# Patient Record
Sex: MALE | Race: WHITE | Employment: OTHER | URBAN - METROPOLITAN AREA
[De-identification: names, ages, dates, MRNs, and addresses within clinical notes are randomized per-mention and may not be internally consistent; named-entity substitution may affect disease eponyms.]

---

## 2017-05-12 PROBLEM — E78.00 HYPERCHOLESTEROLEMIA: Status: ACTIVE | Noted: 2017-05-12

## 2017-05-12 PROBLEM — K63.5 POLYP OF COLON: Status: ACTIVE | Noted: 2017-05-12

## 2017-05-12 PROBLEM — M19.90 ARTHRITIS: Status: ACTIVE | Noted: 2017-05-12

## 2017-05-15 PROBLEM — E16.2 HYPOGLYCEMIA: Status: ACTIVE | Noted: 2017-05-15

## 2017-05-15 PROBLEM — E78.5 HYPERLIPIDEMIA: Status: ACTIVE | Noted: 2017-05-12

## 2018-06-07 ENCOUNTER — HOSPITAL ENCOUNTER (OUTPATIENT)
Age: 68
Discharge: HOME | End: 2018-06-09
Payer: MEDICARE

## 2018-06-07 DIAGNOSIS — Z00.00 ROUTINE GENERAL MEDICAL EXAMINATION AT A HEALTH CARE FACILITY: ICD-10-CM

## 2018-06-07 DIAGNOSIS — Z11.59 ENCOUNTER FOR HEPATITIS C SCREENING TEST FOR LOW RISK PATIENT: ICD-10-CM

## 2018-06-07 DIAGNOSIS — D64.9 ANEMIA, UNSPECIFIED TYPE: ICD-10-CM

## 2018-06-07 DIAGNOSIS — R53.83 FATIGUE, UNSPECIFIED TYPE: ICD-10-CM

## 2018-06-07 DIAGNOSIS — E16.2 HYPOGLYCEMIA: ICD-10-CM

## 2018-06-07 DIAGNOSIS — N39.0 URINARY TRACT INFECTION WITHOUT HEMATURIA, SITE UNSPECIFIED: ICD-10-CM

## 2018-06-07 DIAGNOSIS — Z12.5 SCREENING PSA (PROSTATE SPECIFIC ANTIGEN): ICD-10-CM

## 2018-06-07 DIAGNOSIS — E78.2 MIXED HYPERLIPIDEMIA: Chronic | ICD-10-CM

## 2018-06-07 DIAGNOSIS — E55.9 VITAMIN D DEFICIENCY: ICD-10-CM

## 2018-06-07 LAB
BASOPHILS ABSOLUTE: 0.03 E9/L (ref 0–0.2)
BASOPHILS RELATIVE PERCENT: 0.7 % (ref 0–2)
EOSINOPHILS ABSOLUTE: 0.18 E9/L (ref 0.05–0.5)
EOSINOPHILS RELATIVE PERCENT: 4.2 % (ref 0–6)
HCT VFR BLD CALC: 37.7 % (ref 37–54)
HEMOGLOBIN: 12.9 G/DL (ref 12.5–16.5)
IMMATURE GRANULOCYTES #: 0.01 E9/L
IMMATURE GRANULOCYTES %: 0.2 % (ref 0–5)
LYMPHOCYTES ABSOLUTE: 1.31 E9/L (ref 1.5–4)
LYMPHOCYTES RELATIVE PERCENT: 30.6 % (ref 20–42)
MCH RBC QN AUTO: 31.5 PG (ref 26–35)
MCHC RBC AUTO-ENTMCNC: 34.2 % (ref 32–34.5)
MCV RBC AUTO: 92.2 FL (ref 80–99.9)
MONOCYTES ABSOLUTE: 0.59 E9/L (ref 0.1–0.95)
MONOCYTES RELATIVE PERCENT: 13.8 % (ref 2–12)
NEUTROPHILS ABSOLUTE: 2.16 E9/L (ref 1.8–7.3)
NEUTROPHILS RELATIVE PERCENT: 50.5 % (ref 43–80)
PDW BLD-RTO: 13.5 FL (ref 11.5–15)
PLATELET # BLD: 195 E9/L (ref 130–450)
PMV BLD AUTO: 8.5 FL (ref 7–12)
RBC # BLD: 4.09 E12/L (ref 3.8–5.8)
WBC # BLD: 4.3 E9/L (ref 4.5–11.5)

## 2018-06-07 PROCEDURE — 83036 HEMOGLOBIN GLYCOSYLATED A1C: CPT

## 2018-06-07 PROCEDURE — 80074 ACUTE HEPATITIS PANEL: CPT

## 2018-06-07 PROCEDURE — 85025 COMPLETE CBC W/AUTO DIFF WBC: CPT

## 2018-06-07 PROCEDURE — G0103 PSA SCREENING: HCPCS

## 2018-06-07 PROCEDURE — 82746 ASSAY OF FOLIC ACID SERUM: CPT

## 2018-06-07 PROCEDURE — 84443 ASSAY THYROID STIM HORMONE: CPT

## 2018-06-07 PROCEDURE — 82607 VITAMIN B-12: CPT

## 2018-06-07 PROCEDURE — 84100 ASSAY OF PHOSPHORUS: CPT

## 2018-06-07 PROCEDURE — 83735 ASSAY OF MAGNESIUM: CPT

## 2018-06-07 PROCEDURE — 85651 RBC SED RATE NONAUTOMATED: CPT

## 2018-06-07 PROCEDURE — 80053 COMPREHEN METABOLIC PANEL: CPT

## 2018-06-07 PROCEDURE — 81001 URINALYSIS AUTO W/SCOPE: CPT

## 2018-06-07 PROCEDURE — 80061 LIPID PANEL: CPT

## 2018-06-07 PROCEDURE — 82306 VITAMIN D 25 HYDROXY: CPT

## 2018-06-08 LAB
ALBUMIN SERPL-MCNC: 4.7 G/DL (ref 3.5–5.2)
ALP BLD-CCNC: 31 U/L (ref 40–129)
ALT SERPL-CCNC: 15 U/L (ref 0–40)
ANION GAP SERPL CALCULATED.3IONS-SCNC: 11 MMOL/L (ref 7–16)
AST SERPL-CCNC: 26 U/L (ref 0–39)
BACTERIA: NORMAL /HPF
BILIRUB SERPL-MCNC: 0.5 MG/DL (ref 0–1.2)
BILIRUBIN URINE: NEGATIVE
BLOOD, URINE: NEGATIVE
BUN BLDV-MCNC: 7 MG/DL (ref 8–23)
CALCIUM SERPL-MCNC: 9.2 MG/DL (ref 8.6–10.2)
CHLORIDE BLD-SCNC: 93 MMOL/L (ref 98–107)
CHOLESTEROL, TOTAL: 130 MG/DL (ref 0–199)
CLARITY: CLEAR
CO2: 27 MMOL/L (ref 22–29)
COLOR: YELLOW
CREAT SERPL-MCNC: 0.8 MG/DL (ref 0.7–1.2)
FOLATE: >20 NG/ML (ref 4.8–24.2)
GFR AFRICAN AMERICAN: >60
GFR NON-AFRICAN AMERICAN: >60 ML/MIN/1.73
GLUCOSE BLD-MCNC: 132 MG/DL (ref 74–109)
GLUCOSE URINE: NEGATIVE MG/DL
HAV IGM SER IA-ACNC: NORMAL
HBA1C MFR BLD: 5.5 % (ref 4.8–5.9)
HDLC SERPL-MCNC: 68 MG/DL
HEPATITIS B CORE IGM ANTIBODY: NORMAL
HEPATITIS B SURFACE ANTIGEN INTERPRETATION: NORMAL
HEPATITIS C ANTIBODY INTERPRETATION: NORMAL
KETONES, URINE: NEGATIVE MG/DL
LDL CHOLESTEROL CALCULATED: 55 MG/DL (ref 0–99)
LEUKOCYTE ESTERASE, URINE: NEGATIVE
MAGNESIUM: 1.9 MG/DL (ref 1.6–2.6)
NITRITE, URINE: NEGATIVE
PH UA: 6 (ref 5–9)
PHOSPHORUS: 3.3 MG/DL (ref 2.5–4.5)
POTASSIUM SERPL-SCNC: 4.7 MMOL/L (ref 3.5–5)
PROSTATE SPECIFIC ANTIGEN: 1.79 NG/ML (ref 0–4)
PROTEIN UA: NEGATIVE MG/DL
RBC UA: NORMAL /HPF (ref 0–2)
SEDIMENTATION RATE, ERYTHROCYTE: 0 MM/HR (ref 0–15)
SODIUM BLD-SCNC: 131 MMOL/L (ref 132–146)
SPECIFIC GRAVITY UA: 1.01 (ref 1–1.03)
TOTAL PROTEIN: 7.2 G/DL (ref 6.4–8.3)
TRIGL SERPL-MCNC: 34 MG/DL (ref 0–149)
TSH SERPL DL<=0.05 MIU/L-ACNC: 2.76 UIU/ML (ref 0.27–4.2)
UROBILINOGEN, URINE: 0.2 E.U./DL
VITAMIN B-12: 728 PG/ML (ref 211–946)
VITAMIN D 25-HYDROXY: 26 NG/ML (ref 30–100)
VLDLC SERPL CALC-MCNC: 7 MG/DL
WBC UA: NORMAL /HPF (ref 0–5)

## 2018-08-17 ENCOUNTER — APPOINTMENT (OUTPATIENT)
Dept: GENERAL RADIOLOGY | Age: 68
DRG: 336 | End: 2018-08-17
Payer: MEDICARE

## 2018-08-17 ENCOUNTER — HOSPITAL ENCOUNTER (INPATIENT)
Age: 68
LOS: 4 days | Discharge: HOME | DRG: 336 | End: 2018-08-21
Attending: EMERGENCY MEDICINE | Admitting: INTERNAL MEDICINE
Payer: MEDICARE

## 2018-08-17 ENCOUNTER — APPOINTMENT (OUTPATIENT)
Dept: CT IMAGING | Age: 68
DRG: 336 | End: 2018-08-17
Payer: MEDICARE

## 2018-08-17 DIAGNOSIS — D72.829 LEUKOCYTOSIS, UNSPECIFIED TYPE: ICD-10-CM

## 2018-08-17 DIAGNOSIS — N17.9 ACUTE RENAL FAILURE, UNSPECIFIED ACUTE RENAL FAILURE TYPE (HCC): Primary | ICD-10-CM

## 2018-08-17 DIAGNOSIS — K56.609 SMALL BOWEL OBSTRUCTION (HCC): ICD-10-CM

## 2018-08-17 DIAGNOSIS — E87.20 LACTIC ACIDOSIS: ICD-10-CM

## 2018-08-17 DIAGNOSIS — R10.9 ABDOMINAL PAIN, UNSPECIFIED ABDOMINAL LOCATION: ICD-10-CM

## 2018-08-17 LAB
ALBUMIN SERPL-MCNC: 4.6 G/DL (ref 3.5–5.2)
ALP BLD-CCNC: 33 U/L (ref 40–129)
ALT SERPL-CCNC: 13 U/L (ref 0–40)
ANION GAP SERPL CALCULATED.3IONS-SCNC: 21 MMOL/L (ref 7–16)
AST SERPL-CCNC: 23 U/L (ref 0–39)
BACTERIA: NORMAL /HPF
BASOPHILS ABSOLUTE: 0 E9/L (ref 0–0.2)
BASOPHILS RELATIVE PERCENT: 0.2 % (ref 0–2)
BILIRUB SERPL-MCNC: 1.1 MG/DL (ref 0–1.2)
BILIRUBIN URINE: ABNORMAL
BLOOD, URINE: NEGATIVE
BUN BLDV-MCNC: 28 MG/DL (ref 8–23)
BURR CELLS: ABNORMAL
CALCIUM SERPL-MCNC: 10.3 MG/DL (ref 8.6–10.2)
CHLORIDE BLD-SCNC: 89 MMOL/L (ref 98–107)
CLARITY: ABNORMAL
CO2: 24 MMOL/L (ref 22–29)
COLOR: YELLOW
CREAT SERPL-MCNC: 2.7 MG/DL (ref 0.7–1.2)
EOSINOPHILS ABSOLUTE: 0 E9/L (ref 0.05–0.5)
EOSINOPHILS RELATIVE PERCENT: 0 % (ref 0–6)
GFR AFRICAN AMERICAN: 28
GFR NON-AFRICAN AMERICAN: 24 ML/MIN/1.73
GLUCOSE BLD-MCNC: 197 MG/DL (ref 74–109)
GLUCOSE URINE: NEGATIVE MG/DL
HCT VFR BLD CALC: 41.4 % (ref 37–54)
HEMOGLOBIN: 14.7 G/DL (ref 12.5–16.5)
INR BLD: 1.1
KETONES, URINE: 15 MG/DL
LACTIC ACID: 3.6 MMOL/L (ref 0.5–2.2)
LEUKOCYTE ESTERASE, URINE: NEGATIVE
LIPASE: 9 U/L (ref 13–60)
LYMPHOCYTES ABSOLUTE: 1.37 E9/L (ref 1.5–4)
LYMPHOCYTES RELATIVE PERCENT: 6.9 % (ref 20–42)
MAGNESIUM: 1.6 MG/DL (ref 1.6–2.6)
MCH RBC QN AUTO: 31.4 PG (ref 26–35)
MCHC RBC AUTO-ENTMCNC: 35.5 % (ref 32–34.5)
MCV RBC AUTO: 88.5 FL (ref 80–99.9)
MONOCYTES ABSOLUTE: 0 E9/L (ref 0.1–0.95)
MONOCYTES RELATIVE PERCENT: 2.6 % (ref 2–12)
NEUTROPHILS ABSOLUTE: 18.14 E9/L (ref 1.8–7.3)
NEUTROPHILS RELATIVE PERCENT: 93.1 % (ref 43–80)
NITRITE, URINE: NEGATIVE
OVALOCYTES: ABNORMAL
PDW BLD-RTO: 13.3 FL (ref 11.5–15)
PH UA: 5.5 (ref 5–9)
PLATELET # BLD: 184 E9/L (ref 130–450)
PMV BLD AUTO: 8.7 FL (ref 7–12)
POIKILOCYTES: ABNORMAL
POTASSIUM SERPL-SCNC: 4.2 MMOL/L (ref 3.5–5)
PROTEIN UA: 30 MG/DL
PROTHROMBIN TIME: 12.8 SEC (ref 9.3–12.4)
RBC # BLD: 4.68 E12/L (ref 3.8–5.8)
RBC UA: NORMAL /HPF (ref 0–2)
SODIUM BLD-SCNC: 134 MMOL/L (ref 132–146)
SPECIFIC GRAVITY UA: 1.02 (ref 1–1.03)
TOTAL PROTEIN: 7.6 G/DL (ref 6.4–8.3)
TROPONIN: <0.01 NG/ML (ref 0–0.03)
UROBILINOGEN, URINE: 0.2 E.U./DL
WBC # BLD: 19.5 E9/L (ref 4.5–11.5)
WBC UA: NORMAL /HPF (ref 0–5)

## 2018-08-17 PROCEDURE — 80053 COMPREHEN METABOLIC PANEL: CPT

## 2018-08-17 PROCEDURE — 81001 URINALYSIS AUTO W/SCOPE: CPT

## 2018-08-17 PROCEDURE — 83735 ASSAY OF MAGNESIUM: CPT

## 2018-08-17 PROCEDURE — 83605 ASSAY OF LACTIC ACID: CPT

## 2018-08-17 PROCEDURE — 96374 THER/PROPH/DIAG INJ IV PUSH: CPT

## 2018-08-17 PROCEDURE — 84484 ASSAY OF TROPONIN QUANT: CPT

## 2018-08-17 PROCEDURE — 2580000003 HC RX 258: Performed by: NURSE PRACTITIONER

## 2018-08-17 PROCEDURE — 6360000002 HC RX W HCPCS: Performed by: NURSE PRACTITIONER

## 2018-08-17 PROCEDURE — 6370000000 HC RX 637 (ALT 250 FOR IP): Performed by: NURSE PRACTITIONER

## 2018-08-17 PROCEDURE — 74176 CT ABD & PELVIS W/O CONTRAST: CPT

## 2018-08-17 PROCEDURE — 71046 X-RAY EXAM CHEST 2 VIEWS: CPT

## 2018-08-17 PROCEDURE — 1200000000 HC SEMI PRIVATE

## 2018-08-17 PROCEDURE — 36415 COLL VENOUS BLD VENIPUNCTURE: CPT

## 2018-08-17 PROCEDURE — 96375 TX/PRO/DX INJ NEW DRUG ADDON: CPT

## 2018-08-17 PROCEDURE — 83690 ASSAY OF LIPASE: CPT

## 2018-08-17 PROCEDURE — 99285 EMERGENCY DEPT VISIT HI MDM: CPT

## 2018-08-17 PROCEDURE — 85025 COMPLETE CBC W/AUTO DIFF WBC: CPT

## 2018-08-17 PROCEDURE — 85610 PROTHROMBIN TIME: CPT

## 2018-08-17 RX ORDER — 0.9 % SODIUM CHLORIDE 0.9 %
500 INTRAVENOUS SOLUTION INTRAVENOUS ONCE
Status: COMPLETED | OUTPATIENT
Start: 2018-08-17 | End: 2018-08-17

## 2018-08-17 RX ORDER — MORPHINE SULFATE 4 MG/ML
4 INJECTION, SOLUTION INTRAMUSCULAR; INTRAVENOUS ONCE
Status: COMPLETED | OUTPATIENT
Start: 2018-08-17 | End: 2018-08-17

## 2018-08-17 RX ORDER — ONDANSETRON 2 MG/ML
4 INJECTION INTRAMUSCULAR; INTRAVENOUS ONCE
Status: COMPLETED | OUTPATIENT
Start: 2018-08-17 | End: 2018-08-17

## 2018-08-17 RX ORDER — 0.9 % SODIUM CHLORIDE 0.9 %
1000 INTRAVENOUS SOLUTION INTRAVENOUS ONCE
Status: COMPLETED | OUTPATIENT
Start: 2018-08-17 | End: 2018-08-17

## 2018-08-17 RX ADMIN — SODIUM CHLORIDE 500 ML: 9 INJECTION, SOLUTION INTRAVENOUS at 19:35

## 2018-08-17 RX ADMIN — LIDOCAINE HYDROCHLORIDE: 20 SOLUTION ORAL; TOPICAL at 21:03

## 2018-08-17 RX ADMIN — MORPHINE SULFATE 4 MG: 4 INJECTION INTRAVENOUS at 19:34

## 2018-08-17 RX ADMIN — SODIUM CHLORIDE 1000 ML: 9 INJECTION, SOLUTION INTRAVENOUS at 20:30

## 2018-08-17 RX ADMIN — ONDANSETRON 4 MG: 2 INJECTION INTRAMUSCULAR; INTRAVENOUS at 19:35

## 2018-08-17 ASSESSMENT — PAIN DESCRIPTION - DESCRIPTORS: DESCRIPTORS: ACHING;DISCOMFORT;DULL

## 2018-08-17 ASSESSMENT — PAIN SCALES - GENERAL: PAINLEVEL_OUTOF10: 8

## 2018-08-17 ASSESSMENT — PAIN DESCRIPTION - PAIN TYPE: TYPE: ACUTE PAIN

## 2018-08-17 ASSESSMENT — PAIN DESCRIPTION - LOCATION: LOCATION: ABDOMEN

## 2018-08-17 NOTE — ED PROVIDER NOTES
EXAM--------------------------------------    Constitutional/General: Alert and oriented x3, well appearing, non toxic in NAD  Head: Normocephalic and atraumatic  Eyes: PERRL, EOMI  Mouth: Oropharynx clear, handling secretions, no trismus  Neck: Supple, full ROM, non tender to palpation in the midline, no stridor, no crepitus, no meningeal signs  Pulmonary: Lungs clear to auscultation bilaterally, no wheezes, rales, or rhonchi. Not in respiratory distress  Cardiovascular:  Tachycardic rate. Regular rhythm. No murmurs, gallops, or rubs. 2+ distal pulses  Chest: no chest wall tenderness  Abdomen: Soft. Diffuse tenderness throughout. Non distended. No rebound, guarding, or rigidity. No pulsatile masses appreciated. Tympanic sounding. Absent bowel sounds  Musculoskeletal: Moves all extremities x 4. Warm and well perfused, no clubbing, cyanosis, or edema. Capillary refill <3 seconds  Skin: warm and dry. No rashes. Neurologic: GCS 15, CN 2-12 grossly intact, no focal deficits, symmetric strength 5/5 in the upper and lower extremities bilaterally  Psych: Normal Affect    -------------------------------------------------- RESULTS -------------------------------------------------  I have personally reviewed all laboratory and imaging results for this patient. Results are listed below.      LABS:  Results for orders placed or performed during the hospital encounter of 08/17/18   Troponin   Result Value Ref Range    Troponin <0.01 0.00 - 0.03 ng/mL   CBC Auto Differential   Result Value Ref Range    WBC 19.5 (H) 4.5 - 11.5 E9/L    RBC 4.68 3.80 - 5.80 E12/L    Hemoglobin 14.7 12.5 - 16.5 g/dL    Hematocrit 41.4 37.0 - 54.0 %    MCV 88.5 80.0 - 99.9 fL    MCH 31.4 26.0 - 35.0 pg    MCHC 35.5 (H) 32.0 - 34.5 %    RDW 13.3 11.5 - 15.0 fL    Platelets 950 028 - 922 E9/L    MPV 8.7 7.0 - 12.0 fL    Neutrophils % 93.1 (H) 43.0 - 80.0 %    Lymphocytes % 6.9 (L) 20.0 - 42.0 %    Monocytes % 2.6 2.0 - 12.0 %    Eosinophils % 0.0 and reevaluate. 2120- call to Dr. Hector Ahuja updated on patient's clinical presentation physical exam and diagnostic results will his service to a general medical bed with diagnosis of small bowel obstruction. 2135- Call to Dr. Blaise Lares, made aware of the consult request    Re-Evaluations:             Re-evaluation. Patients symptoms show no change      Consultations:                 Critical Care: This patient's ED course included: a personal history and physicial examination, re-evaluation prior to disposition, multiple bedside re-evaluations and a personal history and physicial eaxmination    This patient has remained hemodynamically stable and remained unchanged during their ED course. Counseling: The emergency provider has spoken with the patient and spouse/SO and discussed todays results, in addition to providing specific details for the plan of care and counseling regarding the diagnosis and prognosis. Questions are answered at this time and they are agreeable with the plan.       --------------------------------- IMPRESSION AND DISPOSITION ---------------------------------    IMPRESSION  1. Acute renal failure, unspecified acute renal failure type ([address]    2. Lactic acidosis    3. Abdominal pain, unspecified abdominal location    4. Leukocytosis, unspecified type    5. Small bowel obstruction ([address]        DISPOSITION  Disposition: Admit to med/surg floor  Patient condition is stable        NOTE: This report was transcribed using voice recognition software.  Every effort was made to ensure accuracy; however, inadvertent computerized transcription errors may be present         LINDSEY Maria CNP  08/17/18 2125       LINDSEY Maria CNP  08/17/18 2142

## 2018-08-18 LAB
ALBUMIN SERPL-MCNC: 3.7 G/DL (ref 3.5–5.2)
ALP BLD-CCNC: 29 U/L (ref 40–129)
ALT SERPL-CCNC: 10 U/L (ref 0–40)
ANION GAP SERPL CALCULATED.3IONS-SCNC: 14 MMOL/L (ref 7–16)
ANISOCYTOSIS: ABNORMAL
AST SERPL-CCNC: 19 U/L (ref 0–39)
BASOPHILS ABSOLUTE: 0 E9/L (ref 0–0.2)
BASOPHILS RELATIVE PERCENT: 0.2 % (ref 0–2)
BILIRUB SERPL-MCNC: 0.9 MG/DL (ref 0–1.2)
BUN BLDV-MCNC: 36 MG/DL (ref 8–23)
BURR CELLS: ABNORMAL
CALCIUM SERPL-MCNC: 9.6 MG/DL (ref 8.6–10.2)
CHLORIDE BLD-SCNC: 91 MMOL/L (ref 98–107)
CO2: 27 MMOL/L (ref 22–29)
CREAT SERPL-MCNC: 2.1 MG/DL (ref 0.7–1.2)
EOSINOPHILS ABSOLUTE: 0 E9/L (ref 0.05–0.5)
EOSINOPHILS RELATIVE PERCENT: 0 % (ref 0–6)
GFR AFRICAN AMERICAN: 38
GFR NON-AFRICAN AMERICAN: 31 ML/MIN/1.73
GLUCOSE BLD-MCNC: 163 MG/DL (ref 74–109)
HCT VFR BLD CALC: 37.3 % (ref 37–54)
HEMOGLOBIN: 13 G/DL (ref 12.5–16.5)
LACTIC ACID: 1.1 MMOL/L (ref 0.5–2.2)
LYMPHOCYTES ABSOLUTE: 0.88 E9/L (ref 1.5–4)
LYMPHOCYTES RELATIVE PERCENT: 6 % (ref 20–42)
MCH RBC QN AUTO: 31.1 PG (ref 26–35)
MCHC RBC AUTO-ENTMCNC: 34.9 % (ref 32–34.5)
MCV RBC AUTO: 89.2 FL (ref 80–99.9)
METER GLUCOSE: 139 MG/DL (ref 70–110)
METER GLUCOSE: 164 MG/DL (ref 70–110)
MONOCYTES ABSOLUTE: 0.44 E9/L (ref 0.1–0.95)
MONOCYTES RELATIVE PERCENT: 2.6 % (ref 2–12)
NEUTROPHILS ABSOLUTE: 13.43 E9/L (ref 1.8–7.3)
NEUTROPHILS RELATIVE PERCENT: 91.5 % (ref 43–80)
OVALOCYTES: ABNORMAL
PDW BLD-RTO: 13.6 FL (ref 11.5–15)
PLATELET # BLD: 163 E9/L (ref 130–450)
PMV BLD AUTO: 8.4 FL (ref 7–12)
POIKILOCYTES: ABNORMAL
POTASSIUM SERPL-SCNC: 3.9 MMOL/L (ref 3.5–5)
RBC # BLD: 4.18 E12/L (ref 3.8–5.8)
SODIUM BLD-SCNC: 132 MMOL/L (ref 132–146)
TOTAL PROTEIN: 6.3 G/DL (ref 6.4–8.3)
WBC # BLD: 14.6 E9/L (ref 4.5–11.5)

## 2018-08-18 PROCEDURE — 6370000000 HC RX 637 (ALT 250 FOR IP): Performed by: FAMILY MEDICINE

## 2018-08-18 PROCEDURE — 1200000000 HC SEMI PRIVATE

## 2018-08-18 PROCEDURE — 83605 ASSAY OF LACTIC ACID: CPT

## 2018-08-18 PROCEDURE — 36415 COLL VENOUS BLD VENIPUNCTURE: CPT

## 2018-08-18 PROCEDURE — 6370000000 HC RX 637 (ALT 250 FOR IP): Performed by: INTERNAL MEDICINE

## 2018-08-18 PROCEDURE — 2580000003 HC RX 258: Performed by: INTERNAL MEDICINE

## 2018-08-18 PROCEDURE — 99223 1ST HOSP IP/OBS HIGH 75: CPT | Performed by: SURGERY

## 2018-08-18 PROCEDURE — 85025 COMPLETE CBC W/AUTO DIFF WBC: CPT

## 2018-08-18 PROCEDURE — 82962 GLUCOSE BLOOD TEST: CPT

## 2018-08-18 PROCEDURE — 2580000003 HC RX 258: Performed by: FAMILY MEDICINE

## 2018-08-18 PROCEDURE — 6360000002 HC RX W HCPCS: Performed by: FAMILY MEDICINE

## 2018-08-18 PROCEDURE — 80053 COMPREHEN METABOLIC PANEL: CPT

## 2018-08-18 PROCEDURE — C9113 INJ PANTOPRAZOLE SODIUM, VIA: HCPCS | Performed by: FAMILY MEDICINE

## 2018-08-18 RX ORDER — ACETAMINOPHEN 325 MG/1
650 TABLET ORAL EVERY 4 HOURS PRN
Status: DISCONTINUED | OUTPATIENT
Start: 2018-08-18 | End: 2018-08-21 | Stop reason: HOSPADM

## 2018-08-18 RX ORDER — 0.9 % SODIUM CHLORIDE 0.9 %
10 VIAL (ML) INJECTION DAILY
Status: DISCONTINUED | OUTPATIENT
Start: 2018-08-18 | End: 2018-08-21

## 2018-08-18 RX ORDER — ATORVASTATIN CALCIUM 40 MG/1
40 TABLET, FILM COATED ORAL DAILY
Status: DISCONTINUED | OUTPATIENT
Start: 2018-08-18 | End: 2018-08-21 | Stop reason: HOSPADM

## 2018-08-18 RX ORDER — ONDANSETRON 2 MG/ML
4 INJECTION INTRAMUSCULAR; INTRAVENOUS EVERY 6 HOURS PRN
Status: DISCONTINUED | OUTPATIENT
Start: 2018-08-18 | End: 2018-08-21 | Stop reason: HOSPADM

## 2018-08-18 RX ORDER — HEPARIN SODIUM 5000 [USP'U]/ML
5000 INJECTION, SOLUTION INTRAVENOUS; SUBCUTANEOUS EVERY 8 HOURS SCHEDULED
Status: DISCONTINUED | OUTPATIENT
Start: 2018-08-18 | End: 2018-08-21 | Stop reason: HOSPADM

## 2018-08-18 RX ORDER — DEXTROSE MONOHYDRATE 50 MG/ML
100 INJECTION, SOLUTION INTRAVENOUS PRN
Status: DISCONTINUED | OUTPATIENT
Start: 2018-08-18 | End: 2018-08-21 | Stop reason: HOSPADM

## 2018-08-18 RX ORDER — 0.9 % SODIUM CHLORIDE 0.9 %
1000 INTRAVENOUS SOLUTION INTRAVENOUS ONCE
Status: COMPLETED | OUTPATIENT
Start: 2018-08-18 | End: 2018-08-18

## 2018-08-18 RX ORDER — PANTOPRAZOLE SODIUM 40 MG/10ML
40 INJECTION, POWDER, LYOPHILIZED, FOR SOLUTION INTRAVENOUS DAILY
Status: DISCONTINUED | OUTPATIENT
Start: 2018-08-18 | End: 2018-08-21

## 2018-08-18 RX ORDER — DEXTROSE MONOHYDRATE 25 G/50ML
12.5 INJECTION, SOLUTION INTRAVENOUS PRN
Status: DISCONTINUED | OUTPATIENT
Start: 2018-08-18 | End: 2018-08-21 | Stop reason: HOSPADM

## 2018-08-18 RX ORDER — SODIUM CHLORIDE 9 MG/ML
INJECTION, SOLUTION INTRAVENOUS CONTINUOUS
Status: DISCONTINUED | OUTPATIENT
Start: 2018-08-18 | End: 2018-08-19

## 2018-08-18 RX ORDER — SODIUM CHLORIDE 9 MG/ML
INJECTION, SOLUTION INTRAVENOUS CONTINUOUS
Status: DISCONTINUED | OUTPATIENT
Start: 2018-08-18 | End: 2018-08-18

## 2018-08-18 RX ORDER — FENTANYL CITRATE 50 UG/ML
50 INJECTION, SOLUTION INTRAMUSCULAR; INTRAVENOUS
Status: DISCONTINUED | OUTPATIENT
Start: 2018-08-18 | End: 2018-08-21 | Stop reason: HOSPADM

## 2018-08-18 RX ORDER — NICOTINE POLACRILEX 4 MG
15 LOZENGE BUCCAL PRN
Status: DISCONTINUED | OUTPATIENT
Start: 2018-08-18 | End: 2018-08-21 | Stop reason: HOSPADM

## 2018-08-18 RX ADMIN — ATORVASTATIN CALCIUM 40 MG: 40 TABLET, FILM COATED ORAL at 08:46

## 2018-08-18 RX ADMIN — SODIUM CHLORIDE: 9 INJECTION, SOLUTION INTRAVENOUS at 23:44

## 2018-08-18 RX ADMIN — FENTANYL CITRATE 50 MCG: 50 INJECTION INTRAMUSCULAR; INTRAVENOUS at 10:03

## 2018-08-18 RX ADMIN — PANTOPRAZOLE SODIUM 40 MG: 40 INJECTION, POWDER, FOR SOLUTION INTRAVENOUS at 09:33

## 2018-08-18 RX ADMIN — HEPARIN SODIUM 5000 UNITS: 5000 INJECTION, SOLUTION INTRAVENOUS; SUBCUTANEOUS at 13:24

## 2018-08-18 RX ADMIN — FENTANYL CITRATE 50 MCG: 50 INJECTION INTRAMUSCULAR; INTRAVENOUS at 05:25

## 2018-08-18 RX ADMIN — HEPARIN SODIUM 5000 UNITS: 5000 INJECTION, SOLUTION INTRAVENOUS; SUBCUTANEOUS at 20:07

## 2018-08-18 RX ADMIN — FENTANYL CITRATE 50 MCG: 50 INJECTION INTRAMUSCULAR; INTRAVENOUS at 03:28

## 2018-08-18 RX ADMIN — FENTANYL CITRATE 50 MCG: 50 INJECTION INTRAMUSCULAR; INTRAVENOUS at 00:55

## 2018-08-18 RX ADMIN — SODIUM CHLORIDE 1000 ML: 9 INJECTION, SOLUTION INTRAVENOUS at 16:22

## 2018-08-18 RX ADMIN — FENTANYL CITRATE 50 MCG: 50 INJECTION INTRAMUSCULAR; INTRAVENOUS at 08:00

## 2018-08-18 RX ADMIN — FENTANYL CITRATE 50 MCG: 50 INJECTION INTRAMUSCULAR; INTRAVENOUS at 13:43

## 2018-08-18 RX ADMIN — BENZOCAINE, BUTAMBEN, AND TETRACAINE HYDROCHLORIDE 1 SPRAY: .028; .004; .004 AEROSOL, SPRAY TOPICAL at 19:29

## 2018-08-18 RX ADMIN — SODIUM CHLORIDE: 9 INJECTION, SOLUTION INTRAVENOUS at 09:31

## 2018-08-18 RX ADMIN — ONDANSETRON HYDROCHLORIDE 4 MG: 2 INJECTION, SOLUTION INTRAMUSCULAR; INTRAVENOUS at 07:55

## 2018-08-18 RX ADMIN — Medication 10 ML: at 09:35

## 2018-08-18 RX ADMIN — ONDANSETRON HYDROCHLORIDE 4 MG: 2 INJECTION, SOLUTION INTRAMUSCULAR; INTRAVENOUS at 00:56

## 2018-08-18 RX ADMIN — Medication 10 ML: at 10:04

## 2018-08-18 RX ADMIN — SODIUM CHLORIDE: 9 INJECTION, SOLUTION INTRAVENOUS at 01:00

## 2018-08-18 ASSESSMENT — PAIN SCALES - GENERAL
PAINLEVEL_OUTOF10: 4
PAINLEVEL_OUTOF10: 8
PAINLEVEL_OUTOF10: 10
PAINLEVEL_OUTOF10: 8
PAINLEVEL_OUTOF10: 8
PAINLEVEL_OUTOF10: 6
PAINLEVEL_OUTOF10: 8
PAINLEVEL_OUTOF10: 8

## 2018-08-18 ASSESSMENT — PAIN DESCRIPTION - PAIN TYPE
TYPE: ACUTE PAIN
TYPE: ACUTE PAIN

## 2018-08-18 ASSESSMENT — PAIN DESCRIPTION - FREQUENCY
FREQUENCY: CONTINUOUS
FREQUENCY: CONTINUOUS

## 2018-08-18 ASSESSMENT — PAIN DESCRIPTION - DESCRIPTORS
DESCRIPTORS: ACHING;CONSTANT;DISCOMFORT
DESCRIPTORS: ACHING;DISCOMFORT;SORE;TENDER

## 2018-08-18 ASSESSMENT — PAIN DESCRIPTION - LOCATION
LOCATION: ABDOMEN
LOCATION: ABDOMEN

## 2018-08-18 ASSESSMENT — PAIN DESCRIPTION - ONSET
ONSET: ON-GOING
ONSET: ON-GOING

## 2018-08-18 ASSESSMENT — PAIN DESCRIPTION - PROGRESSION: CLINICAL_PROGRESSION: NOT CHANGED

## 2018-08-18 ASSESSMENT — PAIN DESCRIPTION - ORIENTATION: ORIENTATION: MID

## 2018-08-18 NOTE — CONSULTS
trimethobenzamide (TIGAN) injection 200 mg  200 mg Intramuscular Q6H PRN Clemencia Stephanie, DO        acetaminophen (TYLENOL) tablet 650 mg  650 mg Oral Q4H PRN Clemencia Stephanie, DO        glucose (GLUTOSE) 40 % oral gel 15 g  15 g Oral PRN Clemencia Stephanie, DO        dextrose 50 % solution 12.5 g  12.5 g Intravenous PRN Clemencia Stephanie, DO        glucagon (rDNA) injection 1 mg  1 mg Intramuscular PRN Clemencia Stephanie, DO        dextrose 5 % solution  100 mL/hr Intravenous PRN Clemencia Stephanie, DO        fentaNYL (SUBLIMAZE) injection 50 mcg  50 mcg Intravenous Q2H PRN Clemencia Stephanie, DO   50 mcg at 08/18/18 1003       No Known Allergies    The patient has a family history that is negative for severe cardiovascular or respiratory issues, negative for reaction to anesthesia.     Social History     Social History    Marital status:      Spouse name: Hao Barry Number of children: 2    Years of education: 16     Occupational History    -retired      Social History Main Topics    Smoking status: Former Smoker     Quit date: 2/13/2011    Smokeless tobacco: Never Used    Alcohol use 6.0 oz/week     10 Cans of beer per week      Comment: moderate 3-4 per day    Drug use: No    Sexual activity: Yes     Partners: Female     Other Topics Concern    Not on file     Social History Narrative    No narrative on file           Review of Systems  General ROS: negative for - chills, fatigue or fever  ENT ROS: negative for - headaches, hearing change or nasal congestion  Endocrine ROS: negative for - breast changes or galactorrhea, no polyuria  Respiratory ROS: negative for - hemoptysis, orthopnea or pleuritic pain  Cardiovascular ROS: negative for - dyspnea on exertion, edema or loss of consciousness  Gastrointestinal ROS: negative for - blood in stools, heartburn, hematemesis or melena  : no polyuria, no dysuria  Musculoskeletal ROS: negative for - gait

## 2018-08-18 NOTE — H&P
INTERNAL MEDICINE H&P  2018  Name:  Sam Ramirez  :    (70 y.o.)  MRN:   [id number]    Chief Complaint   Patient presents with    Emesis     vomiting abd pain denies any diarrhea         HPI:  The patient is a 70 y.o. male presents with abdominal pain and bloating that began yesterday. He noticed swelling in his belly yesterday afternoon. His last episode of emesis was this morning, his last BM was this morning, he regularly goes every day. He ate a bowl of cereal at 5:30pm, prior to coming to the ED. He admits to burping. He denies diarrhea, fever, chills, cheat pain, dysuria. 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 [facility] 2015 - Extended pylorus preserving whipple procedure with pancreaticojejunostomy, choledochojejunostomy, duodenojejunostomy, placement of nasoenteric feeding tube into the distal jejenum, intraoperative pancreatic ultrasound. He has a hx of duodenitis, esophagitis, colon polyps, c diff, ALFRED.         Past Medical History:    Past Medical History:   Diagnosis Date    Arthritis     GERD (gastroesophageal reflux disease) 2015    Hyperlipidemia     Hypoglycemia     Mixed hyperlipidemia 2015    Nausea & vomiting 2015    Pneumonia 2015       Past Surgical History:   Past Surgical History:   Procedure Laterality Date    COLONOSCOPY      INCISIONAL HERNIA REPAIR N/A 10/13/2016    INGUINAL HERNIA REPAIR Left 2013    laparoscopic left inguinal hernia repair    PANCREAS SURGERY      Whipple procedure    SHOULDER SURGERY      right cyst removed 1965    SKIN BIOPSY      UPPER GASTROINTESTINAL ENDOSCOPY         Social History:  Social History     Social History    Marital status:      Spouse name: Vivian Molina Number of children: 2    Years of education: 16     Occupational History    Building -retired      Social History Main Topics    Smoking status: Former Smoker     Quit date: 2011    Smokeless tobacco: Never Used    Alcohol use 6.0 oz/week     10 Cans of beer per week      Comment: moderate 3-4 per day    Drug use: No    Sexual activity: Yes     Partners: Female     Other Topics Concern    None     Social History Narrative    None       Family History:   Family History   Problem Relation Age of Onset    High Cholesterol Mother     Stroke Mother     Other Father          in , ? pneumonia and prostate cancer.  Diabetes Brother     Mental Illness Brother     Stroke Brother     Cancer Brother        Allergies:    No Known Allergies    Medications Prior to Admission:   Prior to Admission medications    Medication Sig Start Date End Date Taking? Authorizing Provider   temazepam (RESTORIL) 15 MG capsule Take 1 capsule by mouth nightly as needed for Sleep for up to 30 days. . 18  Aung Mishra,    omega-3 acid ethyl esters (LOVAZA) 1 g capsule TAKE 1 CAPSULE BY MOUTH FOUR TIMES DAILY 18   Aung Mishra DO   atorvastatin (LIPITOR) 40 MG tablet TAKE 1 TABLET BY MOUTH EVERY DAY 18   Aung Mishra DO   celecoxib (CELEBREX) 200 MG capsule TAKE 1 CAPSULE BY MOUTH EVERY DAY 18   Aung Mishra DO   pantoprazole (PROTONIX) 40 MG tablet TAKE 1 TABLET BY MOUTH EVERY DAY 18   Aung Mishra, DO   ezetimibe (ZETIA) 10 MG tablet Take 1 tablet by mouth daily 18   Aung Mishra DO   NONFORMULARY Take 1 capsule by mouth daily    Historical Provider, MD   NONFORMULARY Take 1 capsule by mouth daily    Historical Provider, MD   Flaxseed, Linseed, (FLAX SEEDS PO) Take 1 capsule by mouth daily    Historical Provider, MD Yeboah Ballinger EXTR Take 1 capsule by mouth daily    Historical Provider, MD   therapeutic multivitamin-minerals (THERAGRAN-M) tablet Take 1 tablet by mouth daily.     Historical Provider, MD       REVIEW OF SYSTEMS:  General ROS: negative  Hematological and Lymphatic ROS: negative  Respiratory ROS: negative  Cardiovascular ROS: negative  Gastrointestinal ROS: positive for - appetite loss, gas/bloating and nausea/vomiting  Genito-Urinary ROS: negative  Musculoskeletal ROS: negative  ENT ROS: negative  Endocrine ROS: negative  Dermatological ROS: negative    PHYSICAL EXAM:  Vitals:    08/17/18 2119   BP: 127/62   Pulse: 89   Resp: 16   Temp:    SpO2: 98%       General Appearance:  awake, alert, oriented, in no acute distress and appears older than stated age  Skin:  Skin color, texture, turgor normal. No rashes or lesions. Head/face:  NCAT  Eyes:  No gross abnormalities.  and PERRL  Lungs:  RA,no couhg, CTAb, no WRR  Heart:  RRR, no murmur, no peripheral edema, peripheral pulses intact  Abdomen:  Distended, soft, no guarding, non tender, scattered BS, burping  Extremities: no edema, no calf tenderness  Neurologic: CNs grossly intact, no slurred speech, A&Ox3  Musculoskeletal: good ROM, no acute deformities      CBC:   Lab Results   Component Value Date    WBC 19.5 08/17/2018    RBC 4.68 08/17/2018    HGB 14.7 08/17/2018    HCT 41.4 08/17/2018    MCV 88.5 08/17/2018    MCH 31.4 08/17/2018    MCHC 35.5 08/17/2018    RDW 13.3 08/17/2018     08/17/2018    MPV 8.7 08/17/2018     BMP:   Lab Results   Component Value Date     08/17/2018    K 4.2 08/17/2018    CL 89 08/17/2018    CO2 24 08/17/2018    BUN 28 08/17/2018    LABALBU 4.6 08/17/2018    CREATININE 2.7 08/17/2018    CALCIUM 10.3 08/17/2018    GFRAA 28 08/17/2018    LABGLOM 24 08/17/2018    GLUCOSE 197 08/17/2018     Hepatic Function Panel:    Lab Results   Component Value Date    ALKPHOS 33 08/17/2018    AST 23 08/17/2018    ALT 13 08/17/2018    PROT 7.6 08/17/2018    LABALBU 4.6 08/17/2018    BILITOT 1.1 08/17/2018     PT/INR:   Lab Results   Component Value Date    PROTIME 12.8 08/17/2018    INR 1.1 08/17/2018     U/A:   Lab Results   Component Value Date    LEUKOCYTESUR Abdominal pain FINDINGS: PA and lateral views of the chest compared to 1/3/2015. Normal-sized heart. Normal caliber pulmonary vessels. Small multifocal parenchymal opacities posteriorly to left lung base. Multilevel spondylosis. No free peritoneal air. Focal elevation right hemidiaphragm anteriorly. Small left lower lobe airspace disease. Assessment and Plan:  68yo male with abdominal pain possibly 2/2 to internal hernia  HLD  Hx whipple 2015 for intraductal papillary mucinous neoplasm  Hx hypoglycemia, A1c 5.5 6/7/2018  Hx bilateral inguinal hernia, incisional hernia and lap roxana      Admit to med surg  General surgery consulted from ED  Clear liquid diet  AM labs  IVF NS 100c  IV fentanyl for pain  Monitor abdomen/bowel function  Home medications reviewed  DVT/GI prophylaxis  Will discuss with Attending Physician. Tracey Pino DO 10:15 PM, 8/17/2018       I  discussed the patient's management with the resident. I reviewed the resident's note and agree with the documented findings and plan of care.     Shellee Curling, D.O., FACOI  10:30 PM  8/17/19

## 2018-08-18 NOTE — PROGRESS NOTES
Nutrition Assessment    Type and Reason for Visit: Initial, Positive Nutrition Screen    Nutrition Recommendations: Progress diet when medically appropriate. Malnutrition Assessment:  · Malnutrition Status: At risk for malnutrition  · Context: Acute illness or injury  · Findings of the 6 clinical characteristics of malnutrition (Minimum of 2 out of 6 clinical characteristics is required to make the diagnosis of moderate or severe Protein Calorie Malnutrition based on AND/ASPEN Guidelines):  1. Energy Intake-Less than or equal to 75%,  (1-2 days per pt)    2. Weight Loss-No significant weight loss,  (5 months)  3. Fat Loss-No significant subcutaneous fat loss,    4. Muscle Loss-No significant muscle mass loss,    5. Fluid Accumulation-No significant fluid accumulation,    6.   Strength-Not measured    Nutrition Diagnosis:   · Problem: Inadequate oral intake  · Etiology: related to Alteration in GI function (mild SBO noted in physician notes)     Signs and symptoms:  as evidenced by NPO status due to medical condition, Patient report of, Nausea, Vomiting (Pt reported poor appetite, N/V 1-2 days PTA.)    Nutrition Assessment:  · Nutrition-Focused Physical Findings: alert, abdomen distended/soft/tender, emesis, active bowel sounds, edema WDL, -I/O  · Wound Type: None  · Current Nutrition Therapies:  · Oral Diet Orders: NPO   · Oral Diet intake: NPO  · Oral Nutrition Supplement (ONS) Orders: None  · Anthropometric Measures:  · Ht: 5' 6\" (167.6 cm)   · Current Body Wt: 155 lb 9 oz (70.6 kg) (8/17 actual)  · Admission Body Wt: 155 lb 9 oz (70.6 kg) (8/17 actual)  · Usual Body Wt: 166 lb 8 oz (75.5 kg) (3/2018 no method given EMR)  · % Weight Change: 6.6% loss,  5 months  · Ideal Body Wt: 142 lb (64.4 kg), % Ideal Body 109%  · BMI Classification: BMI 25.0 - 29.9 Overweight  · Comparative Standards (Estimated Nutrition Needs):  · Estimated Daily Total Kcal: 3800-6900 (x 1.1-1.2 SF)  · Estimated Daily Protein (g):

## 2018-08-18 NOTE — PROGRESS NOTES
bowel.  Due to lack of IV contrast, suboptimal evaluation of the blood vessels and solid organs. Automated dose control. Severely distended stomach and mid and proximal small bowel. Small bowel dilated up to about 5 cm diameter. Transition point right lower quadrant associated with whirled configuration of the mesentery. Distal small bowel and colon normal caliber. Bile ducts and urinary tract normal caliber. Normal-sized heart. Atherosclerosis including coronary arteries. Multifocal opacities in the lingula and left lower lobe and posterior aspect of the right lower lobe. 3 mm calculus centrally lower pole right kidney. Relatively atrophic pancreas neck and proximal body region of the pancreas not well delineated. There appears to be a low density mass involving this region measuring up to least 47 x 32 mm in diameter. Metallic clips in this region. Within the limits of a noncontrast examination, no focal abnormality other abdominal solid organs. No abdominal or pelvic fluid collection or abnormally enlarged lymph nodes Multilevel spondylosis spondylosis. Biconcave compression deformities at L4 and L5 vertebra. 1. Mid small bowel obstruction probably related to internal hernia right lower quadrant. 2. Probable mass in the region of the pancreatic neck. 3. Multifocal airspace disease. Xr Chest Standard (2 Vw)    Result Date: 8/17/2018  Reading location: 200 INDICATION: Abdominal pain FINDINGS: PA and lateral views of the chest compared to 1/3/2015. Normal-sized heart. Normal caliber pulmonary vessels. Small multifocal parenchymal opacities posteriorly to left lung base. Multilevel spondylosis. No free peritoneal air. Focal elevation right hemidiaphragm anteriorly. Small left lower lobe airspace disease.       Physical Exam:  I/O this shift:  In: -   Out: 900 [Emesis/NG output:900]    Intake/Output Summary (Last 24 hours) at 08/18/18 1648  Last data filed at 08/18/18 1521   Gross per 24 hour   Intake 0 ml   Output             2900 ml   Net            -2900 ml   I/O last 3 completed shifts: In: 0   Out: 2000 [Urine:300; Emesis/NG output:1700]  Patient Vitals for the past 96 hrs (Last 3 readings):   Weight   08/17/18 1901 155 lb 9 oz (70.6 kg)       Vital Signs:   Blood pressure 116/62, pulse 87, temperature 99.4 °F (37.4 °C), temperature source Oral, resp. rate 20, height 5' 6\" (1.676 m), weight 155 lb 9 oz (70.6 kg), SpO2 92 %. General appearance:   Jelena Martinez is a 70 y. o.  male who is alert, responsive, oriented to person, place, and time. Psych:   Behavior is normal. Mood appears normal. Speech is not rapid and/or pressured. HEENT:   PERRLA. EOMI. Sclera clear. Buccal mucosa moist. NG tube left nares. Neck:    Supple. Trachea midline. No thyromegaly. No JVD. No bruits. Heart:    Rhythm regular, rate controlled. No murmurs. Lungs:  Symmetrical. Clear to auscultation bilaterally. No wheezes. No rhonchi. No rales. Abdomen:   Soft. Minimally tender. Softly distended Bowel sounds positive. No organomegaly or masses. Extremities:    Peripheral pulses present. No peripheral edema. No ulcers. Neurologic:    Alert x 3. No focal deficit. Cranial nerves grossly intact. Skin:    No rashes or lesions    Wound Documentation:   Incision 02/19/13 Abdomen  (Active)   Number of days: 2006       Incision 10/18/16 Abdomen (Active)   Number of days: 78[address] [facility] Medical Problems:  Past Medical History:   Diagnosis Date    Arthritis     GERD (gastroesophageal reflux disease) 1/4/2015    Hyperlipidemia     Hypoglycemia     Mixed hyperlipidemia 1/4/2015    Nausea & vomiting 1/4/2015    Pneumonia 1/4/2015     Active Problems:    Abdominal pain  Resolved Problems:    * No resolved hospital problems. *      Assessment:  1. Small bowel obstruction  2. Abdominal pain.    3. Acute kidney injury in the setting of decreased oral intake secondary to small bowel obstruction  4. Leukocytosis secondary to #1   5. Hyperlipidemia  6. Hx whipple 2015 for intraductal papillary mucinous neoplasm  7. Hx hypoglycemia, A1c 5.5 6/7/2018  8. Hx bilateral inguinal hernia, incisional hernia and lap roxana  9. Gastroesophageal reflux disease  10. Vitamin D deficiency      Plan:    Gen. surgery is following the patient and recommendations a been reviewed. Patient did have good relief status post NG tube placement. Depending on patient's stability tomorrow he may require surgical intervention. We'll await further recommendation from general surgery. Encourage the patient to increase activity. We'll permit ice chips sparingly. Patient's medications were reviewed/continued/adjusted. Labs as ordered. Please see orders for further plan of care. More than 50% of my  time was spent counseling and/or coordination of care with the patient and/or family with face to face contact. Time was spent reviewing notes and laboratory data, instructing and counseling the patient  regarding my findings and recommendations and reviewing and answer questions. Jaime Mishra DO, F.A.C.O.I.   On 8/18/2018  4:48 PM

## 2018-08-19 ENCOUNTER — APPOINTMENT (OUTPATIENT)
Dept: GENERAL RADIOLOGY | Age: 68
DRG: 336 | End: 2018-08-19
Payer: MEDICARE

## 2018-08-19 PROBLEM — K56.609 SMALL BOWEL OBSTRUCTION (HCC): Status: ACTIVE | Noted: 2018-08-19

## 2018-08-19 LAB
ALBUMIN SERPL-MCNC: 3.7 G/DL (ref 3.5–5.2)
ALP BLD-CCNC: 32 U/L (ref 40–129)
ALT SERPL-CCNC: 10 U/L (ref 0–40)
ANION GAP SERPL CALCULATED.3IONS-SCNC: 13 MMOL/L (ref 7–16)
AST SERPL-CCNC: 18 U/L (ref 0–39)
BASOPHILS ABSOLUTE: 0.01 E9/L (ref 0–0.2)
BASOPHILS RELATIVE PERCENT: 0.2 % (ref 0–2)
BILIRUB SERPL-MCNC: 0.7 MG/DL (ref 0–1.2)
BUN BLDV-MCNC: 22 MG/DL (ref 8–23)
CALCIUM SERPL-MCNC: 9.2 MG/DL (ref 8.6–10.2)
CHLORIDE BLD-SCNC: 101 MMOL/L (ref 98–107)
CO2: 27 MMOL/L (ref 22–29)
CREAT SERPL-MCNC: 0.9 MG/DL (ref 0.7–1.2)
EOSINOPHILS ABSOLUTE: 0.02 E9/L (ref 0.05–0.5)
EOSINOPHILS RELATIVE PERCENT: 0.3 % (ref 0–6)
GFR AFRICAN AMERICAN: >60
GFR NON-AFRICAN AMERICAN: >60 ML/MIN/1.73
GLUCOSE BLD-MCNC: 124 MG/DL (ref 74–109)
HCT VFR BLD CALC: 35.5 % (ref 37–54)
HEMOGLOBIN: 12 G/DL (ref 12.5–16.5)
IMMATURE GRANULOCYTES #: 0.02 E9/L
IMMATURE GRANULOCYTES %: 0.3 % (ref 0–5)
LYMPHOCYTES ABSOLUTE: 0.98 E9/L (ref 1.5–4)
LYMPHOCYTES RELATIVE PERCENT: 16 % (ref 20–42)
MCH RBC QN AUTO: 30.8 PG (ref 26–35)
MCHC RBC AUTO-ENTMCNC: 33.8 % (ref 32–34.5)
MCV RBC AUTO: 91.3 FL (ref 80–99.9)
MONOCYTES ABSOLUTE: 0.58 E9/L (ref 0.1–0.95)
MONOCYTES RELATIVE PERCENT: 9.4 % (ref 2–12)
NEUTROPHILS ABSOLUTE: 4.53 E9/L (ref 1.8–7.3)
NEUTROPHILS RELATIVE PERCENT: 73.8 % (ref 43–80)
PDW BLD-RTO: 13.5 FL (ref 11.5–15)
PLATELET # BLD: 172 E9/L (ref 130–450)
PMV BLD AUTO: 8.7 FL (ref 7–12)
POTASSIUM SERPL-SCNC: 3.4 MMOL/L (ref 3.5–5)
RBC # BLD: 3.89 E12/L (ref 3.8–5.8)
SODIUM BLD-SCNC: 141 MMOL/L (ref 132–146)
TOTAL PROTEIN: 6.4 G/DL (ref 6.4–8.3)
WBC # BLD: 6.1 E9/L (ref 4.5–11.5)

## 2018-08-19 PROCEDURE — 36415 COLL VENOUS BLD VENIPUNCTURE: CPT

## 2018-08-19 PROCEDURE — 1200000000 HC SEMI PRIVATE

## 2018-08-19 PROCEDURE — 6360000002 HC RX W HCPCS: Performed by: FAMILY MEDICINE

## 2018-08-19 PROCEDURE — 6360000004 HC RX CONTRAST MEDICATION: Performed by: RADIOLOGY

## 2018-08-19 PROCEDURE — 80053 COMPREHEN METABOLIC PANEL: CPT

## 2018-08-19 PROCEDURE — C9113 INJ PANTOPRAZOLE SODIUM, VIA: HCPCS | Performed by: FAMILY MEDICINE

## 2018-08-19 PROCEDURE — 2580000003 HC RX 258: Performed by: FAMILY MEDICINE

## 2018-08-19 PROCEDURE — 2580000003 HC RX 258: Performed by: INTERNAL MEDICINE

## 2018-08-19 PROCEDURE — 99232 SBSQ HOSP IP/OBS MODERATE 35: CPT | Performed by: SURGERY

## 2018-08-19 PROCEDURE — 85025 COMPLETE CBC W/AUTO DIFF WBC: CPT

## 2018-08-19 PROCEDURE — 74250 X-RAY XM SM INT 1CNTRST STD: CPT

## 2018-08-19 PROCEDURE — 2500000003 HC RX 250 WO HCPCS: Performed by: INTERNAL MEDICINE

## 2018-08-19 RX ORDER — DEXTROSE, SODIUM CHLORIDE, AND POTASSIUM CHLORIDE 5; .45; .15 G/100ML; G/100ML; G/100ML
INJECTION INTRAVENOUS CONTINUOUS
Status: DISCONTINUED | OUTPATIENT
Start: 2018-08-19 | End: 2018-08-21

## 2018-08-19 RX ADMIN — ONDANSETRON HYDROCHLORIDE 4 MG: 2 INJECTION, SOLUTION INTRAMUSCULAR; INTRAVENOUS at 22:09

## 2018-08-19 RX ADMIN — Medication 10 ML: at 08:23

## 2018-08-19 RX ADMIN — ONDANSETRON HYDROCHLORIDE 4 MG: 2 INJECTION, SOLUTION INTRAMUSCULAR; INTRAVENOUS at 16:26

## 2018-08-19 RX ADMIN — FENTANYL CITRATE 50 MCG: 50 INJECTION INTRAMUSCULAR; INTRAVENOUS at 16:26

## 2018-08-19 RX ADMIN — POTASSIUM CHLORIDE, DEXTROSE MONOHYDRATE AND SODIUM CHLORIDE: 150; 5; 450 INJECTION, SOLUTION INTRAVENOUS at 17:18

## 2018-08-19 RX ADMIN — SODIUM CHLORIDE: 9 INJECTION, SOLUTION INTRAVENOUS at 09:40

## 2018-08-19 RX ADMIN — PANTOPRAZOLE SODIUM 40 MG: 40 INJECTION, POWDER, FOR SOLUTION INTRAVENOUS at 08:23

## 2018-08-19 RX ADMIN — HEPARIN SODIUM 5000 UNITS: 5000 INJECTION, SOLUTION INTRAVENOUS; SUBCUTANEOUS at 06:15

## 2018-08-19 RX ADMIN — DIATRIZOATE MEGLUMINE AND DIATRIZOATE SODIUM 480 ML: 660; 100 LIQUID ORAL; RECTAL at 12:26

## 2018-08-19 ASSESSMENT — PAIN DESCRIPTION - PAIN TYPE
TYPE: ACUTE PAIN
TYPE: ACUTE PAIN

## 2018-08-19 ASSESSMENT — PAIN SCALES - GENERAL
PAINLEVEL_OUTOF10: 4
PAINLEVEL_OUTOF10: 5
PAINLEVEL_OUTOF10: 4
PAINLEVEL_OUTOF10: 4

## 2018-08-19 ASSESSMENT — PAIN DESCRIPTION - DESCRIPTORS
DESCRIPTORS: DISCOMFORT
DESCRIPTORS: DISCOMFORT

## 2018-08-19 ASSESSMENT — PAIN DESCRIPTION - LOCATION
LOCATION: THROAT
LOCATION: THROAT;ABDOMEN

## 2018-08-19 NOTE — PROGRESS NOTES
Surgery Progress Note            Chief complaint:   Patient Active Problem List   Diagnosis    Hyponatremia    Acute uremia    Hypomagnesemia    GERD (gastroesophageal reflux disease)    Mixed hyperlipidemia    Lumbago    Pneumonia    Nausea & vomiting    Diarrhea    Shoulder impingement    AC (acromioclavicular) arthritis    Arthritis    C. difficile colitis    Polyp of colon    Hyperlipidemia    Hernia, incisional    Pancreas neoplasm    Hypoglycemia    Abdominal pain       S: feeling better with ngt in     O:   Vitals:    08/19/18 0748   BP: 124/64   Pulse: 80   Resp: 18   Temp: 97.9 °F (36.6 °C)   SpO2:        Intake/Output Summary (Last 24 hours) at 08/19/18 0925  Last data filed at 08/19/18 8045   Gross per 24 hour   Intake             2004 ml   Output             5411 ml   Net            -3407 ml           Labs:  Recent Labs      08/17/18   1937  08/18/18   0429  08/19/18   0441   WBC  19.5*  14.6*  6.1   HGB  14.7  13.0  12.0*   HCT  41.4  37.3  35.5*     Lab Results   Component Value Date    CREATININE 0.9 08/19/2018    BUN 22 08/19/2018     08/19/2018    K 3.4 (L) 08/19/2018     08/19/2018    CO2 27 08/19/2018     Recent Labs      08/17/18   1937   LIPASE  9*       Physical exam:   /64   Pulse 80   Temp 97.9 °F (36.6 °C) (Oral)   Resp 18   Ht 5' 6\" (1.676 m)   Wt 155 lb 9 oz (70.6 kg)   SpO2 93%   BMI 25.11 kg/m²   General appearance: NAD  Head: NCAT  Neck: supple, no masses  Lungs: equal chest rise bilateral  Heart: S1S2 present  Abdomen: soft, nontender, minimally distended  Skin; no lesions  Gu: no cva tenderness  Extremities: extremities normal, atraumatic, no cyanosis or edema    A:  sbo    P: will get sbft and if obstruction persists then will need OR    Ligia Lopez MD  8/19/2018

## 2018-08-19 NOTE — PROGRESS NOTES
Internal Medicine Progress Note    Thor Villatoro. Marie Oshea., & [address] Marie Oshea., F.A.C.O.I. Gloria Potter D.O., F.A.C.O.I. Primary Care Physician: Nereyda Burton DO   Admitting Physician:  Bashir Anderson DO  Admission date and time: 8/17/2018  7:11 PM    Room:  [address] [place] [address]  Admitting diagnosis: Abdominal pain [R10.9]    Patient Name: Franci Pink  MRN: [id number]    Date of Service: 8/19/2018     Subjective:    Benji Garcia is a 70 y. o.  male who was seen and examined today,8/19/2018, at the bedside. Returned from [employer] bowel follow through. Had emesis. Feels poorly at present time. Wife present during my examination. I reviewed the patient's past medical, surgical history and medication. I reviewed the  course of events since last visit. Review of System:  Constitutional:   Negative for fever and chills. HEENT:   Negative for ear pain, sore throat, rhinorrhea and sinus pressure. Negative for eye pain, discharge and redness. Psch:   Denies depression or anxiety. Cardiovascular:   Denies any chest pain, irregular heartbeats, or palpitations. Respiratory:   Denies shortness of breath, coughing, sputum production, hemoptysis, or wheezing. Gastrointestinal:   Denies  diarrhea, or constipation. Denies any abdominal pain. See subjective-no issues since NG tube placed. Once ice chips. Positive for nausea and vomiting. Extremities:   Denies any lower extremity swelling or edema. Neurology:    Denies any headache or focal neurological deficits. No weakness or paresthesia. Derm:   Denies any rashes, ulcers, or excoriations. Denies bruising. Genitourinary:    Denies any urgency, frequency, hematuria. Voiding without difficulty.   Musculoskeletal:  Negative for myalgias, back pain and arthralgias      Allergy:  No Known Allergies     Medication:  Scheduled Meds:   atorvastatin  40 mg Oral Daily    heparin (porcine)  5,000 Units Subcutaneous 3 times stomach and mid and proximal small bowel. Small bowel dilated up to about 5 cm diameter. Transition point right lower quadrant associated with whirled configuration of the mesentery. Distal small bowel and colon normal caliber. Bile ducts and urinary tract normal caliber. Normal-sized heart. Atherosclerosis including coronary arteries. Multifocal opacities in the lingula and left lower lobe and posterior aspect of the right lower lobe. 3 mm calculus centrally lower pole right kidney. Relatively atrophic pancreas neck and proximal body region of the pancreas not well delineated. There appears to be a low density mass involving this region measuring up to least 47 x 32 mm in diameter. Metallic clips in this region. Within the limits of a noncontrast examination, no focal abnormality other abdominal solid organs. No abdominal or pelvic fluid collection or abnormally enlarged lymph nodes Multilevel spondylosis spondylosis. Biconcave compression deformities at L4 and L5 vertebra. 1. Mid small bowel obstruction probably related to internal hernia right lower quadrant. 2. Probable mass in the region of the pancreatic neck. 3. Multifocal airspace disease. Xr Chest Standard (2 Vw)    Result Date: 8/17/2018  Reading location: 200 INDICATION: Abdominal pain FINDINGS: PA and lateral views of the chest compared to 1/3/2015. Normal-sized heart. Normal caliber pulmonary vessels. Small multifocal parenchymal opacities posteriorly to left lung base. Multilevel spondylosis. No free peritoneal air. Focal elevation right hemidiaphragm anteriorly. Small left lower lobe airspace disease. Physical Exam:  No intake/output data recorded. Intake/Output Summary (Last 24 hours) at 08/19/18 1503  Last data filed at 08/19/18 1329   Gross per 24 hour   Intake          2652.33 ml   Output             4611 ml   Net         -1958.67 ml   I/O last 3 completed shifts:   In: 2652.3 [P.O.:60; I.V.:2592.3]  Out: 9725 [Urine:1600;

## 2018-08-20 ENCOUNTER — ANESTHESIA EVENT (OUTPATIENT)
Dept: OPERATING ROOM | Age: 68
DRG: 336 | End: 2018-08-20
Payer: MEDICARE

## 2018-08-20 ENCOUNTER — ANESTHESIA (OUTPATIENT)
Dept: OPERATING ROOM | Age: 68
DRG: 336 | End: 2018-08-20
Payer: MEDICARE

## 2018-08-20 VITALS
TEMPERATURE: 99.1 F | SYSTOLIC BLOOD PRESSURE: 208 MMHG | DIASTOLIC BLOOD PRESSURE: 118 MMHG | RESPIRATION RATE: 15 BRPM | OXYGEN SATURATION: 100 %

## 2018-08-20 LAB
ALBUMIN SERPL-MCNC: 4.5 G/DL (ref 3.5–5.2)
ALP BLD-CCNC: 37 U/L (ref 40–129)
ALT SERPL-CCNC: 11 U/L (ref 0–40)
ANION GAP SERPL CALCULATED.3IONS-SCNC: 13 MMOL/L (ref 7–16)
AST SERPL-CCNC: 21 U/L (ref 0–39)
BASOPHILS ABSOLUTE: 0.01 E9/L (ref 0–0.2)
BASOPHILS RELATIVE PERCENT: 0.1 % (ref 0–2)
BILIRUB SERPL-MCNC: 0.6 MG/DL (ref 0–1.2)
BUN BLDV-MCNC: 26 MG/DL (ref 8–23)
CALCIUM SERPL-MCNC: 10.1 MG/DL (ref 8.6–10.2)
CHLORIDE BLD-SCNC: 101 MMOL/L (ref 98–107)
CO2: 31 MMOL/L (ref 22–29)
CREAT SERPL-MCNC: 1 MG/DL (ref 0.7–1.2)
EOSINOPHILS ABSOLUTE: 0.01 E9/L (ref 0.05–0.5)
EOSINOPHILS RELATIVE PERCENT: 0.1 % (ref 0–6)
GFR AFRICAN AMERICAN: >60
GFR NON-AFRICAN AMERICAN: >60 ML/MIN/1.73
GLUCOSE BLD-MCNC: 169 MG/DL (ref 74–109)
HCT VFR BLD CALC: 40.8 % (ref 37–54)
HEMOGLOBIN: 14 G/DL (ref 12.5–16.5)
IMMATURE GRANULOCYTES #: 0.04 E9/L
IMMATURE GRANULOCYTES %: 0.4 % (ref 0–5)
LYMPHOCYTES ABSOLUTE: 0.93 E9/L (ref 1.5–4)
LYMPHOCYTES RELATIVE PERCENT: 10.3 % (ref 20–42)
MCH RBC QN AUTO: 31.4 PG (ref 26–35)
MCHC RBC AUTO-ENTMCNC: 34.3 % (ref 32–34.5)
MCV RBC AUTO: 91.5 FL (ref 80–99.9)
METER GLUCOSE: 127 MG/DL (ref 70–110)
MONOCYTES ABSOLUTE: 0.7 E9/L (ref 0.1–0.95)
MONOCYTES RELATIVE PERCENT: 7.8 % (ref 2–12)
NEUTROPHILS ABSOLUTE: 7.31 E9/L (ref 1.8–7.3)
NEUTROPHILS RELATIVE PERCENT: 81.3 % (ref 43–80)
PDW BLD-RTO: 13.5 FL (ref 11.5–15)
PLATELET # BLD: 220 E9/L (ref 130–450)
PMV BLD AUTO: 8.5 FL (ref 7–12)
POTASSIUM SERPL-SCNC: 3.7 MMOL/L (ref 3.5–5)
RBC # BLD: 4.46 E12/L (ref 3.8–5.8)
SODIUM BLD-SCNC: 145 MMOL/L (ref 132–146)
TOTAL PROTEIN: 7.8 G/DL (ref 6.4–8.3)
WBC # BLD: 9 E9/L (ref 4.5–11.5)

## 2018-08-20 PROCEDURE — 2500000003 HC RX 250 WO HCPCS: Performed by: INTERNAL MEDICINE

## 2018-08-20 PROCEDURE — 2709999900 HC NON-CHARGEABLE SUPPLY: Performed by: SURGERY

## 2018-08-20 PROCEDURE — 2580000003 HC RX 258: Performed by: NURSE ANESTHETIST, CERTIFIED REGISTERED

## 2018-08-20 PROCEDURE — 44005 FREEING OF BOWEL ADHESION: CPT | Performed by: SURGERY

## 2018-08-20 PROCEDURE — 2500000003 HC RX 250 WO HCPCS: Performed by: SURGERY

## 2018-08-20 PROCEDURE — 2500000003 HC RX 250 WO HCPCS: Performed by: NURSE ANESTHETIST, CERTIFIED REGISTERED

## 2018-08-20 PROCEDURE — C9113 INJ PANTOPRAZOLE SODIUM, VIA: HCPCS | Performed by: FAMILY MEDICINE

## 2018-08-20 PROCEDURE — 6360000002 HC RX W HCPCS: Performed by: FAMILY MEDICINE

## 2018-08-20 PROCEDURE — 2780000010 HC IMPLANT OTHER: Performed by: SURGERY

## 2018-08-20 PROCEDURE — 85025 COMPLETE CBC W/AUTO DIFF WBC: CPT

## 2018-08-20 PROCEDURE — 80053 COMPREHEN METABOLIC PANEL: CPT

## 2018-08-20 PROCEDURE — 2580000003 HC RX 258: Performed by: FAMILY MEDICINE

## 2018-08-20 PROCEDURE — 7100000001 HC PACU RECOVERY - ADDTL 15 MIN: Performed by: SURGERY

## 2018-08-20 PROCEDURE — 6360000002 HC RX W HCPCS: Performed by: SURGERY

## 2018-08-20 PROCEDURE — 2720000010 HC SURG SUPPLY STERILE: Performed by: SURGERY

## 2018-08-20 PROCEDURE — 3700000000 HC ANESTHESIA ATTENDED CARE: Performed by: SURGERY

## 2018-08-20 PROCEDURE — 1200000000 HC SEMI PRIVATE

## 2018-08-20 PROCEDURE — 6360000002 HC RX W HCPCS: Performed by: NURSE ANESTHETIST, CERTIFIED REGISTERED

## 2018-08-20 PROCEDURE — 7100000000 HC PACU RECOVERY - FIRST 15 MIN: Performed by: SURGERY

## 2018-08-20 PROCEDURE — 3700000001 HC ADD 15 MINUTES (ANESTHESIA): Performed by: SURGERY

## 2018-08-20 PROCEDURE — 3600000015 HC SURGERY LEVEL 5 ADDTL 15MIN: Performed by: SURGERY

## 2018-08-20 PROCEDURE — 3600000005 HC SURGERY LEVEL 5 BASE: Performed by: SURGERY

## 2018-08-20 PROCEDURE — 0DN84ZZ RELEASE SMALL INTESTINE, PERCUTANEOUS ENDOSCOPIC APPROACH: ICD-10-PCS | Performed by: SURGERY

## 2018-08-20 PROCEDURE — 82962 GLUCOSE BLOOD TEST: CPT

## 2018-08-20 PROCEDURE — 36415 COLL VENOUS BLD VENIPUNCTURE: CPT

## 2018-08-20 DEVICE — RELOAD STPL L75MM OPN H3.8MM CLS 1.5MM WIRE DIA0.2MM REG: Type: IMPLANTABLE DEVICE | Status: FUNCTIONAL

## 2018-08-20 DEVICE — RELOAD STPL L60MM H1.5-3.6MM REG TISS BLU GRIPPING SURF B: Type: IMPLANTABLE DEVICE | Status: FUNCTIONAL

## 2018-08-20 RX ORDER — DEXAMETHASONE SODIUM PHOSPHATE 10 MG/ML
INJECTION, SOLUTION INTRAMUSCULAR; INTRAVENOUS PRN
Status: DISCONTINUED | OUTPATIENT
Start: 2018-08-20 | End: 2018-08-20 | Stop reason: SDUPTHER

## 2018-08-20 RX ORDER — ONDANSETRON 2 MG/ML
4 INJECTION INTRAMUSCULAR; INTRAVENOUS
Status: DISCONTINUED | OUTPATIENT
Start: 2018-08-20 | End: 2018-08-20

## 2018-08-20 RX ORDER — SODIUM CHLORIDE, SODIUM LACTATE, POTASSIUM CHLORIDE, CALCIUM CHLORIDE 600; 310; 30; 20 MG/100ML; MG/100ML; MG/100ML; MG/100ML
INJECTION, SOLUTION INTRAVENOUS CONTINUOUS PRN
Status: DISCONTINUED | OUTPATIENT
Start: 2018-08-20 | End: 2018-08-20 | Stop reason: SDUPTHER

## 2018-08-20 RX ORDER — ONDANSETRON 2 MG/ML
INJECTION INTRAMUSCULAR; INTRAVENOUS PRN
Status: DISCONTINUED | OUTPATIENT
Start: 2018-08-20 | End: 2018-08-20 | Stop reason: SDUPTHER

## 2018-08-20 RX ORDER — PROPOFOL 10 MG/ML
INJECTION, EMULSION INTRAVENOUS PRN
Status: DISCONTINUED | OUTPATIENT
Start: 2018-08-20 | End: 2018-08-20 | Stop reason: SDUPTHER

## 2018-08-20 RX ORDER — FENTANYL CITRATE 50 UG/ML
INJECTION, SOLUTION INTRAMUSCULAR; INTRAVENOUS PRN
Status: DISCONTINUED | OUTPATIENT
Start: 2018-08-20 | End: 2018-08-20 | Stop reason: SDUPTHER

## 2018-08-20 RX ORDER — MORPHINE SULFATE 2 MG/ML
2 INJECTION, SOLUTION INTRAMUSCULAR; INTRAVENOUS EVERY 5 MIN PRN
Status: DISCONTINUED | OUTPATIENT
Start: 2018-08-20 | End: 2018-08-20

## 2018-08-20 RX ORDER — MEPERIDINE HYDROCHLORIDE 25 MG/ML
12.5 INJECTION INTRAMUSCULAR; INTRAVENOUS; SUBCUTANEOUS EVERY 5 MIN PRN
Status: DISCONTINUED | OUTPATIENT
Start: 2018-08-20 | End: 2018-08-20

## 2018-08-20 RX ORDER — PROMETHAZINE HYDROCHLORIDE 25 MG/ML
6.25 INJECTION, SOLUTION INTRAMUSCULAR; INTRAVENOUS
Status: DISCONTINUED | OUTPATIENT
Start: 2018-08-20 | End: 2018-08-20

## 2018-08-20 RX ORDER — ROCURONIUM BROMIDE 10 MG/ML
INJECTION, SOLUTION INTRAVENOUS PRN
Status: DISCONTINUED | OUTPATIENT
Start: 2018-08-20 | End: 2018-08-20 | Stop reason: SDUPTHER

## 2018-08-20 RX ORDER — BUPIVACAINE HYDROCHLORIDE AND EPINEPHRINE 2.5; 5 MG/ML; UG/ML
INJECTION, SOLUTION EPIDURAL; INFILTRATION; INTRACAUDAL; PERINEURAL PRN
Status: DISCONTINUED | OUTPATIENT
Start: 2018-08-20 | End: 2018-08-20 | Stop reason: HOSPADM

## 2018-08-20 RX ORDER — LIDOCAINE HYDROCHLORIDE 20 MG/ML
INJECTION, SOLUTION INFILTRATION; PERINEURAL PRN
Status: DISCONTINUED | OUTPATIENT
Start: 2018-08-20 | End: 2018-08-20 | Stop reason: SDUPTHER

## 2018-08-20 RX ORDER — SUCCINYLCHOLINE CHLORIDE 20 MG/ML
INJECTION INTRAMUSCULAR; INTRAVENOUS PRN
Status: DISCONTINUED | OUTPATIENT
Start: 2018-08-20 | End: 2018-08-20 | Stop reason: SDUPTHER

## 2018-08-20 RX ADMIN — ONDANSETRON HYDROCHLORIDE 4 MG: 2 INJECTION, SOLUTION INTRAMUSCULAR; INTRAVENOUS at 11:32

## 2018-08-20 RX ADMIN — PHENYLEPHRINE HYDROCHLORIDE 300 MCG: 10 INJECTION INTRAMUSCULAR; INTRAVENOUS; SUBCUTANEOUS at 11:41

## 2018-08-20 RX ADMIN — Medication 10 ML: at 09:13

## 2018-08-20 RX ADMIN — FENTANYL CITRATE 50 MCG: 50 INJECTION INTRAMUSCULAR; INTRAVENOUS at 02:42

## 2018-08-20 RX ADMIN — ONDANSETRON HYDROCHLORIDE 4 MG: 2 INJECTION, SOLUTION INTRAMUSCULAR; INTRAVENOUS at 09:13

## 2018-08-20 RX ADMIN — PANTOPRAZOLE SODIUM 40 MG: 40 INJECTION, POWDER, FOR SOLUTION INTRAVENOUS at 09:13

## 2018-08-20 RX ADMIN — Medication 40 MG: at 11:32

## 2018-08-20 RX ADMIN — POTASSIUM CHLORIDE, DEXTROSE MONOHYDRATE AND SODIUM CHLORIDE: 150; 5; 450 INJECTION, SOLUTION INTRAVENOUS at 18:25

## 2018-08-20 RX ADMIN — PROPOFOL 160 MG: 10 INJECTION, EMULSION INTRAVENOUS at 11:32

## 2018-08-20 RX ADMIN — CEFOXITIN SODIUM 1 G: 1 INJECTION, SOLUTION INTRAVENOUS at 11:26

## 2018-08-20 RX ADMIN — FENTANYL CITRATE 50 MCG: 50 INJECTION, SOLUTION INTRAMUSCULAR; INTRAVENOUS at 11:40

## 2018-08-20 RX ADMIN — FENTANYL CITRATE 50 MCG: 50 INJECTION, SOLUTION INTRAMUSCULAR; INTRAVENOUS at 12:00

## 2018-08-20 RX ADMIN — LIDOCAINE HYDROCHLORIDE 40 MG: 20 INJECTION, SOLUTION INFILTRATION; PERINEURAL at 11:32

## 2018-08-20 RX ADMIN — POTASSIUM CHLORIDE, DEXTROSE MONOHYDRATE AND SODIUM CHLORIDE: 150; 5; 450 INJECTION, SOLUTION INTRAVENOUS at 02:43

## 2018-08-20 RX ADMIN — FENTANYL CITRATE 100 MCG: 50 INJECTION, SOLUTION INTRAMUSCULAR; INTRAVENOUS at 11:32

## 2018-08-20 RX ADMIN — PHENYLEPHRINE HYDROCHLORIDE 300 MCG: 10 INJECTION INTRAMUSCULAR; INTRAVENOUS; SUBCUTANEOUS at 11:39

## 2018-08-20 RX ADMIN — FENTANYL CITRATE 50 MCG: 50 INJECTION INTRAMUSCULAR; INTRAVENOUS at 09:13

## 2018-08-20 RX ADMIN — DEXAMETHASONE SODIUM PHOSPHATE 10 MG: 10 INJECTION, SOLUTION INTRAMUSCULAR; INTRAVENOUS at 11:32

## 2018-08-20 RX ADMIN — FENTANYL CITRATE 50 MCG: 50 INJECTION, SOLUTION INTRAMUSCULAR; INTRAVENOUS at 12:10

## 2018-08-20 RX ADMIN — Medication 140 MG: at 11:32

## 2018-08-20 RX ADMIN — SODIUM CHLORIDE, POTASSIUM CHLORIDE, SODIUM LACTATE AND CALCIUM CHLORIDE: 600; 310; 30; 20 INJECTION, SOLUTION INTRAVENOUS at 11:26

## 2018-08-20 RX ADMIN — PHENYLEPHRINE HYDROCHLORIDE 200 MCG: 10 INJECTION INTRAMUSCULAR; INTRAVENOUS; SUBCUTANEOUS at 11:35

## 2018-08-20 RX ADMIN — HEPARIN SODIUM 5000 UNITS: 5000 INJECTION, SOLUTION INTRAVENOUS; SUBCUTANEOUS at 21:28

## 2018-08-20 ASSESSMENT — PAIN DESCRIPTION - DESCRIPTORS: DESCRIPTORS: ACHING;DISCOMFORT

## 2018-08-20 ASSESSMENT — PULMONARY FUNCTION TESTS
PIF_VALUE: 0
PIF_VALUE: 6
PIF_VALUE: 26
PIF_VALUE: 17
PIF_VALUE: 23
PIF_VALUE: 25
PIF_VALUE: 28
PIF_VALUE: 28
PIF_VALUE: 17
PIF_VALUE: 2
PIF_VALUE: 0
PIF_VALUE: 28
PIF_VALUE: 28
PIF_VALUE: 23
PIF_VALUE: 0
PIF_VALUE: 28
PIF_VALUE: 14
PIF_VALUE: 26
PIF_VALUE: 17
PIF_VALUE: 28
PIF_VALUE: 21
PIF_VALUE: 14
PIF_VALUE: 22
PIF_VALUE: 28
PIF_VALUE: 18
PIF_VALUE: 14
PIF_VALUE: 6
PIF_VALUE: 0
PIF_VALUE: 25
PIF_VALUE: 23
PIF_VALUE: 0
PIF_VALUE: 28
PIF_VALUE: 23
PIF_VALUE: 28
PIF_VALUE: 5
PIF_VALUE: 28
PIF_VALUE: 5
PIF_VALUE: 22
PIF_VALUE: 28
PIF_VALUE: 20
PIF_VALUE: 21
PIF_VALUE: 28
PIF_VALUE: 28
PIF_VALUE: 17
PIF_VALUE: 16
PIF_VALUE: 26
PIF_VALUE: 28
PIF_VALUE: 20
PIF_VALUE: 17

## 2018-08-20 ASSESSMENT — PAIN SCALES - GENERAL
PAINLEVEL_OUTOF10: 0
PAINLEVEL_OUTOF10: 7
PAINLEVEL_OUTOF10: 0
PAINLEVEL_OUTOF10: 4

## 2018-08-20 ASSESSMENT — PAIN DESCRIPTION - PROGRESSION: CLINICAL_PROGRESSION: NOT CHANGED

## 2018-08-20 ASSESSMENT — PAIN DESCRIPTION - FREQUENCY: FREQUENCY: CONTINUOUS

## 2018-08-20 ASSESSMENT — PAIN DESCRIPTION - LOCATION: LOCATION: ABDOMEN

## 2018-08-20 ASSESSMENT — LIFESTYLE VARIABLES: SMOKING_STATUS: 0

## 2018-08-20 ASSESSMENT — PAIN DESCRIPTION - ONSET: ONSET: ON-GOING

## 2018-08-20 ASSESSMENT — PAIN DESCRIPTION - PAIN TYPE: TYPE: ACUTE PAIN

## 2018-08-20 NOTE — PROGRESS NOTES
P Quality Flow/Interdisciplinary Rounds Progress Note        Quality Flow Rounds held on August 20, 2018    Disciplines Attending:  Bedside Nurse, ,  and Nursing Unit [address] Mak Nuñez was admitted on 8/17/2018  7:11 PM    Anticipated Discharge Date:  Expected Discharge Date: 08/20/18    Disposition:    Jeferson Score:  Jeferson Scale Score: 19    Readmission Risk              Risk of Unplanned Readmission:        9             Discussed patient goal for the day, patient clinical progression, and barriers to discharge.   The following Goal(s) of the Day/Commitment(s) have been identified:  OR planned for today      [facility]  August 20, 2018

## 2018-08-20 NOTE — ANESTHESIA PRE PROCEDURE
Department of Anesthesiology  Preprocedure Note       Name:  Freedom Drilllarry   Age:  70 y.o.  :  1950                                          MRN:  [id number]         Date:  2018      Surgeon: Austin Diaz):  Jase Parks MD    Procedure: Procedure(s):  DIAGNOSTIC LAPAROSCOPY POSS LAPAROTOMY POSS BOWEL RESECTION    Medications prior to admission:   Prior to Admission medications    Medication Sig Start Date End Date Taking? Authorizing Provider   celecoxib (CELEBREX) 200 MG capsule TAKE 1 CAPSULE BY MOUTH EVERY DAY 18  Yes Jaime Mishra DO   pantoprazole (PROTONIX) 40 MG tablet TAKE 1 TABLET BY MOUTH EVERY DAY 18  Yes Jaime Mishra DO   temazepam (RESTORIL) 15 MG capsule Take 1 capsule by mouth nightly as needed for Sleep for up to 30 days. . 18  Italo Pandael, DO   omega-3 acid ethyl esters (LOVAZA) 1 g capsule TAKE 1 CAPSULE BY MOUTH FOUR TIMES DAILY 18   Italo Mishra, DO   atorvastatin (LIPITOR) 40 MG tablet TAKE 1 TABLET BY MOUTH EVERY DAY 18   Italo Mishra, DO   ezetimibe (ZETIA) 10 MG tablet Take 1 tablet by mouth daily 18   Italo Mishra, DO   NONFORMULARY Take 1 capsule by mouth daily    Historical Provider, MD   NONFORMULARY Take 1 capsule by mouth daily    Historical Provider, MD   Flaxseed, Linseed, (FLAX SEEDS PO) Take 1 capsule by mouth daily    Historical Provider, MD Yeboah Newark EXTR Take 1 capsule by mouth daily    Historical Provider, MD   therapeutic multivitamin-minerals (THERAGRAN-M) tablet Take 1 tablet by mouth daily.     Historical Provider, MD       Current medications:    Current Facility-Administered Medications   Medication Dose Route Frequency Provider Last Rate Last Dose    diatrizoate meglumine-sodium (GASTROGRAFIN) 66-10 % solution 480 mL  480 mL Oral ONCE PRN Rachel Zelaya MD   480 mL at 18 1226    lidocaine viscous (XYLOCAINE) 2 % solution 15 mL  15 mL Mouth/Throat Q3H PRN Jaime Mishra DO        dextrose 5 % and 0.45 % NaCl with KCl 20 mEq infusion   Intravenous Continuous Dontrell Mackeys Danica,  mL/hr at 08/20/18 0243      cefOXitin in dextrose (MEFOXIN) IVPB Duplex 1 g  1 g Intravenous On Call to 3 [facility] MD Yasmeen        atorvastatin (LIPITOR) tablet 40 mg  40 mg Oral Daily [facility]   40 mg at 08/18/18 8751    heparin (porcine) injection 5,000 Units  5,000 Units Subcutaneous 3 times per day [facility]   Stopped at 08/19/18 2111    pantoprazole (PROTONIX) injection 40 mg  40 mg Intravenous Daily [facility]   40 mg at 08/20/18 1545    And    sodium chloride (PF) 0.9 % injection 10 mL  10 mL Intravenous Daily [facility], DO   10 mL at 08/20/18 0913    ondansetron (ZOFRAN) injection 4 mg  4 mg Intravenous Q6H PRN [facility], DO   4 mg at 08/20/18 4623    trimethobenzamide (TIGAN) injection 200 mg  200 mg Intramuscular Q6H PRN [facility], DO        acetaminophen (TYLENOL) tablet 650 mg  650 mg Oral Q4H PRN [facility], DO        glucose (GLUTOSE) 40 % oral gel 15 g  15 g Oral PRN [facility], DO        dextrose 50 % solution 12.5 g  12.5 g Intravenous PRN [facility], DO        glucagon (rDNA) injection 1 mg  1 mg Intramuscular PRN [facility], [facility]        dextrose 5 % solution  100 mL/hr Intravenous PRN [facility], DO        fentaNYL (SUBLIMAZE) injection 50 mcg  50 mcg Intravenous Q2H PRN [facility], DO   50 mcg at 08/20/18 9629       Allergies:  No Known Allergies    Problem List:    Patient Active Problem List   Diagnosis Code    Hyponatremia E87.1    Acute uremia N19    Hypomagnesemia E83.42    GERD (gastroesophageal reflux disease) K21.9    Mixed hyperlipidemia E78.2    Lumbago M54.5    Pneumonia J18.9    Nausea & vomiting R11.2    Diarrhea R19.7    Shoulder impingement M75.40    AC (acromioclavicular) arthritis M19.019    Arthritis M19.90    C. 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    Hypoglycemia E16.2    Abdominal pain R10.9    Small bowel obstruction ([address] K56.609       Past Medical History:        Diagnosis Date    Arthritis     GERD (gastroesophageal reflux disease) 1/4/2015    Hyperlipidemia     Hypoglycemia     Mixed hyperlipidemia 1/4/2015    Nausea & vomiting 1/4/2015    Pneumonia 1/4/2015       Past Surgical History:        Procedure Laterality Date    COLONOSCOPY      INCISIONAL HERNIA REPAIR N/A 10/13/2016    INGUINAL HERNIA REPAIR Left 2/19/2013    laparoscopic left inguinal hernia repair    PANCREAS SURGERY      Whipple procedure    SHOULDER SURGERY      right cyst removed 1965    SKIN BIOPSY      UPPER GASTROINTESTINAL ENDOSCOPY         Social History:    Social History   Substance Use Topics    Smoking status: Former Smoker     Quit date: 2/13/2011    Smokeless tobacco: Never Used    Alcohol use 6.0 oz/week     10 Cans of beer per week      Comment: moderate 3-4 per day                                Counseling given: Not Answered      Vital Signs (Current):   Vitals:    08/19/18 1600 08/19/18 1909 08/19/18 2356 08/20/18 0900   BP:  137/77 (!) 153/87 (!) 157/91   Pulse: 84 81 94 91   Resp:  16 18 16   Temp:  98.7 °F (37.1 °C) 98 °F (36.7 °C) 98 °F (36.7 °C)   TempSrc:  Oral     SpO2: 93% 97% 95% 90%   Weight:       Height:                                                  BP Readings from Last 3 Encounters:   08/20/18 (!) 157/91   07/31/18 124/68   05/23/18 138/72       NPO Status:                                                                                 BMI:   Wt Readings from Last 3 Encounters:   08/17/18 155 lb 9 oz (70.6 kg)   07/31/18 158 lb (71.7 kg)   05/23/18 159 lb (72.1 kg)     Body mass index is 25.11 kg/m².     CBC:   Lab Results   Component Value Date    WBC 9.0 08/20/2018    RBC 4.46 08/20/2018    HGB 14.0 08/20/2018    HCT 40.8 08/20/2018    MCV 91.5 08/20/2018    RDW 13.5 08/20/2018     08/20/2018       CMP:   Lab Results   Component Value Date     08/20/2018    K 3.7 08/20/2018     08/20/2018    CO2 31 08/20/2018    BUN 26 08/20/2018    CREATININE 1.0 08/20/2018    GFRAA >60 08/20/2018    LABGLOM >60 08/20/2018    GLUCOSE 169 08/20/2018    PROT 7.8 08/20/2018    CALCIUM 10.1 08/20/2018    BILITOT 0.6 08/20/2018    ALKPHOS 37 08/20/2018    AST 21 08/20/2018    ALT 11 08/20/2018       POC Tests: No results for input(s): POCGLU, POCNA, POCK, POCCL, POCBUN, POCHEMO, POCHCT in the last 72 hours. Coags:   Lab Results   Component Value Date    PROTIME 12.8 08/17/2018    INR 1.1 08/17/2018       HCG (If Applicable): No results found for: PREGTESTUR, PREGSERUM, HCG, HCGQUANT     ABGs: No results found for: PHART, PO2ART, NNF9KMA, DYF2QOB, BEART, R1BVZHSC     Type & Screen (If Applicable):  No results found for: LABABO, LABRH     CT Scan Abdomen 8/17/2018: Impression   1. Mid small bowel obstruction probably related to internal hernia   right lower quadrant. 2. Probable mass in the region of the pancreatic neck. 3. Multifocal airspace disease.             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    Endo/Other:    (+) : arthritis:., .                 Abdominal:         (-) obese     Vascular:                                      Anesthesia Plan      general     ASA 3       Induction: rapid

## 2018-08-20 NOTE — OP NOTE
sharp  scissors. Once this was completed, there was obvious transition point that  was appreciated and released with those releasing of the adhesions. There  were some additional adhesions with the omentum to the proximal  gastrojejunostomy. The bowel was run from the ligament of Treitz and the  biliary limitus distally all the way down to the cecum. There were no  other appreciable abnormalities. The remainder of the abdomen appeared  normal without any other problems. At this time, pneumoperitoneum and all  trocars were removed. The trocar sites were closed with 4-0 Monocryl and  DermaFlex and the patient was awoken up in stable condition.         Clarisa Dye MD    D: 08/20/2018 12:11:58       T: 08/20/2018 15:43:40     [email or web address]  Job#: [id number]     Doc#: [id number]    CC:

## 2018-08-20 NOTE — PROGRESS NOTES
GENERAL SURGERY  DAILY PROGRESS NOTE  8/20/2018    Subjective:  Floweree sick after SBFT yesterday. Contrast did not reach colon. Had 3200cc of bilious content from NGT. Small bowel movement this morning. Not passing flatus. Objective:  BP (!) 153/87   Pulse 94   Temp 98 °F (36.7 °C)   Resp 18   Ht 5' 6\" (1.676 m)   Wt 155 lb 9 oz (70.6 kg)   SpO2 95%   BMI 25.11 kg/m²     GENERAL:  Laying in bed, awake, alert, cooperative, ill appearing. HEAD: Normocephalic, atraumatic  EYES: No sclera icterus, pupils equal  LUNGS:  No increased work of breathing  CARDIOVASCULAR:  Reg rate  ABDOMEN:  Soft, non-distended, mild diffuse ttp , NGT in place with bilious content. Midline surgical scar well healed. EXTREMITIES: No edema or swelling  SKIN: Warm and dry    Assessment/Plan:  70 y.o. male with SBO 2/2 adhesions from prior surgery   - Continue NPO, NGT  - IVF  - Will tentatively plan for OR this afternoon for diagnostic laparoscopy, and lysis of adhesions.   - medical mgmt per primary team    Electronically signed by Reji Puga MD on 8/20/2018 at 6:31 AM

## 2018-08-20 NOTE — PROGRESS NOTES
HPI:  Ping Nichols was seen and evaluated in the presence of his wife today. He states his abdominal discomfort has improved as NG has been placed back to suction. He has significant output from the NG tube. He did have small bowel movement this morning. Small bowel follow-through yesterday indicated persistent small bowel obstruction and plans are for surgical intervention today. Patient voiced no new complaints since hospital admission. Questions, answers, and tests reviewed. ROS:  Cardiovascular:   Denies any chest pain, irregular heartbeats, or palpitations. Respiratory:   Denies shortness of breath, coughing, sputum production, hemoptysis, or wheezing. Gastrointestinal:   Mild abdominal discomfort but significantly improved when compared to yesterday's examination. Extremities:   Denies any lower extremity swelling or edema. Neurology:    Denies any headache or focal neurological deficits. No weakness or paresthesia. Derm:    Denies any rashes, ulcers, or excoriations. Denies bruising. Genitourinary:    Denies any urgency, frequency, hematuria. Voiding without difficulty. Physical Exam:    Vitals:    08/19/18 2356   BP: (!) 153/87   Pulse: 94   Resp: 18   Temp: 98 °F (36.7 °C)   SpO2: 95%       HEENT:  PERRLA. EOMI. Sclera clear. Buccal mucosa moist.    Neck:  Supple. Trachea midline. No thyromegaly. No JVD. No bruits. Heart:  Rhythm regular, rate controlled. No murmurs. Lungs:  Symmetrical. Clear to auscultation bilaterally. No wheezes. No rhonchi. No rales. Abdomen: Soft. Bowel sounds are hypoactive. No high-pitched bowel sounds are heard. Extremities:  Peripheral pulses present. No peripheral edema. No ulcers. Neurologic:  Alert x 3. No focal deficit. Cranial nerves grossly intact. Skin:  No petechia. No hemorrhage. No wounds.     CBC with Differential:    Lab Results   Component Value Date    WBC 9.0 08/20/2018    RBC 4.46 08/20/2018    HGB 14.0 08/20/2018    HCT 40.8 plan of care.     Adam Walden D.O.  9:02 AM  8/20/2018

## 2018-08-21 VITALS
HEART RATE: 76 BPM | RESPIRATION RATE: 16 BRPM | HEIGHT: 66 IN | OXYGEN SATURATION: 98 % | TEMPERATURE: 98.4 F | DIASTOLIC BLOOD PRESSURE: 89 MMHG | BODY MASS INDEX: 25 KG/M2 | SYSTOLIC BLOOD PRESSURE: 155 MMHG | WEIGHT: 155.56 LBS

## 2018-08-21 LAB
ANION GAP SERPL CALCULATED.3IONS-SCNC: 10 MMOL/L (ref 7–16)
BASOPHILS ABSOLUTE: 0.01 E9/L (ref 0–0.2)
BASOPHILS RELATIVE PERCENT: 0.1 % (ref 0–2)
BUN BLDV-MCNC: 16 MG/DL (ref 8–23)
CALCIUM SERPL-MCNC: 7.9 MG/DL (ref 8.6–10.2)
CHLORIDE BLD-SCNC: 96 MMOL/L (ref 98–107)
CO2: 27 MMOL/L (ref 22–29)
CREAT SERPL-MCNC: 0.7 MG/DL (ref 0.7–1.2)
EKG ATRIAL RATE: 90 BPM
EKG P AXIS: 57 DEGREES
EKG P-R INTERVAL: 138 MS
EKG Q-T INTERVAL: 368 MS
EKG QRS DURATION: 86 MS
EKG QTC CALCULATION (BAZETT): 450 MS
EKG R AXIS: -54 DEGREES
EKG T AXIS: -35 DEGREES
EKG VENTRICULAR RATE: 90 BPM
EOSINOPHILS ABSOLUTE: 0.01 E9/L (ref 0.05–0.5)
EOSINOPHILS RELATIVE PERCENT: 0.1 % (ref 0–6)
GFR AFRICAN AMERICAN: >60
GFR NON-AFRICAN AMERICAN: >60 ML/MIN/1.73
GLUCOSE BLD-MCNC: 144 MG/DL (ref 74–109)
HCT VFR BLD CALC: 33.1 % (ref 37–54)
HEMOGLOBIN: 11.2 G/DL (ref 12.5–16.5)
IMMATURE GRANULOCYTES #: 0.05 E9/L
IMMATURE GRANULOCYTES %: 0.5 % (ref 0–5)
LYMPHOCYTES ABSOLUTE: 1.5 E9/L (ref 1.5–4)
LYMPHOCYTES RELATIVE PERCENT: 16.1 % (ref 20–42)
MCH RBC QN AUTO: 31.1 PG (ref 26–35)
MCHC RBC AUTO-ENTMCNC: 33.8 % (ref 32–34.5)
MCV RBC AUTO: 91.9 FL (ref 80–99.9)
MONOCYTES ABSOLUTE: 0.8 E9/L (ref 0.1–0.95)
MONOCYTES RELATIVE PERCENT: 8.6 % (ref 2–12)
NEUTROPHILS ABSOLUTE: 6.92 E9/L (ref 1.8–7.3)
NEUTROPHILS RELATIVE PERCENT: 74.6 % (ref 43–80)
PDW BLD-RTO: 13 FL (ref 11.5–15)
PLATELET # BLD: 176 E9/L (ref 130–450)
PMV BLD AUTO: 8.6 FL (ref 7–12)
POTASSIUM SERPL-SCNC: 3.3 MMOL/L (ref 3.5–5)
RBC # BLD: 3.6 E12/L (ref 3.8–5.8)
SODIUM BLD-SCNC: 133 MMOL/L (ref 132–146)
WBC # BLD: 9.3 E9/L (ref 4.5–11.5)

## 2018-08-21 PROCEDURE — 6370000000 HC RX 637 (ALT 250 FOR IP): Performed by: INTERNAL MEDICINE

## 2018-08-21 PROCEDURE — 36415 COLL VENOUS BLD VENIPUNCTURE: CPT

## 2018-08-21 PROCEDURE — 80048 BASIC METABOLIC PNL TOTAL CA: CPT

## 2018-08-21 PROCEDURE — 85025 COMPLETE CBC W/AUTO DIFF WBC: CPT

## 2018-08-21 PROCEDURE — 6370000000 HC RX 637 (ALT 250 FOR IP): Performed by: FAMILY MEDICINE

## 2018-08-21 PROCEDURE — 6360000002 HC RX W HCPCS: Performed by: FAMILY MEDICINE

## 2018-08-21 RX ORDER — PANTOPRAZOLE SODIUM 40 MG/1
40 TABLET, DELAYED RELEASE ORAL DAILY
Status: DISCONTINUED | OUTPATIENT
Start: 2018-08-21 | End: 2018-08-21 | Stop reason: HOSPADM

## 2018-08-21 RX ORDER — TEMAZEPAM 15 MG/1
15 CAPSULE ORAL NIGHTLY PRN
Status: DISCONTINUED | OUTPATIENT
Start: 2018-08-21 | End: 2018-08-21 | Stop reason: HOSPADM

## 2018-08-21 RX ORDER — POTASSIUM CHLORIDE 20 MEQ/1
40 TABLET, EXTENDED RELEASE ORAL ONCE
Status: COMPLETED | OUTPATIENT
Start: 2018-08-21 | End: 2018-08-21

## 2018-08-21 RX ADMIN — ATORVASTATIN CALCIUM 40 MG: 40 TABLET, FILM COATED ORAL at 09:24

## 2018-08-21 RX ADMIN — POTASSIUM CHLORIDE 40 MEQ: 1500 TABLET, EXTENDED RELEASE ORAL at 09:29

## 2018-08-21 RX ADMIN — HEPARIN SODIUM 5000 UNITS: 5000 INJECTION, SOLUTION INTRAVENOUS; SUBCUTANEOUS at 06:18

## 2018-08-21 RX ADMIN — PANTOPRAZOLE SODIUM 40 MG: 40 TABLET, DELAYED RELEASE ORAL at 09:24

## 2018-08-21 ASSESSMENT — PAIN DESCRIPTION - ORIENTATION: ORIENTATION: MID

## 2018-08-21 ASSESSMENT — PAIN DESCRIPTION - LOCATION: LOCATION: ABDOMEN

## 2018-08-21 ASSESSMENT — PAIN SCALES - GENERAL: PAINLEVEL_OUTOF10: 1

## 2018-08-21 ASSESSMENT — PAIN DESCRIPTION - PAIN TYPE: TYPE: ACUTE PAIN

## 2018-08-21 ASSESSMENT — PAIN DESCRIPTION - DESCRIPTORS: DESCRIPTORS: DISCOMFORT

## 2018-08-21 NOTE — PROGRESS NOTES
GENERAL SURGERY  DAILY PROGRESS NOTE  8/21/2018    Subjective:  Patient was seen and examined this morning. Pt has no complaints, pain is well controlled, had BM, but no flatus, tolerating his diet and denies fever, nausea vomiting and chest pain. Objective:  /75   Pulse 71   Temp 98.6 °F (37 °C) (Oral)   Resp 16   Ht 5' 6\" (1.676 m)   Wt 155 lb 9 oz (70.6 kg)   SpO2 97%   BMI 25.11 kg/m²     GENERAL:  Awake, alert, ambulating with wife, cooperative, no apparent distress. HEAD: Normocephalic, atraumatic, NG tube in place, not connected to suction. EYES: No sclera icterus, pupils equal  LUNGS:  No increased work of breathing  CARDIOVASCULAR:  RRR, no murmur or gallops  ABDOMEN:  Soft,  Not distended, appropriately tender in LLQ, incisions are cleaned, dried and intact  EXTREMITIES: No edema or swelling, pulse intact. SKIN: Warm and dry, no acute changes  Labs:   Results for Mary Ann Marmolejo (MRN [id number]) as of 8/21/2018 06:29   Ref.  Range 8/21/2018 03:05   WBC Latest Ref Range: 4.5 - 11.5 E9/L 9.3   RBC Latest Ref Range: 3.80 - 5.80 E12/L 3.60 (L)   Hemoglobin Quant Latest Ref Range: 12.5 - 16.5 g/dL 11.2 (L)   Hematocrit Latest Ref Range: 37.0 - 54.0 % 33.1 (L)   MCV Latest Ref Range: 80.0 - 99.9 fL 91.9   MCH Latest Ref Range: 26.0 - 35.0 pg 31.1   MCHC Latest Ref Range: 32.0 - 34.5 % 33.8   MPV Latest Ref Range: 7.0 - 12.0 fL 8.6   Input: 848  Output: 1,105  NG tube: None since surgery  Assessment/Plan:  70 y.o. male with small bowel obstruction S/P laparoscopic lysis of adhesions  - Pt improving , had BM  - Continue pain control  -Continue IVF  -Ambulate as tolerated  - IS 10 / hour   - Will consider D/C NG tube tomorrow if continue improving  - Advanced diet to full liquid  Prophylaxis  -SCD    Rosi Tucker  Medical student [employer] 4    Electronically signed by Chapin Pizano on 8/21/2018 at 6:29 AM

## 2018-08-21 NOTE — PLAN OF CARE
Problem: PAIN  Goal: Communication of presence of pain  Outcome: Met This Shift      Problem: Pain:  Goal: Pain level will decrease  Pain level will decrease   Outcome: Met This Shift      Problem: Falls - Risk of:  Goal: Will remain free from falls  Will remain free from falls   Outcome: Met This Shift

## 2018-08-21 NOTE — PROGRESS NOTES
HPI:  Benji Garcia was seen and evaluated in the presence of his wife today. He underwent surgical intervention yesterday with lysis of adhesions and release of small bowel obstruction. He is feeling dramatically better this morning. He is tolerating his current diet. He did have a bowel movement earlier today as well. Patient voiced no new complaints since hospital admission. Questions, answers, and tests reviewed. ROS:  Cardiovascular:   Denies any chest pain, irregular heartbeats, or palpitations. Respiratory:   Denies shortness of breath, coughing, sputum production, hemoptysis, or wheezing. Gastrointestinal:   Continued improvement in his abdominal discomfort. Extremities:   Denies any lower extremity swelling or edema. Neurology:    Denies any headache or focal neurological deficits. No weakness or paresthesia. Derm:    Denies any rashes, ulcers, or excoriations. Denies bruising. Genitourinary:    Denies any urgency, frequency, hematuria. Voiding without difficulty. Physical Exam:    Vitals:    08/21/18 0322   BP: 138/75   Pulse: 71   Resp: 16   Temp: 98.6 °F (37 °C)   SpO2:        HEENT:  PERRLA. EOMI. Sclera clear. Buccal mucosa moist.    Neck:  Supple. Trachea midline. No thyromegaly. No JVD. No bruits. Heart:  Rhythm regular, rate controlled. No murmurs. Lungs:  Symmetrical. Clear to auscultation bilaterally. No wheezes. No rhonchi. No rales. Abdomen: Soft. Bowel sounds are hypoactive. Extremities:  Peripheral pulses present. No peripheral edema. No ulcers. Neurologic:  Alert x 3. No focal deficit. Cranial nerves grossly intact. Skin:  No petechia. No hemorrhage. No wounds.     CBC with Differential:    Lab Results   Component Value Date    WBC 9.3 08/21/2018    RBC 3.60 08/21/2018    HGB 11.2 08/21/2018    HCT 33.1 08/21/2018     08/21/2018    MCV 91.9 08/21/2018    MCH 31.1 08/21/2018    MCHC 33.8 08/21/2018    RDW 13.0 08/21/2018    LYMPHOPCT 16.1 08/21/2018

## 2018-08-21 NOTE — DISCHARGE SUMMARY
thyromegaly. No JVD. No bruits. Heart:  Rhythm regular, rate controlled. No murmurs. Lungs:  Symmetrical. Clear to auscultation bilaterally. No wheezes. No rhonchi. No rales. Abdomen: Soft. Non-tender. Non-distended. Bowel sounds positive. No organomegaly or masses. No pain on palpation    Extremities:  Peripheral pulses present. No peripheral edema. No ulcers. Neurologic:  Alert x 3. No focal deficit. Cranial nerves grossly intact. Skin:  No petechia. No hemorrhage. No wounds. DISPOSITION:  The patient's condition is good. At this time the patient is without objective evidence of an acute process requiring continuing hospitalization or inpatient management. They are stable for discharge with outpatient follow-up. I have spoken with the patient and discussed the results of the current hospitalization, in addition to providing specific details for the plan of care and counseling regarding the diagnosis and prognosis. The plan has been discussed in detail and they are aware of the specific conditions for emergent return, as well as the importance of follow-up.   Their questions are answered at this time and they are agreeable with the plan for discharge to home    DISCHARGE MEDICATIONS:    [facility] Medication Instructions OSE:[id number]    Printed on:08/21/18 1003   Medication Information                      atorvastatin (LIPITOR) 40 MG tablet  TAKE 1 TABLET BY MOUTH EVERY DAY             celecoxib (CELEBREX) 200 MG capsule  TAKE 1 CAPSULE BY MOUTH EVERY DAY             ezetimibe (ZETIA) 10 MG tablet  Take 1 tablet by mouth daily             Flaxseed, Linseed, (FLAX SEEDS PO)  Take 1 capsule by mouth daily             NONFORMULARY  Take 1 capsule by mouth daily             NONFORMULARY  Take 1 capsule by mouth daily             omega-3 acid ethyl esters (LOVAZA) 1 g capsule  TAKE 1 CAPSULE BY MOUTH FOUR TIMES DAILY             pantoprazole (PROTONIX) 40 MG tablet  TAKE

## 2018-08-22 LAB
EKG ATRIAL RATE: 90 BPM
EKG P AXIS: 64 DEGREES
EKG P-R INTERVAL: 130 MS
EKG Q-T INTERVAL: 390 MS
EKG QRS DURATION: 90 MS
EKG QTC CALCULATION (BAZETT): 477 MS
EKG R AXIS: -40 DEGREES
EKG T AXIS: 50 DEGREES
EKG VENTRICULAR RATE: 90 BPM

## 2018-09-04 ENCOUNTER — APPOINTMENT (OUTPATIENT)
Dept: CT IMAGING | Age: 68
DRG: 327 | End: 2018-09-04
Payer: MEDICARE

## 2018-09-04 ENCOUNTER — HOSPITAL ENCOUNTER (INPATIENT)
Age: 68
LOS: 5 days | Discharge: HOME | DRG: 327 | End: 2018-09-09
Attending: EMERGENCY MEDICINE | Admitting: INTERNAL MEDICINE
Payer: MEDICARE

## 2018-09-04 ENCOUNTER — APPOINTMENT (OUTPATIENT)
Dept: GENERAL RADIOLOGY | Age: 68
DRG: 327 | End: 2018-09-04
Payer: MEDICARE

## 2018-09-04 ENCOUNTER — TELEPHONE (OUTPATIENT)
Dept: SURGERY | Age: 68
End: 2018-09-04

## 2018-09-04 DIAGNOSIS — K92.0 HEMATEMESIS WITH NAUSEA: ICD-10-CM

## 2018-09-04 DIAGNOSIS — K56.609 SBO (SMALL BOWEL OBSTRUCTION) (HCC): Primary | ICD-10-CM

## 2018-09-04 LAB
ANION GAP SERPL CALCULATED.3IONS-SCNC: 13 MMOL/L (ref 7–16)
BASOPHILS ABSOLUTE: 0.02 E9/L (ref 0–0.2)
BASOPHILS RELATIVE PERCENT: 0.2 % (ref 0–2)
BUN BLDV-MCNC: 16 MG/DL (ref 8–23)
CALCIUM SERPL-MCNC: 10.4 MG/DL (ref 8.6–10.2)
CHLORIDE BLD-SCNC: 95 MMOL/L (ref 98–107)
CO2: 28 MMOL/L (ref 22–29)
CREAT SERPL-MCNC: 0.7 MG/DL (ref 0.7–1.2)
EOSINOPHILS ABSOLUTE: 0.04 E9/L (ref 0.05–0.5)
EOSINOPHILS RELATIVE PERCENT: 0.4 % (ref 0–6)
GFR AFRICAN AMERICAN: >60
GFR NON-AFRICAN AMERICAN: >60 ML/MIN/1.73
GLUCOSE BLD-MCNC: 138 MG/DL (ref 74–109)
HCT VFR BLD CALC: 38.4 % (ref 37–54)
HEMOGLOBIN: 13 G/DL (ref 12.5–16.5)
IMMATURE GRANULOCYTES #: 0.05 E9/L
IMMATURE GRANULOCYTES %: 0.5 % (ref 0–5)
LACTIC ACID: 0.9 MMOL/L (ref 0.5–2.2)
LYMPHOCYTES ABSOLUTE: 1.08 E9/L (ref 1.5–4)
LYMPHOCYTES RELATIVE PERCENT: 10.6 % (ref 20–42)
MCH RBC QN AUTO: 30.9 PG (ref 26–35)
MCHC RBC AUTO-ENTMCNC: 33.9 % (ref 32–34.5)
MCV RBC AUTO: 91.2 FL (ref 80–99.9)
MONOCYTES ABSOLUTE: 0.88 E9/L (ref 0.1–0.95)
MONOCYTES RELATIVE PERCENT: 8.7 % (ref 2–12)
NEUTROPHILS ABSOLUTE: 8.09 E9/L (ref 1.8–7.3)
NEUTROPHILS RELATIVE PERCENT: 79.6 % (ref 43–80)
PDW BLD-RTO: 13.3 FL (ref 11.5–15)
PLATELET # BLD: 274 E9/L (ref 130–450)
PMV BLD AUTO: 8.3 FL (ref 7–12)
POTASSIUM REFLEX MAGNESIUM: 4.3 MMOL/L (ref 3.5–5)
RBC # BLD: 4.21 E12/L (ref 3.8–5.8)
SODIUM BLD-SCNC: 136 MMOL/L (ref 132–146)
WBC # BLD: 10.2 E9/L (ref 4.5–11.5)

## 2018-09-04 PROCEDURE — 85025 COMPLETE CBC W/AUTO DIFF WBC: CPT

## 2018-09-04 PROCEDURE — 2580000003 HC RX 258: Performed by: EMERGENCY MEDICINE

## 2018-09-04 PROCEDURE — 74177 CT ABD & PELVIS W/CONTRAST: CPT

## 2018-09-04 PROCEDURE — C9113 INJ PANTOPRAZOLE SODIUM, VIA: HCPCS | Performed by: INTERNAL MEDICINE

## 2018-09-04 PROCEDURE — 6360000004 HC RX CONTRAST MEDICATION: Performed by: RADIOLOGY

## 2018-09-04 PROCEDURE — 6360000002 HC RX W HCPCS: Performed by: INTERNAL MEDICINE

## 2018-09-04 PROCEDURE — 2580000003 HC RX 258: Performed by: INTERNAL MEDICINE

## 2018-09-04 PROCEDURE — 71045 X-RAY EXAM CHEST 1 VIEW: CPT

## 2018-09-04 PROCEDURE — 99285 EMERGENCY DEPT VISIT HI MDM: CPT

## 2018-09-04 PROCEDURE — 80048 BASIC METABOLIC PNL TOTAL CA: CPT

## 2018-09-04 PROCEDURE — 6360000002 HC RX W HCPCS: Performed by: EMERGENCY MEDICINE

## 2018-09-04 PROCEDURE — 1200000000 HC SEMI PRIVATE

## 2018-09-04 PROCEDURE — 36415 COLL VENOUS BLD VENIPUNCTURE: CPT

## 2018-09-04 PROCEDURE — 83605 ASSAY OF LACTIC ACID: CPT

## 2018-09-04 PROCEDURE — 96374 THER/PROPH/DIAG INJ IV PUSH: CPT

## 2018-09-04 RX ORDER — SODIUM CHLORIDE 9 MG/ML
INJECTION, SOLUTION INTRAVENOUS CONTINUOUS
Status: DISCONTINUED | OUTPATIENT
Start: 2018-09-04 | End: 2018-09-04

## 2018-09-04 RX ORDER — 0.9 % SODIUM CHLORIDE 0.9 %
1000 INTRAVENOUS SOLUTION INTRAVENOUS ONCE
Status: COMPLETED | OUTPATIENT
Start: 2018-09-04 | End: 2018-09-04

## 2018-09-04 RX ORDER — LORAZEPAM 2 MG/ML
0.5 INJECTION INTRAMUSCULAR NIGHTLY PRN
Status: DISCONTINUED | OUTPATIENT
Start: 2018-09-04 | End: 2018-09-09 | Stop reason: HOSPADM

## 2018-09-04 RX ORDER — ACETAMINOPHEN 650 MG/1
650 SUPPOSITORY RECTAL EVERY 4 HOURS PRN
Status: DISCONTINUED | OUTPATIENT
Start: 2018-09-04 | End: 2018-09-09 | Stop reason: HOSPADM

## 2018-09-04 RX ORDER — ONDANSETRON 2 MG/ML
4 INJECTION INTRAMUSCULAR; INTRAVENOUS EVERY 6 HOURS PRN
Status: DISCONTINUED | OUTPATIENT
Start: 2018-09-04 | End: 2018-09-09 | Stop reason: HOSPADM

## 2018-09-04 RX ORDER — SODIUM CHLORIDE 0.9 % (FLUSH) 0.9 %
10 SYRINGE (ML) INJECTION EVERY 12 HOURS SCHEDULED
Status: DISCONTINUED | OUTPATIENT
Start: 2018-09-04 | End: 2018-09-09 | Stop reason: HOSPADM

## 2018-09-04 RX ORDER — SODIUM CHLORIDE 0.9 % (FLUSH) 0.9 %
10 SYRINGE (ML) INJECTION PRN
Status: DISCONTINUED | OUTPATIENT
Start: 2018-09-04 | End: 2018-09-04 | Stop reason: SDUPTHER

## 2018-09-04 RX ORDER — SODIUM CHLORIDE 0.9 % (FLUSH) 0.9 %
10 SYRINGE (ML) INJECTION PRN
Status: DISCONTINUED | OUTPATIENT
Start: 2018-09-04 | End: 2018-09-09 | Stop reason: HOSPADM

## 2018-09-04 RX ORDER — LORAZEPAM 0.5 MG/1
0.5 TABLET ORAL NIGHTLY PRN
Status: DISCONTINUED | OUTPATIENT
Start: 2018-09-04 | End: 2018-09-04

## 2018-09-04 RX ORDER — FENTANYL CITRATE 50 UG/ML
25 INJECTION, SOLUTION INTRAMUSCULAR; INTRAVENOUS
Status: DISCONTINUED | OUTPATIENT
Start: 2018-09-04 | End: 2018-09-09 | Stop reason: HOSPADM

## 2018-09-04 RX ORDER — SODIUM CHLORIDE, SODIUM LACTATE, POTASSIUM CHLORIDE, CALCIUM CHLORIDE 600; 310; 30; 20 MG/100ML; MG/100ML; MG/100ML; MG/100ML
INJECTION, SOLUTION INTRAVENOUS CONTINUOUS
Status: DISCONTINUED | OUTPATIENT
Start: 2018-09-04 | End: 2018-09-09 | Stop reason: HOSPADM

## 2018-09-04 RX ORDER — PANTOPRAZOLE SODIUM 40 MG/10ML
40 INJECTION, POWDER, LYOPHILIZED, FOR SOLUTION INTRAVENOUS 2 TIMES DAILY
Status: DISCONTINUED | OUTPATIENT
Start: 2018-09-04 | End: 2018-09-09 | Stop reason: HOSPADM

## 2018-09-04 RX ORDER — ONDANSETRON 2 MG/ML
4 INJECTION INTRAMUSCULAR; INTRAVENOUS ONCE
Status: COMPLETED | OUTPATIENT
Start: 2018-09-04 | End: 2018-09-04

## 2018-09-04 RX ADMIN — IOHEXOL 50 ML: 240 INJECTION, SOLUTION INTRATHECAL; INTRAVASCULAR; INTRAVENOUS; ORAL at 19:55

## 2018-09-04 RX ADMIN — PANTOPRAZOLE SODIUM 40 MG: 40 INJECTION, POWDER, FOR SOLUTION INTRAVENOUS at 23:35

## 2018-09-04 RX ADMIN — IOPAMIDOL 80 ML: 755 INJECTION, SOLUTION INTRAVENOUS at 19:55

## 2018-09-04 RX ADMIN — ONDANSETRON 4 MG: 2 INJECTION INTRAMUSCULAR; INTRAVENOUS at 22:45

## 2018-09-04 RX ADMIN — ONDANSETRON 4 MG: 2 INJECTION INTRAMUSCULAR; INTRAVENOUS at 18:28

## 2018-09-04 RX ADMIN — SODIUM CHLORIDE: 9 INJECTION, SOLUTION INTRAVENOUS at 22:07

## 2018-09-04 RX ADMIN — SODIUM CHLORIDE 1000 ML: 9 INJECTION, SOLUTION INTRAVENOUS at 18:07

## 2018-09-04 RX ADMIN — SODIUM CHLORIDE, POTASSIUM CHLORIDE, SODIUM LACTATE AND CALCIUM CHLORIDE: 600; 310; 30; 20 INJECTION, SOLUTION INTRAVENOUS at 22:40

## 2018-09-04 ASSESSMENT — ENCOUNTER SYMPTOMS
EYE PAIN: 0
SORE THROAT: 0
SHORTNESS OF BREATH: 0
BACK PAIN: 0
COUGH: 0
DIARRHEA: 0
SINUS PRESSURE: 0
ABDOMINAL PAIN: 0
EYE REDNESS: 0
EYE DISCHARGE: 0
NAUSEA: 1
WHEEZING: 0
VOMITING: 1

## 2018-09-04 ASSESSMENT — PAIN SCALES - GENERAL: PAINLEVEL_OUTOF10: 0

## 2018-09-04 NOTE — TELEPHONE ENCOUNTER
Pt's wife has called in and left a VM stating she has taken pt to ED due to excessive vomiting. Pt's wife state she would just like to inform our office and will call to follow up with any questions or concerns, along with the  Result of the ED visit.  Electronically signed by Ambreen Carrington on 9/4/2018 at 2:58 PM

## 2018-09-04 NOTE — ED PROVIDER NOTES
Nursing note and vitals reviewed. Procedures    MDM    Patient presents to the ED for hematemesis. Differential diagnoses included but not limited to SBO. Workup in the ED revealed massive gastric distention. Patient was given NGT for their symptoms with good improvement. Patient requires continued workup and management of their symptoms and will be admitted to the hospital for further evaluation and treatment. ED Course as of Sep 04 2123   Tue Sep 04, 2018   2000 Discussed case with Dr. Campbell Fung. He ask that the patient be admitted under medicine service for hematemesis. [AD]      ED Course User Index  [AD] Jayant Fail, DO       --------------------------------------------- PAST HISTORY ---------------------------------------------  Past Medical History:  has a past medical history of Arthritis; GERD (gastroesophageal reflux disease); Hyperlipidemia; Hypoglycemia; Mixed hyperlipidemia; Nausea & vomiting; and Pneumonia. Past Surgical History:  has a past surgical history that includes Colonoscopy; shoulder surgery; Inguinal hernia repair (Left, 2/19/2013); skin biopsy; Upper gastrointestinal endoscopy; Pancreas surgery; Incisional hernia repair (N/A, 10/13/2016); pr lap,surg,colect,tot,w/proctect,w/ileost (N/A, 8/20/2018); and Colon surgery. Social History:  reports that he quit smoking about 7 years ago. He has never used smokeless tobacco. He reports that he drinks about 6.0 oz of alcohol per week . He reports that he does not use drugs. Family History: family history includes Cancer in his brother; Diabetes in his brother; High Cholesterol in his mother; Mental Illness in his brother; Other in his father; Stroke in his brother and mother. The patients home medications have been reviewed. Allergies: Patient has no known allergies.     -------------------------------------------------- RESULTS -------------------------------------------------    LABS:  Results for orders placed or

## 2018-09-05 ENCOUNTER — APPOINTMENT (OUTPATIENT)
Dept: GENERAL RADIOLOGY | Age: 68
DRG: 327 | End: 2018-09-05
Payer: MEDICARE

## 2018-09-05 ENCOUNTER — ANESTHESIA (OUTPATIENT)
Dept: ENDOSCOPY | Age: 68
DRG: 327 | End: 2018-09-05
Payer: MEDICARE

## 2018-09-05 ENCOUNTER — ANESTHESIA EVENT (OUTPATIENT)
Dept: ENDOSCOPY | Age: 68
DRG: 327 | End: 2018-09-05
Payer: MEDICARE

## 2018-09-05 VITALS
RESPIRATION RATE: 21 BRPM | OXYGEN SATURATION: 96 % | SYSTOLIC BLOOD PRESSURE: 130 MMHG | DIASTOLIC BLOOD PRESSURE: 69 MMHG | TEMPERATURE: 98.6 F

## 2018-09-05 LAB
ALBUMIN SERPL-MCNC: 3.7 G/DL (ref 3.5–5.2)
ALP BLD-CCNC: 31 U/L (ref 40–129)
ALT SERPL-CCNC: 9 U/L (ref 0–40)
ANION GAP SERPL CALCULATED.3IONS-SCNC: 13 MMOL/L (ref 7–16)
AST SERPL-CCNC: 13 U/L (ref 0–39)
BILIRUB SERPL-MCNC: 0.6 MG/DL (ref 0–1.2)
BUN BLDV-MCNC: 13 MG/DL (ref 8–23)
CALCIUM SERPL-MCNC: 9.1 MG/DL (ref 8.6–10.2)
CHLORIDE BLD-SCNC: 102 MMOL/L (ref 98–107)
CHOLESTEROL, TOTAL: 98 MG/DL (ref 0–199)
CO2: 25 MMOL/L (ref 22–29)
CREAT SERPL-MCNC: 0.8 MG/DL (ref 0.7–1.2)
GFR AFRICAN AMERICAN: >60
GFR NON-AFRICAN AMERICAN: >60 ML/MIN/1.73
GLUCOSE BLD-MCNC: 108 MG/DL (ref 74–109)
HBA1C MFR BLD: 5.6 % (ref 4–5.6)
HCT VFR BLD CALC: 32.4 % (ref 37–54)
HDLC SERPL-MCNC: 48 MG/DL
HEMOGLOBIN: 11.2 G/DL (ref 12.5–16.5)
LDL CHOLESTEROL CALCULATED: 40 MG/DL (ref 0–99)
MAGNESIUM: 1.5 MG/DL (ref 1.6–2.6)
MCH RBC QN AUTO: 31.4 PG (ref 26–35)
MCHC RBC AUTO-ENTMCNC: 34.6 % (ref 32–34.5)
MCV RBC AUTO: 90.8 FL (ref 80–99.9)
PDW BLD-RTO: 13.5 FL (ref 11.5–15)
PHOSPHORUS: 3.1 MG/DL (ref 2.5–4.5)
PLATELET # BLD: 246 E9/L (ref 130–450)
PMV BLD AUTO: 8.5 FL (ref 7–12)
POTASSIUM SERPL-SCNC: 3.6 MMOL/L (ref 3.5–5)
RBC # BLD: 3.57 E12/L (ref 3.8–5.8)
SODIUM BLD-SCNC: 140 MMOL/L (ref 132–146)
TOTAL PROTEIN: 6 G/DL (ref 6.4–8.3)
TRIGL SERPL-MCNC: 49 MG/DL (ref 0–149)
TSH SERPL DL<=0.05 MIU/L-ACNC: 2.08 UIU/ML (ref 0.27–4.2)
VLDLC SERPL CALC-MCNC: 10 MG/DL
WBC # BLD: 10.2 E9/L (ref 4.5–11.5)

## 2018-09-05 PROCEDURE — 7100000011 HC PHASE II RECOVERY - ADDTL 15 MIN: Performed by: SURGERY

## 2018-09-05 PROCEDURE — 6370000000 HC RX 637 (ALT 250 FOR IP): Performed by: SURGERY

## 2018-09-05 PROCEDURE — 80053 COMPREHEN METABOLIC PANEL: CPT

## 2018-09-05 PROCEDURE — 71045 X-RAY EXAM CHEST 1 VIEW: CPT

## 2018-09-05 PROCEDURE — 0DB78ZX EXCISION OF STOMACH, PYLORUS, VIA NATURAL OR ARTIFICIAL OPENING ENDOSCOPIC, DIAGNOSTIC: ICD-10-PCS | Performed by: SURGERY

## 2018-09-05 PROCEDURE — 85027 COMPLETE CBC AUTOMATED: CPT

## 2018-09-05 PROCEDURE — 3609012400 HC EGD TRANSORAL BIOPSY SINGLE/MULTIPLE: Performed by: SURGERY

## 2018-09-05 PROCEDURE — 99223 1ST HOSP IP/OBS HIGH 75: CPT | Performed by: SURGERY

## 2018-09-05 PROCEDURE — 2580000003 HC RX 258: Performed by: NURSE ANESTHETIST, CERTIFIED REGISTERED

## 2018-09-05 PROCEDURE — 6360000002 HC RX W HCPCS: Performed by: NURSE ANESTHETIST, CERTIFIED REGISTERED

## 2018-09-05 PROCEDURE — 1200000000 HC SEMI PRIVATE

## 2018-09-05 PROCEDURE — 3700000000 HC ANESTHESIA ATTENDED CARE: Performed by: SURGERY

## 2018-09-05 PROCEDURE — 6370000000 HC RX 637 (ALT 250 FOR IP): Performed by: INTERNAL MEDICINE

## 2018-09-05 PROCEDURE — 2500000003 HC RX 250 WO HCPCS: Performed by: NURSE ANESTHETIST, CERTIFIED REGISTERED

## 2018-09-05 PROCEDURE — 2709999900 HC NON-CHARGEABLE SUPPLY: Performed by: SURGERY

## 2018-09-05 PROCEDURE — 88305 TISSUE EXAM BY PATHOLOGIST: CPT

## 2018-09-05 PROCEDURE — 84443 ASSAY THYROID STIM HORMONE: CPT

## 2018-09-05 PROCEDURE — 2580000003 HC RX 258: Performed by: INTERNAL MEDICINE

## 2018-09-05 PROCEDURE — C9113 INJ PANTOPRAZOLE SODIUM, VIA: HCPCS | Performed by: INTERNAL MEDICINE

## 2018-09-05 PROCEDURE — 80061 LIPID PANEL: CPT

## 2018-09-05 PROCEDURE — 83735 ASSAY OF MAGNESIUM: CPT

## 2018-09-05 PROCEDURE — 84100 ASSAY OF PHOSPHORUS: CPT

## 2018-09-05 PROCEDURE — 6360000002 HC RX W HCPCS: Performed by: INTERNAL MEDICINE

## 2018-09-05 PROCEDURE — 88342 IMHCHEM/IMCYTCHM 1ST ANTB: CPT

## 2018-09-05 PROCEDURE — 43239 EGD BIOPSY SINGLE/MULTIPLE: CPT | Performed by: SURGERY

## 2018-09-05 PROCEDURE — 2580000003 HC RX 258: Performed by: EMERGENCY MEDICINE

## 2018-09-05 PROCEDURE — 83036 HEMOGLOBIN GLYCOSYLATED A1C: CPT

## 2018-09-05 PROCEDURE — 7100000010 HC PHASE II RECOVERY - FIRST 15 MIN: Performed by: SURGERY

## 2018-09-05 PROCEDURE — 36415 COLL VENOUS BLD VENIPUNCTURE: CPT

## 2018-09-05 PROCEDURE — C1773 RET DEV, INSERTABLE: HCPCS | Performed by: SURGERY

## 2018-09-05 RX ORDER — MAGNESIUM SULFATE 1 G/100ML
1 INJECTION INTRAVENOUS ONCE
Status: COMPLETED | OUTPATIENT
Start: 2018-09-05 | End: 2018-09-05

## 2018-09-05 RX ORDER — SODIUM CHLORIDE 9 MG/ML
INJECTION, SOLUTION INTRAVENOUS CONTINUOUS PRN
Status: DISCONTINUED | OUTPATIENT
Start: 2018-09-05 | End: 2018-09-05 | Stop reason: SDUPTHER

## 2018-09-05 RX ORDER — MIDAZOLAM HYDROCHLORIDE 1 MG/ML
INJECTION INTRAMUSCULAR; INTRAVENOUS PRN
Status: DISCONTINUED | OUTPATIENT
Start: 2018-09-05 | End: 2018-09-05 | Stop reason: SDUPTHER

## 2018-09-05 RX ORDER — LIDOCAINE HYDROCHLORIDE 20 MG/ML
INJECTION, SOLUTION INFILTRATION; PERINEURAL PRN
Status: DISCONTINUED | OUTPATIENT
Start: 2018-09-05 | End: 2018-09-05 | Stop reason: SDUPTHER

## 2018-09-05 RX ORDER — PROPOFOL 10 MG/ML
INJECTION, EMULSION INTRAVENOUS PRN
Status: DISCONTINUED | OUTPATIENT
Start: 2018-09-05 | End: 2018-09-05 | Stop reason: SDUPTHER

## 2018-09-05 RX ORDER — FENTANYL CITRATE 50 UG/ML
INJECTION, SOLUTION INTRAMUSCULAR; INTRAVENOUS PRN
Status: DISCONTINUED | OUTPATIENT
Start: 2018-09-05 | End: 2018-09-05 | Stop reason: SDUPTHER

## 2018-09-05 RX ADMIN — ONDANSETRON 4 MG: 2 INJECTION INTRAMUSCULAR; INTRAVENOUS at 13:16

## 2018-09-05 RX ADMIN — BENZOCAINE AND MENTHOL 1 LOZENGE: 15; 3.6 LOZENGE ORAL at 19:34

## 2018-09-05 RX ADMIN — ONDANSETRON 4 MG: 2 INJECTION INTRAMUSCULAR; INTRAVENOUS at 19:34

## 2018-09-05 RX ADMIN — PANTOPRAZOLE SODIUM 40 MG: 40 INJECTION, POWDER, FOR SOLUTION INTRAVENOUS at 21:27

## 2018-09-05 RX ADMIN — SODIUM CHLORIDE, POTASSIUM CHLORIDE, SODIUM LACTATE AND CALCIUM CHLORIDE: 600; 310; 30; 20 INJECTION, SOLUTION INTRAVENOUS at 07:46

## 2018-09-05 RX ADMIN — Medication 20 ML: at 13:16

## 2018-09-05 RX ADMIN — FENTANYL CITRATE 50 MCG: 50 INJECTION, SOLUTION INTRAMUSCULAR; INTRAVENOUS at 09:48

## 2018-09-05 RX ADMIN — FENTANYL CITRATE 25 MCG: 50 INJECTION INTRAMUSCULAR; INTRAVENOUS at 13:18

## 2018-09-05 RX ADMIN — Medication 10 ML: at 09:22

## 2018-09-05 RX ADMIN — BENZOCAINE AND MENTHOL 1 LOZENGE: 15; 3.6 LOZENGE ORAL at 16:41

## 2018-09-05 RX ADMIN — BENZOCAINE AND MENTHOL 1 LOZENGE: 15; 3.6 LOZENGE ORAL at 13:16

## 2018-09-05 RX ADMIN — FENTANYL CITRATE 25 MCG: 50 INJECTION INTRAMUSCULAR; INTRAVENOUS at 07:46

## 2018-09-05 RX ADMIN — FENTANYL CITRATE 25 MCG: 50 INJECTION INTRAMUSCULAR; INTRAVENOUS at 00:31

## 2018-09-05 RX ADMIN — CARBAMIDE PEROXIDE 6.5% 5 DROP: 6.5 LIQUID AURICULAR (OTIC) at 21:35

## 2018-09-05 RX ADMIN — PANTOPRAZOLE SODIUM 40 MG: 40 INJECTION, POWDER, FOR SOLUTION INTRAVENOUS at 09:22

## 2018-09-05 RX ADMIN — MIDAZOLAM 2 MG: 1 INJECTION INTRAMUSCULAR; INTRAVENOUS at 09:37

## 2018-09-05 RX ADMIN — MAGNESIUM SULFATE HEPTAHYDRATE 1 G: 1 INJECTION, SOLUTION INTRAVENOUS at 13:16

## 2018-09-05 RX ADMIN — LIDOCAINE HYDROCHLORIDE 10 MG: 20 INJECTION, SOLUTION INFILTRATION; PERINEURAL at 09:44

## 2018-09-05 RX ADMIN — BENZOCAINE AND MENTHOL 1 LOZENGE: 15; 3.6 LOZENGE ORAL at 21:26

## 2018-09-05 RX ADMIN — PROPOFOL 30 MG: 10 INJECTION, EMULSION INTRAVENOUS at 09:44

## 2018-09-05 RX ADMIN — Medication 10 ML: at 21:27

## 2018-09-05 RX ADMIN — SODIUM CHLORIDE: 9 INJECTION, SOLUTION INTRAVENOUS at 09:35

## 2018-09-05 RX ADMIN — FENTANYL CITRATE 50 MCG: 50 INJECTION, SOLUTION INTRAMUSCULAR; INTRAVENOUS at 09:44

## 2018-09-05 ASSESSMENT — PAIN DESCRIPTION - LOCATION
LOCATION: ABDOMEN
LOCATION: NOSE;THROAT
LOCATION: NOSE

## 2018-09-05 ASSESSMENT — PAIN SCALES - GENERAL
PAINLEVEL_OUTOF10: 2
PAINLEVEL_OUTOF10: 0
PAINLEVEL_OUTOF10: 7
PAINLEVEL_OUTOF10: 1
PAINLEVEL_OUTOF10: 1
PAINLEVEL_OUTOF10: 0
PAINLEVEL_OUTOF10: 7
PAINLEVEL_OUTOF10: 3
PAINLEVEL_OUTOF10: 7

## 2018-09-05 ASSESSMENT — PAIN DESCRIPTION - DESCRIPTORS: DESCRIPTORS: ACHING;DISCOMFORT

## 2018-09-05 ASSESSMENT — PAIN DESCRIPTION - ONSET: ONSET: ON-GOING

## 2018-09-05 ASSESSMENT — PAIN DESCRIPTION - FREQUENCY: FREQUENCY: CONTINUOUS

## 2018-09-05 ASSESSMENT — PAIN DESCRIPTION - PROGRESSION: CLINICAL_PROGRESSION: NOT CHANGED

## 2018-09-05 ASSESSMENT — PAIN DESCRIPTION - PAIN TYPE: TYPE: ACUTE PAIN

## 2018-09-05 NOTE — CONSULTS
GENERAL SURGERY  CONSULT NOTE  9/5/2018    Physician Consulted: Dr. Andrew Ojeda   Reason for Consult: Nausea and vomiting  Referring Physician: Dr. Andrae Cooper    HPI  Roshan Cruz is a 70 y.o. male who presents with nausea, vomiting and abdominal pain. The patient has a history of of recent diagnostic laparoscopy with lysis of adhesions for a small bowel obstruction. The patient's prior surgical history is significant for a Whipple in 2015 for an IPMN. He admits to some persistent reflux symptoms and early satiety since his Whipple. His last bowel movement was yesterday. He admits to still passing flatus. Past Medical History:   Diagnosis Date    Arthritis     GERD (gastroesophageal reflux disease) 1/4/2015    Hyperlipidemia     Hypoglycemia     Mixed hyperlipidemia 1/4/2015    Nausea & vomiting 1/4/2015    Pneumonia 1/4/2015       Past Surgical History:   Procedure Laterality Date    COLON SURGERY      COLONOSCOPY      INCISIONAL HERNIA REPAIR N/A 10/13/2016    INGUINAL HERNIA REPAIR Left 2/19/2013    laparoscopic left inguinal hernia repair    PANCREAS SURGERY      Whipple procedure    [place] LAP,SURG,COLECT,TOT,W/PROCTECT,W/ILEOST N/A 8/20/2018    DIAGNOSTIC LAPAROSCOPY POSS LAPAROTOMY POSS BOWEL RESECTION performed by Mita Deras MD at [address] [place] [address]      right cyst removed 1965    SKIN BIOPSY      UPPER GASTROINTESTINAL ENDOSCOPY         Medications Prior to Admission:    Prior to Admission medications    Medication Sig Start Date End Date Taking? Authorizing Provider   temazepam (RESTORIL) 15 MG capsule Take 1 capsule by mouth nightly as needed for Sleep for up to 30 days. . 8/17/18 9/16/18 Yes Jaime Mishra, DO   omega-3 acid ethyl esters (LOVAZA) 1 g capsule TAKE 1 CAPSULE BY MOUTH FOUR TIMES DAILY 8/7/18  Yes Jaime Mishra DO   atorvastatin (LIPITOR) 40 MG tablet TAKE 1 TABLET BY MOUTH EVERY DAY 8/7/18  Yes Jaime Mishra DO   celecoxib (CELEBREX) 200 MG capsule TAKE 1 CAPSULE BY MOUTH EVERY DAY 18  Yes Jaime Mishra, DO   pantoprazole (PROTONIX) 40 MG tablet TAKE 1 TABLET BY MOUTH EVERY DAY 18  Yes lAex Mishra, DO   ezetimibe (ZETIA) 10 MG tablet Take 1 tablet by mouth daily 18  Yes Jaime Mishra, DO   Flaxseed, Linseed, (FLAX SEEDS PO) Take 1 capsule by mouth daily   Yes Historical Provider, MD Obinna MELGOZA Take 1 capsule by mouth daily   Yes Historical Provider, MD   therapeutic multivitamin-minerals (THERAGRAN-M) tablet Take 1 tablet by mouth daily. Yes Historical Provider, MD       No Known Allergies    Family History   Problem Relation Age of Onset    High Cholesterol Mother     Stroke Mother     Other Father          in , ? pneumonia and prostate cancer.  Diabetes Brother     Mental Illness Brother     Stroke Brother     Cancer Brother        Social History   Substance Use Topics    Smoking status: Former Smoker     Quit date: 2011    Smokeless tobacco: Never Used    Alcohol use 6.0 oz/week     10 Cans of beer per week      Comment: moderate 3-4 per day       Review of Systems   General ROS: negative  Hematological and Lymphatic ROS: negative  Respiratory ROS: negative  Cardiovascular ROS: negative  Gastrointestinal ROS: negative  Genito-Urinary ROS: negative  Musculoskeletal ROS: negative    PHYSICAL EXAM:    Vitals:    18 2330   BP:    Pulse:    Resp:    Temp:    SpO2: 98%       General Appearance:  awake, alert, oriented, in no acute distress  Skin:  Skin color, texture, turgor normal. No rashes or lesions. Head/face:  NCAT  Eyes:  No gross abnormalities.   Lungs:  Symmetric chest rise   Heart:  Regular rate   Abdomen:  Soft, slightly distended     LABS:  CBC  Recent Labs      18   0431   WBC  10.2   HGB  11.2*   HCT  32.4*   PLT  246     BMP  Recent Labs      18   043   NA  140   K  3.6   CL  102   CO2  25   BUN  13   CREATININE  0.8   CALCIUM  9.1     Liver Function  Recent Labs      18 BILITOT  0.6   AST  13   ALT  9   ALKPHOS  31*   PROT  6.0*   LABALBU  3.7     Ct Abdomen Pelvis W Iv Contrast Additional Contrast? Oral    Result Date: 9/4/2018  Reading location: 200 INDICATION: Abdominal pain FINDINGS: Axial CT images through the abdomen and pelvis after intravenous injection 80 mL Isovue-[id number]. Oral contrast with incomplete bowel opacification. Compare with 8/17/2018. Automated dose control. Severely distended stomach with distention of the thoracic segment of the esophagus. Mural thickening/luminal narrowing near the gastric antrum/pylorus. Other bowel normal caliber. Distended urinary bladder. Renal collecting systems and ureters normal caliber. Normal caliber bile ducts. Normal-sized heart. No acute consolidation visualized lung bases. Persistent soft tissue masslike lesion in the region of the neck of the pancreas which could be further assessed with nonemergent MRI. No acute abnormality of the abdominal solid organs. No acute osseous finding. No large abdominal or pelvic fluid collection. Interval severe distention of the stomach with narrowed antropyloric region with mural thickening/edema. Note: Configuration and the right abdomen (mesentery and bowel) remains suspicious for internal hernia. Xr Chest Abdomen Ng Placement    Result Date: 9/5/2018  Location:100 Exam: XR CHEST ABDOMEN NG PLACEMENT Comparison: September 4, 2018 History:  NG tube repositioning Findings: Tip of NG tube in the gastric fundus, side port has been advanced to the gastroesophageal junction. Mild distention of the stomach. Retention of contrast in the transverse colon. No small bowel distention. Tip of the NG tube is in the fundus the stomach. Xr Chest Abdomen Ng Placement    Result Date: 9/4/2018  Reading location: 200 INDICATION: Nasogastric tube placement FINDINGS: Portable AP supine view the abdomen for nasogastric tube placement.  Nasogastric tube medial aspect of the left upper abdomen at the vertex position the gastric fundus. Side-port in the region of the distal thoracic segment esophagus. Visualized bowel normal caliber. Nasogastric tube is not optimally positioned. ASSESSMENT:  70 y.o. male w/ nausea and vomiting likely secondary to gastric outlet obstruction         PLAN:   1. Continue NPO  2. NG decompression  3. Plan for further endoscopic evaluation     Electronically signed by Selma Alves M.D, on 9/5/2018 at 8:04 AM      Surgery Progress Note            Chief complaint:   Patient Active Problem List   Diagnosis    Hyponatremia    Acute uremia    Hypomagnesemia    GERD (gastroesophageal reflux disease)    Mixed hyperlipidemia    Lumbago    Pneumonia    Nausea & vomiting    Diarrhea    Shoulder impingement    AC (acromioclavicular) arthritis    Arthritis    C. difficile colitis    Polyp of colon    Hyperlipidemia    Hernia, incisional    Pancreas neoplasm    Hypoglycemia    Abdominal pain    Small bowel obstruction ([address]    SBO (small bowel obstruction) ([address]       S: as above    O:   Vitals:    09/05/18 0800   BP: 119/60   Pulse: 80   Resp: 16   Temp: 98.7 °F (37.1 °C)   SpO2:        Intake/Output Summary (Last 24 hours) at 09/05/18 0939  Last data filed at 09/05/18 0536   Gross per 24 hour   Intake                0 ml   Output             3125 ml   Net            -3125 ml           Labs:  Recent Labs      09/04/18   1805  09/05/18   0431   WBC  10.2  10.2   HGB  13.0  11.2*   HCT  38.4  32.4*     Lab Results   Component Value Date    CREATININE 0.8 09/05/2018    BUN 13 09/05/2018     09/05/2018    K 3.6 09/05/2018     09/05/2018    CO2 25 09/05/2018     No results for input(s): LIPASE, AMYLASE in the last 72 hours.     Physical exam:   /60   Pulse 80   Temp 98.7 °F (37.1 °C)   Resp 16   Ht 5' 6\" (1.676 m)   Wt 143 lb 11.2 oz (65.2 kg)   SpO2 98%   BMI 23.19 kg/m²   General appearance: NAD  Head: NCAT  Neck: supple, no

## 2018-09-05 NOTE — H&P
[facility]  Internal Medicine  -Resident History & Physical-    PCP:  Ade Hoyos DO  Admitting Physician:  Ade Hoyos DO  Consultants: General surgery  Chief Complaint:    Chief Complaint   Patient presents with    Hematemesis     2 weeks postop bowel obstruction, vomiting black \"stuff\" today       History of Present Illness  Woodrow Overton is a 70 y.o. male who presents to [facility] complaining of vomiting. Woodrow Overton has a past medical history that includes recent SBO related to past whipple procedure secondary to intraductal papillary mucinous neoplasm, hyperlipidemia, GERD, vitamin D deficiency. Yesika Alexander presents to the ER with complaint of nausea and emesis of dark brown material starting today. He states he had multiple episodes of emesis. He denies any bright red blood. He was recently admitted for small bowel obstruction 2 weeks ago where he underwent release of small bowel obstruction on 8/20/2018 with Dr. Jaime Vee. Hemoglobin continues to be 13.0. NG tube was placed with large amount of dark brown drainage. He states his symptoms have improved since NG tube placement. ER Course  Upon presentation to the ER, routine labwork was performed which revealed Glucose of 138, calcium of 10.4. Imaging results are as outlined below in the Imaging section of this note. Upon arrival to the ER, patient was 132/66. The patient received 1L Bolus, zofran in the emergency room and was admitted to med surg under the care of Dr. Beth Sequeira and Philip Vnan. Last Hospital Admission - 8/17/18  1. Small bowel obstruction related to extensive past surgical history with Whipple procedure secondary to intraductal papillary mucinous neoplasm  2. Acute renal failure upon presentation with complete resolution  3. Hyperlipidemia  4. Gastroesophageal reflux disease  5.  Vitamin D deficiency    Last Echocardiogram -   None     ED TRIAGE VITALS  BP: (!) 149/75, Temp: 99.5 °F (37.5 °C), Pulse: 89, Resp: 16, SpO2: 97 %    Vitals:    18 1442 18 1809 18 2155 18 2222   BP: 132/66 (!) 158/91 (!) 154/89 (!) 149/75   Pulse: 102 99 87 89   Resp:    Temp: 98.6 °F (37 °C) 98.5 °F (36.9 °C) 98.8 °F (37.1 °C) 99.5 °F (37.5 °C)   TempSrc:  Oral Oral Oral   SpO2: 96% 96% 96% 97%   Weight: 148 lb (67.1 kg)      Height: 5' 6\" (1.676 m)            Histories  Past Medical History:   Diagnosis Date    Arthritis     GERD (gastroesophageal reflux disease) 2015    Hyperlipidemia     Hypoglycemia     Mixed hyperlipidemia 2015    Nausea & vomiting 2015    Pneumonia 2015     Past Surgical History:   Procedure Laterality Date    COLON SURGERY      COLONOSCOPY      INCISIONAL HERNIA REPAIR N/A 10/13/2016    INGUINAL HERNIA REPAIR Left 2013    laparoscopic left inguinal hernia repair    PANCREAS SURGERY      Whipple procedure    [place] LAP,SURG,COLECT,TOT,W/PROCTECT,W/ILEOST N/A 2018    DIAGNOSTIC LAPAROSCOPY POSS LAPAROTOMY POSS BOWEL RESECTION performed by Carole Marshall MD at [address]      right cyst removed 1965    SKIN BIOPSY      UPPER GASTROINTESTINAL ENDOSCOPY       Family History   Problem Relation Age of Onset    High Cholesterol Mother     Stroke Mother     Other Father          in , ? pneumonia and prostate cancer.  Diabetes Brother     Mental Illness Brother     Stroke Brother     Cancer Brother        Home Medications  Prior to Admission medications    Medication Sig Start Date End Date Taking? Authorizing Provider   temazepam (RESTORIL) 15 MG capsule Take 1 capsule by mouth nightly as needed for Sleep for up to 30 days. . 18 Yes Jaime Mishra, DO   omega-3 acid ethyl esters (LOVAZA) 1 g capsule TAKE 1 CAPSULE BY MOUTH FOUR TIMES DAILY 18  Yes Jaime Mishra DO   atorvastatin (LIPITOR) 40 MG tablet TAKE 1 TABLET BY MOUTH EVERY DAY 18  Yes Jaime Mishra DO   celecoxib (CELEBREX) 200 MG capsule TAKE 1 CAPSULE BY MOUTH EVERY DAY 8/7/18  Yes Jaime MARIANELA Danica, DO   pantoprazole (PROTONIX) 40 MG tablet TAKE 1 TABLET BY MOUTH EVERY DAY 8/7/18  Yes Hali Mishra, DO   ezetimibe (ZETIA) 10 MG tablet Take 1 tablet by mouth daily 2/1/18  Yes Jaime Mishra, DO   Flaxseed, Linseed, (FLAX SEEDS PO) Take 1 capsule by mouth daily   Yes Historical Provider, MD Obinna MELGOZA Take 1 capsule by mouth daily   Yes Historical Provider, MD   therapeutic multivitamin-minerals (THERAGRAN-M) tablet Take 1 tablet by mouth daily. Yes Historical Provider, MD       Allergies  Patient has no known allergies. Social Hx  Social History     Social History    Marital status:      Spouse name: Elisha Cockayne Number of children: 2    Years of education: 16     Occupational History    -retired      Social History Main Topics    Smoking status: Former Smoker     Quit date: 2/13/2011    Smokeless tobacco: Never Used    Alcohol use 6.0 oz/week     10 Cans of beer per week      Comment: moderate 3-4 per day    Drug use: No    Sexual activity: Yes     Partners: Female     Other Topics Concern    Not on file     Social History Narrative    No narrative on file       Review of Systems  All bolded are positive; please see HPI  General:  Fever, chills, diaphoresis, fatigue, malaise, night sweats, weight loss  Psychological:  Anxiety, disorientation, hallucinations. ENT:  Epistaxis, headaches, vertigo, visual changes. Cardiovascular:  Chest pain, irregular heartbeats, palpitations, paroxysmal nocturnal dyspnea. Respiratory:  Shortness of breath, coughing, sputum production, hemoptysis, wheezing, orthopnea.   Gastrointestinal:  Nausea, vomiting, diarrhea, heartburn, constipation, abdominal pain, hematemesis, hematochezia, melena, acholic stools  Genito-Urinary:  Dysuria, urgency, frequency, hematuria  Musculoskeletal:  Joint pain, joint stiffness, joint swelling, muscle pain  Neurology:  Headache, focal neurological deficits, weakness, numbness, paresthesia  Derm:  Rashes, ulcers, excoriations, bruising  Extremities:  Decreased ROM, peripheral edema, mottling    Physical Examination  Vitals:  BP (!) 149/75   Pulse 89   Temp 99.5 °F (37.5 °C) (Oral)   Resp 16   Ht 5' 6\" (1.676 m)   Wt 148 lb (67.1 kg)   SpO2 97%   BMI 23.89 kg/m²   General Appearance:  awake, alert, and oriented to person, place, time, and purpose; appears stated age and cooperative; no apparent distress no labored breathing  HEENT:  NCAT; PERRL; conjunctiva pink, sclera clear +NG tube with dark brown drainage  Neck:  no adenopathy, bruit, JVD, tenderness, masses, or nodules; supple, symmetrical, trachea midline, thyroid not enlarged  Lung:  clear to auscultation bilaterally; no use of accessory muscles; no rhonchi, rales, or crackles  Heart:  regular rate and regular rhythm without murmur, rub, or gallop  Abdomen:  soft, nontender, nondistended; normoactive bowel sounds; no organomegaly  Extremities:  extremities normal, atraumatic, no cyanosis or edema  Musculokeletal:  no joint swelling, no muscle tenderness. ROM normal in all joints of extremities.    Neurologic:  mental status A&Ox3, thought content appropriate; CN II-XII grossly intact; sensation intact, motor strength 5/5 globally; no slurred speech    Laboratory Data  Recent Results (from the past 24 hour(s))   CBC Auto Differential    Collection Time: 09/04/18  6:05 PM   Result Value Ref Range    WBC 10.2 4.5 - 11.5 E9/L    RBC 4.21 3.80 - 5.80 E12/L    Hemoglobin 13.0 12.5 - 16.5 g/dL    Hematocrit 38.4 37.0 - 54.0 %    MCV 91.2 80.0 - 99.9 fL    MCH 30.9 26.0 - 35.0 pg    MCHC 33.9 32.0 - 34.5 %    RDW 13.3 11.5 - 15.0 fL    Platelets 828 997 - 442 E9/L    MPV 8.3 7.0 - 12.0 fL    Neutrophils % 79.6 43.0 - 80.0 %    Immature Granulocytes % 0.5 0.0 - 5.0 %    Lymphocytes % 10.6 (L) 20.0 - 42.0 %    Monocytes % 8.7 2.0 - 12.0 %    Eosinophils % 0.4 0.0 - 6.0 %    Basophils % 0.2 0.0 - 2.0 % Neutrophils # 8.09 (H) 1.80 - 7.30 E9/L    Immature Granulocytes # 0.05 E9/L    Lymphocytes # 1.08 (L) 1.50 - 4.00 E9/L    Monocytes # 0.88 0.10 - 0.95 E9/L    Eosinophils # 0.04 (L) 0.05 - 0.50 E9/L    Basophils # 0.02 0.00 - 0.20 M2/R   Basic Metabolic Panel w/ Reflex to MG    Collection Time: 09/04/18  6:05 PM   Result Value Ref Range    Sodium 136 132 - 146 mmol/L    Potassium reflex Magnesium 4.3 3.5 - 5.0 mmol/L    Chloride 95 (L) 98 - 107 mmol/L    CO2 28 22 - 29 mmol/L    Anion Gap 13 7 - 16 mmol/L    Glucose 138 (H) 74 - 109 mg/dL    BUN 16 8 - 23 mg/dL    CREATININE 0.7 0.7 - 1.2 mg/dL    GFR Non-African American >60 >=60 mL/min/1.73    GFR African American >60     Calcium 10.4 (H) 8.6 - 10.2 mg/dL   Lactic Acid, Plasma    Collection Time: 09/04/18  6:05 PM   Result Value Ref Range    Lactic Acid 0.9 0.5 - 2.2 mmol/L       Imaging  Ct Abdomen Pelvis Wo Contrast    Result Date: 8/17/2018  Reading location: 200 INDICATION: Vomiting FINDINGS: Axial noncontrast images were obtained through the abdomen and pelvis per stone protocol. Due to lack of oral contrast, suboptimal evaluation of the bowel. Due to lack of IV contrast, suboptimal evaluation of the blood vessels and solid organs. Automated dose control. Severely distended stomach and mid and proximal small bowel. Small bowel dilated up to about 5 cm diameter. Transition point right lower quadrant associated with whirled configuration of the mesentery. Distal small bowel and colon normal caliber. Bile ducts and urinary tract normal caliber. Normal-sized heart. Atherosclerosis including coronary arteries. Multifocal opacities in the lingula and left lower lobe and posterior aspect of the right lower lobe. 3 mm calculus centrally lower pole right kidney. Relatively atrophic pancreas neck and proximal body region of the pancreas not well delineated. There appears to be a low density mass involving this region measuring up to least 47 x 32 mm in diameter.

## 2018-09-05 NOTE — PROGRESS NOTES
HPI:  Valentina Wang presented to [facility] emergency department yesterday for the evaluation of intractable abdominal pain with nausea and vomiting. The patient was recently hospitalized and underwent lysis of adhesions secondary to a small bowel obstruction 2 weeks ago. CT scan in the emergency department indicated massive gastric distention questionable gastric outlet obstruction. NG tube is been placed for decompressive purposes. He continues to complain of abdominal discomfort but it has improved when compared to yesterday's examination. His wife was present for the examination and updated accordingly. Patient voiced no new complaints since hospital admission. Questions, answers, and tests reviewed. ROS:  Cardiovascular:   Denies any chest pain, irregular heartbeats, or palpitations. Respiratory:   Denies shortness of breath, coughing, sputum production, hemoptysis, or wheezing. Gastrointestinal:   Abdominal pain persists but is better than on initial presentation. He continues to have intermittent appears of nausea. Extremities:   Denies any lower extremity swelling or edema. Neurology:    Denies any headache or focal neurological deficits. No weakness or paresthesia. Derm:    Denies any rashes, ulcers, or excoriations. Denies bruising. Genitourinary:    Denies any urgency, frequency, hematuria. Voiding without difficulty. Physical Exam:    Vitals:    09/05/18 0800   BP: 119/60   Pulse: 80   Resp: 16   Temp: 98.7 °F (37.1 °C)   SpO2:        HEENT:  PERRLA. EOMI. Sclera clear. Buccal mucosa moist. NG tube is in place. Neck:  Supple. Trachea midline. No thyromegaly. No JVD. No bruits. Heart:  Rhythm regular, rate controlled. Systolic murmur. Lungs:  Symmetrical. Clear to auscultation bilaterally. No wheezes. No rhonchi. No rales. Abdomen: Soft. Mildly tender to palpation diffusely. Bowel sounds are hypoactive. Extremities:  Peripheral pulses present. No peripheral edema.   No ulcers. Neurologic:  Alert x 3. No focal deficit. Cranial nerves grossly intact. Skin:  No petechia. No hemorrhage. No wounds. CBC with Differential:    Lab Results   Component Value Date    WBC 10.2 09/05/2018    RBC 3.57 09/05/2018    HGB 11.2 09/05/2018    HCT 32.4 09/05/2018     09/05/2018    MCV 90.8 09/05/2018    MCH 31.4 09/05/2018    MCHC 34.6 09/05/2018    RDW 13.5 09/05/2018    LYMPHOPCT 10.6 09/04/2018    MONOPCT 8.7 09/04/2018    BASOPCT 0.2 09/04/2018    MONOSABS 0.88 09/04/2018    LYMPHSABS 1.08 09/04/2018    EOSABS 0.04 09/04/2018    BASOSABS 0.02 09/04/2018     BMP:    Lab Results   Component Value Date     09/05/2018    K 3.6 09/05/2018    K 4.3 09/04/2018     09/05/2018    CO2 25 09/05/2018    BUN 13 09/05/2018    LABALBU 3.7 09/05/2018    CREATININE 0.8 09/05/2018    CALCIUM 9.1 09/05/2018    GFRAA >60 09/05/2018    LABGLOM >60 09/05/2018    GLUCOSE 108 09/05/2018       Other Data:      Intake/Output Summary (Last 24 hours) at 09/05/18 0924  Last data filed at 09/05/18 0536   Gross per 24 hour   Intake                0 ml   Output             3125 ml   Net            -3125 ml         Scheduled Medications:   magnesium sulfate  1 g Intravenous Once    sodium chloride flush  10 mL Intravenous 2 times per day    pantoprazole  40 mg Intravenous BID         Infusion Medications:   lactated ringers 100 mL/hr at 09/05/18 0746       Assessment:   1. 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 Hyperlipidemia  3. Gastroesophageal reflux disease  4. Vitamin D deficiency    Plan:   NG tube is been placed for decompression. The patient's abdominal pain has improved mildly. I have discussed the case extensively with the surgical team. Plans are for EGD evaluation later this afternoon.  The patient's wife was updated at the bedside. IV fluid resuscitation, anti-emetics, and pain control is being undertaken. Continue current therapy. See orders for further plan of care.     China Mcdonald D.O.  9:24 AM  9/5/2018

## 2018-09-06 ENCOUNTER — ANESTHESIA EVENT (OUTPATIENT)
Dept: OPERATING ROOM | Age: 68
DRG: 327 | End: 2018-09-06
Payer: MEDICARE

## 2018-09-06 ENCOUNTER — ANESTHESIA (OUTPATIENT)
Dept: OPERATING ROOM | Age: 68
DRG: 327 | End: 2018-09-06
Payer: MEDICARE

## 2018-09-06 VITALS
TEMPERATURE: 64 F | OXYGEN SATURATION: 98 % | DIASTOLIC BLOOD PRESSURE: 77 MMHG | SYSTOLIC BLOOD PRESSURE: 139 MMHG | RESPIRATION RATE: 3 BRPM

## 2018-09-06 LAB
ALBUMIN SERPL-MCNC: 3.5 G/DL (ref 3.5–5.2)
ALP BLD-CCNC: 28 U/L (ref 40–129)
ALT SERPL-CCNC: 8 U/L (ref 0–40)
ANION GAP SERPL CALCULATED.3IONS-SCNC: 15 MMOL/L (ref 7–16)
AST SERPL-CCNC: 14 U/L (ref 0–39)
BILIRUB SERPL-MCNC: 0.5 MG/DL (ref 0–1.2)
BUN BLDV-MCNC: 12 MG/DL (ref 8–23)
CALCIUM SERPL-MCNC: 8.4 MG/DL (ref 8.6–10.2)
CHLORIDE BLD-SCNC: 102 MMOL/L (ref 98–107)
CO2: 22 MMOL/L (ref 22–29)
CREAT SERPL-MCNC: 0.8 MG/DL (ref 0.7–1.2)
GFR AFRICAN AMERICAN: >60
GFR NON-AFRICAN AMERICAN: >60 ML/MIN/1.73
GLUCOSE BLD-MCNC: 89 MG/DL (ref 74–109)
HCT VFR BLD CALC: 30.2 % (ref 37–54)
HEMOGLOBIN: 10 G/DL (ref 12.5–16.5)
MCH RBC QN AUTO: 31.3 PG (ref 26–35)
MCHC RBC AUTO-ENTMCNC: 33.1 % (ref 32–34.5)
MCV RBC AUTO: 94.4 FL (ref 80–99.9)
PDW BLD-RTO: 13.8 FL (ref 11.5–15)
PLATELET # BLD: 215 E9/L (ref 130–450)
PMV BLD AUTO: 8.5 FL (ref 7–12)
POTASSIUM SERPL-SCNC: 3.4 MMOL/L (ref 3.5–5)
RBC # BLD: 3.2 E12/L (ref 3.8–5.8)
SODIUM BLD-SCNC: 139 MMOL/L (ref 132–146)
TOTAL PROTEIN: 5.8 G/DL (ref 6.4–8.3)
WBC # BLD: 7 E9/L (ref 4.5–11.5)

## 2018-09-06 PROCEDURE — 2500000003 HC RX 250 WO HCPCS: Performed by: NURSE ANESTHETIST, CERTIFIED REGISTERED

## 2018-09-06 PROCEDURE — 88304 TISSUE EXAM BY PATHOLOGIST: CPT

## 2018-09-06 PROCEDURE — 2780000010 HC IMPLANT OTHER: Performed by: SURGERY

## 2018-09-06 PROCEDURE — C1760 CLOSURE DEV, VASC: HCPCS | Performed by: SURGERY

## 2018-09-06 PROCEDURE — 2720000010 HC SURG SUPPLY STERILE: Performed by: SURGERY

## 2018-09-06 PROCEDURE — 3700000001 HC ADD 15 MINUTES (ANESTHESIA): Performed by: SURGERY

## 2018-09-06 PROCEDURE — 6370000000 HC RX 637 (ALT 250 FOR IP): Performed by: SURGERY

## 2018-09-06 PROCEDURE — 6360000002 HC RX W HCPCS: Performed by: INTERNAL MEDICINE

## 2018-09-06 PROCEDURE — 6360000002 HC RX W HCPCS: Performed by: ANESTHESIOLOGY

## 2018-09-06 PROCEDURE — 36415 COLL VENOUS BLD VENIPUNCTURE: CPT

## 2018-09-06 PROCEDURE — 80053 COMPREHEN METABOLIC PANEL: CPT

## 2018-09-06 PROCEDURE — 88307 TISSUE EXAM BY PATHOLOGIST: CPT

## 2018-09-06 PROCEDURE — 7100000000 HC PACU RECOVERY - FIRST 15 MIN: Performed by: SURGERY

## 2018-09-06 PROCEDURE — 43999 UNLISTED PROCEDURE STOMACH: CPT | Performed by: SURGERY

## 2018-09-06 PROCEDURE — 2500000003 HC RX 250 WO HCPCS: Performed by: SURGERY

## 2018-09-06 PROCEDURE — 85027 COMPLETE CBC AUTOMATED: CPT

## 2018-09-06 PROCEDURE — 88342 IMHCHEM/IMCYTCHM 1ST ANTB: CPT

## 2018-09-06 PROCEDURE — 1200000000 HC SEMI PRIVATE

## 2018-09-06 PROCEDURE — 2580000003 HC RX 258: Performed by: INTERNAL MEDICINE

## 2018-09-06 PROCEDURE — 2709999900 HC NON-CHARGEABLE SUPPLY: Performed by: SURGERY

## 2018-09-06 PROCEDURE — 2500000003 HC RX 250 WO HCPCS: Performed by: FAMILY MEDICINE

## 2018-09-06 PROCEDURE — 6360000002 HC RX W HCPCS

## 2018-09-06 PROCEDURE — 3600000002 HC SURGERY LEVEL 2 BASE: Performed by: SURGERY

## 2018-09-06 PROCEDURE — 3700000000 HC ANESTHESIA ATTENDED CARE: Performed by: SURGERY

## 2018-09-06 PROCEDURE — 49905 OMENTAL FLAP INTRA-ABDOM: CPT | Performed by: SURGERY

## 2018-09-06 PROCEDURE — C9113 INJ PANTOPRAZOLE SODIUM, VIA: HCPCS | Performed by: INTERNAL MEDICINE

## 2018-09-06 PROCEDURE — 6360000002 HC RX W HCPCS: Performed by: NURSE ANESTHETIST, CERTIFIED REGISTERED

## 2018-09-06 PROCEDURE — 7100000001 HC PACU RECOVERY - ADDTL 15 MIN: Performed by: SURGERY

## 2018-09-06 PROCEDURE — 3600000012 HC SURGERY LEVEL 2 ADDTL 15MIN: Performed by: SURGERY

## 2018-09-06 DEVICE — RELOAD STPL L60MM H1.5-3.6MM REG TISS BLU GRIPPING SURF B: Type: IMPLANTABLE DEVICE | Site: ABDOMEN | Status: FUNCTIONAL

## 2018-09-06 DEVICE — RELOAD STPL L60MM H1-2.6MM MESENTERY THN TISS WHT 6 ROW: Type: IMPLANTABLE DEVICE | Site: ABDOMEN | Status: FUNCTIONAL

## 2018-09-06 RX ORDER — FENTANYL CITRATE 50 UG/ML
50 INJECTION, SOLUTION INTRAMUSCULAR; INTRAVENOUS EVERY 5 MIN PRN
Status: DISCONTINUED | OUTPATIENT
Start: 2018-09-06 | End: 2018-09-06 | Stop reason: HOSPADM

## 2018-09-06 RX ORDER — MIDAZOLAM HYDROCHLORIDE 1 MG/ML
INJECTION INTRAMUSCULAR; INTRAVENOUS PRN
Status: DISCONTINUED | OUTPATIENT
Start: 2018-09-06 | End: 2018-09-06 | Stop reason: SDUPTHER

## 2018-09-06 RX ORDER — ONDANSETRON 2 MG/ML
4 INJECTION INTRAMUSCULAR; INTRAVENOUS
Status: DISCONTINUED | OUTPATIENT
Start: 2018-09-06 | End: 2018-09-06 | Stop reason: HOSPADM

## 2018-09-06 RX ORDER — NEOSTIGMINE METHYLSULFATE 1 MG/ML
INJECTION, SOLUTION INTRAVENOUS PRN
Status: DISCONTINUED | OUTPATIENT
Start: 2018-09-06 | End: 2018-09-06 | Stop reason: SDUPTHER

## 2018-09-06 RX ORDER — FENTANYL CITRATE 50 UG/ML
INJECTION, SOLUTION INTRAMUSCULAR; INTRAVENOUS PRN
Status: DISCONTINUED | OUTPATIENT
Start: 2018-09-06 | End: 2018-09-06 | Stop reason: SDUPTHER

## 2018-09-06 RX ORDER — FENTANYL CITRATE 50 UG/ML
25 INJECTION, SOLUTION INTRAMUSCULAR; INTRAVENOUS EVERY 5 MIN PRN
Status: DISCONTINUED | OUTPATIENT
Start: 2018-09-06 | End: 2018-09-06 | Stop reason: HOSPADM

## 2018-09-06 RX ORDER — BUPIVACAINE HYDROCHLORIDE AND EPINEPHRINE 5; 5 MG/ML; UG/ML
INJECTION, SOLUTION EPIDURAL; INTRACAUDAL; PERINEURAL PRN
Status: DISCONTINUED | OUTPATIENT
Start: 2018-09-06 | End: 2018-09-06 | Stop reason: HOSPADM

## 2018-09-06 RX ORDER — PROPOFOL 10 MG/ML
INJECTION, EMULSION INTRAVENOUS PRN
Status: DISCONTINUED | OUTPATIENT
Start: 2018-09-06 | End: 2018-09-06 | Stop reason: SDUPTHER

## 2018-09-06 RX ORDER — FENTANYL CITRATE 50 UG/ML
INJECTION, SOLUTION INTRAMUSCULAR; INTRAVENOUS
Status: COMPLETED
Start: 2018-09-06 | End: 2018-09-06

## 2018-09-06 RX ORDER — DEXAMETHASONE SODIUM PHOSPHATE 10 MG/ML
INJECTION INTRAMUSCULAR; INTRAVENOUS PRN
Status: DISCONTINUED | OUTPATIENT
Start: 2018-09-06 | End: 2018-09-06 | Stop reason: SDUPTHER

## 2018-09-06 RX ORDER — LIGHT MINERAL OIL, WHITE PETROLATUM 150; 850 MG/G; MG/G
OINTMENT OPHTHALMIC
Status: DISCONTINUED | OUTPATIENT
Start: 2018-09-06 | End: 2018-09-09 | Stop reason: HOSPADM

## 2018-09-06 RX ORDER — GLYCOPYRROLATE 1 MG/5 ML
SYRINGE (ML) INTRAVENOUS PRN
Status: DISCONTINUED | OUTPATIENT
Start: 2018-09-06 | End: 2018-09-06 | Stop reason: SDUPTHER

## 2018-09-06 RX ORDER — ROCURONIUM BROMIDE 10 MG/ML
INJECTION, SOLUTION INTRAVENOUS PRN
Status: DISCONTINUED | OUTPATIENT
Start: 2018-09-06 | End: 2018-09-06 | Stop reason: SDUPTHER

## 2018-09-06 RX ADMIN — BENZOCAINE AND MENTHOL 1 LOZENGE: 15; 3.6 LOZENGE ORAL at 04:58

## 2018-09-06 RX ADMIN — FENTANYL CITRATE 25 MCG: 50 INJECTION INTRAMUSCULAR; INTRAVENOUS at 17:44

## 2018-09-06 RX ADMIN — PANTOPRAZOLE SODIUM 40 MG: 40 INJECTION, POWDER, FOR SOLUTION INTRAVENOUS at 20:48

## 2018-09-06 RX ADMIN — FENTANYL CITRATE 50 MCG: 50 INJECTION, SOLUTION INTRAMUSCULAR; INTRAVENOUS at 15:20

## 2018-09-06 RX ADMIN — FENTANYL CITRATE 25 MCG: 50 INJECTION INTRAMUSCULAR; INTRAVENOUS at 09:41

## 2018-09-06 RX ADMIN — FENTANYL CITRATE 25 MCG: 50 INJECTION INTRAMUSCULAR; INTRAVENOUS at 18:44

## 2018-09-06 RX ADMIN — MIDAZOLAM 2 MG: 1 INJECTION INTRAMUSCULAR; INTRAVENOUS at 15:10

## 2018-09-06 RX ADMIN — PROPOFOL 100 MG: 10 INJECTION, EMULSION INTRAVENOUS at 15:15

## 2018-09-06 RX ADMIN — FENTANYL CITRATE 50 MCG: 50 INJECTION, SOLUTION INTRAMUSCULAR; INTRAVENOUS at 17:15

## 2018-09-06 RX ADMIN — Medication 3 MG: at 16:59

## 2018-09-06 RX ADMIN — ONDANSETRON 4 MG: 2 INJECTION INTRAMUSCULAR; INTRAVENOUS at 18:43

## 2018-09-06 RX ADMIN — DEXAMETHASONE SODIUM PHOSPHATE 10 MG: 10 INJECTION INTRAMUSCULAR; INTRAVENOUS at 15:26

## 2018-09-06 RX ADMIN — BENZOCAINE AND MENTHOL 1 LOZENGE: 15; 3.6 LOZENGE ORAL at 01:49

## 2018-09-06 RX ADMIN — FENTANYL CITRATE 25 MCG: 50 INJECTION INTRAMUSCULAR; INTRAVENOUS at 17:51

## 2018-09-06 RX ADMIN — PANTOPRAZOLE SODIUM 40 MG: 40 INJECTION, POWDER, FOR SOLUTION INTRAVENOUS at 10:11

## 2018-09-06 RX ADMIN — Medication 10 ML: at 10:11

## 2018-09-06 RX ADMIN — BENZOCAINE AND MENTHOL 1 LOZENGE: 15; 3.6 LOZENGE ORAL at 20:14

## 2018-09-06 RX ADMIN — ONDANSETRON 4 MG: 2 INJECTION INTRAMUSCULAR; INTRAVENOUS at 01:50

## 2018-09-06 RX ADMIN — ROCURONIUM BROMIDE 30 MG: 10 INJECTION, SOLUTION INTRAVENOUS at 15:15

## 2018-09-06 RX ADMIN — LIDOCAINE HYDROCHLORIDE 60 MG: 20 INJECTION, SOLUTION INTRAVENOUS at 15:15

## 2018-09-06 RX ADMIN — FENTANYL CITRATE 25 MCG: 50 INJECTION INTRAMUSCULAR; INTRAVENOUS at 04:59

## 2018-09-06 RX ADMIN — SODIUM CHLORIDE, POTASSIUM CHLORIDE, SODIUM LACTATE AND CALCIUM CHLORIDE: 600; 310; 30; 20 INJECTION, SOLUTION INTRAVENOUS at 05:02

## 2018-09-06 RX ADMIN — FENTANYL CITRATE 25 MCG: 50 INJECTION INTRAMUSCULAR; INTRAVENOUS at 20:14

## 2018-09-06 RX ADMIN — Medication 10 ML: at 20:55

## 2018-09-06 RX ADMIN — BENZOCAINE AND MENTHOL 1 LOZENGE: 15; 3.6 LOZENGE ORAL at 09:41

## 2018-09-06 RX ADMIN — SODIUM CHLORIDE, POTASSIUM CHLORIDE, SODIUM LACTATE AND CALCIUM CHLORIDE: 600; 310; 30; 20 INJECTION, SOLUTION INTRAVENOUS at 18:43

## 2018-09-06 RX ADMIN — Medication 0.6 MG: at 16:55

## 2018-09-06 RX ADMIN — CEFAZOLIN SODIUM 2 G: 2 SOLUTION INTRAVENOUS at 15:28

## 2018-09-06 RX ADMIN — FENTANYL CITRATE 25 MCG: 50 INJECTION INTRAMUSCULAR; INTRAVENOUS at 01:52

## 2018-09-06 RX ADMIN — FENTANYL CITRATE 50 MCG: 50 INJECTION, SOLUTION INTRAMUSCULAR; INTRAVENOUS at 15:15

## 2018-09-06 RX ADMIN — FENTANYL CITRATE 50 MCG: 50 INJECTION, SOLUTION INTRAMUSCULAR; INTRAVENOUS at 15:56

## 2018-09-06 RX ADMIN — LIGHT MINERAL OIL, WHITE PETROLATUM: 150; 850 OINTMENT OPHTHALMIC at 20:48

## 2018-09-06 RX ADMIN — FENTANYL CITRATE 50 MCG: 50 INJECTION, SOLUTION INTRAMUSCULAR; INTRAVENOUS at 16:55

## 2018-09-06 RX ADMIN — ONDANSETRON 4 MG: 2 INJECTION INTRAMUSCULAR; INTRAVENOUS at 09:32

## 2018-09-06 RX ADMIN — PROPOFOL 100 MG: 10 INJECTION, EMULSION INTRAVENOUS at 15:56

## 2018-09-06 ASSESSMENT — PULMONARY FUNCTION TESTS
PIF_VALUE: 24
PIF_VALUE: 22
PIF_VALUE: 22
PIF_VALUE: 24
PIF_VALUE: 24
PIF_VALUE: 25
PIF_VALUE: 22
PIF_VALUE: 21
PIF_VALUE: 23
PIF_VALUE: 24
PIF_VALUE: 2
PIF_VALUE: 2
PIF_VALUE: 22
PIF_VALUE: 17
PIF_VALUE: 12
PIF_VALUE: 23
PIF_VALUE: 22
PIF_VALUE: 22
PIF_VALUE: 18
PIF_VALUE: 23
PIF_VALUE: 13
PIF_VALUE: 22
PIF_VALUE: 22
PIF_VALUE: 19
PIF_VALUE: 18
PIF_VALUE: 16
PIF_VALUE: 19
PIF_VALUE: 22
PIF_VALUE: 24
PIF_VALUE: 22
PIF_VALUE: 24
PIF_VALUE: 17
PIF_VALUE: 3
PIF_VALUE: 1
PIF_VALUE: 22
PIF_VALUE: 23
PIF_VALUE: 18
PIF_VALUE: 23
PIF_VALUE: 22
PIF_VALUE: 8
PIF_VALUE: 17
PIF_VALUE: 17
PIF_VALUE: 21
PIF_VALUE: 21
PIF_VALUE: 19
PIF_VALUE: 22
PIF_VALUE: 21
PIF_VALUE: 24
PIF_VALUE: 23
PIF_VALUE: 21
PIF_VALUE: 22
PIF_VALUE: 23
PIF_VALUE: 17
PIF_VALUE: 20
PIF_VALUE: 21
PIF_VALUE: 24
PIF_VALUE: 22
PIF_VALUE: 19
PIF_VALUE: 23
PIF_VALUE: 23
PIF_VALUE: 18
PIF_VALUE: 19
PIF_VALUE: 17
PIF_VALUE: 5
PIF_VALUE: 24
PIF_VALUE: 18
PIF_VALUE: 21
PIF_VALUE: 12
PIF_VALUE: 23
PIF_VALUE: 21
PIF_VALUE: 12
PIF_VALUE: 29
PIF_VALUE: 21
PIF_VALUE: 25
PIF_VALUE: 17
PIF_VALUE: 18
PIF_VALUE: 2
PIF_VALUE: 13
PIF_VALUE: 2
PIF_VALUE: 18
PIF_VALUE: 22
PIF_VALUE: 25
PIF_VALUE: 17
PIF_VALUE: 23
PIF_VALUE: 17
PIF_VALUE: 24
PIF_VALUE: 17
PIF_VALUE: 16
PIF_VALUE: 24
PIF_VALUE: 18
PIF_VALUE: 22
PIF_VALUE: 17
PIF_VALUE: 24
PIF_VALUE: 23
PIF_VALUE: 22
PIF_VALUE: 21
PIF_VALUE: 22
PIF_VALUE: 23
PIF_VALUE: 25
PIF_VALUE: 24
PIF_VALUE: 25
PIF_VALUE: 24
PIF_VALUE: 21
PIF_VALUE: 20
PIF_VALUE: 23
PIF_VALUE: 18
PIF_VALUE: 25
PIF_VALUE: 18
PIF_VALUE: 17
PIF_VALUE: 17
PIF_VALUE: 22
PIF_VALUE: 18
PIF_VALUE: 24
PIF_VALUE: 34
PIF_VALUE: 21
PIF_VALUE: 24
PIF_VALUE: 22
PIF_VALUE: 12
PIF_VALUE: 24
PIF_VALUE: 17
PIF_VALUE: 2
PIF_VALUE: 12
PIF_VALUE: 22
PIF_VALUE: 21
PIF_VALUE: 18

## 2018-09-06 ASSESSMENT — PAIN SCALES - GENERAL
PAINLEVEL_OUTOF10: 5
PAINLEVEL_OUTOF10: 0
PAINLEVEL_OUTOF10: 10
PAINLEVEL_OUTOF10: 5
PAINLEVEL_OUTOF10: 7
PAINLEVEL_OUTOF10: 6
PAINLEVEL_OUTOF10: 5
PAINLEVEL_OUTOF10: 0
PAINLEVEL_OUTOF10: 9
PAINLEVEL_OUTOF10: 1
PAINLEVEL_OUTOF10: 9
PAINLEVEL_OUTOF10: 4

## 2018-09-06 ASSESSMENT — PAIN DESCRIPTION - PROGRESSION: CLINICAL_PROGRESSION: GRADUALLY WORSENING

## 2018-09-06 ASSESSMENT — PAIN DESCRIPTION - ORIENTATION: ORIENTATION: RIGHT;LEFT

## 2018-09-06 ASSESSMENT — PAIN DESCRIPTION - ONSET: ONSET: ON-GOING

## 2018-09-06 ASSESSMENT — PAIN DESCRIPTION - DESCRIPTORS
DESCRIPTORS: ACHING;DISCOMFORT;DULL
DESCRIPTORS: ACHING;TENDER;DISCOMFORT

## 2018-09-06 ASSESSMENT — PAIN DESCRIPTION - FREQUENCY: FREQUENCY: CONTINUOUS

## 2018-09-06 ASSESSMENT — PAIN DESCRIPTION - LOCATION
LOCATION: ABDOMEN
LOCATION: ABDOMEN

## 2018-09-06 ASSESSMENT — LIFESTYLE VARIABLES: SMOKING_STATUS: 0

## 2018-09-06 ASSESSMENT — PAIN DESCRIPTION - PAIN TYPE
TYPE: SURGICAL PAIN
TYPE: SURGICAL PAIN

## 2018-09-06 NOTE — PROGRESS NOTES
diminished. No wheezes. No rhonchi. No rales. Abdomen: Soft. Surgical scars. . Mildly tender to palpation diffusely. Bowel sounds are hypoactive. Extremities:  Peripheral pulses present. No peripheral edema. No ulcers. Neurologic:  Alert x 3. No focal deficit. Cranial nerves grossly intact. Skin:  No petechia. No hemorrhage. No wounds. CBC with Differential:    Lab Results   Component Value Date    WBC 7.0 09/06/2018    RBC 3.20 09/06/2018    HGB 10.0 09/06/2018    HCT 30.2 09/06/2018     09/06/2018    MCV 94.4 09/06/2018    MCH 31.3 09/06/2018    MCHC 33.1 09/06/2018    RDW 13.8 09/06/2018    LYMPHOPCT 10.6 09/04/2018    MONOPCT 8.7 09/04/2018    BASOPCT 0.2 09/04/2018    MONOSABS 0.88 09/04/2018    LYMPHSABS 1.08 09/04/2018    EOSABS 0.04 09/04/2018    BASOSABS 0.02 09/04/2018     BMP:    Lab Results   Component Value Date     09/06/2018    K 3.4 09/06/2018    K 4.3 09/04/2018     09/06/2018    CO2 22 09/06/2018    BUN 12 09/06/2018    LABALBU 3.5 09/06/2018    CREATININE 0.8 09/06/2018    CALCIUM 8.4 09/06/2018    GFRAA >60 09/06/2018    LABGLOM >60 09/06/2018    GLUCOSE 89 09/06/2018       Other Data:      Intake/Output Summary (Last 24 hours) at 09/06/18 1416  Last data filed at 09/06/18 1405   Gross per 24 hour   Intake          2283.33 ml   Output             1650 ml   Net           633.33 ml         Scheduled Medications:   sodium chloride flush  10 mL Intravenous 2 times per day    pantoprazole  40 mg Intravenous BID         Infusion Medications:   lactated ringers 100 mL/hr at 09/06/18 0502       Assessment:   1. 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 Hyperlipidemia  3. Abnormal EGD findings of kinked gastrojenostomy with obstruction   4.  Gastroesophageal reflux disease  5. Vitamin D deficiency    Plan:   NG tube  to be maintained by general surgery. Patient did undergo EGD yesterday which revealed a kinked gastrojejunostomy with obstruction. We're awaiting general surgery's recommendations. Family indicated that Dr. Kylah Pierre plans for the patient undergo surgical intervention. Monitor abdominal pain and adjust medication regimen as needed. Continue IV fluids. Activity as tolerated. Patient's medications were reviewed/continued/adjusted. Labs as ordered. Please see orders for further plan of care. More than 50% of my  time was spent counseling and/or coordination of care with the patient and/or family with face to face contact. Time was spent reviewing notes and laboratory data, instructing and counseling the patient  regarding my findings and recommendations and reviewing and answer questions. Odalys Mishra D.O., F.A.C.O.I.  9/6/2018  2:21 PM

## 2018-09-06 NOTE — ANESTHESIA POSTPROCEDURE EVALUATION
Department of Anesthesiology  Postprocedure Note    Patient: Tyra Meadows  MRN: [id number]  YOB: 1950  Date of evaluation: 9/6/2018  Time:  2:22 PM     Procedure Summary     Date:  09/06/18 Room / Location:  [address] [place] [address] / [facility] [address]    Anesthesia Start:   Anesthesia Stop:      Procedure:  LAPAROSCOPIC REVISION OF JEJUNOSTOMY (N/A ) Diagnosis:  (N/A)    Surgeon:  Ran Ford MD Responsible Provider:      Anesthesia Type:  general ASA Status:  3          Anesthesia Type: No value filed. Jayce Phase I: Jayce Score: 10    Jayce Phase II:      Last vitals: Reviewed and per EMR flowsheets.        Anesthesia Post Evaluation    Patient location during evaluation: PACU  Patient participation: complete - patient participated  Level of consciousness: awake and alert  Airway patency: patent  Nausea & Vomiting: no vomiting  Complications: no  Cardiovascular status: blood pressure returned to baseline  Respiratory status: acceptable  Hydration status: stable

## 2018-09-07 ENCOUNTER — APPOINTMENT (OUTPATIENT)
Dept: CT IMAGING | Age: 68
DRG: 327 | End: 2018-09-07
Payer: MEDICARE

## 2018-09-07 LAB
ALBUMIN SERPL-MCNC: 3.8 G/DL (ref 3.5–5.2)
ALP BLD-CCNC: 33 U/L (ref 40–129)
ALT SERPL-CCNC: 10 U/L (ref 0–40)
ANION GAP SERPL CALCULATED.3IONS-SCNC: 16 MMOL/L (ref 7–16)
AST SERPL-CCNC: 19 U/L (ref 0–39)
BILIRUB SERPL-MCNC: 0.5 MG/DL (ref 0–1.2)
BUN BLDV-MCNC: 13 MG/DL (ref 8–23)
CALCIUM SERPL-MCNC: 8.2 MG/DL (ref 8.6–10.2)
CHLORIDE BLD-SCNC: 101 MMOL/L (ref 98–107)
CK MB: 2.8 NG/ML (ref 0–7.7)
CO2: 22 MMOL/L (ref 22–29)
CREAT SERPL-MCNC: 0.9 MG/DL (ref 0.7–1.2)
EKG ATRIAL RATE: 73 BPM
EKG P AXIS: 73 DEGREES
EKG P-R INTERVAL: 138 MS
EKG Q-T INTERVAL: 436 MS
EKG QRS DURATION: 90 MS
EKG QTC CALCULATION (BAZETT): 480 MS
EKG R AXIS: -8 DEGREES
EKG T AXIS: 8 DEGREES
EKG VENTRICULAR RATE: 73 BPM
GFR AFRICAN AMERICAN: >60
GFR NON-AFRICAN AMERICAN: >60 ML/MIN/1.73
GLUCOSE BLD-MCNC: 111 MG/DL (ref 74–109)
HCT VFR BLD CALC: 35 % (ref 37–54)
HEMOGLOBIN: 11.6 G/DL (ref 12.5–16.5)
MCH RBC QN AUTO: 31.4 PG (ref 26–35)
MCHC RBC AUTO-ENTMCNC: 33.1 % (ref 32–34.5)
MCV RBC AUTO: 94.9 FL (ref 80–99.9)
PDW BLD-RTO: 13.6 FL (ref 11.5–15)
PLATELET # BLD: 264 E9/L (ref 130–450)
PMV BLD AUTO: 8.8 FL (ref 7–12)
POTASSIUM SERPL-SCNC: 3.9 MMOL/L (ref 3.5–5)
RBC # BLD: 3.69 E12/L (ref 3.8–5.8)
SODIUM BLD-SCNC: 139 MMOL/L (ref 132–146)
TOTAL CK: 88 U/L (ref 20–200)
TOTAL PROTEIN: 6.5 G/DL (ref 6.4–8.3)
TROPONIN: <0.01 NG/ML (ref 0–0.03)
WBC # BLD: 12.5 E9/L (ref 4.5–11.5)

## 2018-09-07 PROCEDURE — 1200000000 HC SEMI PRIVATE

## 2018-09-07 PROCEDURE — 74177 CT ABD & PELVIS W/CONTRAST: CPT

## 2018-09-07 PROCEDURE — 0D164ZA BYPASS STOMACH TO JEJUNUM, PERCUTANEOUS ENDOSCOPIC APPROACH: ICD-10-PCS | Performed by: SURGERY

## 2018-09-07 PROCEDURE — 85027 COMPLETE CBC AUTOMATED: CPT

## 2018-09-07 PROCEDURE — 2580000003 HC RX 258: Performed by: INTERNAL MEDICINE

## 2018-09-07 PROCEDURE — 2580000003 HC RX 258: Performed by: EMERGENCY MEDICINE

## 2018-09-07 PROCEDURE — 6360000004 HC RX CONTRAST MEDICATION: Performed by: RADIOLOGY

## 2018-09-07 PROCEDURE — 36415 COLL VENOUS BLD VENIPUNCTURE: CPT

## 2018-09-07 PROCEDURE — C9113 INJ PANTOPRAZOLE SODIUM, VIA: HCPCS | Performed by: INTERNAL MEDICINE

## 2018-09-07 PROCEDURE — 6360000002 HC RX W HCPCS: Performed by: INTERNAL MEDICINE

## 2018-09-07 PROCEDURE — 93005 ELECTROCARDIOGRAM TRACING: CPT | Performed by: INTERNAL MEDICINE

## 2018-09-07 PROCEDURE — 0DU647Z SUPPLEMENT STOMACH WITH AUTOLOGOUS TISSUE SUBSTITUTE, PERCUTANEOUS ENDOSCOPIC APPROACH: ICD-10-PCS | Performed by: SURGERY

## 2018-09-07 PROCEDURE — 84484 ASSAY OF TROPONIN QUANT: CPT

## 2018-09-07 PROCEDURE — 6370000000 HC RX 637 (ALT 250 FOR IP): Performed by: SURGERY

## 2018-09-07 PROCEDURE — 6370000000 HC RX 637 (ALT 250 FOR IP): Performed by: INTERNAL MEDICINE

## 2018-09-07 PROCEDURE — 82550 ASSAY OF CK (CPK): CPT

## 2018-09-07 PROCEDURE — 82553 CREATINE MB FRACTION: CPT

## 2018-09-07 PROCEDURE — 80053 COMPREHEN METABOLIC PANEL: CPT

## 2018-09-07 RX ORDER — WHITE PETROLATUM 450 MG/G
STICK TOPICAL PRN
Status: DISCONTINUED | OUTPATIENT
Start: 2018-09-07 | End: 2018-09-09 | Stop reason: HOSPADM

## 2018-09-07 RX ADMIN — ONDANSETRON 4 MG: 2 INJECTION INTRAMUSCULAR; INTRAVENOUS at 00:21

## 2018-09-07 RX ADMIN — Medication: at 01:22

## 2018-09-07 RX ADMIN — FENTANYL CITRATE 25 MCG: 50 INJECTION INTRAMUSCULAR; INTRAVENOUS at 04:30

## 2018-09-07 RX ADMIN — Medication 10 ML: at 20:33

## 2018-09-07 RX ADMIN — Medication 10 ML: at 14:13

## 2018-09-07 RX ADMIN — FENTANYL CITRATE 25 MCG: 50 INJECTION INTRAMUSCULAR; INTRAVENOUS at 01:21

## 2018-09-07 RX ADMIN — FENTANYL CITRATE 25 MCG: 50 INJECTION INTRAMUSCULAR; INTRAVENOUS at 05:59

## 2018-09-07 RX ADMIN — FENTANYL CITRATE 25 MCG: 50 INJECTION INTRAMUSCULAR; INTRAVENOUS at 14:12

## 2018-09-07 RX ADMIN — Medication 10 ML: at 07:53

## 2018-09-07 RX ADMIN — ONDANSETRON 4 MG: 2 INJECTION INTRAMUSCULAR; INTRAVENOUS at 06:33

## 2018-09-07 RX ADMIN — PANTOPRAZOLE SODIUM 40 MG: 40 INJECTION, POWDER, FOR SOLUTION INTRAVENOUS at 20:33

## 2018-09-07 RX ADMIN — FENTANYL CITRATE 25 MCG: 50 INJECTION INTRAMUSCULAR; INTRAVENOUS at 10:14

## 2018-09-07 RX ADMIN — ONDANSETRON 4 MG: 2 INJECTION INTRAMUSCULAR; INTRAVENOUS at 20:42

## 2018-09-07 RX ADMIN — FENTANYL CITRATE 25 MCG: 50 INJECTION INTRAMUSCULAR; INTRAVENOUS at 07:53

## 2018-09-07 RX ADMIN — ONDANSETRON 4 MG: 2 INJECTION INTRAMUSCULAR; INTRAVENOUS at 14:17

## 2018-09-07 RX ADMIN — FENTANYL CITRATE 25 MCG: 50 INJECTION INTRAMUSCULAR; INTRAVENOUS at 00:21

## 2018-09-07 RX ADMIN — FENTANYL CITRATE 25 MCG: 50 INJECTION INTRAMUSCULAR; INTRAVENOUS at 20:42

## 2018-09-07 RX ADMIN — IOHEXOL 50 ML: 240 INJECTION, SOLUTION INTRATHECAL; INTRAVASCULAR; INTRAVENOUS; ORAL at 15:07

## 2018-09-07 RX ADMIN — BENZOCAINE AND MENTHOL 1 LOZENGE: 15; 3.6 LOZENGE ORAL at 16:26

## 2018-09-07 RX ADMIN — FENTANYL CITRATE 25 MCG: 50 INJECTION INTRAMUSCULAR; INTRAVENOUS at 16:26

## 2018-09-07 RX ADMIN — PANTOPRAZOLE SODIUM 40 MG: 40 INJECTION, POWDER, FOR SOLUTION INTRAVENOUS at 07:52

## 2018-09-07 RX ADMIN — BENZOCAINE AND MENTHOL 1 LOZENGE: 15; 3.6 LOZENGE ORAL at 04:30

## 2018-09-07 ASSESSMENT — PAIN SCALES - GENERAL
PAINLEVEL_OUTOF10: 6
PAINLEVEL_OUTOF10: 6
PAINLEVEL_OUTOF10: 8
PAINLEVEL_OUTOF10: 7
PAINLEVEL_OUTOF10: 6
PAINLEVEL_OUTOF10: 3
PAINLEVEL_OUTOF10: 5
PAINLEVEL_OUTOF10: 4
PAINLEVEL_OUTOF10: 9
PAINLEVEL_OUTOF10: 9
PAINLEVEL_OUTOF10: 5
PAINLEVEL_OUTOF10: 9
PAINLEVEL_OUTOF10: 10
PAINLEVEL_OUTOF10: 9
PAINLEVEL_OUTOF10: 4
PAINLEVEL_OUTOF10: 9

## 2018-09-07 ASSESSMENT — PAIN DESCRIPTION - FREQUENCY
FREQUENCY: CONTINUOUS
FREQUENCY: CONTINUOUS

## 2018-09-07 ASSESSMENT — PAIN DESCRIPTION - PAIN TYPE
TYPE: ACUTE PAIN;SURGICAL PAIN
TYPE: SURGICAL PAIN

## 2018-09-07 ASSESSMENT — PAIN DESCRIPTION - DESCRIPTORS
DESCRIPTORS: ACHING;DISCOMFORT;SORE;TENDER
DESCRIPTORS: CRAMPING;DISCOMFORT;JABBING
DESCRIPTORS: ACHING;DISCOMFORT;SORE;TENDER

## 2018-09-07 ASSESSMENT — PAIN DESCRIPTION - LOCATION
LOCATION: ABDOMEN

## 2018-09-07 ASSESSMENT — PAIN DESCRIPTION - PROGRESSION
CLINICAL_PROGRESSION: NOT CHANGED
CLINICAL_PROGRESSION: NOT CHANGED

## 2018-09-07 ASSESSMENT — PAIN DESCRIPTION - ONSET
ONSET: ON-GOING
ONSET: ON-GOING

## 2018-09-07 NOTE — OP NOTE
[id number]
trocar was placed in the  right lateral abdomen. Once in the abdomen, we could see that there was  the gastrojejunostomy. We opened both the gastric and jejunostomy site  with electrocautery. We then fired a stapler across this and then closed  the common enterogastrostomy. On endoscopic evaluation, though, it  did  not appear to be patent enough. We then did a Gonzalez type stapling of the  limbs of the jejunum that were coming into this as it did appear that it  was narrowed there. I could only find one of the limbs, when I passed  through the gastrojejunostomy. Also, then opened up the stomach and the  jejunum wider and did a very large hand-sewn Heineke-Mikulicz type  strictureplasty of that area. Afterwards, we were able to see through the  gastrojejunostomy very clearly. There were several loops of bowel there  and it was very difficult anatomy at that time, but there was no leaking  from the sewn closure and at this time, mobilization was done of omentum  and placed over our strictureplasty to buttress and patch our repair. Once  this was done, pneumoperitoneum was evacuated. All trocars were removed  and the skin was closed with 4-0 Monocryl.   The patient was then woken up  in stable condition and taken to PACU.        Shasta Méndez MD    D: 09/06/2018 18:47:34       T: 09/07/2018 2:14:49     [email or web address]  Job#: [id number]     Doc#: [id number]    CC:

## 2018-09-07 NOTE — PROGRESS NOTES
doing well Tolerated surgery yesterday and doing satisfactory. NG tube is out. For repeat CT to rule out leak. Ambulating in the banda and doing well    . Patient's medications were reviewed/continued/adjusted. Labs as ordered. Please see orders for further plan of care. Time was spent reviewing notes and laboratory data, instructing and counseling the patient  regarding my findings and recommendations and reviewing and answer questions. Duarte Mishra D.O., EVELINA.O.I.  9/7/2018  1:57 PM

## 2018-09-07 NOTE — PROGRESS NOTES
GENERAL SURGERY  DAILY PROGRESS NOTE  9/7/2018    Subjective:  Complains of abdominal soreness.  States that he has no nausea but is apprehensive about drinking oral contrast as it usually gives him a lot of pain and distention     Objective:  BP (!) 125/58   Pulse 71   Temp 97.9 °F (36.6 °C) (Oral)   Resp 24   Ht 5' 6\" (1.676 m)   Wt 149 lb 12.8 oz (67.9 kg)   SpO2 96%   BMI 24.18 kg/m²     GENERAL:  Laying in bed, awake, alert, cooperative, no apparent distress  LUNGS:  No increased work of breathing  CARDIOVASCULAR:  Regular rate   ABDOMEN:  Soft, non-tender, slightly distended, incisions C/D/I    Assessment/Plan:  70 y.o. male s/p laparoscopic revision of gastrojejunostomy for GOO secondary to stricture     For CT scan w/ PO contrast to better elucidate anatomy today  Continue NPO for now      Electronically signed by Yandy Lopez MD on 9/7/2018 at 9:41 AM      As above, will get CT today to make sure flow is good out of stomach, then d/c ngt and diet

## 2018-09-08 LAB
ALBUMIN SERPL-MCNC: 3.1 G/DL (ref 3.5–5.2)
ALP BLD-CCNC: 31 U/L (ref 40–129)
ALT SERPL-CCNC: 8 U/L (ref 0–40)
ANION GAP SERPL CALCULATED.3IONS-SCNC: 9 MMOL/L (ref 7–16)
AST SERPL-CCNC: 15 U/L (ref 0–39)
BILIRUB SERPL-MCNC: 0.4 MG/DL (ref 0–1.2)
BUN BLDV-MCNC: 7 MG/DL (ref 8–23)
CALCIUM SERPL-MCNC: 8 MG/DL (ref 8.6–10.2)
CHLORIDE BLD-SCNC: 102 MMOL/L (ref 98–107)
CO2: 26 MMOL/L (ref 22–29)
CREAT SERPL-MCNC: 0.7 MG/DL (ref 0.7–1.2)
GFR AFRICAN AMERICAN: >60
GFR NON-AFRICAN AMERICAN: >60 ML/MIN/1.73
GLUCOSE BLD-MCNC: 130 MG/DL (ref 74–109)
HCT VFR BLD CALC: 28.7 % (ref 37–54)
HEMOGLOBIN: 9.6 G/DL (ref 12.5–16.5)
MCH RBC QN AUTO: 31.1 PG (ref 26–35)
MCHC RBC AUTO-ENTMCNC: 33.4 % (ref 32–34.5)
MCV RBC AUTO: 92.9 FL (ref 80–99.9)
PDW BLD-RTO: 13.6 FL (ref 11.5–15)
PLATELET # BLD: 208 E9/L (ref 130–450)
PMV BLD AUTO: 8.8 FL (ref 7–12)
POTASSIUM SERPL-SCNC: 3.7 MMOL/L (ref 3.5–5)
RBC # BLD: 3.09 E12/L (ref 3.8–5.8)
SODIUM BLD-SCNC: 137 MMOL/L (ref 132–146)
TOTAL PROTEIN: 5.3 G/DL (ref 6.4–8.3)
WBC # BLD: 7.1 E9/L (ref 4.5–11.5)

## 2018-09-08 PROCEDURE — 80053 COMPREHEN METABOLIC PANEL: CPT

## 2018-09-08 PROCEDURE — 2580000003 HC RX 258: Performed by: INTERNAL MEDICINE

## 2018-09-08 PROCEDURE — 85027 COMPLETE CBC AUTOMATED: CPT

## 2018-09-08 PROCEDURE — 6360000002 HC RX W HCPCS: Performed by: INTERNAL MEDICINE

## 2018-09-08 PROCEDURE — 99024 POSTOP FOLLOW-UP VISIT: CPT | Performed by: SURGERY

## 2018-09-08 PROCEDURE — 1200000000 HC SEMI PRIVATE

## 2018-09-08 PROCEDURE — 36415 COLL VENOUS BLD VENIPUNCTURE: CPT

## 2018-09-08 PROCEDURE — C9113 INJ PANTOPRAZOLE SODIUM, VIA: HCPCS | Performed by: INTERNAL MEDICINE

## 2018-09-08 RX ADMIN — SODIUM CHLORIDE, POTASSIUM CHLORIDE, SODIUM LACTATE AND CALCIUM CHLORIDE: 600; 310; 30; 20 INJECTION, SOLUTION INTRAVENOUS at 08:29

## 2018-09-08 RX ADMIN — FENTANYL CITRATE 25 MCG: 50 INJECTION INTRAMUSCULAR; INTRAVENOUS at 08:22

## 2018-09-08 RX ADMIN — Medication 10 ML: at 20:34

## 2018-09-08 RX ADMIN — FENTANYL CITRATE 25 MCG: 50 INJECTION INTRAMUSCULAR; INTRAVENOUS at 16:21

## 2018-09-08 RX ADMIN — Medication 10 ML: at 08:23

## 2018-09-08 RX ADMIN — PANTOPRAZOLE SODIUM 40 MG: 40 INJECTION, POWDER, FOR SOLUTION INTRAVENOUS at 20:34

## 2018-09-08 RX ADMIN — PANTOPRAZOLE SODIUM 40 MG: 40 INJECTION, POWDER, FOR SOLUTION INTRAVENOUS at 08:24

## 2018-09-08 RX ADMIN — FENTANYL CITRATE 25 MCG: 50 INJECTION INTRAMUSCULAR; INTRAVENOUS at 14:29

## 2018-09-08 ASSESSMENT — PAIN DESCRIPTION - PAIN TYPE: TYPE: SURGICAL PAIN

## 2018-09-08 ASSESSMENT — PAIN DESCRIPTION - FREQUENCY: FREQUENCY: INTERMITTENT

## 2018-09-08 ASSESSMENT — PAIN SCALES - GENERAL
PAINLEVEL_OUTOF10: 0
PAINLEVEL_OUTOF10: 9
PAINLEVEL_OUTOF10: 3
PAINLEVEL_OUTOF10: 4
PAINLEVEL_OUTOF10: 9
PAINLEVEL_OUTOF10: 6

## 2018-09-08 ASSESSMENT — PAIN DESCRIPTION - LOCATION: LOCATION: ABDOMEN

## 2018-09-08 ASSESSMENT — PAIN DESCRIPTION - ORIENTATION: ORIENTATION: INNER;MID;LOWER

## 2018-09-08 ASSESSMENT — PAIN DESCRIPTION - DESCRIPTORS: DESCRIPTORS: ACHING;DISCOMFORT

## 2018-09-08 ASSESSMENT — PAIN DESCRIPTION - PROGRESSION: CLINICAL_PROGRESSION: GRADUALLY IMPROVING

## 2018-09-08 ASSESSMENT — PAIN DESCRIPTION - ONSET: ONSET: ON-GOING

## 2018-09-08 NOTE — PROGRESS NOTES
Internal Medicine Progress Note      Dontrell King. Rosalba Devine., & [address] [facility] [address] [facility] [address] Rosalba Devine., F.A.C.O.I. Nahed Mclean D.O. , FMARY JANE.CMarcelloO.I. Primary Care Physician: Michelle Mcdowell DO   Admitting Physician:  Michelle Mcdowell DO  Admission date and time: 9/4/2018  5:07 PM    Room:  [address] [place] [address]  Admitting diagnosis: SBO (small bowel obstruction) [facility]) [K56.609]    Patient Name: Jacki Bamberger  MRN: [id number]    Date of Service: 9/8/2018     Subjective:    Luke Vazquez is a 70 y. o.  male who was seen and examined today,9/8/2018, at the bedside. T    Patient states in general he does feel better. Doing well. Tolerating liquid diet. Walked in the banda. Questions, answers, and tests reviewed. ROS:  Cardiovascular:   Denies any chest pain, irregular heartbeats, or palpitations. Respiratory:   Denies shortness of breath, coughing, sputum production, hemoptysis, or wheezing. Gastrointestinal:   Minimal post-surgical pain. No nausea or vomiting. Extremities:   Denies any lower extremity swelling or edema. Neurology:    Denies any headache or focal neurological deficits. No weakness or paresthesia. Derm:    Denies any rashes, ulcers, or excoriations. Denies bruising. Genitourinary:    Denies any urgency, frequency, hematuria. Voiding without difficulty. Physical Exam:    Vitals:    09/08/18 1624   BP: 125/78   Pulse: 78   Resp: 18   Temp: 99.3 °F (37.4 °C)   SpO2:        HEENT:  PERRLA. EOMI. Sclera clear. Buccal mucosa moist. NG tube-Right nares. Impaired vision. Neck:  Supple. Trachea midline. No thyromegaly. No JVD. No bruits. Heart:  Rhythm regular, rate controlled. Systolic murmur. Lungs:  Symmetrical. Clear to auscultation bilaterally but diminished. No wheezes. No rhonchi. No rales. Abdomen: Soft. Surgical scars. . Mildly tender to palpation diffusely. Bowel sounds are present. Extremities:  Peripheral pulses present. No peripheral edema. No ulcers.     Neurologic: veronika and doing well. Discharge planning-tentative discharge home tomorrow. Dr. Marquita Marion note reviewed. .  Patient's medications were reviewed/continued/adjusted. Labs as ordered. Please see orders for further plan of care. Time was spent reviewing notes and laboratory data, instructing and counseling the patient  regarding my findings and recommendations and reviewing and answer questions. Kameron Mishra D.O., TRAYOMarcelloI.  9/8/2018  5:05 PM

## 2018-09-08 NOTE — PROGRESS NOTES
Surgery Progress Note            Chief complaint:   Patient Active Problem List   Diagnosis    Hyponatremia    Acute uremia    Hypomagnesemia    GERD (gastroesophageal reflux disease)    Mixed hyperlipidemia    Lumbago    Pneumonia    Nausea & vomiting    Diarrhea    Shoulder impingement    AC (acromioclavicular) arthritis    Arthritis    C. difficile colitis    Polyp of colon    Hyperlipidemia    Hernia, incisional    Pancreas neoplasm    Hypoglycemia    Abdominal pain    Small bowel obstruction ([address]    SBO (small bowel obstruction) ([facility])       S: doing well tolerating liquids    O:   Vitals:    09/08/18 0429   BP: (!) 154/87   Pulse: 72   Resp: 20   Temp: 97.3 °F (36.3 °C)   SpO2:        Intake/Output Summary (Last 24 hours) at 09/08/18 1017  Last data filed at 09/08/18 0653   Gross per 24 hour   Intake          3763.33 ml   Output                0 ml   Net          3763.33 ml           Labs:  Recent Labs      09/06/18   0438  09/07/18   0842  09/08/18   0448   WBC  7.0  12.5*  7.1   HGB  10.0*  11.6*  9.6*   HCT  30.2*  35.0*  28.7*     Lab Results   Component Value Date    CREATININE 0.7 09/08/2018    BUN 7 (L) 09/08/2018     09/08/2018    K 3.7 09/08/2018     09/08/2018    CO2 26 09/08/2018     No results for input(s): LIPASE, AMYLASE in the last 72 hours. Physical exam:   BP (!) 154/87   Pulse 72   Temp 97.3 °F (36.3 °C) (Oral)   Resp 20   Ht 5' 6\" (1.676 m)   Wt 150 lb 1.6 oz (68.1 kg)   SpO2 95%   BMI 24.23 kg/m²   General appearance: NAD  Head: NCAT  Neck: supple, no masses  Lungs: equal chest rise bilateral  Heart: S1S2 present  Abdomen: soft, minimally tender, nondistended,  Incisions clean  Skin; no lesions  Gu: no cva tenderness  Extremities: extremities normal, atraumatic, no cyanosis or edema    A:  POD # 2 LAP Revision of gastrojejunostomy    P: liquids for one week, ok for d/c by surgery when ok with others.      Chelly Broussard MD  9/8/2018

## 2018-09-09 VITALS
HEIGHT: 66 IN | SYSTOLIC BLOOD PRESSURE: 152 MMHG | OXYGEN SATURATION: 95 % | WEIGHT: 150 LBS | TEMPERATURE: 98.2 F | DIASTOLIC BLOOD PRESSURE: 76 MMHG | RESPIRATION RATE: 18 BRPM | BODY MASS INDEX: 24.11 KG/M2 | HEART RATE: 82 BPM

## 2018-09-09 LAB
ALBUMIN SERPL-MCNC: 3.3 G/DL (ref 3.5–5.2)
ALP BLD-CCNC: 27 U/L (ref 40–129)
ALT SERPL-CCNC: 8 U/L (ref 0–40)
ANION GAP SERPL CALCULATED.3IONS-SCNC: 8 MMOL/L (ref 7–16)
AST SERPL-CCNC: 16 U/L (ref 0–39)
BILIRUB SERPL-MCNC: 0.3 MG/DL (ref 0–1.2)
BUN BLDV-MCNC: 3 MG/DL (ref 8–23)
CALCIUM SERPL-MCNC: 8.2 MG/DL (ref 8.6–10.2)
CHLORIDE BLD-SCNC: 100 MMOL/L (ref 98–107)
CO2: 27 MMOL/L (ref 22–29)
CREAT SERPL-MCNC: 0.6 MG/DL (ref 0.7–1.2)
GFR AFRICAN AMERICAN: >60
GFR NON-AFRICAN AMERICAN: >60 ML/MIN/1.73
GLUCOSE BLD-MCNC: 122 MG/DL (ref 74–109)
HCT VFR BLD CALC: 28.2 % (ref 37–54)
HEMOGLOBIN: 9.6 G/DL (ref 12.5–16.5)
MCH RBC QN AUTO: 31.2 PG (ref 26–35)
MCHC RBC AUTO-ENTMCNC: 34 % (ref 32–34.5)
MCV RBC AUTO: 91.6 FL (ref 80–99.9)
PDW BLD-RTO: 13.5 FL (ref 11.5–15)
PLATELET # BLD: 191 E9/L (ref 130–450)
PMV BLD AUTO: 8.6 FL (ref 7–12)
POTASSIUM SERPL-SCNC: 3.7 MMOL/L (ref 3.5–5)
RBC # BLD: 3.08 E12/L (ref 3.8–5.8)
SODIUM BLD-SCNC: 135 MMOL/L (ref 132–146)
TOTAL PROTEIN: 5.8 G/DL (ref 6.4–8.3)
WBC # BLD: 7.4 E9/L (ref 4.5–11.5)

## 2018-09-09 PROCEDURE — 36415 COLL VENOUS BLD VENIPUNCTURE: CPT

## 2018-09-09 PROCEDURE — 85027 COMPLETE CBC AUTOMATED: CPT

## 2018-09-09 PROCEDURE — 80053 COMPREHEN METABOLIC PANEL: CPT

## 2018-09-09 NOTE — PLAN OF CARE
Problem: Falls - Risk of:  Goal: Will remain free from falls  Will remain free from falls   Outcome: Met This Shift
Problem: Falls - Risk of:  Goal: Will remain free from falls  Will remain free from falls   Outcome: Met This Shift
Problem: Falls - Risk of:  Goal: Will remain free from falls  Will remain free from falls   Outcome: Met This Shift      Problem: Pain:  Goal: Pain level will decrease  Pain level will decrease   Outcome: Met This Shift    Goal: Control of acute pain  Control of acute pain   Outcome: Met This Shift
Problem: Falls - Risk of:  Goal: Will remain free from falls  Will remain free from falls   Outcome: Met This Shift    Goal: Absence of physical injury  Absence of physical injury   Outcome: Met This Shift      Problem: Pain:  Goal: Pain level will decrease  Pain level will decrease   Outcome: Met This Shift    Goal: Control of acute pain  Control of acute pain   Outcome: Met This Shift    Goal: Control of chronic pain  Control of chronic pain   Outcome: Met This Shift
No

## 2018-09-09 NOTE — DISCHARGE SUMMARY
lobe compressive atelectasis, left greater than right. Since the previous study, multiple small collections of subcutaneous and free intraperitoneal air are noted throughout the abdomen and pelvis, consistent with patient history of interval surgery. The stomach is markedly distended and contains a large amount of oral contrast. In addition, there is moderate distention of multiple loops of jejunum just distal to the gastrojejunostomy junction. These small bowel loops measure up to 4.3 cm in diameter. Postoperative changes and anastomotic suture are noted at the gastrojejunostomy junction with a large amount of thickening of the wall of the distal stomach. Soft tissue stranding of the mesenteric fat surrounding the postoperative suture region is also noted. No extraluminal contrast is seen to suggest suture dehiscence. A small amount of nonspecific free fluid is noted within the prerectal region of the pelvis. Other findings are similar to previous examination. 1. Interval postoperative changes consistent with patient history of revision of gastrojejunostomy. 2. Thickening of wall of distal stomach and jejunum adjacent to the postoperative region with inflammatory changes within the surrounding mesenteric fat. Findings are most likely on the basis of edema associated with the recent surgery. Prominent distention of the stomach is again noted. 3. Moderate distention of portion of jejunum just distal to gastrojejunostomy and involving several loops of bowel distal to this region, nonspecific, suggesting postoperative ileus. 4. Other findings as above    Ct Abdomen Pelvis W Iv Contrast Additional Contrast? Oral    Result Date: 9/4/2018  Reading location: 200 INDICATION: Abdominal pain FINDINGS: Axial CT images through the abdomen and pelvis after intravenous injection 80 mL Isovue-370. Oral contrast with incomplete bowel opacification. Compare with 8/17/2018. Automated dose control.  Severely distended stomach with follow-up. I have spoken with the patient and discussed the results of the current hospitalization, in addition to providing specific details for the plan of care and counseling regarding the diagnosis and prognosis. The plan has been discussed in detail and they are aware of the specific conditions for emergent return, as well as the importance of follow-up. Their questions are answered at this time and they are agreeable with the plan for discharge to home    DISCHARGE MEDICATIONS:    Murtaza Parker   Home Medication Instructions [id number]    Printed on:09/09/18 0825   Medication Information                      atorvastatin (LIPITOR) 40 MG tablet  TAKE 1 TABLET BY MOUTH EVERY DAY             ezetimibe (ZETIA) 10 MG tablet  Take 1 tablet by mouth daily             Flaxseed, Linseed, (FLAX SEEDS PO)  Take 1 capsule by mouth daily             omega-3 acid ethyl esters (LOVAZA) 1 g capsule  TAKE 1 CAPSULE BY MOUTH FOUR TIMES DAILY             pantoprazole (PROTONIX) 40 MG tablet  TAKE 1 TABLET BY MOUTH EVERY DAY             Saw Palmetto EXTR  Take 1 capsule by mouth daily             temazepam (RESTORIL) 15 MG capsule  Take 1 capsule by mouth nightly as needed for Sleep for up to 30 days. [facility] therapeutic multivitamin-minerals (THERAGRAN-M) tablet  Take 1 tablet by mouth daily. FOLLOW UP/INSTRUCTIONS:  · This patient is instructed to follow-up with his primary care physician. · Patient is instructed to follow-up with the consults listed above as directed by them. · he is instructed to resume home medications and take new medications as indicated in the list above. · If the patient has a recurrence of symptoms, he is instructed to go to the ED. Preparing for this patient's discharge, including paperwork, orders, instructions, and meeting with patient did require > 30 minutes.     Lazaro Almeida DO     9/9/2018  8:25 AM

## 2018-09-10 ENCOUNTER — TELEPHONE (OUTPATIENT)
Dept: SURGERY | Age: 68
End: 2018-09-10

## 2018-09-12 PROBLEM — K31.1 GASTRIC OUTLET OBSTRUCTION: Status: ACTIVE | Noted: 2018-09-12

## 2018-09-17 ENCOUNTER — OFFICE VISIT (OUTPATIENT)
Dept: SURGERY | Age: 68
End: 2018-09-17

## 2018-09-17 VITALS — SYSTOLIC BLOOD PRESSURE: 118 MMHG | HEART RATE: 98 BPM | DIASTOLIC BLOOD PRESSURE: 62 MMHG | OXYGEN SATURATION: 96 %

## 2018-09-17 DIAGNOSIS — K56.609 SBO (SMALL BOWEL OBSTRUCTION) (HCC): Primary | ICD-10-CM

## 2018-09-17 PROCEDURE — 99024 POSTOP FOLLOW-UP VISIT: CPT | Performed by: SURGERY

## 2018-10-31 PROBLEM — K31.1 GASTRIC OUTLET OBSTRUCTION: Status: RESOLVED | Noted: 2018-09-12 | Resolved: 2018-10-31

## 2018-10-31 PROBLEM — R10.9 ABDOMINAL PAIN: Status: RESOLVED | Noted: 2018-08-17 | Resolved: 2018-10-31

## 2018-10-31 PROBLEM — K56.609 SBO (SMALL BOWEL OBSTRUCTION) (HCC): Status: RESOLVED | Noted: 2018-09-04 | Resolved: 2018-10-31

## 2019-04-17 ENCOUNTER — APPOINTMENT (OUTPATIENT)
Dept: CT IMAGING | Age: 69
End: 2019-04-17
Payer: MEDICARE

## 2019-04-17 ENCOUNTER — HOSPITAL ENCOUNTER (EMERGENCY)
Age: 69
Discharge: ANOTHER HOSPITAL | End: 2019-04-18
Attending: EMERGENCY MEDICINE
Payer: MEDICARE

## 2019-04-17 ENCOUNTER — APPOINTMENT (OUTPATIENT)
Dept: GENERAL RADIOLOGY | Age: 69
End: 2019-04-17
Payer: MEDICARE

## 2019-04-17 VITALS
TEMPERATURE: 97.4 F | DIASTOLIC BLOOD PRESSURE: 87 MMHG | HEIGHT: 67 IN | HEART RATE: 80 BPM | BODY MASS INDEX: 24.01 KG/M2 | RESPIRATION RATE: 20 BRPM | WEIGHT: 153 LBS | OXYGEN SATURATION: 98 % | SYSTOLIC BLOOD PRESSURE: 145 MMHG

## 2019-04-17 DIAGNOSIS — S42.291A OTHER CLOSED DISPLACED FRACTURE OF PROXIMAL END OF RIGHT HUMERUS, INITIAL ENCOUNTER: ICD-10-CM

## 2019-04-17 DIAGNOSIS — E87.1 HYPONATREMIA: ICD-10-CM

## 2019-04-17 DIAGNOSIS — R56.9 SEIZURE (HCC): Primary | ICD-10-CM

## 2019-04-17 LAB
ALBUMIN SERPL-MCNC: 4.1 G/DL (ref 3.5–5.2)
ALP BLD-CCNC: 37 U/L (ref 40–129)
ALT SERPL-CCNC: 19 U/L (ref 0–40)
ANION GAP SERPL CALCULATED.3IONS-SCNC: 9 MMOL/L (ref 7–16)
AST SERPL-CCNC: 52 U/L (ref 0–39)
BILIRUB SERPL-MCNC: 0.3 MG/DL (ref 0–1.2)
BUN BLDV-MCNC: 7 MG/DL (ref 8–23)
CALCIUM SERPL-MCNC: 8.6 MG/DL (ref 8.6–10.2)
CHLORIDE BLD-SCNC: 90 MMOL/L (ref 98–107)
CO2: 26 MMOL/L (ref 22–29)
CREAT SERPL-MCNC: 0.6 MG/DL (ref 0.7–1.2)
GFR AFRICAN AMERICAN: >60
GFR NON-AFRICAN AMERICAN: >60 ML/MIN/1.73
GLUCOSE BLD-MCNC: 134 MG/DL (ref 74–99)
HCT VFR BLD CALC: 31.4 % (ref 37–54)
HEMOGLOBIN: 10.3 G/DL (ref 12.5–16.5)
LACTIC ACID: 1.7 MMOL/L (ref 0.5–2.2)
MCH RBC QN AUTO: 27.5 PG (ref 26–35)
MCHC RBC AUTO-ENTMCNC: 32.8 % (ref 32–34.5)
MCV RBC AUTO: 84 FL (ref 80–99.9)
PDW BLD-RTO: 15.4 FL (ref 11.5–15)
PLATELET # BLD: 158 E9/L (ref 130–450)
PMV BLD AUTO: 9 FL (ref 7–12)
POTASSIUM SERPL-SCNC: 5.7 MMOL/L (ref 3.5–5)
RBC # BLD: 3.74 E12/L (ref 3.8–5.8)
SODIUM BLD-SCNC: 125 MMOL/L (ref 132–146)
TOTAL PROTEIN: 6.6 G/DL (ref 6.4–8.3)
TROPONIN: <0.01 NG/ML (ref 0–0.03)
WBC # BLD: 9.1 E9/L (ref 4.5–11.5)

## 2019-04-17 PROCEDURE — 36415 COLL VENOUS BLD VENIPUNCTURE: CPT

## 2019-04-17 PROCEDURE — G0480 DRUG TEST DEF 1-7 CLASSES: HCPCS

## 2019-04-17 PROCEDURE — 73030 X-RAY EXAM OF SHOULDER: CPT

## 2019-04-17 PROCEDURE — 80053 COMPREHEN METABOLIC PANEL: CPT

## 2019-04-17 PROCEDURE — 73060 X-RAY EXAM OF HUMERUS: CPT

## 2019-04-17 PROCEDURE — 99285 EMERGENCY DEPT VISIT HI MDM: CPT

## 2019-04-17 PROCEDURE — 84484 ASSAY OF TROPONIN QUANT: CPT

## 2019-04-17 PROCEDURE — 70450 CT HEAD/BRAIN W/O DYE: CPT

## 2019-04-17 PROCEDURE — 84146 ASSAY OF PROLACTIN: CPT

## 2019-04-17 PROCEDURE — 24505 CLTX HUMRL SHFT FX W/MNPJ: CPT

## 2019-04-17 PROCEDURE — 2500000003 HC RX 250 WO HCPCS: Performed by: EMERGENCY MEDICINE

## 2019-04-17 PROCEDURE — 85027 COMPLETE CBC AUTOMATED: CPT

## 2019-04-17 PROCEDURE — 93005 ELECTROCARDIOGRAM TRACING: CPT | Performed by: EMERGENCY MEDICINE

## 2019-04-17 PROCEDURE — 2580000003 HC RX 258

## 2019-04-17 PROCEDURE — 83605 ASSAY OF LACTIC ACID: CPT

## 2019-04-17 RX ORDER — SODIUM CHLORIDE 9 MG/ML
INJECTION, SOLUTION INTRAVENOUS
Status: COMPLETED
Start: 2019-04-17 | End: 2019-04-18

## 2019-04-17 RX ORDER — KETAMINE HYDROCHLORIDE 10 MG/ML
1 INJECTION, SOLUTION INTRAMUSCULAR; INTRAVENOUS ONCE
Status: COMPLETED | OUTPATIENT
Start: 2019-04-17 | End: 2019-04-17

## 2019-04-17 RX ADMIN — KETAMINE HYDROCHLORIDE 69.4 MG: 10 INJECTION, SOLUTION INTRAMUSCULAR; INTRAVENOUS at 21:28

## 2019-04-17 RX ADMIN — SODIUM CHLORIDE: 900 INJECTION, SOLUTION INTRAVENOUS at 21:00

## 2019-04-17 ASSESSMENT — PAIN SCALES - GENERAL: PAINLEVEL_OUTOF10: 10

## 2019-04-17 NOTE — ED PROVIDER NOTES
74-year-old male brought in with concern of new onset seizure. He was driving his car today and there was a period of confusion and diminished responsiveness. Patient was driving at the time. He was driving to his mother-in-law's house, he was able to get there. A 30 months after being there and after finishing a couple chores family reports that he had a several minute episode of generalized shaking, he bit his tongue and was then brought in by EMS. He is awake, alert, and oriented ×4 at this time. However, he is complaining of right arm weakness that is new and different for him. No slurred speech, no facial droop, no weakness to the right leg. He has focal tenderness with palpation to the proximal humerus on the right side, no change in  strength. Review of Systems   Neurological: Positive for seizures and weakness. All other systems reviewed and are negative. Physical Exam   Constitutional: He is oriented to person, place, and time. He appears well-developed and well-nourished. No distress. HENT:   Head: Normocephalic and atraumatic. Bite on the right side of the patient's tongue   Eyes: Pupils are equal, round, and reactive to light. Neck: Normal range of motion. Neck supple. No thyromegaly present. Cardiovascular: Normal rate and regular rhythm. Pulmonary/Chest: Effort normal and breath sounds normal. No respiratory distress. He has no wheezes. Abdominal: Soft. He exhibits no distension and no mass. There is no tenderness. There is no rebound and no guarding. Musculoskeletal: He exhibits tenderness. He exhibits no edema. Right shoulder: He exhibits tenderness. Weakness with anterior movement of the arm,  strength is equal on both sides, tenderness to palpation of the proximal humerus and shoulder   Neurological: He is alert and oriented to person, place, and time. No cranial nerve deficit. Skin: Skin is warm and dry. No erythema.    Psychiatric: He has a normal mood and affect. Procedures    [facility]    ED Course as of Apr 23 0112 Wed Apr 17, 2019   2200 Pt alert and oriented, agrees with transfer to [facility].    [SO]   2227 Spoke with Dr. Danelle Tejada who will admit the patient to [facility].    [SO]   [phone] Patient not having any pain as long as he is not moving his arm. Resting currently. Updated patient and his spouse regarding the transfer.    [SO]      ED Course User Index  [SO] Gildardo Myers,        EKG: Sinus rhythm, rate 68, left axis, no ST elevations or depressions, no T-wave abnormalities. Conscious Sedation Procedure Note    Indication: fracture dislocation and procedural pain management    Consent: I have discussed with the patient and/or the patient representative the indication, alternatives, and the possible risks and/or complications of the planned procedure and the anesthesia methods. The patient and/or patient representative appear to understand and agree to proceed. Physician Involvement: The attending physician was present and supervising this procedure. Pre-Sedation Documentation and Exam:  Time: 9422  I have personally completed a history, physical exam & review of systems for this patient (see notes). Vital signs have been reviewed (see flow sheet for vitals). Airway Assessment: normal, dentition not prohibitive, normal neck range of motion    Prior History of Anesthesia Complications: none    ASA Classification: Class 2 - A normal healthy patient with mild systemic disease    Sedation/ Anesthesia Plan: intravenous sedation    Medications Used: ketamine intravenously    Monitoring and Safety: The patient was placed on a cardiac monitor and vital signs, pulse oximetry and level of consciousness were continuously evaluated throughout the procedure. The patient was closely monitored until recovery from the medications was complete and the patient had returned to baseline status.  Respiratory therapy was on standby at all times during the procedure. (The following sections must be completed)  Post-Sedation Vital Signs: Vital signs were reviewed and were stable after the procedure (see flow sheet for vitals)            Post-Sedation Exam:   Time: 1920. Lungs: clear and Cardiovascular: regular rate and rhythm           Complications: none        ED Course as of Apr 23 0112 Wed Apr 17, 2019   2200 Pt alert and oriented, agrees with transfer to [facility].    [SO]   2227 Spoke with Dr. Corey Sotelo who will admit the patient to [facility].    [SO]   [phone] Patient not having any pain as long as he is not moving his arm. Resting currently. Updated patient and his spouse regarding the transfer.    [SO]      ED Course User Index  [SO] Janel Kirkpatrick, DO       --------------------------------------------- PAST HISTORY ---------------------------------------------  Past Medical History:  has a past medical history of Arthritis, GERD (gastroesophageal reflux disease), Hyperlipidemia, Hypoglycemia, Mixed hyperlipidemia, Nausea & vomiting, and Pneumonia. Past Surgical History:  has a past surgical history that includes Colonoscopy; shoulder surgery; Inguinal hernia repair (Left, 2/19/2013); skin biopsy; Upper gastrointestinal endoscopy; Pancreas surgery; Incisional hernia repair (N/A, 10/13/2016); pr lap,surg,colect,tot,w/proctect,w/ileost (N/A, 8/20/2018); Colon surgery; Upper gastrointestinal endoscopy (N/A, 9/5/2018); and pr lap,surg,enterectomy,resect & anast (N/A, 9/6/2018). Social History:  reports that he quit smoking about 8 years ago. He has never used smokeless tobacco. He reports that he drinks about 6.0 oz of alcohol per week. He reports that he does not use drugs. Family History: family history includes Cancer in his brother; Diabetes in his brother; High Cholesterol in his mother; Mental Illness in his brother; Other in his father; Stroke in his brother and mother. The patients home medications have been reviewed.     Allergies: Patient has no known allergies. -------------------------------------------------- RESULTS -------------------------------------------------    Lab  Results for orders placed or performed during the hospital encounter of 04/17/19   CBC   Result Value Ref Range    WBC 9.1 4.5 - 11.5 E9/L    RBC 3.74 (L) 3.80 - 5.80 E12/L    Hemoglobin 10.3 (L) 12.5 - 16.5 g/dL    Hematocrit 31.4 (L) 37.0 - 54.0 %    MCV 84.0 80.0 - 99.9 fL    MCH 27.5 26.0 - 35.0 pg    MCHC 32.8 32.0 - 34.5 %    RDW 15.4 (H) 11.5 - 15.0 fL    Platelets 439 249 - 693 E9/L    MPV 9.0 7.0 - 12.0 fL   Comprehensive Metabolic Panel   Result Value Ref Range    Sodium 125 (L) 132 - 146 mmol/L    Potassium 5.7 (H) 3.5 - 5.0 mmol/L    Chloride 90 (L) 98 - 107 mmol/L    CO2 26 22 - 29 mmol/L    Anion Gap 9 7 - 16 mmol/L    Glucose 134 (H) 74 - 99 mg/dL    BUN 7 (L) 8 - 23 mg/dL    CREATININE 0.6 (L) 0.7 - 1.2 mg/dL    GFR Non-African American >60 >=60 mL/min/1.73    GFR African American >60     Calcium 8.6 8.6 - 10.2 mg/dL    Total Protein 6.6 6.4 - 8.3 g/dL    Alb 4.1 3.5 - 5.2 g/dL    Total Bilirubin 0.3 0.0 - 1.2 mg/dL    Alkaline Phosphatase 37 (L) 40 - 129 U/L    ALT 19 0 - 40 U/L    AST 52 (H) 0 - 39 U/L   Lactic Acid, Plasma   Result Value Ref Range    Lactic Acid 1.7 0.5 - 2.2 mmol/L   Troponin   Result Value Ref Range    Troponin <0.01 0.00 - 0.03 ng/mL   EKG 12 Lead   Result Value Ref Range    Ventricular Rate 68 BPM    Atrial Rate 68 BPM    P-R Interval 146 ms    QRS Duration 86 ms    Q-T Interval 430 ms    QTc Calculation (Bazett) 457 ms    P Axis 69 degrees    R Axis -17 degrees    T Axis 38 degrees       Radiology  XR SHOULDER RIGHT (MIN 2 VIEWS)   Final Result   Redemonstration of a comminuted proximal humerus fracture with   anterior dislocation of the humeral head.       XR SHOULDER RIGHT (MIN 2 VIEWS)   Final Result      Right Shoulder:   Comminuted displaced proximal humerus fracture with anterior   dislocation of the humeral head relative to the glenoid. Right Humerus:   No additional humerus fracture is identified. XR HUMERUS RIGHT (MIN 2 VIEWS)   Final Result      Right Shoulder:   Comminuted displaced proximal humerus fracture with anterior   dislocation of the humeral head relative to the glenoid. Right Humerus:   No additional humerus fracture is identified. CT Head WO Contrast   Final Result   1. No acute intracranial hemorrhage or mass effect. 2. Patchy and confluent areas of hypoattenuation within the cerebral   white matter, which are nonspecific but most compatible with moderate   changes of microangiopathy. 3. Suggestion of a small old lacunar infarcts within bilateral basal   ganglia. Fab Sanchez ------------------------- NURSING NOTES AND VITALS REVIEWED ---------------------------  Date / Time Roomed:  4/17/2019  5:01 PM  ED Bed Assignment:  01/01    The nursing notes within the ED encounter and vital signs as below have been reviewed. Patient Vitals for the past 24 hrs:   BP Temp Temp src Pulse Resp SpO2 Height Weight   04/17/19 2148 (!) 148/80 -- -- 65 15 99 % -- --   04/17/19 2134 (!) 150/74 -- -- 70 20 98 % -- --   04/17/19 2128 (!) 152/78 -- -- 67 14 98 % -- --   04/17/19 2123 139/79 -- -- 65 16 98 % -- --   04/17/19 2118 134/71 -- -- 56 19 97 % -- --   04/17/19 2114 (!) 120/91 -- -- 64 12 99 % -- --   04/17/19 2108 (!) 155/92 -- -- 77 19 100 % -- --   04/17/19 2105 (!) 146/22 -- -- 72 19 100 % -- --   04/17/19 1911 (!) 149/70 -- -- 68 16 99 % -- --   04/17/19 1726 136/75 97.4 °F (36.3 °C) Oral 69 18 95 % 5' 7\" (1.702 m) 153 lb (69.4 kg)       Oxygen Saturation Interpretation: Normal  ------------------------------------------ PROGRESS NOTES ------------------------------------------  Re-evaluation(s):   .  Patients symptoms are improving  Repeat physical examination is improved  Patients symptoms are improving  Repeat physical examination is improved    I have spoken with the patient and spouse and discussed todays results, in addition to providing specific details for the plan of care and counseling regarding the diagnosis and prognosis. Their questions are answered at this time and they are agreeable with the plan. I have discussed the risks and benefits of transfer and they wish to proceed with the transfer. --------------------------------- ADDITIONAL PROVIDER NOTES ---------------------------------    Reason for transfer: Neurology availability. This patient's ED course included: a personal history and physicial examination, re-evaluation prior to disposition, multiple bedside re-evaluations and IV medications    This patient has remained hemodynamically stable and been closely monitored during their ED course. Clinical Impression  1. Seizure ([address]    2. Other closed displaced fracture of proximal end of right humerus, initial encounter          Disposition  Patient's disposition: Transfer to [facility]. Transferred by: EMS. Patient's condition is stable.          Jcarlos Saenz DO  04/23/19 0114

## 2019-04-17 NOTE — ED NOTES
Bed: H3  Expected date:   Expected time:   Means of arrival:   Comments:  Siobhan Benitez RN  04/17/19 7940

## 2019-04-18 ENCOUNTER — HOSPITAL ENCOUNTER (INPATIENT)
Age: 69
LOS: 2 days | Discharge: HOME | DRG: 563 | End: 2019-04-20
Attending: FAMILY MEDICINE | Admitting: FAMILY MEDICINE
Payer: MEDICARE

## 2019-04-18 ENCOUNTER — APPOINTMENT (OUTPATIENT)
Dept: NEUROLOGY | Age: 69
DRG: 563 | End: 2019-04-18
Attending: FAMILY MEDICINE
Payer: MEDICARE

## 2019-04-18 ENCOUNTER — APPOINTMENT (OUTPATIENT)
Dept: CT IMAGING | Age: 69
DRG: 563 | End: 2019-04-18
Attending: FAMILY MEDICINE
Payer: MEDICARE

## 2019-04-18 ENCOUNTER — TELEPHONE (OUTPATIENT)
Dept: ORTHOPEDIC SURGERY | Age: 69
End: 2019-04-18

## 2019-04-18 ENCOUNTER — APPOINTMENT (OUTPATIENT)
Dept: GENERAL RADIOLOGY | Age: 69
DRG: 563 | End: 2019-04-18
Attending: FAMILY MEDICINE
Payer: MEDICARE

## 2019-04-18 ENCOUNTER — HOSPITAL ENCOUNTER (OUTPATIENT)
Age: 69
Discharge: HOME | End: 2019-04-18
Payer: MEDICARE

## 2019-04-18 PROBLEM — S42.309A HUMERUS FRACTURE: Status: ACTIVE | Noted: 2019-04-18

## 2019-04-18 LAB
ABO/RH: NORMAL
ANION GAP SERPL CALCULATED.3IONS-SCNC: 13 MMOL/L (ref 7–16)
ANTIBODY SCREEN: NORMAL
APTT: 27.8 SEC (ref 24.5–35.1)
BACTERIA: ABNORMAL /HPF
BASOPHILS ABSOLUTE: 0.01 E9/L (ref 0–0.2)
BASOPHILS RELATIVE PERCENT: 0.1 % (ref 0–2)
BILIRUBIN URINE: NEGATIVE
BLOOD, URINE: NEGATIVE
BUN BLDV-MCNC: 6 MG/DL (ref 8–23)
CALCIUM SERPL-MCNC: 8.9 MG/DL (ref 8.6–10.2)
CHLORIDE BLD-SCNC: 95 MMOL/L (ref 98–107)
CLARITY: CLEAR
CO2: 24 MMOL/L (ref 22–29)
COLOR: YELLOW
CREAT SERPL-MCNC: 0.7 MG/DL (ref 0.7–1.2)
D DIMER: 2385 NG/ML DDU
EKG ATRIAL RATE: 68 BPM
EKG ATRIAL RATE: 85 BPM
EKG P AXIS: 63 DEGREES
EKG P AXIS: 69 DEGREES
EKG P-R INTERVAL: 140 MS
EKG P-R INTERVAL: 146 MS
EKG Q-T INTERVAL: 392 MS
EKG Q-T INTERVAL: 430 MS
EKG QRS DURATION: 86 MS
EKG QRS DURATION: 88 MS
EKG QTC CALCULATION (BAZETT): 457 MS
EKG QTC CALCULATION (BAZETT): 466 MS
EKG R AXIS: -16 DEGREES
EKG R AXIS: -17 DEGREES
EKG T AXIS: 36 DEGREES
EKG T AXIS: 38 DEGREES
EKG VENTRICULAR RATE: 68 BPM
EKG VENTRICULAR RATE: 85 BPM
EOSINOPHILS ABSOLUTE: 0.01 E9/L (ref 0.05–0.5)
EOSINOPHILS RELATIVE PERCENT: 0.1 % (ref 0–6)
EPITHELIAL CELLS, UA: ABNORMAL /HPF
ETHANOL: <10 MG/DL (ref 0–0.08)
GFR AFRICAN AMERICAN: >60
GFR NON-AFRICAN AMERICAN: >60 ML/MIN/1.73
GLUCOSE BLD-MCNC: 132 MG/DL (ref 74–99)
GLUCOSE URINE: NEGATIVE MG/DL
HCT VFR BLD CALC: 29.3 % (ref 37–54)
HEMOGLOBIN: 9.8 G/DL (ref 12.5–16.5)
IMMATURE GRANULOCYTES #: 0.03 E9/L
IMMATURE GRANULOCYTES %: 0.4 % (ref 0–5)
INR BLD: 1
KETONES, URINE: ABNORMAL MG/DL
LEUKOCYTE ESTERASE, URINE: NEGATIVE
LV EF: 60 %
LVEF MODALITY: NORMAL
LYMPHOCYTES ABSOLUTE: 0.75 E9/L (ref 1.5–4)
LYMPHOCYTES RELATIVE PERCENT: 9.4 % (ref 20–42)
MAGNESIUM: 1.8 MG/DL (ref 1.6–2.6)
MCH RBC QN AUTO: 27.5 PG (ref 26–35)
MCHC RBC AUTO-ENTMCNC: 33.4 % (ref 32–34.5)
MCV RBC AUTO: 82.3 FL (ref 80–99.9)
METER GLUCOSE: 137 MG/DL (ref 74–99)
MONOCYTES ABSOLUTE: 0.47 E9/L (ref 0.1–0.95)
MONOCYTES RELATIVE PERCENT: 5.9 % (ref 2–12)
NEUTROPHILS ABSOLUTE: 6.75 E9/L (ref 1.8–7.3)
NEUTROPHILS RELATIVE PERCENT: 84.1 % (ref 43–80)
NITRITE, URINE: NEGATIVE
PDW BLD-RTO: 15.1 FL (ref 11.5–15)
PH UA: 6.5 (ref 5–9)
PLATELET # BLD: 172 E9/L (ref 130–450)
PMV BLD AUTO: 9.2 FL (ref 7–12)
POTASSIUM SERPL-SCNC: 3.6 MMOL/L (ref 3.5–5)
POTASSIUM SERPL-SCNC: 4 MMOL/L (ref 3.5–5)
PRO-BNP: 249 PG/ML (ref 0–125)
PROCALCITONIN: 0.06 NG/ML (ref 0–0.08)
PROLACTIN: 6.47 NG/ML
PROTEIN UA: NEGATIVE MG/DL
PROTHROMBIN TIME: 11.9 SEC (ref 9.3–12.4)
RBC # BLD: 3.56 E12/L (ref 3.8–5.8)
RBC UA: ABNORMAL /HPF (ref 0–2)
SODIUM BLD-SCNC: 132 MMOL/L (ref 132–146)
SPECIFIC GRAVITY UA: 1.01 (ref 1–1.03)
TOTAL CK: 281 U/L (ref 20–200)
TROPONIN: <0.01 NG/ML (ref 0–0.03)
TROPONIN: <0.01 NG/ML (ref 0–0.03)
UROBILINOGEN, URINE: 0.2 E.U./DL
WBC # BLD: 8 E9/L (ref 4.5–11.5)
WBC UA: ABNORMAL /HPF (ref 0–5)

## 2019-04-18 PROCEDURE — 83880 ASSAY OF NATRIURETIC PEPTIDE: CPT

## 2019-04-18 PROCEDURE — 95816 EEG AWAKE AND DROWSY: CPT | Performed by: PSYCHIATRY & NEUROLOGY

## 2019-04-18 PROCEDURE — 6370000000 HC RX 637 (ALT 250 FOR IP): Performed by: STUDENT IN AN ORGANIZED HEALTH CARE EDUCATION/TRAINING PROGRAM

## 2019-04-18 PROCEDURE — 6370000000 HC RX 637 (ALT 250 FOR IP): Performed by: PSYCHIATRY & NEUROLOGY

## 2019-04-18 PROCEDURE — 86850 RBC ANTIBODY SCREEN: CPT

## 2019-04-18 PROCEDURE — 84484 ASSAY OF TROPONIN QUANT: CPT

## 2019-04-18 PROCEDURE — 93010 ELECTROCARDIOGRAM REPORT: CPT | Performed by: INTERNAL MEDICINE

## 2019-04-18 PROCEDURE — 93306 TTE W/DOPPLER COMPLETE: CPT

## 2019-04-18 PROCEDURE — 6370000000 HC RX 637 (ALT 250 FOR IP): Performed by: FAMILY MEDICINE

## 2019-04-18 PROCEDURE — A0426 ALS 1: HCPCS

## 2019-04-18 PROCEDURE — 2060000000 HC ICU INTERMEDIATE R&B

## 2019-04-18 PROCEDURE — 85025 COMPLETE CBC W/AUTO DIFF WBC: CPT

## 2019-04-18 PROCEDURE — 86901 BLOOD TYPING SEROLOGIC RH(D): CPT

## 2019-04-18 PROCEDURE — 87040 BLOOD CULTURE FOR BACTERIA: CPT

## 2019-04-18 PROCEDURE — 85610 PROTHROMBIN TIME: CPT

## 2019-04-18 PROCEDURE — 82550 ASSAY OF CK (CPK): CPT

## 2019-04-18 PROCEDURE — 99221 1ST HOSP IP/OBS SF/LOW 40: CPT | Performed by: PSYCHIATRY & NEUROLOGY

## 2019-04-18 PROCEDURE — 93005 ELECTROCARDIOGRAM TRACING: CPT | Performed by: FAMILY MEDICINE

## 2019-04-18 PROCEDURE — 84132 ASSAY OF SERUM POTASSIUM: CPT

## 2019-04-18 PROCEDURE — 85378 FIBRIN DEGRADE SEMIQUANT: CPT

## 2019-04-18 PROCEDURE — 81001 URINALYSIS AUTO W/SCOPE: CPT

## 2019-04-18 PROCEDURE — 6360000002 HC RX W HCPCS: Performed by: PSYCHIATRY & NEUROLOGY

## 2019-04-18 PROCEDURE — 85730 THROMBOPLASTIN TIME PARTIAL: CPT

## 2019-04-18 PROCEDURE — 36415 COLL VENOUS BLD VENIPUNCTURE: CPT

## 2019-04-18 PROCEDURE — 82962 GLUCOSE BLOOD TEST: CPT

## 2019-04-18 PROCEDURE — 73200 CT UPPER EXTREMITY W/O DYE: CPT

## 2019-04-18 PROCEDURE — A0425 GROUND MILEAGE: HCPCS

## 2019-04-18 PROCEDURE — 71045 X-RAY EXAM CHEST 1 VIEW: CPT

## 2019-04-18 PROCEDURE — 83735 ASSAY OF MAGNESIUM: CPT

## 2019-04-18 PROCEDURE — 2580000003 HC RX 258: Performed by: FAMILY MEDICINE

## 2019-04-18 PROCEDURE — 86900 BLOOD TYPING SEROLOGIC ABO: CPT

## 2019-04-18 PROCEDURE — 95819 EEG AWAKE AND ASLEEP: CPT

## 2019-04-18 PROCEDURE — 2580000003 HC RX 258

## 2019-04-18 PROCEDURE — 84145 PROCALCITONIN (PCT): CPT

## 2019-04-18 PROCEDURE — 80048 BASIC METABOLIC PNL TOTAL CA: CPT

## 2019-04-18 PROCEDURE — 6360000002 HC RX W HCPCS: Performed by: FAMILY MEDICINE

## 2019-04-18 PROCEDURE — 76376 3D RENDER W/INTRP POSTPROCES: CPT

## 2019-04-18 RX ORDER — MORPHINE SULFATE 4 MG/ML
4 INJECTION, SOLUTION INTRAMUSCULAR; INTRAVENOUS EVERY 4 HOURS PRN
Status: DISCONTINUED | OUTPATIENT
Start: 2019-04-18 | End: 2019-04-20 | Stop reason: HOSPADM

## 2019-04-18 RX ORDER — M-VIT,TX,IRON,MINS/CALC/FOLIC 27MG-0.4MG
1 TABLET ORAL DAILY
Status: DISCONTINUED | OUTPATIENT
Start: 2019-04-18 | End: 2019-04-20 | Stop reason: HOSPADM

## 2019-04-18 RX ORDER — CEFAZOLIN SODIUM 2 G/50ML
2 SOLUTION INTRAVENOUS
Status: ACTIVE | OUTPATIENT
Start: 2019-04-18 | End: 2019-04-18

## 2019-04-18 RX ORDER — LORAZEPAM 2 MG/ML
0.5 INJECTION INTRAMUSCULAR EVERY 4 HOURS PRN
Status: DISCONTINUED | OUTPATIENT
Start: 2019-04-18 | End: 2019-04-20 | Stop reason: HOSPADM

## 2019-04-18 RX ORDER — LEVETIRACETAM 500 MG/1
500 TABLET ORAL 2 TIMES DAILY
Status: DISCONTINUED | OUTPATIENT
Start: 2019-04-18 | End: 2019-04-20 | Stop reason: HOSPADM

## 2019-04-18 RX ORDER — ACETAMINOPHEN 325 MG/1
650 TABLET ORAL EVERY 4 HOURS PRN
Status: DISCONTINUED | OUTPATIENT
Start: 2019-04-18 | End: 2019-04-20 | Stop reason: HOSPADM

## 2019-04-18 RX ORDER — ATORVASTATIN CALCIUM 40 MG/1
40 TABLET, FILM COATED ORAL DAILY
Status: DISCONTINUED | OUTPATIENT
Start: 2019-04-18 | End: 2019-04-20 | Stop reason: HOSPADM

## 2019-04-18 RX ORDER — MORPHINE SULFATE 2 MG/ML
2 INJECTION, SOLUTION INTRAMUSCULAR; INTRAVENOUS EVERY 4 HOURS PRN
Status: DISCONTINUED | OUTPATIENT
Start: 2019-04-18 | End: 2019-04-20 | Stop reason: HOSPADM

## 2019-04-18 RX ORDER — ONDANSETRON 2 MG/ML
4 INJECTION INTRAMUSCULAR; INTRAVENOUS EVERY 6 HOURS PRN
Status: DISCONTINUED | OUTPATIENT
Start: 2019-04-18 | End: 2019-04-20 | Stop reason: HOSPADM

## 2019-04-18 RX ORDER — LEVETIRACETAM 10 MG/ML
1000 INJECTION INTRAVASCULAR ONCE
Status: COMPLETED | OUTPATIENT
Start: 2019-04-18 | End: 2019-04-18

## 2019-04-18 RX ORDER — HYDROCODONE BITARTRATE AND ACETAMINOPHEN 5; 325 MG/1; MG/1
1 TABLET ORAL EVERY 4 HOURS PRN
Status: DISCONTINUED | OUTPATIENT
Start: 2019-04-18 | End: 2019-04-20 | Stop reason: HOSPADM

## 2019-04-18 RX ORDER — HYDROCODONE BITARTRATE AND ACETAMINOPHEN 5; 325 MG/1; MG/1
2 TABLET ORAL EVERY 4 HOURS PRN
Status: DISCONTINUED | OUTPATIENT
Start: 2019-04-18 | End: 2019-04-20 | Stop reason: HOSPADM

## 2019-04-18 RX ORDER — SODIUM CHLORIDE 0.9 % (FLUSH) 0.9 %
SYRINGE (ML) INJECTION
Status: COMPLETED
Start: 2019-04-18 | End: 2019-04-18

## 2019-04-18 RX ORDER — PANTOPRAZOLE SODIUM 40 MG/1
40 TABLET, DELAYED RELEASE ORAL DAILY
Status: DISCONTINUED | OUTPATIENT
Start: 2019-04-18 | End: 2019-04-20 | Stop reason: HOSPADM

## 2019-04-18 RX ADMIN — ONDANSETRON 4 MG: 2 INJECTION INTRAMUSCULAR; INTRAVENOUS at 02:27

## 2019-04-18 RX ADMIN — LEVETIRACETAM 500 MG: 500 TABLET ORAL at 21:44

## 2019-04-18 RX ADMIN — PANTOPRAZOLE SODIUM 40 MG: 40 TABLET, DELAYED RELEASE ORAL at 08:22

## 2019-04-18 RX ADMIN — LORAZEPAM 0.5 MG: 2 INJECTION INTRAMUSCULAR; INTRAVENOUS at 17:24

## 2019-04-18 RX ADMIN — ONDANSETRON 4 MG: 2 INJECTION INTRAMUSCULAR; INTRAVENOUS at 08:28

## 2019-04-18 RX ADMIN — AMPICILLIN SODIUM AND SULBACTAM SODIUM 3 G: 2; 1 INJECTION, POWDER, FOR SOLUTION INTRAMUSCULAR; INTRAVENOUS at 14:39

## 2019-04-18 RX ADMIN — AMPICILLIN SODIUM AND SULBACTAM SODIUM 3 G: 2; 1 INJECTION, POWDER, FOR SOLUTION INTRAMUSCULAR; INTRAVENOUS at 02:36

## 2019-04-18 RX ADMIN — ATORVASTATIN CALCIUM 40 MG: 40 TABLET, FILM COATED ORAL at 08:22

## 2019-04-18 RX ADMIN — AMPICILLIN SODIUM AND SULBACTAM SODIUM 3 G: 2; 1 INJECTION, POWDER, FOR SOLUTION INTRAMUSCULAR; INTRAVENOUS at 08:22

## 2019-04-18 RX ADMIN — LEVETIRACETAM 1000 MG: 10 INJECTION INTRAVENOUS at 17:38

## 2019-04-18 RX ADMIN — Medication 10 ML: at 21:41

## 2019-04-18 RX ADMIN — HYDROCODONE BITARTRATE AND ACETAMINOPHEN 2 TABLET: 5; 325 TABLET ORAL at 09:15

## 2019-04-18 RX ADMIN — AMPICILLIN SODIUM AND SULBACTAM SODIUM 3 G: 2; 1 INJECTION, POWDER, FOR SOLUTION INTRAMUSCULAR; INTRAVENOUS at 21:39

## 2019-04-18 RX ADMIN — MULTIPLE VITAMINS W/ MINERALS TAB 1 TABLET: TAB at 08:22

## 2019-04-18 ASSESSMENT — PAIN - FUNCTIONAL ASSESSMENT: PAIN_FUNCTIONAL_ASSESSMENT: PREVENTS OR INTERFERES WITH MANY ACTIVE NOT PASSIVE ACTIVITIES

## 2019-04-18 ASSESSMENT — PAIN DESCRIPTION - PROGRESSION: CLINICAL_PROGRESSION: NOT CHANGED

## 2019-04-18 ASSESSMENT — PAIN SCALES - GENERAL
PAINLEVEL_OUTOF10: 0
PAINLEVEL_OUTOF10: 0
PAINLEVEL_OUTOF10: 7
PAINLEVEL_OUTOF10: 0
PAINLEVEL_OUTOF10: 0
PAINLEVEL_OUTOF10: 2

## 2019-04-18 ASSESSMENT — PAIN DESCRIPTION - ORIENTATION: ORIENTATION: RIGHT

## 2019-04-18 ASSESSMENT — PAIN DESCRIPTION - PAIN TYPE: TYPE: ACUTE PAIN

## 2019-04-18 ASSESSMENT — PAIN DESCRIPTION - ONSET: ONSET: ON-GOING

## 2019-04-18 ASSESSMENT — PAIN DESCRIPTION - FREQUENCY: FREQUENCY: INTERMITTENT

## 2019-04-18 ASSESSMENT — PAIN DESCRIPTION - LOCATION: LOCATION: SHOULDER

## 2019-04-18 ASSESSMENT — PAIN DESCRIPTION - DESCRIPTORS: DESCRIPTORS: ACHING;CONSTANT;DISCOMFORT

## 2019-04-18 NOTE — CONSULTS
Department of Orthopedic Surgery  Resident Consult Note          CHIEF COMPLAINT:  Right shoulder pain    HISTORY OF PRESENT ILLNESS:                The patient is a 78 y.o. male who presents with right shoulder pain after the patient had a seizure. Family is at bedside and was present for the encounter. They state he did not fall but rather his arm became very spasmodic. He is right hand dominant. Denies any numbness or paresthesias. Denies other orthopedic complaints at this time. Past Medical History:        Diagnosis Date    Arthritis     GERD (gastroesophageal reflux disease) 1/4/2015    Hyperlipidemia     Hypoglycemia     Mixed hyperlipidemia 1/4/2015    Nausea & vomiting 1/4/2015    Pneumonia 1/4/2015     Past Surgical History:        Procedure Laterality Date    COLON SURGERY      COLONOSCOPY      INCISIONAL HERNIA REPAIR N/A 10/13/2016    INGUINAL HERNIA REPAIR Left 2/19/2013    laparoscopic left inguinal hernia repair    PANCREAS SURGERY      Whipple procedure    [place] LAP,SURG,COLECT,TOT,W/PROCTECT,W/ILEOST N/A 8/20/2018    DIAGNOSTIC LAPAROSCOPY POSS LAPAROTOMY POSS BOWEL RESECTION performed by Melvin Rousseau MD at [address] 9/6/2018    LAPAROSCOPIC REVISION OF GASTROJEJUNOSTOMY; INTRAOPERATIVE EGD performed by Melvin Rousseau MD at [address] [place] [address]      right cyst removed [address] [facility]      UPPER GASTROINTESTINAL ENDOSCOPY      UPPER GASTROINTESTINAL ENDOSCOPY N/A 9/5/2018    EGD BIOPSY performed by Melvin Rousseau MD at [facility] ENDOSCOPY     Current Medications:   Current Facility-Administered Medications: ketamine (KETALAR) injection 69.4 mg, 1 mg/kg, Intravenous, Once  Allergies:  Patient has no known allergies. Social History:   TOBACCO:   reports that he quit smoking about 8 years ago. He has never used smokeless tobacco.  ETOH:   reports that he drinks about 6.0 oz of alcohol per week.   DRUGS:   reports that he does 9.0 04/17/2019     PT/INR:    Lab Results   Component Value Date    PROTIME 12.8 08/17/2018    INR 1.1 08/17/2018     Radiology Review:      XR right shoulder/ humerus: comminuted 3 part fracture of the proximal humerus with anterior dislocation of the articular part. IMPRESSION:  · Right proximal humerus fracture dislocation    PLAN:  · After informed consent was obtained. Conscious sedation was performed with a failed attempted at reduction of the articular part into the glenoid. · NWB RUE  · Sling  · Pain control per ED  · Given patient is to be admitted secondary to his new onset seizure and After discussion with attending orthopedic surgeon and the possible intraoperative complications that may arise with the procedures needed to correct this orthopedic injury it was felt that transfer to a facility with broader vascular surgery coverage would better serve the patient. · Transfer   · Discussed with attending.

## 2019-04-18 NOTE — H&P
Hospital Medicine History & Physical      PCP: Ning Hall DO    Date of Admission: 4/18/2019    Date of Service: Pt seen/examined on 04/18/19 and Admitted to Inpatient with expected LOS greater than two midnights due to medical therapy. Chief Complaint:  Possible new-onset seizure    History Of Present Illness:  Records reviewed. This is a  78 y.o. male who presented to [address] [place] [address] with possible new-onset seizure. Patient was brought to the emergency room at [facility] emergency after concern for new onset seizure while he was driving a car. He episode of confusion and decreased responsiveness. ,  Family noted that he had several minute episode of shaking and bit his tongue after he arrived at his destination. ED course: 165/75, heart rate 69, afebrile 95% on room air. Lab work done in the emergency room at [address] [place] [address],[address] shows sodium of 125. He was noted to have weakness in anterior movement of his arm with pain. CT of the head showed old infarcts. X-ray of the right humerus showed displaced proximal humerus fracture.   Was transferred to [address] [place] [address] for neuro evaluation and orthopedic evaluation      Past Medical History:          Diagnosis Date    Arthritis     GERD (gastroesophageal reflux disease) 1/4/2015    Hyperlipidemia     Hypoglycemia     Mixed hyperlipidemia 1/4/2015    Nausea & vomiting 1/4/2015    New onset seizure ([address] 4/17/2019    Pneumonia 1/4/2015       Past Surgical History:          Procedure Laterality Date    COLON SURGERY      COLONOSCOPY      INCISIONAL HERNIA REPAIR N/A 10/13/2016    INGUINAL HERNIA REPAIR Left 2/19/2013    laparoscopic left inguinal hernia repair    PANCREAS SURGERY      Whipple procedure    [place] LAP,SURG,COLECT,TOT,W/PROCTECT,W/ILEOST N/A 8/20/2018    DIAGNOSTIC LAPAROSCOPY POSS LAPAROTOMY POSS BOWEL RESECTION performed by Tillie Habermann, MD at [address] N/A 9/6/2018 pneumonia and prostate cancer.  Diabetes Brother     Mental Illness Brother     Stroke Brother     Cancer Brother        REVIEW OF SYSTEMS:   Pertinent positives as noted in the HPI. All other systems reviewed and negative. PHYSICAL EXAM:    /62   Pulse 86   Temp 98 °F (36.7 °C) (Temporal)   Resp 18   Ht 5' 7\" (1.702 m)   Wt 153 lb 3.2 oz (69.5 kg)   SpO2 94%   BMI 23.99 kg/m²     General appearance:  No apparent distress, appears stated age and cooperative. HEENT:  Normal cephalic, atraumatic without obvious deformity. Pupils equal, round, and reactive to light. Extra ocular muscles intact. Conjunctivae/corneas clear. Neck: Supple, with full range of motion. No jugular venous distention. Trachea midline. Respiratory:  Normal respiratory effort. Clear to auscultation, bilaterally without Rales/Wheezes/Rhonchi. Cardiovascular:  Regular rate and rhythm with normal S1/S2 without murmurs, rubs or gallops. Abdomen: Soft, non-tender, non-distended with normal bowel sounds. Musculoskeletal:  No clubbing, cyanosis or edema bilaterally. Full range of motion without deformity. Skin: Skin color, texture, turgor normal.  No rashes or lesions. Neurologic:  Neurovascularly intact without any focal sensory/motor deficits. Psychiatric:  Alert and oriented, thought content appropriate, normal insight  Capillary Refill: Brisk,< 3 seconds   Peripheral Pulses: +2 palpable, equal bilaterally     Ct Shoulder Right Wo Contrast    Result Date: 2019  Patient MRN:  [id number] : 1950 Age: 78 years Gender: Male Order Date:  2019 10:00 AM EXAM: CT SHOULDER RIGHT WO CONTRAST NUMBER OF IMAGES:  [id number]  INDICATION:  FRACTURE, SHOULDER  COMPARISON: Plain radiographs of the area dated 2018. FINDINGS: Multiple recent fractures of the right shoulder are demonstrated. No underlying osteolytic or osteoblastic lesion is seen in any of these regions.  There is a recent minimally comminuted oblique fracture of the mid acromion. Minimal superior displacement of the distal component is seen. Type II acromion is noted. Recent nondisplaced nondistracted fracture of the posterior glenoid involving its articular surface is noted. There is a recent comminuted fracture involving the femoral head and neck. This extends slightly more inferiorly along its medial area of involvement. Humeral head is displaced medially and anteriorly and is dislocated and impacted upon the anterior glenoid, although most of the head is in a subglenoid location, impacted against the bone including the general area of coracoid fracture. The acromioclavicular joint remains well aligned. The clavicle is intact. Visualized portions of the right lung appear clear. No evidence of rib fracture in the region. Multiple recent fractures with anterior subcoracoid dislocation and rotation of the humeral head. Xr Chest 1 Vw    Result Date: 2019  Patient MRN: [id number] : 1950 Age:  78 years Gender: Male Order Date: 2019 1:15 AM Exam: XR CHEST 1 VIEW Number of Images: 1 view Indication:   pre-operative planning and medical clearance; preop repair of right humeral fracture. pre-operative planning and medical clearance Comparison: 2018 FINDINGS: Lungs emphysematous. Heart and pulmonary vascularity normal. Costophrenic angles sharp. Right humeral fracture noted. Emphysema. Right humeral fracture noted.        Labs:     Recent Labs     19   WBC 9.1 8.0   HGB 10.3* 9.8*   HCT 31.4* 29.3*    172     Recent Labs     19  0625   *  --  132   K 5.7* 3.6 4.0   CL 90*  --  95*   CO2 26  --  24   BUN 7*  --  6*   CREATININE 0.6*  --  0.7   CALCIUM 8.6  --  8.9     Recent Labs     19   AST 52*   ALT 19   BILITOT 0.3   ALKPHOS 37*     Recent Labs     19   INR 1.0     Recent Labs     19  8570 CKTOTAL  --  [facility] <0.01 <0.01 <0.01       Urinalysis:      Lab Results   Component Value Date    NITRU Negative 04/18/2019    WBCUA NONE 04/18/2019    BACTERIA NONE 04/18/2019    RBCUA NONE 04/18/2019    BLOODU Negative 04/18/2019    SPECGRAV 1.010 04/18/2019    GLUCOSEU Negative 04/18/2019         ASSESSMENT:    Active Hospital Problems    Diagnosis Date Noted    Humerus fracture [S42.309A] 04/18/2019     Priority: High    New onset seizure ([address] [R56.9] 04/17/2019     Priority: High    GERD (gastroesophageal reflux disease) [K21.9] 01/04/2015     Priority: Medium    Mixed hyperlipidemia [E78.2] 01/04/2015     Priority: Medium     New-onset seizure    Right proximal humerus fracture dislocation    History of old lacunar strokes noted on CT    Risk surgery:       PLAN:    Neuro  Evaluation  BG strokes, old - per CT  MRI brain  EEG  RCRI Class II risk for right proximal humerus fracture surgery. Tele showing SR, occasional PVC    DVT Prophylaxis: SCDs   Diet: DIET GENERAL;  Code Status: Full Code    PT/OT Eval Status: post op needs     Dispo - tele        Jennifer Escobar, APRN - CNP    Thank you Lola Maldonado DO for the opportunity to be involved in this patient's care.  If you have any questions or concerns please feel free to contact me at [phone]

## 2019-04-18 NOTE — CONSULTS
Department of Orthopedic Surgery  Resident Consult Note          Reason for Consult:  Right shoulder pain      HISTORY OF PRESENT ILLNESS:                 The patient is a 78 y.o. male who presents with right shoulder pain after the patient had a seizure. Family is at bedside and was present for the encounter. They state he did not fall but rather his arm became very spasmodic. He is right hand dominant. Denies any numbness or paresthesias. Denies other orthopedic complaints at this time. Patient is transfer from [facility] for definitve treatment. Shoulder reduction was attempted at [facility] by orthopedics        Past Medical History:        Diagnosis Date    Arthritis     GERD (gastroesophageal reflux disease) 1/4/2015    Hyperlipidemia     Hypoglycemia     Mixed hyperlipidemia 1/4/2015    Nausea & vomiting 1/4/2015    New onset seizure ([address] 4/17/2019    Pneumonia 1/4/2015     Past Surgical History:        Procedure Laterality Date    COLON SURGERY      COLONOSCOPY      INCISIONAL HERNIA REPAIR N/A 10/13/2016    INGUINAL HERNIA REPAIR Left 2/19/2013    laparoscopic left inguinal hernia repair    PANCREAS SURGERY      Whipple procedure    TN LAP,SURG,COLECT,TOT,W/PROCTECT,W/ILEOST N/A 8/20/2018    DIAGNOSTIC LAPAROSCOPY POSS LAPAROTOMY POSS BOWEL RESECTION performed by Corinne Lyon, MD at [address] 9/6/2018    LAPAROSCOPIC REVISION OF GASTROJEJUNOSTOMY; INTRAOPERATIVE EGD performed by Corinne Lyon, MD at [facility]      right cyst removed [address] GASTROINTESTINAL ENDOSCOPY      UPPER GASTROINTESTINAL ENDOSCOPY N/A 9/5/2018    EGD BIOPSY performed by Corinne Lyon, MD at [facility] ENDOSCOPY     Current Medications:   No current facility-administered medications for this encounter. Allergies:  Patient has no known allergies. Social History:   TOBACCO:   reports that he quit smoking about 8 years ago.  He has never used smokeless tobacco.  ETOH:   reports that he drinks about 6.0 oz of alcohol per week. DRUGS:   reports that he does not use drugs. ACTIVITIES OF DAILY LIVING:    OCCUPATION:    Family History:       Problem Relation Age of Onset    High Cholesterol Mother     Stroke Mother     Other Father          in , ? pneumonia and prostate cancer.      Diabetes Brother     Mental Illness Brother     Stroke Brother     Cancer Brother        REVIEW OF SYSTEMS:  CONSTITUTIONAL:  negative for  fevers, chills  EYES:  negative for blurred vision, visual disturbance  HEENT:  negative for  hearing loss, voice change  RESPIRATORY:  negative for  dyspnea, wheezing  CARDIOVASCULAR:  negative for  chest pain, palpitations  GASTROINTESTINAL:  negative for nausea, vomiting  GENITOURINARY:  negative for frequency, urinary incontinence  HEMATOLOGIC/LYMPHATIC:  negative for bleeding and petechiae  MUSCULOSKELETAL:  positive for  Right shoulder pain  NEUROLOGICAL:  negative for headaches, dizziness  BEHAVIOR/PSYCH:  negative for increased agitation and anxiety    PHYSICAL EXAM:    VITALS:  BP (!) 149/70   Pulse 68   Temp 97.4 °F (36.3 °C) (Oral)   Resp 16   Ht 5' 7\" (1.702 m)   Wt 153 lb (69.4 kg)   SpO2 99%   BMI 23.96 kg/m²   CONSTITUTIONAL:  awake, alert, cooperative, no apparent distress, and appears stated age  MUSCULOSKELETAL:  RUE  · Skin intact  · Compartments soft and compressible  · +2 radial pulses  · SILT median/ulnar/axillary/radial nerves  · +AIN/PIN/ulnar nerve function      Secondary Exam:   LUE: skin intact, -TTP, Radial pulses +2, cap refill <2 seconds, +sensation to radial/ulnar/median nerves sensation, +motor to AIN/PIN/ulnar nerves, compartments soft and compressible     RLE: skin intack, -TTP, DP/PT pulses +2, cap refill < 2 seconds, + sensation to sp/dp/pt/s/s nerves intact, demonstrates active plantar and dorsiflexion of ankle, compartments soft and compressible     LLE:skin intack, -TTP, DP/PT pulses +2, cap refill < 2 seconds, + sensation to sp/dp/pt/s/s nerves intact, demonstrates active plantar and dorsiflexion of ankle, compartments soft and compressible        DATA:    CBC:   Lab Results   Component Value Date    WBC 9.1 04/17/2019    RBC 3.74 04/17/2019    HGB 10.3 04/17/2019    HCT 31.4 04/17/2019    MCV 84.0 04/17/2019    MCH 27.5 04/17/2019    MCHC 32.8 04/17/2019    RDW 15.4 04/17/2019     04/17/2019    MPV 9.0 04/17/2019     PT/INR:    Lab Results   Component Value Date    PROTIME 12.8 08/17/2018    INR 1.1 08/17/2018     CRP/ESR:   Lab Results   Component Value Date    SEDRATE 0 06/07/2018     Lactic Acid :   Lab Results   Component Value Date    LACTA 1.7 04/17/2019       Radiology Review:  XR right shoulder/ humerus: comminuted 3 part fracture of the proximal humerus with anterior dislocation of the articular part. IMPRESSION:   · Right proximal humerus fracture dislocation     PLAN:  · NWB RUE  · Sling RUE  · Pain control  · NPO now for possible OR 4/18/19  · Pre op labs and imaging  · Discussed with attending    Orthopedic Attending    Met with patient and his wife. Discussed the complexity and the severity of the fracture dislocation of the right shoulder. Discussed plans to consult Dr. Jessica Carolina for definitive management. Further recommendations will be based after his evaluation. Currently, he is to maintain his sling and strict nonweightbearing to the right upper extremity. He is to continue with medical management is appropriate. Electronically Signed By  Dayan IGLESIAS

## 2019-04-18 NOTE — H&P
[facility] Hospital[facility] Attending Attestation     Neuro  Evaluation  BG strokes, old - per CT  MRI brain  EEG  RCRI Class II risk for right proximal humerus fracture surgery. Tele showing SR, occasional PVC      Attending Physician Statement  I have reviewed the chart, including any radiology or labs, and have seen the patient with the PA/CNP/APRN (s). I personally reviewed and performed key elements of the history and exam.  I agree with the assessment, plan and orders as documented by the PA/CNP/APRN (s). Please refer to the physician extender note for additional information.        Electronically signed by Suman Nix MD on 4/18/2019 at 3:48 PM

## 2019-04-18 NOTE — CARE COORDINATION
Met with pt and wife at bedside to discuss discharge planning / transition of care. Pt relayed he lives with wife, independent of all ADL, denies DME or needs, active , plan is to return home with no needs. Spouse Keyon to provide transportation at discharge via phone [phone]. Discussed possible need for therapy [facility] he would like [facility], declined list.  Pt and wife given SNF list and will make three choices by tomorrow should pt require SNF level of care at discharge.   No therapy ordered currently, level of care will be determined after evals, will continue to follow and assist.

## 2019-04-18 NOTE — PROCEDURES
EEG Report  Ori Santiago is a 78 y.o. male      Appointment Date 4/18/2019   Appointment Time 9:30 am   [place] [facility] [id number]   Type of Study Routine Floor 7401-A     Technical Specifications  Technician Kizzy Salas/Arabella Diaz 92 of consciousness Awake/Sleep   Sleep deprived? No   Hyperventilation tested? No   Photic stim tested? Yes   EEG recording Standard 10-20 electrode placement    Duration of recording 31 mins   EEG complete?  Yes         Clinical History   sz    Medications    Current Facility-Administered Medications:     acetaminophen (TYLENOL) tablet 650 mg, 650 mg, Oral, Q4H PRN, Kelly Schneider DO    HYDROcodone-acetaminophen (NORCO) 5-325 MG per tablet 1 tablet, 1 tablet, Oral, Q4H PRN **OR** HYDROcodone-acetaminophen (NORCO) 5-325 MG per tablet 2 tablet, 2 tablet, Oral, Q4H PRN, Kelly Schneider DO, 2 tablet at 04/18/19 0915    morphine (PF) injection 2 mg, 2 mg, Intravenous, Q4H PRN **OR** morphine sulfate (PF) injection 4 mg, 4 mg, Intravenous, Q4H PRN, Kelly Schneider DO    ceFAZolin (ANCEF) 2 g in dextrose 3 % 50 mL IVPB (duplex), 2 g, Intravenous, On Call to OR, Kelly Schneider DO, Stopped at 04/18/19 0835    ondansetron (ZOFRAN) injection 4 mg, 4 mg, Intravenous, Q6H PRN, Mandi Fuentes MD, 4 mg at 04/18/19 0828    atorvastatin (LIPITOR) tablet 40 mg, 40 mg, Oral, Daily, Mandi Fuentes MD, 40 mg at 04/18/19 6000    pantoprazole (PROTONIX) tablet 40 mg, 40 mg, Oral, Daily, Mandi Fuentes MD, 40 mg at 04/18/19 7401    therapeutic multivitamin-minerals 1 tablet, 1 tablet, Oral, Daily, Mandi Fuentes MD, 1 tablet at 04/18/19 3175    ampicillin-sulbactam (UNASYN) 3 g ivpb minibag, 3 g, Intravenous, Q6H, Mandi Fuentes MD, Stopped at 04/18/19 0917    LORazepam (ATIVAN) injection 0.5 mg, 0.5 mg, Intravenous, Q4H PRN, Mandi Fuentes MD        Physician Interpretation    General EEG Report        Epileptiform Discharge None  Non-Epileptiform Abnormality  PDR is ~ 8 hz      Ictal  None    General Impression  This awake and drowsy EEG is normal. No epileptiform activity or lateralizing signs are seen.

## 2019-04-18 NOTE — CONSULTS
Admission medications    Medication Sig Start Date End Date Taking? Authorizing Provider   temazepam (RESTORIL) 15 MG capsule Take 1 capsule by mouth nightly as needed for Sleep for up to 30 days. 3/25/19 4/24/19 Yes Jaime Mishra, DO   atorvastatin (LIPITOR) 40 MG tablet TAKE 1 TABLET BY MOUTH EVERY DAY 19  Yes Jaime Mishra DO   pantoprazole (PROTONIX) 40 MG tablet TAKE 1 TABLET BY MOUTH EVERY DAY 19  Yes Jaime Mishra DO   omega-3 acid ethyl esters (LOVAZA) 1 g capsule TAKE 1 CAPSULE BY MOUTH FOUR TIMES DAILY 19  Yes Jaime Mishra DO   celecoxib (CELEBREX) 200 MG capsule TAKE 1 CAPSULE BY MOUTH EVERY DAY 19  Yes Marianna Mishra, DO   ezetimibe (ZETIA) 10 MG tablet Take 1 tablet by mouth daily 10/3/18  Yes Jaime Mishra DO   Flaxseed, Linseed, (FLAX SEEDS PO) Take 1 capsule by mouth daily   Yes Historical Provider, MD Obinna Gomez EXTR Take 1 capsule by mouth daily   Yes Historical Provider, MD   therapeutic multivitamin-minerals (THERAGRAN-M) tablet Take 1 tablet by mouth daily. Yes Historical Provider, MD       Allergies:  Patient has no known allergies. Family History   Problem Relation Age of Onset    High Cholesterol Mother     Stroke Mother     Other Father          in , ? pneumonia and prostate cancer.      Diabetes Brother     Mental Illness Brother     Stroke Brother     Cancer Brother        Social History     Tobacco Use    Smoking status: Former Smoker     Last attempt to quit: 2011     Years since quittin.1    Smokeless tobacco: Never Used   Substance Use Topics    Alcohol use: Not Currently     Alcohol/week: 6.0 oz     Types: 10 Cans of beer per week     Comment: moderate 3-4 per day    Drug use: No          ROS:  14 points review of sytem were checked and were neg except as above      EXAMINATION:  BP (!) 104/50   Pulse 97   Temp 98.6 °F (37 °C) (Temporal)   Resp 18   Ht 5' 7\" (1.702 m)   Wt 153 lb 3.2 oz (69.5 kg)   SpO2 95% BMI 23.99 kg/m²     AAOx3, follows commands, no aphasia/dysarthria. PERRL, EOMI, VFF, normal saccades and pursuit, no gaze preference/nystagmus. Intact facial sensation, no asymmetry. Intact hearing bilaterally. Tongue midline. Symmetric palate elevation. Neck muscles and shoulder shrug 5/5. Sensation: intact all over to LT and proprioception, no neglect. Motor: 5/5 all extremities except right shoulder which has dislocation due to sz. Normal bulk and tone, No drift/orbiting. DTRs: +2 biceps/triceps/BR/Knees, +1 ankles, plantars down B/L. Coordination: intact F-N and H-S B/L. No dysmetria. Intact NA.        ASSESSMENT:  1st time sz. One episode of dialepsis. 1 hour later a GTC. No aura. EEG was normal.     PLAN:   Pending brain MRI without and with gadolinium  Due to significant injury shoulder dislocation, I would like to treat this 1st sz with Keppra loading and then 500 mg BID. It needs to be continued at least for 2 years. The need for its continuation can be addressed afterwards. The benefit and side effects of plan and tx were extensively discussed with him and wife. Agreed. Please print the below paragraphs and give to patient on discharge: It was already discussed with the patient/family. It is important to continue to try and achieve seizure control because of the potential for injury and illness due to seizures. In a very small minority of patients with generalized tonic clonic seizures (\"grand mal\"), breathing or heart function can stop during a seizure and result in demise (sudden unexpected death in epilepsy or SUDEP). [place] from seizures prevents this kind of outcome.     Please follow seizure precautions:  Please do not engage in any high risk activities such as, but not limited to, driving, bathing in tubs, swimming alone, climbing ladders, working at heights, near open flames or machinery with moving parts etc.  For patients with epilepsy it is recommended to stay seizure free for 6 months on medication before resume driving.     These will be re-assessed at your next appointment.        =====================================  =====================================          Electronically signed by Rosa M Zapata MD on 4/18/2019 at 10:20 AM

## 2019-04-18 NOTE — PROGRESS NOTES
Dr. Ankur Liz on for vascular surgery. Perfect serve message sent re: new consult order from Dr. Ana Rodrigues per ortho's request. Pt to have a possible Right shoulder open reduction internal fixation vs hemiarthroplasty vs reverse total shoulder, today 4/18/19, with Dr. Ulices Graff.

## 2019-04-18 NOTE — PROGRESS NOTES
Perfect serve message sent to Dr. José Antonio Ayala regarding episodes of confusion patient has experienced twice today. During these episodes, patient becomes nauseated, vomits, and then becomes confused. Patient is then delayed with responses, and does not seem with it. About after a minute of this episode, patient is alert again and answers all questions appropriately. Dr. José Antonio Ayala made aware of these episodes. Will continue to monitor. Dr. Anatoly Liang notified of patient's episodes.

## 2019-04-19 ENCOUNTER — APPOINTMENT (OUTPATIENT)
Dept: MRI IMAGING | Age: 69
DRG: 563 | End: 2019-04-19
Attending: FAMILY MEDICINE
Payer: MEDICARE

## 2019-04-19 LAB
ANION GAP SERPL CALCULATED.3IONS-SCNC: 12 MMOL/L (ref 7–16)
BUN BLDV-MCNC: 12 MG/DL (ref 8–23)
CALCIUM SERPL-MCNC: 8.9 MG/DL (ref 8.6–10.2)
CHLORIDE BLD-SCNC: 96 MMOL/L (ref 98–107)
CO2: 25 MMOL/L (ref 22–29)
CREAT SERPL-MCNC: 0.9 MG/DL (ref 0.7–1.2)
GFR AFRICAN AMERICAN: >60
GFR NON-AFRICAN AMERICAN: >60 ML/MIN/1.73
GLUCOSE BLD-MCNC: 132 MG/DL (ref 74–99)
POTASSIUM SERPL-SCNC: 4.1 MMOL/L (ref 3.5–5)
SODIUM BLD-SCNC: 133 MMOL/L (ref 132–146)

## 2019-04-19 PROCEDURE — A9579 GAD-BASE MR CONTRAST NOS,1ML: HCPCS | Performed by: FAMILY MEDICINE

## 2019-04-19 PROCEDURE — 99233 SBSQ HOSP IP/OBS HIGH 50: CPT | Performed by: CLINICAL NURSE SPECIALIST

## 2019-04-19 PROCEDURE — 2580000003 HC RX 258

## 2019-04-19 PROCEDURE — 70553 MRI BRAIN STEM W/O & W/DYE: CPT

## 2019-04-19 PROCEDURE — 80048 BASIC METABOLIC PNL TOTAL CA: CPT

## 2019-04-19 PROCEDURE — 6370000000 HC RX 637 (ALT 250 FOR IP): Performed by: STUDENT IN AN ORGANIZED HEALTH CARE EDUCATION/TRAINING PROGRAM

## 2019-04-19 PROCEDURE — 2580000003 HC RX 258: Performed by: FAMILY MEDICINE

## 2019-04-19 PROCEDURE — 2060000000 HC ICU INTERMEDIATE R&B

## 2019-04-19 PROCEDURE — 36415 COLL VENOUS BLD VENIPUNCTURE: CPT

## 2019-04-19 PROCEDURE — 6360000004 HC RX CONTRAST MEDICATION: Performed by: FAMILY MEDICINE

## 2019-04-19 PROCEDURE — 6370000000 HC RX 637 (ALT 250 FOR IP): Performed by: FAMILY MEDICINE

## 2019-04-19 PROCEDURE — 6370000000 HC RX 637 (ALT 250 FOR IP): Performed by: PSYCHIATRY & NEUROLOGY

## 2019-04-19 PROCEDURE — 6360000002 HC RX W HCPCS: Performed by: FAMILY MEDICINE

## 2019-04-19 RX ORDER — SODIUM CHLORIDE 0.9 % (FLUSH) 0.9 %
SYRINGE (ML) INJECTION
Status: COMPLETED
Start: 2019-04-19 | End: 2019-04-19

## 2019-04-19 RX ADMIN — LEVETIRACETAM 500 MG: 500 TABLET ORAL at 09:21

## 2019-04-19 RX ADMIN — ATORVASTATIN CALCIUM 40 MG: 40 TABLET, FILM COATED ORAL at 09:21

## 2019-04-19 RX ADMIN — HYDROCODONE BITARTRATE AND ACETAMINOPHEN 2 TABLET: 5; 325 TABLET ORAL at 20:25

## 2019-04-19 RX ADMIN — HYDROCODONE BITARTRATE AND ACETAMINOPHEN 1 TABLET: 5; 325 TABLET ORAL at 14:42

## 2019-04-19 RX ADMIN — AMPICILLIN SODIUM AND SULBACTAM SODIUM 3 G: 2; 1 INJECTION, POWDER, FOR SOLUTION INTRAMUSCULAR; INTRAVENOUS at 03:03

## 2019-04-19 RX ADMIN — GADOTERIDOL 14 ML: 279.3 INJECTION, SOLUTION INTRAVENOUS at 18:56

## 2019-04-19 RX ADMIN — MULTIPLE VITAMINS W/ MINERALS TAB 1 TABLET: TAB at 09:21

## 2019-04-19 RX ADMIN — LORAZEPAM 0.5 MG: 2 INJECTION INTRAMUSCULAR; INTRAVENOUS at 18:40

## 2019-04-19 RX ADMIN — AMPICILLIN SODIUM AND SULBACTAM SODIUM 3 G: 2; 1 INJECTION, POWDER, FOR SOLUTION INTRAMUSCULAR; INTRAVENOUS at 09:21

## 2019-04-19 RX ADMIN — LEVETIRACETAM 500 MG: 500 TABLET ORAL at 20:25

## 2019-04-19 RX ADMIN — Medication 10 ML: at 03:04

## 2019-04-19 RX ADMIN — PANTOPRAZOLE SODIUM 40 MG: 40 TABLET, DELAYED RELEASE ORAL at 09:21

## 2019-04-19 RX ADMIN — AMPICILLIN SODIUM AND SULBACTAM SODIUM 3 G: 2; 1 INJECTION, POWDER, FOR SOLUTION INTRAMUSCULAR; INTRAVENOUS at 20:25

## 2019-04-19 RX ADMIN — AMPICILLIN SODIUM AND SULBACTAM SODIUM 3 G: 2; 1 INJECTION, POWDER, FOR SOLUTION INTRAMUSCULAR; INTRAVENOUS at 14:42

## 2019-04-19 ASSESSMENT — PAIN SCALES - GENERAL
PAINLEVEL_OUTOF10: 6
PAINLEVEL_OUTOF10: 5
PAINLEVEL_OUTOF10: 0
PAINLEVEL_OUTOF10: 7
PAINLEVEL_OUTOF10: 0

## 2019-04-19 ASSESSMENT — PAIN DESCRIPTION - PAIN TYPE: TYPE: ACUTE PAIN

## 2019-04-19 ASSESSMENT — PAIN DESCRIPTION - LOCATION: LOCATION: SHOULDER

## 2019-04-19 ASSESSMENT — PAIN DESCRIPTION - DESCRIPTORS: DESCRIPTORS: ACHING;CONSTANT;DISCOMFORT

## 2019-04-19 ASSESSMENT — PAIN DESCRIPTION - PROGRESSION: CLINICAL_PROGRESSION: GRADUALLY IMPROVING

## 2019-04-19 ASSESSMENT — PAIN DESCRIPTION - ORIENTATION: ORIENTATION: RIGHT

## 2019-04-19 ASSESSMENT — PAIN DESCRIPTION - FREQUENCY: FREQUENCY: INTERMITTENT

## 2019-04-19 ASSESSMENT — PAIN DESCRIPTION - ONSET: ONSET: ON-GOING

## 2019-04-19 NOTE — PROGRESS NOTES
Mike Starks is a 78 y.o. right handed male     Patient was admitted for a suspected GTC seizure    Patient denies any previous similar s/s or previous seizure    His wife notes that they were driving and she noted he was staring straight ahead not talking and going [place];f the speed limit. She attempted to speak to him but he continued to stare, be nonverbal and slow down even further. They arrived at their destination; he exited the car and performed some chores for his MIL. As they sat around the table, he stiffened and had a GTC seizure. In ED, his wife noted a few more of those staring spells where he is not responding but none lasted as long as his original spell. 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 Prior to Visit Medications    Medication Sig Taking? Authorizing Provider   temazepam (RESTORIL) 15 MG capsule Take 1 capsule by mouth nightly as needed for Sleep for up to 30 days.  Yes Jaime Mishra DO   atorvastatin (LIPITOR) 40 MG tablet TAKE 1 TABLET BY MOUTH EVERY DAY Yes Jaime Mishra DO   pantoprazole (PROTONIX) 40 MG tablet TAKE 1 TABLET BY MOUTH EVERY DAY Yes Jaime Mishra DO   omega-3 acid ethyl esters (LOVAZA) 1 g capsule TAKE 1 CAPSULE BY MOUTH FOUR TIMES DAILY Yes Jaime Mishra DO   celecoxib (CELEBREX) 200 MG capsule TAKE 1 CAPSULE BY MOUTH EVERY DAY Yes Jaime Mishra DO   ezetimibe (ZETIA) 10 MG tablet Take 1 tablet by mouth daily Yes Jaime Mishra DO   Flaxseed, Linseed, (FLAX SEEDS PO) Take 1 capsule by mouth daily Yes Historical Provider, MD Obinna MELGOZA Take 1 capsule by mouth daily Yes Historical Provider, MD   therapeutic multivitamin-minerals (THERAGRAN-M) tablet Take 1 tablet by mouth daily.  Yes Historical Provider, MD       Allergies as of 04/17/2019    (No Known Allergies)       Objective:     BP (!) 93/50   Pulse 96   Temp 97.8 °F (36.6 °C) (Temporal)   Resp 18   Ht 5' 7\" (1.702 m)   Wt 153 lb 3.2 oz (69.5 kg)   SpO2 95%   BMI 23.99 kg/m²      General appearance: alert, appears stated age and cooperative  Head: Normocephalic, without obvious abnormality, atraumatic  Neck: no adenopathy, no carotid bruit and limited ROM  Lungs: clear to auscultation bilaterally  Heart: regular rate and rhythm  Extremities: right shoulder in sling   Pulses: 2+ and symmetric  Skin: ecchymosis noted right armpit     Mental Status: Alert, oriented, thought content appropriate    Speech: clear  Language: appropriate    Cranial Nerves:  I: smell    II: visual acuity     II: visual fields Full   II: pupils IMO   III,VII: ptosis None   III,IV,VI: extraocular muscles  EOMI without nystagmus    V: mastication Normal   V: facial light touch sensation  Normal   V,VII: corneal reflex  Present   VII: facial muscle function - upper     VII: facial muscle function - lower Normal   VIII: hearing Normal   IX: soft palate elevation  Normal   IX,X: gag reflex Present   XI: trapezius strength  5/5   XI: sternocleidomastoid strength 5/5   XI: neck extension strength  5/5   XII: tongue strength  Normal     Motor:  5/5 throughout - limited exam right arm   Normal bulk and tone    Sensory:  Normal to LT     Coordination:   Decreased right relative to shoulder sling     DTR:   No reflexes    No Babinski    No Blas's     Laboratory/Radiology:     CBC with Differential:    Lab Results   Component Value Date    WBC 8.0 04/18/2019    RBC 3.56 04/18/2019    HGB 9.8 04/18/2019    HCT 29.3 04/18/2019     04/18/2019    MCV 82.3 04/18/2019    MCH 27.5 04/18/2019    MCHC 33.4 04/18/2019    RDW 15.1 2019    LYMPHOPCT 9.4 2019    MONOPCT 5.9 2019    BASOPCT 0.1 2019    MONOSABS 0.47 2019    LYMPHSABS 0.75 2019    EOSABS 0.01 2019    BASOSABS 0.01 2019     CMP:    Lab Results   Component Value Date     2019    K 4.1 2019    K 4.3 2018    CL 96 2019    CO2 25 2019    BUN 12 2019    CREATININE 0.9 2019    GFRAA >60 2019    LABGLOM >60 2019    GLUCOSE 132 2019    PROT 6.6 2019    LABALBU 4.1 2019    CALCIUM 8.9 2019    BILITOT 0.3 2019    ALKPHOS 37 2019    AST 52 2019    ALT 19 2019       CT Head:   Remote  appreciated    I personally reviewed the patient's lab and imaging studies at this time.     Assessment:     Patient suffered a number of complex partial seizures and one GTC seizure   Etiology remains unclear but MRI is pending    Now maintained on Keppra 500mg BID and doing well    Right humoral head fracture -- sling and followed by ortho     Whipple procedure in  for an intraductal papillary mucinous neoplasm       Plan:     MRI Brain with and without to be obtained    Continue Keppra     Discussed at length with patient and wife     Debra Gu  9:50 AM  2019

## 2019-04-19 NOTE — PROGRESS NOTES
Will try bringing patient down to MRI tomorrow for exam, due to timing of schedule this evening, it did not allow for the time of this  study of the patient to get done.

## 2019-04-19 NOTE — PROGRESS NOTES
Hospitalist Progress Note      Synopsis: Patient admitted on 4/18/2019    Subjective    No complaints as of this morning  Outpatient follow-up for shoulder surgery for Ortho Evra  MRI brain pending her neurology. Currently on Keppra, asymptomatic. Clinically improving. Feeling better. Stable overnight. No other overnight issues reported. Exam:  BP (!) 99/53   Pulse 85   Temp 98.5 °F (36.9 °C) (Temporal)   Resp 18   Ht 5' 7\" (1.702 m)   Wt 153 lb 3.2 oz (69.5 kg)   SpO2 99%   BMI 23.99 kg/m²   General appearance: No apparent distress, appears stated age and cooperative. HEENT: Pupils equal, round, and reactive to light. Conjunctivae/corneas clear. Neck: Supple. No jugular venous distention. Trachea midline. Respiratory:  Normal respiratory effort. Clear to auscultation, bilaterally without Rales/Wheezes/Rhonchi. Cardiovascular: Regular rate and rhythm with normal S1/S2 without murmurs, rubs or gallops. Abdomen: Soft, non-tender, non-distended with normal bowel sounds. Musculoskeletal: No clubbing, cyanosis or edema bilaterally. Brisk capillary refill. 2+ lower extremity pulses (dorsalis pedis).  Right upper extremity in sling  Skin:  No rashes    Neurologic: awake, alert and following commands     Medications:  Reviewed    Infusion Medications   Scheduled Medications    atorvastatin  40 mg Oral Daily    pantoprazole  40 mg Oral Daily    therapeutic multivitamin-minerals  1 tablet Oral Daily    ampicillin-sulbactam  3 g Intravenous Q6H    levETIRAcetam  500 mg Oral BID     PRN Meds: acetaminophen, HYDROcodone 5 mg - acetaminophen **OR** HYDROcodone 5 mg - acetaminophen, morphine **OR** morphine, ondansetron, LORazepam    I/O    Intake/Output Summary (Last 24 hours) at 4/19/2019 1811  Last data filed at 4/19/2019 1439  Gross per 24 hour   Intake 420 ml   Output 675 ml   Net -255 ml       Labs:   Recent Labs     04/17/19  1830 04/18/19  0625   WBC 9.1 8.0   HGB 10.3* 9.8*   HCT 31.4*

## 2019-04-19 NOTE — CONSULTS
Department of Orthopedic Surgery  Resident Consult Note          Reason for Consult:  Right Proximal Humerus Fx/ DL     HISTORY OF PRESENT ILLNESS:                 The patient is a 78 y.o. male who presents with right shoulder pain after the patient had a seizure. Family is at bedside and was present for the encounter. They state he did not fall but rather his arm became very spasmodic on the 4/17. He is right hand dominant. Denies any numbness or paresthesias. Denies other orthopedic complaints at this time. Patient is transfer from [facility] ED. Shoulder reduction was attempted at [facility]. Patient is very Active at home working in his car and yard work. Hx of previous Right shoulder surgery.         Past Medical History:        Diagnosis Date    Arthritis     GERD (gastroesophageal reflux disease) 1/4/2015    Hyperlipidemia     Hypoglycemia     Mixed hyperlipidemia 1/4/2015    Nausea & vomiting 1/4/2015    New onset seizure ([address] 4/17/2019    Pneumonia 1/4/2015     Past Surgical History:        Procedure Laterality Date    COLON SURGERY      COLONOSCOPY      INCISIONAL HERNIA REPAIR N/A 10/13/2016    INGUINAL HERNIA REPAIR Left 2/19/2013    laparoscopic left inguinal hernia repair    PANCREAS SURGERY      Whipple procedure    [place] LAP,SURG,COLECT,TOT,W/PROCTECT,W/ILEOST N/A 8/20/2018    DIAGNOSTIC LAPAROSCOPY POSS LAPAROTOMY POSS BOWEL RESECTION performed by Gene Howard MD at [address] 9/6/2018    LAPAROSCOPIC REVISION OF GASTROJEJUNOSTOMY; INTRAOPERATIVE EGD performed by Gene Howard MD at [address] [place] [address]      right cyst removed [address]      UPPER GASTROINTESTINAL ENDOSCOPY      UPPER GASTROINTESTINAL ENDOSCOPY N/A 9/5/2018    EGD BIOPSY performed by Gene Howard MD at [facility] ENDOSCOPY     Current Medications:   Current Facility-Administered Medications: acetaminophen (TYLENOL) tablet 650 mg, 650 mg, Oral, Q4H agitation and anxiety    PHYSICAL EXAM:    VITALS:  BP (!) 149/70   Pulse 68   Temp 97.4 °F (36.3 °C) (Oral)   Resp 16   Ht 5' 7\" (1.702 m)   Wt 153 lb (69.4 kg)   SpO2 99%   BMI 23.96 kg/m²   CONSTITUTIONAL:  awake, alert, cooperative, no apparent distress, and appears stated age  MUSCULOSKELETAL:  RUE  · Skin intact  · + tenderness to the proximal humerus region  · + swelling  · Sling in place today  · Compartments soft and compressible  · +2 radial pulses  · SILT median/ulnar/axillary/radial nerves  · +AIN/PIN/ulnar nerve function intact  · + firing of the deltoid  · 4/5 strength Biceps      Secondary Exam:   LUE: skin intact, -TTP, Radial pulses +2, cap refill <2 seconds, +sensation to radial/ulnar/median nerves sensation, +motor to AIN/PIN/ulnar nerves, compartments soft and compressible     RLE: skin intack, -TTP, DP/PT pulses +2, cap refill < 2 seconds, + sensation to sp/dp/pt/s/s nerves intact, demonstrates active plantar and dorsiflexion of ankle, compartments soft and compressible     LLE:skin intack, -TTP, DP/PT pulses +2, cap refill < 2 seconds, + sensation to sp/dp/pt/s/s nerves intact, demonstrates active plantar and dorsiflexion of ankle, compartments soft and compressible        DATA:    CBC:   Lab Results   Component Value Date    WBC 8.0 04/18/2019    RBC 3.56 04/18/2019    HGB 9.8 04/18/2019    HCT 29.3 04/18/2019    MCV 82.3 04/18/2019    MCH 27.5 04/18/2019    MCHC 33.4 04/18/2019    RDW 15.1 04/18/2019     04/18/2019    MPV 9.2 04/18/2019     PT/INR:    Lab Results   Component Value Date    PROTIME 11.9 04/18/2019    INR 1.0 04/18/2019     CRP/ESR:   Lab Results   Component Value Date    SEDRATE 0 06/07/2018     Lactic Acid :   Lab Results   Component Value Date    LACTA 1.7 04/17/2019       Radiology Review:  XR right shoulder/ humerus: comminuted 3 part fracture of the proximal humerus with anterior dislocation of the articular part.      CT Right Shoulder -  Showing an communited proximal humerus fracture with greater tuberosity fracture fragment , Dislocation of the humeral head anteriorly.  And a displaced anterior glenoid rim fracture, displaced coracoid base fracture    IMPRESSION:   · Right proximal humerus fracture dislocation  · Right Anterior Glenoid Fracture   · Right Coracoid base fracture      PLAN:  · NWB RUE  · Sling RUE  · Pain control  · Ok to discharge per orthopedic to home with a sling  · Will have patient follow up in office next week to schedule ORIF vs replacement  · DIscussed with Dr. Emiliano Villavicencio

## 2019-04-20 VITALS
DIASTOLIC BLOOD PRESSURE: 60 MMHG | BODY MASS INDEX: 24.04 KG/M2 | HEART RATE: 100 BPM | OXYGEN SATURATION: 93 % | HEIGHT: 67 IN | RESPIRATION RATE: 18 BRPM | WEIGHT: 153.2 LBS | TEMPERATURE: 97.6 F | SYSTOLIC BLOOD PRESSURE: 130 MMHG

## 2019-04-20 PROCEDURE — 99233 SBSQ HOSP IP/OBS HIGH 50: CPT | Performed by: CLINICAL NURSE SPECIALIST

## 2019-04-20 PROCEDURE — 6370000000 HC RX 637 (ALT 250 FOR IP): Performed by: STUDENT IN AN ORGANIZED HEALTH CARE EDUCATION/TRAINING PROGRAM

## 2019-04-20 PROCEDURE — 6370000000 HC RX 637 (ALT 250 FOR IP): Performed by: FAMILY MEDICINE

## 2019-04-20 PROCEDURE — 2580000003 HC RX 258: Performed by: FAMILY MEDICINE

## 2019-04-20 PROCEDURE — 6360000002 HC RX W HCPCS: Performed by: FAMILY MEDICINE

## 2019-04-20 PROCEDURE — 6370000000 HC RX 637 (ALT 250 FOR IP): Performed by: PSYCHIATRY & NEUROLOGY

## 2019-04-20 RX ORDER — LEVETIRACETAM 500 MG/1
500 TABLET ORAL 2 TIMES DAILY
Qty: 60 TABLET | Refills: 3 | Status: SHIPPED | OUTPATIENT
Start: 2019-04-20 | End: 2019-08-12 | Stop reason: SDUPTHER

## 2019-04-20 RX ADMIN — MULTIPLE VITAMINS W/ MINERALS TAB 1 TABLET: TAB at 08:19

## 2019-04-20 RX ADMIN — AMPICILLIN SODIUM AND SULBACTAM SODIUM 3 G: 2; 1 INJECTION, POWDER, FOR SOLUTION INTRAMUSCULAR; INTRAVENOUS at 02:47

## 2019-04-20 RX ADMIN — HYDROCODONE BITARTRATE AND ACETAMINOPHEN 2 TABLET: 5; 325 TABLET ORAL at 04:31

## 2019-04-20 RX ADMIN — ATORVASTATIN CALCIUM 40 MG: 40 TABLET, FILM COATED ORAL at 08:19

## 2019-04-20 RX ADMIN — PANTOPRAZOLE SODIUM 40 MG: 40 TABLET, DELAYED RELEASE ORAL at 08:19

## 2019-04-20 RX ADMIN — AMPICILLIN SODIUM AND SULBACTAM SODIUM 3 G: 2; 1 INJECTION, POWDER, FOR SOLUTION INTRAMUSCULAR; INTRAVENOUS at 08:19

## 2019-04-20 RX ADMIN — HYDROCODONE BITARTRATE AND ACETAMINOPHEN 2 TABLET: 5; 325 TABLET ORAL at 00:28

## 2019-04-20 RX ADMIN — LEVETIRACETAM 500 MG: 500 TABLET ORAL at 08:18

## 2019-04-20 ASSESSMENT — PAIN DESCRIPTION - ONSET: ONSET: ON-GOING

## 2019-04-20 ASSESSMENT — PAIN DESCRIPTION - LOCATION: LOCATION: SHOULDER

## 2019-04-20 ASSESSMENT — PAIN SCALES - GENERAL
PAINLEVEL_OUTOF10: 8
PAINLEVEL_OUTOF10: 0
PAINLEVEL_OUTOF10: 7
PAINLEVEL_OUTOF10: 7

## 2019-04-20 ASSESSMENT — PAIN DESCRIPTION - PAIN TYPE: TYPE: ACUTE PAIN

## 2019-04-20 ASSESSMENT — PAIN DESCRIPTION - FREQUENCY: FREQUENCY: INTERMITTENT

## 2019-04-20 ASSESSMENT — PAIN DESCRIPTION - PROGRESSION: CLINICAL_PROGRESSION: NOT CHANGED

## 2019-04-20 ASSESSMENT — PAIN DESCRIPTION - DESCRIPTORS: DESCRIPTORS: ACHING;CONSTANT;DISCOMFORT

## 2019-04-20 ASSESSMENT — PAIN DESCRIPTION - ORIENTATION: ORIENTATION: RIGHT

## 2019-04-20 NOTE — PROGRESS NOTES
Informed pt wife, that we searched the hospital for alternative sling, we do not carry them.  Will discharge pt with sling that we placed yesterday,

## 2019-04-20 NOTE — PROGRESS NOTES
Luis Valera is a 78 y.o. right handed male     Patient was admitted for a suspected GTC seizure    Patient denies any previous similar s/s or previous seizure    His wife notes that they were driving and she noted he was staring straight ahead not talking and going half the speed limit. She attempted to speak to him but he continued to stare, be nonverbal and slow down even further. They arrived at their destination; he exited the car and performed some chores for his MIL. As they sat around the table, he stiffened and had a GTC seizure. In ED, his wife noted a few more of those staring spells where he is not responding but none lasted as long as his original spell. 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 Prior to Visit Medications    Medication Sig Taking? Authorizing Provider   temazepam (RESTORIL) 15 MG capsule Take 1 capsule by mouth nightly as needed for Sleep for up to 30 days.  Yes Jaime Mishra DO   atorvastatin (LIPITOR) 40 MG tablet TAKE 1 TABLET BY MOUTH EVERY DAY Yes Jaime Mishra DO   pantoprazole (PROTONIX) 40 MG tablet TAKE 1 TABLET BY MOUTH EVERY DAY Yes Jaime Mishra DO   omega-3 acid ethyl esters (LOVAZA) 1 g capsule TAKE 1 CAPSULE BY MOUTH FOUR TIMES DAILY Yes Jaime Mishra DO   celecoxib (CELEBREX) 200 MG capsule TAKE 1 CAPSULE BY MOUTH EVERY DAY Yes Jaime Mishra DO   ezetimibe (ZETIA) 10 MG tablet Take 1 tablet by mouth daily Yes Jaime Mishra DO   Flaxseed, Linseed, (FLAX SEEDS PO) Take 1 capsule by mouth daily Yes Historical Provider, MD Obinna MELGOZA Take 1 capsule by mouth daily Yes Historical Provider, MD   therapeutic multivitamin-minerals (THERAGRAN-M) tablet Take 1 tablet by mouth daily.  Yes Historical Provider, MD       Allergies as of 04/17/2019    (No Known Allergies)       Objective:     /61   Pulse 80   Temp 98.6 °F (37 °C) (Temporal)   Resp 16   Ht 5' 7\" (1.702 m)   Wt 153 lb 3.2 oz (69.5 kg)   SpO2 96%   BMI 23.99 kg/m²      General appearance: alert, appears stated age and cooperative  Head: Normocephalic, without obvious abnormality, atraumatic  Neck: no adenopathy, no carotid bruit and limited ROM  Lungs: clear to auscultation bilaterally  Heart: regular rate and rhythm  Extremities: right shoulder in sling   Pulses: 2+ and symmetric  Skin: ecchymosis noted right armpit     Mental Status: Alert, oriented, thought content appropriate    Speech: clear  Language: appropriate    Cranial Nerves:  I: smell    II: visual acuity     II: visual fields Full   II: pupils MIO   III,VII: ptosis None   III,IV,VI: extraocular muscles  EOMI without nystagmus    V: mastication Normal   V: facial light touch sensation  Normal   V,VII: corneal reflex  Present   VII: facial muscle function - upper     VII: facial muscle function - lower Normal   VIII: hearing Normal   IX: soft palate elevation  Normal   IX,X: gag reflex Present   XI: trapezius strength  5/5   XI: sternocleidomastoid strength 5/5   XI: neck extension strength  5/5   XII: tongue strength  Normal     Motor:  5/5 throughout - limited exam right arm   Normal bulk and tone    Sensory:  Normal to LT     Coordination:   Decreased right relative to shoulder sling     DTR:   No reflexes    No Babinski    No Blas's     Laboratory/Radiology:     CBC with Differential:    Lab Results   Component Value Date    WBC 8.0 04/18/2019    RBC 3.56 04/18/2019    HGB 9.8 04/18/2019    HCT 29.3 04/18/2019     04/18/2019    MCV 82.3 04/18/2019    MCH 27.5 04/18/2019    MCHC 33.4 04/18/2019    RDW 15.1 04/18/2019 LYMPHOPCT 9.4 2019    MONOPCT 5.9 2019    BASOPCT 0.1 2019    MONOSABS 0.47 2019    LYMPHSABS 0.75 2019    EOSABS 0.01 2019    BASOSABS 0.01 2019     CMP:    Lab Results   Component Value Date     2019    K 4.1 2019    K 4.3 2018    CL 96 2019    CO2 25 2019    BUN 12 2019    CREATININE 0.9 2019    GFRAA >60 2019    LABGLOM >60 2019    GLUCOSE 132 2019    PROT 6.6 2019    LABALBU 4.1 2019    CALCIUM 8.9 2019    BILITOT 0.3 2019    ALKPHOS 37 2019    AST 52 2019    ALT 19 2019       CT Head:   Remote  appreciated    MRI unrevealing for an acute event     I personally reviewed the patient's lab and imaging studies at this time.     Assessment:     Patient suffered a number of complex partial seizures and one GTC seizure   Now maintained on Keppra 500mg BID and doing well    Right humoral head fracture -- sling and followed by ortho     Whipple procedure in  for an intraductal papillary mucinous neoplasm     Plan:     Continue Keppra     Discussed at length with patient and wife     No driving -- patient, wife and daughter on phone verbalized an understanding of this  Follow in office    Repeat EEG next month    Repeat MRI in a few months     [place] Render  9:13 AM  2019

## 2019-04-20 NOTE — PLAN OF CARE
Problem: Falls - Risk of:  Goal: Will remain free from falls  Description  Will remain free from falls  Outcome: Met This Shift     Problem: Pain:  Goal: Pain level will decrease  Description  Pain level will decrease  Outcome: Met This Shift

## 2019-04-20 NOTE — DISCHARGE SUMMARY
status epilepticus ([facility] [address]  Resolved Problems:    * No resolved hospital problems. *  Whipple procedure in 2013 for intraductal papillary neoplasm          Discharge Instructions / Follow up:  Neurology follow-up is recommended. Orthopedic follow-up for proximal right humerus fracture  Future Appointments   Date Time Provider Amber Mary   5/13/2019  4:00 PM DO TRISTIN Lezama MARCELLO BEAR           Activity: activity as tolerated    Significant labs:  CBC:   Recent Labs     04/17/19 1830 04/18/19  0625   WBC 9.1 8.0   RBC 3.74* 3.56*   HGB 10.3* 9.8*   HCT 31.4* 29.3*   MCV 84.0 82.3   RDW 15.4* 15.1*    172     BMP:   Recent Labs     04/17/19 1830 04/18/19  0220 04/18/19  0625 04/19/19  0722   *  --  132 133   K 5.7* 3.6 4.0 4.1   CL 90*  --  95* 96*   CO2 26  --  24 25   BUN 7*  --  6* 12   CREATININE 0.6*  --  0.7 0.9   MG  --  1.8  --   --      LFT:  Recent Labs     04/17/19 1830   PROT 6.6   ALKPHOS 37*   ALT 19   AST 52*   BILITOT 0.3     PT/INR:   Recent Labs     04/18/19  0221   INR 1.0   APTT 27.8     BNP: No results for input(s): BNP in the last 72 hours. Hgb A1C:   Lab Results   Component Value Date    LABA1C 5.6 09/05/2018     Folate and B12:   Lab Results   Component Value Date    TQONNPBP04 985 06/07/2018   ,   Lab Results   Component Value Date    FOLATE >20.0 06/07/2018     Thyroid Studies:   Lab Results   Component Value Date    TSH 2.080 09/05/2018       Urinalysis:    Lab Results   Component Value Date    NITRU Negative 04/18/2019    WBCUA NONE 04/18/2019    BACTERIA NONE 04/18/2019    RBCUA NONE 04/18/2019    BLOODU Negative 04/18/2019    SPECGRAV 1.010 04/18/2019    GLUCOSEU Negative 04/18/2019       Imaging:  Xr Shoulder Right (min 2 Views)    Result Date: 4/17/2019  Location: 200 Indication: Post reduction. Comparison: Left shoulder radiograph from 4/7/2019 at 1849 hours Technique: Portable right shoulder radiograph was obtained.  Findings: Redemonstrated is a comminuted proximal humerus fracture with persistent anterior dislocation of the humeral head. The acromioclavicular joint is maintained. Visualized portion of the right lung is grossly clear. Redemonstration of a comminuted proximal humerus fracture with anterior dislocation of the humeral head. Xr Shoulder Right (min 2 Views)    Result Date: 4/17/2019  Reading location:  200 Indication: Seizure, pain. Comparison: None available. Technique: 2 views of the right shoulder and 2 views of the right humerus were obtained. FINDINGS: Right Shoulder: There is a comminuted displaced fracture through the anatomic neck of the proximal humerus with anterior dislocation of the humeral head relative to the glenoid. The acromioclavicular joint is maintained. Visualized portion of the right lung is clear. Right Humerus: No additional humerus fracture is identified. The elbow joint is grossly maintained. Overlying soft tissues are unremarkable. Right Shoulder: Comminuted displaced proximal humerus fracture with anterior dislocation of the humeral head relative to the glenoid. Right Humerus: No additional humerus fracture is identified. Xr Humerus Right (min 2 Views)    Result Date: 4/17/2019  Reading location:  200 Indication: Seizure, pain. Comparison: None available. Technique: 2 views of the right shoulder and 2 views of the right humerus were obtained. FINDINGS: Right Shoulder: There is a comminuted displaced fracture through the anatomic neck of the proximal humerus with anterior dislocation of the humeral head relative to the glenoid. The acromioclavicular joint is maintained. Visualized portion of the right lung is clear. Right Humerus: No additional humerus fracture is identified. The elbow joint is grossly maintained. Overlying soft tissues are unremarkable. Right Shoulder: Comminuted displaced proximal humerus fracture with anterior dislocation of the humeral head relative to the glenoid.  Right Humerus: No additional humerus fracture is identified. Ct Head Wo Contrast    Result Date: 2019  Location: 200 Indication: New seizure, right arm weakness. Comparison: None available. Technique: Multidetector CT imaging was obtained from the skull base to vertex without the administration of intravenous contrast. Coronal and sagittal reformatted images were obtained. Automated dose exposure control was used for this exam. FINDINGS: Prominence of ventricles and sulci is compatible with age-related volume loss. No extra-axial collection. No acute intracranial hemorrhage. No cerebral edema or mass effect. No CT evidence of acute, large territorial infarction. There are patchy and confluent areas of hypoattenuation within the cerebral white matter, which are nonspecific but most compatible with moderate changes of microangiopathy. There is suggestion of small old lacunar infarcts within bilateral basal ganglia. Visualized paranasal sinuses are well aerated. Visualized mastoid air cells are well aerated. The calvarium is intact. 1. No acute intracranial hemorrhage or mass effect. 2. Patchy and confluent areas of hypoattenuation within the cerebral white matter, which are nonspecific but most compatible with moderate changes of microangiopathy. 3. Suggestion of a small old lacunar infarcts within bilateral basal ganglia. .     Ct Shoulder Right Wo Contrast    Result Date: 2019  Patient MRN:  [id number] : 1950 Age: 78 years Gender: Male Order Date:  2019 10:00 AM EXAM: CT SHOULDER RIGHT WO CONTRAST NUMBER OF IMAGES:  [id number]  INDICATION:  FRACTURE, SHOULDER  COMPARISON: Plain radiographs of the area dated 2018. FINDINGS: Multiple recent fractures of the right shoulder are demonstrated. No underlying osteolytic or osteoblastic lesion is seen in any of these regions. There is a recent minimally comminuted oblique fracture of the mid acromion.  Minimal superior displacement of the distal component is or pathologic enhancing lesion. Cortical atrophy and chronic periventricular microangiopathy are present. Discharge Medications:      Medication List      START taking these medications    levETIRAcetam 500 MG tablet  Commonly known as:  KEPPRA  Take 1 tablet by mouth 2 times daily        CONTINUE taking these medications    atorvastatin 40 MG tablet  Commonly known as:  LIPITOR  TAKE 1 TABLET BY MOUTH EVERY DAY     celecoxib 200 MG capsule  Commonly known as:  CELEBREX  TAKE 1 CAPSULE BY MOUTH EVERY DAY     ezetimibe 10 MG tablet  Commonly known as:  ZETIA  Take 1 tablet by mouth daily     FLAX SEEDS PO     omega-3 acid ethyl esters 1 g capsule  Commonly known as:  LOVAZA  TAKE 1 CAPSULE BY MOUTH FOUR TIMES DAILY     pantoprazole 40 MG tablet  Commonly known as:  PROTONIX  TAKE 1 TABLET BY MOUTH EVERY DAY     Saw Palmetto Extr     temazepam 15 MG capsule  Commonly known as:  RESTORIL  Take 1 capsule by mouth nightly as needed for Sleep for up to 30 days. therapeutic multivitamin-minerals tablet           Where to Get Your Medications      You can get these medications from any pharmacy    Bring a paper prescription for each of these medications  · levETIRAcetam 500 MG tablet         Time Spent on discharge is more than 35 minutes in the examination, evaluation, counseling and review of medications and discharge plan.    +++++++++++++++++++++++++++++++++++++++++++++++++  Edna Loza MD, Hospitalist  +++++++++++++++++++++++++++++++++++++++++++++++++  NOTE: This report was transcribed using voice recognition software. Every effort was made to ensure accuracy; however, inadvertent computerized transcription errors may be present.

## 2019-04-22 ENCOUNTER — TELEPHONE (OUTPATIENT)
Dept: ORTHOPEDIC SURGERY | Age: 69
End: 2019-04-22

## 2019-04-22 NOTE — TELEPHONE ENCOUNTER
Left voice message requesting patient to return call to schedule appointment in the office with Dr Sawyer Pate for 4/22/19 or 4/23/19.

## 2019-04-23 ENCOUNTER — OFFICE VISIT (OUTPATIENT)
Dept: ORTHOPEDIC SURGERY | Age: 69
End: 2019-04-23
Payer: MEDICARE

## 2019-04-23 VITALS
WEIGHT: 150 LBS | HEIGHT: 67 IN | BODY MASS INDEX: 23.54 KG/M2 | DIASTOLIC BLOOD PRESSURE: 75 MMHG | HEART RATE: 91 BPM | RESPIRATION RATE: 16 BRPM | SYSTOLIC BLOOD PRESSURE: 125 MMHG

## 2019-04-23 DIAGNOSIS — S42.101A FRACTURE OF UNSPECIFIED PART OF SCAPULA, RIGHT SHOULDER, INITIAL ENCOUNTER FOR CLOSED FRACTURE: Primary | ICD-10-CM

## 2019-04-23 DIAGNOSIS — S42.291A CLOSED 3-PART FRACTURE OF PROXIMAL HUMERUS, RIGHT, INITIAL ENCOUNTER: ICD-10-CM

## 2019-04-23 DIAGNOSIS — S42.91XA TRAUMATIC CLOSED DISPLACED FRACTURE OF RIGHT SHOULDER WITH ANTERIOR DISLOCATION, INITIAL ENCOUNTER: ICD-10-CM

## 2019-04-23 LAB
BLOOD CULTURE, ROUTINE: NORMAL
CULTURE, BLOOD 2: NORMAL

## 2019-04-23 PROCEDURE — 99204 OFFICE O/P NEW MOD 45 MIN: CPT | Performed by: ORTHOPAEDIC SURGERY

## 2019-04-23 PROCEDURE — 1111F DSCHRG MED/CURRENT MED MERGE: CPT | Performed by: ORTHOPAEDIC SURGERY

## 2019-04-23 PROCEDURE — 4040F PNEUMOC VAC/ADMIN/RCVD: CPT | Performed by: ORTHOPAEDIC SURGERY

## 2019-04-23 PROCEDURE — 1036F TOBACCO NON-USER: CPT | Performed by: ORTHOPAEDIC SURGERY

## 2019-04-23 PROCEDURE — G8427 DOCREV CUR MEDS BY ELIG CLIN: HCPCS | Performed by: ORTHOPAEDIC SURGERY

## 2019-04-23 PROCEDURE — 1123F ACP DISCUSS/DSCN MKR DOCD: CPT | Performed by: ORTHOPAEDIC SURGERY

## 2019-04-23 PROCEDURE — 3017F COLORECTAL CA SCREEN DOC REV: CPT | Performed by: ORTHOPAEDIC SURGERY

## 2019-04-23 PROCEDURE — G8420 CALC BMI NORM PARAMETERS: HCPCS | Performed by: ORTHOPAEDIC SURGERY

## 2019-04-23 RX ORDER — OXYCODONE HYDROCHLORIDE AND ACETAMINOPHEN 5; 325 MG/1; MG/1
1 TABLET ORAL EVERY 6 HOURS PRN
Qty: 20 TABLET | Refills: 0 | Status: SHIPPED | OUTPATIENT
Start: 2019-04-23 | End: 2019-05-06 | Stop reason: SDUPTHER

## 2019-04-23 NOTE — PROGRESS NOTES
Department of Orthopedic Surgery  Jermaine Lakhani MD  History and Physical      CHIEF COMPLAINT:  Right shoulder injury    HISTORY OF PRESENT ILLNESS:                 The patient is a 78 y.o. male who presents with right shoulder pain after the patient had a seizure. Fauzia Hobby He did not fall but rather his arm became very spasmodic. He is right hand dominant. Denies any numbness or paresthesias. Denies other orthopedic complaints at this time. He was seen in ED at [facility] and was transferred to [facility] where attempted closed reduction was performed. He was admitted and worked-up for seizure and discharged. He is seen 5 days post-injury. CT scan of right shoulder demonstrates persistent anterior shoulder dislocation with 3-part proximal humerus fracture,    Past Medical History:        Diagnosis Date    Arthritis     GERD (gastroesophageal reflux disease) 1/4/2015    Hyperlipidemia     Hypoglycemia     Mixed hyperlipidemia 1/4/2015    Nausea & vomiting 1/4/2015    New onset seizure ([address] 4/17/2019    Pneumonia 1/4/2015     Past Surgical History:        Procedure Laterality Date    COLON SURGERY      COLONOSCOPY      INCISIONAL HERNIA REPAIR N/A 10/13/2016    INGUINAL HERNIA REPAIR Left 2/19/2013    laparoscopic left inguinal hernia repair    PANCREAS SURGERY      Whipple procedure    [place] LAP,SURG,COLECT,TOT,W/PROCTECT,W/ILEOST N/A 8/20/2018    DIAGNOSTIC LAPAROSCOPY POSS LAPAROTOMY POSS BOWEL RESECTION performed by Mehreen Banda MD at [address] 9/6/2018    LAPAROSCOPIC REVISION OF GASTROJEJUNOSTOMY; INTRAOPERATIVE EGD performed by Mehreen Banda MD at [address]      right cyst removed [address]      UPPER GASTROINTESTINAL ENDOSCOPY      UPPER GASTROINTESTINAL ENDOSCOPY N/A 9/5/2018    EGD BIOPSY performed by Mehreen Banda MD at [address] [facility] ENDOSCOPY     Current Medications:   No current facility-administered medications for this visit. Allergies:  Patient has no known allergies. Social History:   TOBACCO:   reports that he quit smoking about 8 years ago. He has never used smokeless tobacco.  ETOH:   reports that he drank about 6.0 oz of alcohol per week. DRUGS:   reports that he does not use drugs. ACTIVITIES OF DAILY LIVING:    OCCUPATION:    Family History:       Problem Relation Age of Onset    High Cholesterol Mother     Stroke Mother     Other Father          in , ? pneumonia and prostate cancer.  Diabetes Brother     Mental Illness Brother     Stroke Brother     Cancer Brother        REVIEW OF SYSTEMS:  CONSTITUTIONAL:  negative  EYES:  negative  HEENT:  negative  RESPIRATORY:  negative  CARDIOVASCULAR:  negative  GASTROINTESTINAL:  Whipple procedure  GENITOURINARY:  negative  INTEGUMENT/BREAST:  negative  HEMATOLOGIC/LYMPHATIC:  negative  ALLERGIC/IMMUNOLOGIC:  negative  ENDOCRINE:  negative  MUSCULOSKELETAL:  negative  NEUROLOGICAL:  negative  BEHAVIOR/PSYCH:  negative    PHYSICAL EXAM:    VITALS:  /75 (Site: Left Upper Arm, Position: Sitting)   Pulse 91   Resp 16   Ht 5' 7\" (1.702 m)   Wt 150 lb (68 kg)   BMI 23.49 kg/m²   CONSTITUTIONAL:  awake, alert, cooperative, no apparent distress, and appears stated age  EYES:  Lids and lashes normal, pupils equal, round and reactive to light, extra ocular muscles intact, sclera clear, conjunctiva normal  ENT:  Normocephalic, without obvious abnormality, atraumatic, sinuses nontender on palpation, external ears without lesions, oral pharynx with moist mucus membranes, tonsils without erythema or exudates, gums normal and good dentition. NECK:  Supple, symmetrical, trachea midline, no adenopathy, thyroid symmetric, not enlarged and no tenderness, skin normal  LUNGS:  CTA  CARDIOVASCULAR:  RRR  ABDOMEN:  soft  CHEST/BREASTS:  Right echymosis  GENITAL/URINARY:  deferred  NEUROLOGIC:  Awake, alert, oriented to name, place and time.   Cranial nerves II-XII are grossly intact. Motor is 5 out of 5 bilaterally. gait is normal.  MUSCULOSKELETAL:    Right UE: sling intact. [address] with TTP over anterior shoulder. Axillary sensation intact with Deltoid intact shrug. R/U/M nerves intact to light touch distally. Motor 5/5 intrinsics, WF,WE, digital flexion/extension. 2+ radial pulse. BCR all digits    DATA:    CBC:   Lab Results   Component Value Date    WBC 8.0 04/18/2019    RBC 3.56 04/18/2019    HGB 9.8 04/18/2019    HCT 29.3 04/18/2019    MCV 82.3 04/18/2019    MCH 27.5 04/18/2019    MCHC 33.4 04/18/2019    RDW 15.1 04/18/2019     04/18/2019    MPV 9.2 04/18/2019     PT/INR:    Lab Results   Component Value Date    PROTIME 11.9 04/18/2019    INR 1.0 04/18/2019       Radiology Review:  X-rays 4- and CT scan with reconstructions 4- were reviewed and demonstrate right anterior shoulder dislocaiton with 3-proximal humerus fracture. Posterior head impaction present. Glenoid appears preserved    IMPRESSION:  · Right shoulder fracture dislocation at 5 days post-injury  · Coracoid fracture of scapula    PLAN:  Extensive discussion with patient and wife undertaken. Complexity of injury explained. Plan for attempt at ORIF. Patient and wife understand risks of AVN and nonunion. Also explained reverse TSA in detail if fracture more severe than CT/x-ray suggest or if head comminution present. Discussed proximity of brachial plexus and anterior head fragment as well as large coracoid fracture. I explained the risks, benefits, alternatives and complications of surgery with the patient including but not limited to the risks of death, possible damage to nerves, vessels, or tendons, possible infection, possible nonunion, possible malunion, possible hardware failure, possible need for hardware removal, stiffness, as well as the possible need further surgery and unanticipated complications. The patient voiced understanding and all questions were answered.  The patient elected to proceed with surgical intervention.      Yris [place]  4/23/2019

## 2019-04-25 ENCOUNTER — PREP FOR PROCEDURE (OUTPATIENT)
Dept: ORTHOPEDIC SURGERY | Age: 69
End: 2019-04-25

## 2019-04-25 RX ORDER — SODIUM CHLORIDE 0.9 % (FLUSH) 0.9 %
10 SYRINGE (ML) INJECTION EVERY 12 HOURS SCHEDULED
Status: CANCELLED | OUTPATIENT
Start: 2019-04-25

## 2019-04-25 RX ORDER — SODIUM CHLORIDE 0.9 % (FLUSH) 0.9 %
10 SYRINGE (ML) INJECTION PRN
Status: CANCELLED | OUTPATIENT
Start: 2019-04-25

## 2019-04-25 RX ORDER — SODIUM CHLORIDE 9 MG/ML
INJECTION, SOLUTION INTRAVENOUS CONTINUOUS
Status: CANCELLED | OUTPATIENT
Start: 2019-04-25

## 2019-04-25 NOTE — PROGRESS NOTES
[facility] PRE-ADMISSION TESTING INSTRUCTIONS    The Preadmission Testing patient is instructed accordingly using the following criteria (check applicable):    ARRIVAL INSTRUCTIONS:  [x] Parking the day of Surgery is located in the Main Entrance lot. Upon entering the door, make an immediate right to the surgery reception desk    [x] [facility] is available Monday through Friday 6 am to 6 pm    [x] Bring photo ID and insurance card    [] Bring in a copy of Living will or Durable Power of  papers. [x] Please be sure to arrange for responsible adult to provide transportation to and from the hospital    [x] Please arrange for responsible adult to be with you for the 24 hour period post procedure due to having anesthesia      GENERAL INSTRUCTIONS:    [x] Nothing by mouth after midnight, including gum, candy, mints or water    [x] You may brush your teeth, but do not swallow any water    [x] Take medications as instructed with 1-2 oz of water    [x] Stop herbal supplements and vitamins 5 days prior to procedure    [] Follow preop dosing of blood thinners per physician instructions    [] Take 1/2 dose of evening insulin, but no insulin after midnight    [] No oral diabetic medications after midnight    [] If diabetic and have low blood sugar or feel symptomatic, take 1-2oz apple juice only    [] Bring inhalers day of surgery    [] Bring C-PAP/ Bi-Pap day of surgery    [] Bring urine specimen day of surgery    [x] Shower or bath with soap, lather and rinse well, AM of Surgery, no lotion, powders or creams to surgical site    [] Follow bowel prep as instructed per surgeon    [x] No tobacco products within 24 hours of surgery     [x] No alcohol or illegal drug use within 24 hours of surgery.     [x] Jewelry, body piercing's, eyeglasses, contact lenses and dentures are not permitted into surgery (bring cases)      [] Please do not wear any nail polish, make up or hair products on the day of surgery    [x] If not already done, you can expect a call from registration    [x] You can expect a call the business day prior to procedure to notify you if your arrival time changes    [x] If you receive a survey after surgery we would greatly appreciate your comments    [] Parent/guardian of a minor must accompany their child and remain on the premises  the entire time they are under our care     [] Pediatric patients may bring favorite toy, blanket or comfort item with them    [] A caregiver or family member must remain with the patient during their stay if they are mentally handicapped, have dementia, disoriented or unable to use a call light or would be a safety concern if left unattended    [x] Please notify surgeon if you develop any illness between now and time of surgery (cold, cough, sore throat, fever, nausea, vomiting) or any signs of infections  including skin, wounds, and dental.    [x]  The Outpatient Pharmacy is available to fill your prescription here on your day of surgery, ask your preop nurse for details    [] Other instructions  EDUCATIONAL MATERIALS PROVIDED:    [] PAT Preoperative Education Packet/Booklet     [] Medication List    [] Fluoroscopy Information Pamphlet    [] Transfusion bracelet applied with instructions    [] Joint replacement video reviewed    [] Shower with soap, lather and rinse well, and use CHG wipes provided the evening before surgery as instructed

## 2019-04-26 ENCOUNTER — ANESTHESIA (OUTPATIENT)
Dept: OPERATING ROOM | Age: 69
DRG: 483 | End: 2019-04-26
Payer: MEDICARE

## 2019-04-26 ENCOUNTER — HOSPITAL ENCOUNTER (INPATIENT)
Age: 69
LOS: 1 days | Discharge: HOME | DRG: 483 | End: 2019-04-27
Attending: ORTHOPAEDIC SURGERY | Admitting: ORTHOPAEDIC SURGERY
Payer: MEDICARE

## 2019-04-26 ENCOUNTER — APPOINTMENT (OUTPATIENT)
Dept: GENERAL RADIOLOGY | Age: 69
DRG: 483 | End: 2019-04-26
Attending: ORTHOPAEDIC SURGERY
Payer: MEDICARE

## 2019-04-26 ENCOUNTER — ANESTHESIA EVENT (OUTPATIENT)
Dept: OPERATING ROOM | Age: 69
DRG: 483 | End: 2019-04-26
Payer: MEDICARE

## 2019-04-26 ENCOUNTER — HOSPITAL ENCOUNTER (OUTPATIENT)
Dept: GENERAL RADIOLOGY | Age: 69
Discharge: HOME | DRG: 483 | End: 2019-04-28
Attending: ORTHOPAEDIC SURGERY
Payer: MEDICARE

## 2019-04-26 VITALS — TEMPERATURE: 97.2 F | DIASTOLIC BLOOD PRESSURE: 63 MMHG | OXYGEN SATURATION: 100 % | SYSTOLIC BLOOD PRESSURE: 140 MMHG

## 2019-04-26 DIAGNOSIS — R52 PAIN: ICD-10-CM

## 2019-04-26 PROBLEM — S42.293A: Status: ACTIVE | Noted: 2019-04-26

## 2019-04-26 PROBLEM — S42.291A CLOSED 3-PART FRACTURE OF PROXIMAL END OF RIGHT HUMERUS: Status: ACTIVE | Noted: 2019-04-26

## 2019-04-26 LAB
ABO/RH: NORMAL
ANTIBODY SCREEN: NORMAL
METER GLUCOSE: 183 MG/DL (ref 74–99)

## 2019-04-26 PROCEDURE — 7100000001 HC PACU RECOVERY - ADDTL 15 MIN: Performed by: ORTHOPAEDIC SURGERY

## 2019-04-26 PROCEDURE — 3600000005 HC SURGERY LEVEL 5 BASE: Performed by: ORTHOPAEDIC SURGERY

## 2019-04-26 PROCEDURE — 88305 TISSUE EXAM BY PATHOLOGIST: CPT

## 2019-04-26 PROCEDURE — 3700000000 HC ANESTHESIA ATTENDED CARE: Performed by: ORTHOPAEDIC SURGERY

## 2019-04-26 PROCEDURE — 36415 COLL VENOUS BLD VENIPUNCTURE: CPT

## 2019-04-26 PROCEDURE — 6360000002 HC RX W HCPCS: Performed by: PHYSICIAN ASSISTANT

## 2019-04-26 PROCEDURE — 86850 RBC ANTIBODY SCREEN: CPT

## 2019-04-26 PROCEDURE — C1713 ANCHOR/SCREW BN/BN,TIS/BN: HCPCS | Performed by: ORTHOPAEDIC SURGERY

## 2019-04-26 PROCEDURE — 1200000000 HC SEMI PRIVATE

## 2019-04-26 PROCEDURE — 3600000015 HC SURGERY LEVEL 5 ADDTL 15MIN: Performed by: ORTHOPAEDIC SURGERY

## 2019-04-26 PROCEDURE — 6370000000 HC RX 637 (ALT 250 FOR IP): Performed by: INTERNAL MEDICINE

## 2019-04-26 PROCEDURE — 2709999900 HC NON-CHARGEABLE SUPPLY: Performed by: ORTHOPAEDIC SURGERY

## 2019-04-26 PROCEDURE — 6370000000 HC RX 637 (ALT 250 FOR IP): Performed by: PHYSICIAN ASSISTANT

## 2019-04-26 PROCEDURE — 0PBC0ZZ EXCISION OF RIGHT HUMERAL HEAD, OPEN APPROACH: ICD-10-PCS | Performed by: ORTHOPAEDIC SURGERY

## 2019-04-26 PROCEDURE — 6360000002 HC RX W HCPCS: Performed by: ANESTHESIOLOGY

## 2019-04-26 PROCEDURE — 88311 DECALCIFY TISSUE: CPT

## 2019-04-26 PROCEDURE — 2500000003 HC RX 250 WO HCPCS: Performed by: ORTHOPAEDIC SURGERY

## 2019-04-26 PROCEDURE — 2500000003 HC RX 250 WO HCPCS: Performed by: NURSE ANESTHETIST, CERTIFIED REGISTERED

## 2019-04-26 PROCEDURE — 0PU707Z SUPPLEMENT RIGHT GLENOID CAVITY WITH AUTOLOGOUS TISSUE SUBSTITUTE, OPEN APPROACH: ICD-10-PCS | Performed by: ORTHOPAEDIC SURGERY

## 2019-04-26 PROCEDURE — 0PS504Z REPOSITION RIGHT SCAPULA WITH INTERNAL FIXATION DEVICE, OPEN APPROACH: ICD-10-PCS | Performed by: ORTHOPAEDIC SURGERY

## 2019-04-26 PROCEDURE — C1776 JOINT DEVICE (IMPLANTABLE): HCPCS | Performed by: ORTHOPAEDIC SURGERY

## 2019-04-26 PROCEDURE — 73030 X-RAY EXAM OF SHOULDER: CPT

## 2019-04-26 PROCEDURE — 86900 BLOOD TYPING SEROLOGIC ABO: CPT

## 2019-04-26 PROCEDURE — 2580000003 HC RX 258: Performed by: PHYSICIAN ASSISTANT

## 2019-04-26 PROCEDURE — 3209999900 FLUORO FOR SURGICAL PROCEDURES

## 2019-04-26 PROCEDURE — 2720000010 HC SURG SUPPLY STERILE: Performed by: ORTHOPAEDIC SURGERY

## 2019-04-26 PROCEDURE — 2580000003 HC RX 258: Performed by: NURSE ANESTHETIST, CERTIFIED REGISTERED

## 2019-04-26 PROCEDURE — 3700000001 HC ADD 15 MINUTES (ANESTHESIA): Performed by: ORTHOPAEDIC SURGERY

## 2019-04-26 PROCEDURE — L3650 SO 8 ABD RESTRAINT PRE OTS: HCPCS | Performed by: ORTHOPAEDIC SURGERY

## 2019-04-26 PROCEDURE — 0RRJ00Z REPLACEMENT OF RIGHT SHOULDER JOINT WITH REVERSE BALL AND SOCKET SYNTHETIC SUBSTITUTE, OPEN APPROACH: ICD-10-PCS | Performed by: ORTHOPAEDIC SURGERY

## 2019-04-26 PROCEDURE — 86901 BLOOD TYPING SEROLOGIC RH(D): CPT

## 2019-04-26 PROCEDURE — 82962 GLUCOSE BLOOD TEST: CPT

## 2019-04-26 PROCEDURE — 7100000000 HC PACU RECOVERY - FIRST 15 MIN: Performed by: ORTHOPAEDIC SURGERY

## 2019-04-26 PROCEDURE — 99221 1ST HOSP IP/OBS SF/LOW 40: CPT | Performed by: INTERNAL MEDICINE

## 2019-04-26 PROCEDURE — 6360000002 HC RX W HCPCS: Performed by: ORTHOPAEDIC SURGERY

## 2019-04-26 PROCEDURE — 2500000003 HC RX 250 WO HCPCS: Performed by: PHYSICIAN ASSISTANT

## 2019-04-26 PROCEDURE — 2580000003 HC RX 258: Performed by: ORTHOPAEDIC SURGERY

## 2019-04-26 PROCEDURE — 87081 CULTURE SCREEN ONLY: CPT

## 2019-04-26 PROCEDURE — 2700000000 HC OXYGEN THERAPY PER DAY

## 2019-04-26 PROCEDURE — 6360000002 HC RX W HCPCS: Performed by: NURSE ANESTHETIST, CERTIFIED REGISTERED

## 2019-04-26 DEVICE — IMPLANTABLE DEVICE: Type: IMPLANTABLE DEVICE | Site: SHOULDER | Status: FUNCTIONAL

## 2019-04-26 DEVICE — SCREW BNE L15MM DIA4.75MM HD DIA3.5MM CORT FIX ANG: Type: IMPLANTABLE DEVICE | Site: SHOULDER | Status: FUNCTIONAL

## 2019-04-26 DEVICE — BEARING HUM DIA4441MM H+0MM STD ARCOMXL FOR COMPHSVE REV: Type: IMPLANTABLE DEVICE | Site: SHOULDER | Status: FUNCTIONAL

## 2019-04-26 DEVICE — SCREW BNE L20MM DIA4.75MM HD DIA3.5MM CORT FIX ANG: Type: IMPLANTABLE DEVICE | Site: SHOULDER | Status: FUNCTIONAL

## 2019-04-26 DEVICE — SCREW BNE L35MM DIA6.5MM HEX HD DIA3.5MM FOR COMPHSVE CONV: Type: IMPLANTABLE DEVICE | Site: SHOULDER | Status: FUNCTIONAL

## 2019-04-26 DEVICE — TRAY HUM DIA44MM +5MM OFFSET CO CHROM FOR COMPHSVE REV: Type: IMPLANTABLE DEVICE | Site: SHOULDER | Status: FUNCTIONAL

## 2019-04-26 DEVICE — COMP REV AUG MI BSPLT W T: Type: IMPLANTABLE DEVICE | Site: SHOULDER | Status: FUNCTIONAL

## 2019-04-26 DEVICE — ANCHOR SUT NO2 DIA2.9MM MAXBRAID DBL LD SFT W/ TAPR NDL: Type: IMPLANTABLE DEVICE | Site: SHOULDER | Status: FUNCTIONAL

## 2019-04-26 RX ORDER — GLYCOPYRROLATE 1 MG/5 ML
SYRINGE (ML) INTRAVENOUS PRN
Status: DISCONTINUED | OUTPATIENT
Start: 2019-04-26 | End: 2019-04-26 | Stop reason: SDUPTHER

## 2019-04-26 RX ORDER — OXYCODONE HYDROCHLORIDE AND ACETAMINOPHEN 5; 325 MG/1; MG/1
2 TABLET ORAL EVERY 4 HOURS PRN
Status: DISCONTINUED | OUTPATIENT
Start: 2019-04-26 | End: 2019-04-27 | Stop reason: HOSPADM

## 2019-04-26 RX ORDER — OXYCODONE HYDROCHLORIDE AND ACETAMINOPHEN 5; 325 MG/1; MG/1
1 TABLET ORAL EVERY 6 HOURS PRN
Status: DISCONTINUED | OUTPATIENT
Start: 2019-04-26 | End: 2019-04-27 | Stop reason: HOSPADM

## 2019-04-26 RX ORDER — NEOSTIGMINE METHYLSULFATE 1 MG/ML
INJECTION, SOLUTION INTRAVENOUS PRN
Status: DISCONTINUED | OUTPATIENT
Start: 2019-04-26 | End: 2019-04-26 | Stop reason: SDUPTHER

## 2019-04-26 RX ORDER — DOCUSATE SODIUM 100 MG/1
100 CAPSULE, LIQUID FILLED ORAL 2 TIMES DAILY
Status: DISCONTINUED | OUTPATIENT
Start: 2019-04-26 | End: 2019-04-27 | Stop reason: HOSPADM

## 2019-04-26 RX ORDER — OXYCODONE HYDROCHLORIDE AND ACETAMINOPHEN 5; 325 MG/1; MG/1
1 TABLET ORAL EVERY 4 HOURS PRN
Status: DISCONTINUED | OUTPATIENT
Start: 2019-04-26 | End: 2019-04-27 | Stop reason: HOSPADM

## 2019-04-26 RX ORDER — SODIUM CHLORIDE 0.9 % (FLUSH) 0.9 %
10 SYRINGE (ML) INJECTION PRN
Status: DISCONTINUED | OUTPATIENT
Start: 2019-04-26 | End: 2019-04-27 | Stop reason: HOSPADM

## 2019-04-26 RX ORDER — ONDANSETRON 2 MG/ML
INJECTION INTRAMUSCULAR; INTRAVENOUS PRN
Status: DISCONTINUED | OUTPATIENT
Start: 2019-04-26 | End: 2019-04-26 | Stop reason: SDUPTHER

## 2019-04-26 RX ORDER — PHENYLEPHRINE HYDROCHLORIDE 10 MG/ML
INJECTION INTRAVENOUS PRN
Status: DISCONTINUED | OUTPATIENT
Start: 2019-04-26 | End: 2019-04-26 | Stop reason: SDUPTHER

## 2019-04-26 RX ORDER — MIDAZOLAM HYDROCHLORIDE 1 MG/ML
INJECTION INTRAMUSCULAR; INTRAVENOUS PRN
Status: DISCONTINUED | OUTPATIENT
Start: 2019-04-26 | End: 2019-04-26 | Stop reason: SDUPTHER

## 2019-04-26 RX ORDER — VANCOMYCIN HYDROCHLORIDE 500 MG/10ML
INJECTION, POWDER, LYOPHILIZED, FOR SOLUTION INTRAVENOUS PRN
Status: DISCONTINUED | OUTPATIENT
Start: 2019-04-26 | End: 2019-04-26 | Stop reason: ALTCHOICE

## 2019-04-26 RX ORDER — EPHEDRINE SULFATE/0.9% NACL/PF 50 MG/5 ML
SYRINGE (ML) INTRAVENOUS PRN
Status: DISCONTINUED | OUTPATIENT
Start: 2019-04-26 | End: 2019-04-26 | Stop reason: SDUPTHER

## 2019-04-26 RX ORDER — LEVETIRACETAM 500 MG/1
500 TABLET ORAL 2 TIMES DAILY
Status: DISCONTINUED | OUTPATIENT
Start: 2019-04-26 | End: 2019-04-27 | Stop reason: HOSPADM

## 2019-04-26 RX ORDER — OMEGA-3-ACID ETHYL ESTERS 1 G/1
1 CAPSULE, LIQUID FILLED ORAL DAILY
Status: DISCONTINUED | OUTPATIENT
Start: 2019-04-27 | End: 2019-04-27 | Stop reason: HOSPADM

## 2019-04-26 RX ORDER — BUPIVACAINE HYDROCHLORIDE AND EPINEPHRINE 5; 5 MG/ML; UG/ML
INJECTION, SOLUTION EPIDURAL; INTRACAUDAL; PERINEURAL PRN
Status: DISCONTINUED | OUTPATIENT
Start: 2019-04-26 | End: 2019-04-26 | Stop reason: ALTCHOICE

## 2019-04-26 RX ORDER — SODIUM CHLORIDE 0.9 % (FLUSH) 0.9 %
10 SYRINGE (ML) INJECTION PRN
Status: DISCONTINUED | OUTPATIENT
Start: 2019-04-26 | End: 2019-04-26 | Stop reason: HOSPADM

## 2019-04-26 RX ORDER — SODIUM CHLORIDE 0.9 % (FLUSH) 0.9 %
10 SYRINGE (ML) INJECTION EVERY 12 HOURS SCHEDULED
Status: DISCONTINUED | OUTPATIENT
Start: 2019-04-26 | End: 2019-04-26 | Stop reason: HOSPADM

## 2019-04-26 RX ORDER — ROCURONIUM BROMIDE 10 MG/ML
INJECTION, SOLUTION INTRAVENOUS PRN
Status: DISCONTINUED | OUTPATIENT
Start: 2019-04-26 | End: 2019-04-26 | Stop reason: SDUPTHER

## 2019-04-26 RX ORDER — SODIUM CHLORIDE 9 MG/ML
INJECTION, SOLUTION INTRAVENOUS CONTINUOUS
Status: DISCONTINUED | OUTPATIENT
Start: 2019-04-26 | End: 2019-04-26

## 2019-04-26 RX ORDER — SODIUM CHLORIDE 0.9 % (FLUSH) 0.9 %
10 SYRINGE (ML) INJECTION EVERY 12 HOURS SCHEDULED
Status: DISCONTINUED | OUTPATIENT
Start: 2019-04-26 | End: 2019-04-27 | Stop reason: HOSPADM

## 2019-04-26 RX ORDER — TEMAZEPAM 15 MG/1
15 CAPSULE ORAL NIGHTLY PRN
Status: DISCONTINUED | OUTPATIENT
Start: 2019-04-26 | End: 2019-04-26 | Stop reason: CLARIF

## 2019-04-26 RX ORDER — FENTANYL CITRATE 50 UG/ML
INJECTION, SOLUTION INTRAMUSCULAR; INTRAVENOUS PRN
Status: DISCONTINUED | OUTPATIENT
Start: 2019-04-26 | End: 2019-04-26 | Stop reason: SDUPTHER

## 2019-04-26 RX ORDER — PROMETHAZINE HYDROCHLORIDE 25 MG/ML
6.25 INJECTION, SOLUTION INTRAMUSCULAR; INTRAVENOUS
Status: DISCONTINUED | OUTPATIENT
Start: 2019-04-26 | End: 2019-04-26 | Stop reason: HOSPADM

## 2019-04-26 RX ORDER — LORAZEPAM 0.5 MG/1
0.5 TABLET ORAL NIGHTLY PRN
Status: DISCONTINUED | OUTPATIENT
Start: 2019-04-27 | End: 2019-04-27 | Stop reason: HOSPADM

## 2019-04-26 RX ORDER — SODIUM CHLORIDE, SODIUM LACTATE, POTASSIUM CHLORIDE, CALCIUM CHLORIDE 600; 310; 30; 20 MG/100ML; MG/100ML; MG/100ML; MG/100ML
INJECTION, SOLUTION INTRAVENOUS CONTINUOUS PRN
Status: DISCONTINUED | OUTPATIENT
Start: 2019-04-26 | End: 2019-04-26 | Stop reason: SDUPTHER

## 2019-04-26 RX ORDER — SODIUM CHLORIDE 9 MG/ML
INJECTION, SOLUTION INTRAVENOUS CONTINUOUS
Status: DISCONTINUED | OUTPATIENT
Start: 2019-04-26 | End: 2019-04-27 | Stop reason: HOSPADM

## 2019-04-26 RX ORDER — PROPOFOL 10 MG/ML
INJECTION, EMULSION INTRAVENOUS PRN
Status: DISCONTINUED | OUTPATIENT
Start: 2019-04-26 | End: 2019-04-26 | Stop reason: SDUPTHER

## 2019-04-26 RX ORDER — M-VIT,TX,IRON,MINS/CALC/FOLIC 27MG-0.4MG
1 TABLET ORAL DAILY
Status: DISCONTINUED | OUTPATIENT
Start: 2019-04-27 | End: 2019-04-27 | Stop reason: HOSPADM

## 2019-04-26 RX ORDER — LIDOCAINE HYDROCHLORIDE 20 MG/ML
INJECTION, SOLUTION EPIDURAL; INFILTRATION; INTRACAUDAL; PERINEURAL PRN
Status: DISCONTINUED | OUTPATIENT
Start: 2019-04-26 | End: 2019-04-26 | Stop reason: SDUPTHER

## 2019-04-26 RX ORDER — ATORVASTATIN CALCIUM 40 MG/1
40 TABLET, FILM COATED ORAL DAILY
Status: DISCONTINUED | OUTPATIENT
Start: 2019-04-27 | End: 2019-04-27 | Stop reason: HOSPADM

## 2019-04-26 RX ORDER — ONDANSETRON 2 MG/ML
4 INJECTION INTRAMUSCULAR; INTRAVENOUS EVERY 6 HOURS PRN
Status: DISCONTINUED | OUTPATIENT
Start: 2019-04-26 | End: 2019-04-27 | Stop reason: HOSPADM

## 2019-04-26 RX ORDER — PANTOPRAZOLE SODIUM 40 MG/1
40 TABLET, DELAYED RELEASE ORAL DAILY
Status: DISCONTINUED | OUTPATIENT
Start: 2019-04-27 | End: 2019-04-27 | Stop reason: HOSPADM

## 2019-04-26 RX ADMIN — OXYCODONE HYDROCHLORIDE AND ACETAMINOPHEN 2 TABLET: 5; 325 TABLET ORAL at 23:09

## 2019-04-26 RX ADMIN — ONDANSETRON HYDROCHLORIDE 4 MG: 2 INJECTION, SOLUTION INTRAMUSCULAR; INTRAVENOUS at 13:09

## 2019-04-26 RX ADMIN — PHENYLEPHRINE HYDROCHLORIDE 200 MCG: 10 INJECTION INTRAVENOUS at 12:48

## 2019-04-26 RX ADMIN — ROCURONIUM BROMIDE 25 MG: 10 SOLUTION INTRAVENOUS at 12:46

## 2019-04-26 RX ADMIN — Medication 3 MG: at 15:19

## 2019-04-26 RX ADMIN — LEVETIRACETAM 500 MG: 500 TABLET ORAL at 23:09

## 2019-04-26 RX ADMIN — DOCUSATE SODIUM 100 MG: 100 CAPSULE, LIQUID FILLED ORAL at 20:42

## 2019-04-26 RX ADMIN — HYDROMORPHONE HYDROCHLORIDE 0.5 MG: 1 INJECTION, SOLUTION INTRAMUSCULAR; INTRAVENOUS; SUBCUTANEOUS at 16:00

## 2019-04-26 RX ADMIN — PHENYLEPHRINE HYDROCHLORIDE 200 MCG: 10 INJECTION INTRAVENOUS at 12:15

## 2019-04-26 RX ADMIN — FENTANYL CITRATE 100 MCG: 50 INJECTION, SOLUTION INTRAMUSCULAR; INTRAVENOUS at 11:53

## 2019-04-26 RX ADMIN — TRANEXAMIC ACID 1000 MG: 100 INJECTION, SOLUTION INTRAVENOUS at 15:07

## 2019-04-26 RX ADMIN — MIDAZOLAM HYDROCHLORIDE 2 MG: 1 INJECTION, SOLUTION INTRAMUSCULAR; INTRAVENOUS at 11:53

## 2019-04-26 RX ADMIN — TRANEXAMIC ACID 1000 MG: 100 INJECTION, SOLUTION INTRAVENOUS at 10:36

## 2019-04-26 RX ADMIN — HYDROMORPHONE HYDROCHLORIDE 0.5 MG: 1 INJECTION, SOLUTION INTRAMUSCULAR; INTRAVENOUS; SUBCUTANEOUS at 16:06

## 2019-04-26 RX ADMIN — Medication 0.4 MG: at 15:19

## 2019-04-26 RX ADMIN — Medication 20 MG: at 15:17

## 2019-04-26 RX ADMIN — SODIUM CHLORIDE, POTASSIUM CHLORIDE, SODIUM LACTATE AND CALCIUM CHLORIDE: 600; 310; 30; 20 INJECTION, SOLUTION INTRAVENOUS at 11:53

## 2019-04-26 RX ADMIN — Medication 2 G: at 20:36

## 2019-04-26 RX ADMIN — PHENYLEPHRINE HYDROCHLORIDE 100 MCG: 10 INJECTION INTRAVENOUS at 13:26

## 2019-04-26 RX ADMIN — ROCURONIUM BROMIDE 25 MG: 10 SOLUTION INTRAVENOUS at 13:21

## 2019-04-26 RX ADMIN — OXYCODONE HYDROCHLORIDE AND ACETAMINOPHEN 2 TABLET: 5; 325 TABLET ORAL at 18:32

## 2019-04-26 RX ADMIN — Medication 10 MG: at 14:12

## 2019-04-26 RX ADMIN — PROPOFOL 130 MG: 10 INJECTION, EMULSION INTRAVENOUS at 12:03

## 2019-04-26 RX ADMIN — SODIUM CHLORIDE: 9 INJECTION, SOLUTION INTRAVENOUS at 17:12

## 2019-04-26 RX ADMIN — PHENYLEPHRINE HYDROCHLORIDE 200 MCG: 10 INJECTION INTRAVENOUS at 12:28

## 2019-04-26 RX ADMIN — Medication 2 G: at 12:10

## 2019-04-26 RX ADMIN — LIDOCAINE HYDROCHLORIDE 100 MG: 20 INJECTION, SOLUTION EPIDURAL; INFILTRATION; INTRACAUDAL; PERINEURAL at 12:03

## 2019-04-26 RX ADMIN — Medication 10 MG: at 13:43

## 2019-04-26 RX ADMIN — ROCURONIUM BROMIDE 10 MG: 10 SOLUTION INTRAVENOUS at 14:50

## 2019-04-26 RX ADMIN — Medication 10 MG: at 14:37

## 2019-04-26 RX ADMIN — ROCURONIUM BROMIDE 50 MG: 10 SOLUTION INTRAVENOUS at 12:03

## 2019-04-26 RX ADMIN — PHENYLEPHRINE HYDROCHLORIDE 100 MCG: 10 INJECTION INTRAVENOUS at 13:13

## 2019-04-26 ASSESSMENT — PULMONARY FUNCTION TESTS
PIF_VALUE: 23
PIF_VALUE: 26
PIF_VALUE: 22
PIF_VALUE: 23
PIF_VALUE: 22
PIF_VALUE: 22
PIF_VALUE: 23
PIF_VALUE: 23
PIF_VALUE: 22
PIF_VALUE: 23
PIF_VALUE: 23
PIF_VALUE: 1
PIF_VALUE: 21
PIF_VALUE: 20
PIF_VALUE: 21
PIF_VALUE: 23
PIF_VALUE: 22
PIF_VALUE: 22
PIF_VALUE: 23
PIF_VALUE: 22
PIF_VALUE: 22
PIF_VALUE: 11
PIF_VALUE: 23
PIF_VALUE: 3
PIF_VALUE: 21
PIF_VALUE: 13
PIF_VALUE: 22
PIF_VALUE: 23
PIF_VALUE: 21
PIF_VALUE: 6
PIF_VALUE: 23
PIF_VALUE: 21
PIF_VALUE: 22
PIF_VALUE: 22
PIF_VALUE: 23
PIF_VALUE: 21
PIF_VALUE: 0
PIF_VALUE: 14
PIF_VALUE: 23
PIF_VALUE: 0
PIF_VALUE: 0
PIF_VALUE: 23
PIF_VALUE: 22
PIF_VALUE: 23
PIF_VALUE: 22
PIF_VALUE: 3
PIF_VALUE: 22
PIF_VALUE: 21
PIF_VALUE: 22
PIF_VALUE: 21
PIF_VALUE: 22
PIF_VALUE: 21
PIF_VALUE: 0
PIF_VALUE: 1
PIF_VALUE: 23
PIF_VALUE: 24
PIF_VALUE: 23
PIF_VALUE: 23
PIF_VALUE: 14
PIF_VALUE: 22
PIF_VALUE: 21
PIF_VALUE: 23
PIF_VALUE: 22
PIF_VALUE: 23
PIF_VALUE: 23
PIF_VALUE: 22
PIF_VALUE: 21
PIF_VALUE: 3
PIF_VALUE: 22
PIF_VALUE: 13
PIF_VALUE: 22
PIF_VALUE: 22
PIF_VALUE: 23
PIF_VALUE: 23
PIF_VALUE: 22
PIF_VALUE: 21
PIF_VALUE: 23
PIF_VALUE: 14
PIF_VALUE: 22
PIF_VALUE: 23
PIF_VALUE: 23
PIF_VALUE: 21
PIF_VALUE: 23
PIF_VALUE: 22
PIF_VALUE: 23
PIF_VALUE: 1
PIF_VALUE: 23
PIF_VALUE: 22
PIF_VALUE: 22
PIF_VALUE: 23
PIF_VALUE: 21
PIF_VALUE: 22
PIF_VALUE: 22
PIF_VALUE: 23
PIF_VALUE: 21
PIF_VALUE: 23
PIF_VALUE: 23
PIF_VALUE: 13
PIF_VALUE: 23
PIF_VALUE: 14
PIF_VALUE: 14
PIF_VALUE: 23
PIF_VALUE: 21
PIF_VALUE: 22
PIF_VALUE: 23
PIF_VALUE: 22
PIF_VALUE: 23
PIF_VALUE: 24
PIF_VALUE: 15
PIF_VALUE: 21
PIF_VALUE: 21
PIF_VALUE: 22
PIF_VALUE: 21
PIF_VALUE: 15
PIF_VALUE: 22
PIF_VALUE: 23
PIF_VALUE: 0
PIF_VALUE: 23
PIF_VALUE: 22
PIF_VALUE: 14
PIF_VALUE: 22
PIF_VALUE: 21
PIF_VALUE: 15
PIF_VALUE: 0
PIF_VALUE: 22
PIF_VALUE: 22
PIF_VALUE: 26
PIF_VALUE: 3
PIF_VALUE: 23
PIF_VALUE: 15
PIF_VALUE: 13
PIF_VALUE: 21
PIF_VALUE: 22
PIF_VALUE: 13
PIF_VALUE: 2
PIF_VALUE: 22
PIF_VALUE: 2
PIF_VALUE: 23
PIF_VALUE: 26
PIF_VALUE: 23
PIF_VALUE: 22
PIF_VALUE: 1
PIF_VALUE: 15
PIF_VALUE: 22
PIF_VALUE: 21
PIF_VALUE: 13
PIF_VALUE: 22
PIF_VALUE: 21
PIF_VALUE: 23
PIF_VALUE: 22
PIF_VALUE: 21
PIF_VALUE: 22
PIF_VALUE: 22
PIF_VALUE: 26
PIF_VALUE: 22
PIF_VALUE: 21
PIF_VALUE: 22
PIF_VALUE: 13
PIF_VALUE: 12
PIF_VALUE: 23
PIF_VALUE: 22
PIF_VALUE: 15
PIF_VALUE: 24
PIF_VALUE: 22
PIF_VALUE: 22
PIF_VALUE: 23
PIF_VALUE: 1
PIF_VALUE: 22
PIF_VALUE: 14
PIF_VALUE: 23
PIF_VALUE: 23
PIF_VALUE: 22
PIF_VALUE: 23
PIF_VALUE: 22
PIF_VALUE: 23
PIF_VALUE: 23
PIF_VALUE: 14
PIF_VALUE: 23
PIF_VALUE: 2
PIF_VALUE: 21
PIF_VALUE: 22
PIF_VALUE: 23
PIF_VALUE: 23
PIF_VALUE: 14
PIF_VALUE: 22
PIF_VALUE: 14
PIF_VALUE: 21
PIF_VALUE: 23
PIF_VALUE: 22
PIF_VALUE: 22
PIF_VALUE: 23
PIF_VALUE: 22
PIF_VALUE: 21
PIF_VALUE: 23
PIF_VALUE: 4
PIF_VALUE: 23
PIF_VALUE: 22
PIF_VALUE: 12
PIF_VALUE: 22
PIF_VALUE: 23
PIF_VALUE: 22
PIF_VALUE: 22
PIF_VALUE: 21
PIF_VALUE: 21
PIF_VALUE: 1
PIF_VALUE: 22
PIF_VALUE: 23
PIF_VALUE: 21
PIF_VALUE: 22

## 2019-04-26 ASSESSMENT — PAIN - FUNCTIONAL ASSESSMENT
PAIN_FUNCTIONAL_ASSESSMENT: PREVENTS OR INTERFERES SOME ACTIVE ACTIVITIES AND ADLS
PAIN_FUNCTIONAL_ASSESSMENT: 0-10

## 2019-04-26 ASSESSMENT — PAIN DESCRIPTION - ORIENTATION
ORIENTATION: RIGHT

## 2019-04-26 ASSESSMENT — PAIN DESCRIPTION - ONSET
ONSET: GRADUAL

## 2019-04-26 ASSESSMENT — PAIN DESCRIPTION - FREQUENCY
FREQUENCY: CONTINUOUS

## 2019-04-26 ASSESSMENT — PAIN SCALES - GENERAL
PAINLEVEL_OUTOF10: 0
PAINLEVEL_OUTOF10: 8
PAINLEVEL_OUTOF10: 0
PAINLEVEL_OUTOF10: 3
PAINLEVEL_OUTOF10: 7
PAINLEVEL_OUTOF10: 5
PAINLEVEL_OUTOF10: 8
PAINLEVEL_OUTOF10: 4
PAINLEVEL_OUTOF10: 3
PAINLEVEL_OUTOF10: 8

## 2019-04-26 ASSESSMENT — PAIN DESCRIPTION - LOCATION
LOCATION: SHOULDER

## 2019-04-26 ASSESSMENT — PAIN DESCRIPTION - PAIN TYPE
TYPE: SURGICAL PAIN

## 2019-04-26 ASSESSMENT — PAIN DESCRIPTION - DESCRIPTORS
DESCRIPTORS: DISCOMFORT
DESCRIPTORS: ACHING;DISCOMFORT
DESCRIPTORS: ACHING;DISCOMFORT
DESCRIPTORS: DISCOMFORT
DESCRIPTORS: DISCOMFORT
DESCRIPTORS: ACHING;DISCOMFORT
DESCRIPTORS: DISCOMFORT

## 2019-04-26 ASSESSMENT — PAIN DESCRIPTION - PROGRESSION
CLINICAL_PROGRESSION: GRADUALLY WORSENING
CLINICAL_PROGRESSION: GRADUALLY IMPROVING
CLINICAL_PROGRESSION: GRADUALLY WORSENING

## 2019-04-26 NOTE — ANESTHESIA POSTPROCEDURE EVALUATION
Department of Anesthesiology  Postprocedure Note    Patient: Mike Starks  MRN: [id number]  YOB: 1950  Date of evaluation: 4/26/2019  Time:  4:33 PM     Procedure Summary     Date:  04/26/19 Room / Location:  [facility] OR  / [facility] OR    Anesthesia Start:  2988 Anesthesia Stop:  8623    Procedure:  RIGHT REVERSE TOTAL SHOULDER ARTHROPLASTY (Right Shoulder) Diagnosis:  (CLOSED 3 PART FRACTURE OF PROXIMAL HUMERUS RIGHT)    Surgeon:  Garett Moore MD Responsible Provider:  Winston Burger MD    Anesthesia Type:  general ASA Status:  3          Anesthesia Type: general    Jayce Phase I: Jayce Score: 9    Jayce Phase II:      Last vitals: Reviewed and per EMR flowsheets.        Anesthesia Post Evaluation    Patient location during evaluation: PACU  Patient participation: complete - patient participated  Level of consciousness: awake and alert  Pain score: 4  Airway patency: patent  Nausea & Vomiting: no vomiting and no nausea  Complications: no  Cardiovascular status: hemodynamically stable  Respiratory status: spontaneous ventilation  Hydration status: stable

## 2019-04-26 NOTE — BRIEF OP NOTE
IMPL RINGLO+REPL RNG SZ 21 Shoulder/Arm/Wrist/Hand IMPL RINGLO+REPL RNG SZ 21  [employer] [id number] Right 1   SCREW TAMMY PTHRD 5.0X60MM Screw/Plate/Nail/Paulino SCREW TAMMY PTHRD 5.0X60MM  [employer]  Right 1         Drains:   [REMOVED] NG/OG/NJ/NE Tube Nasogastric 16 fr Right nostril (Removed)       Findings: see op note    GILDA Gunter  Date: 4/26/2019  Time: 3:35 PM

## 2019-04-26 NOTE — ANESTHESIA PRE PROCEDURE
Department of Anesthesiology  Preprocedure Note       Name:  Shelley Garcia   Age:  78 y.o.  :  1950                                          MRN:  [id number]         Date:  2019      Surgeon: Rufino Dale):  Maurice Marx MD    Procedure: RIGHT PROXIMAL SHOULDER OPEN REDUCTION INTERNAL FIXATION (BIOMET,BONE GRAFT) (Right )  RIGHT REVERSE TOTAL SHOULDER ARTHROPLASTY (Right )    Medications prior to admission:   Prior to Admission medications    Medication Sig Start Date End Date Taking? Authorizing Provider   temazepam (RESTORIL) 15 MG capsule Take 1 capsule by mouth as needed for Sleep for up to 30 days. 19  Sergo Mishra DO   oxyCODONE-acetaminophen (PERCOCET) 5-325 MG per tablet Take 1 tablet by mouth every 6 hours as needed for Pain for up to 5 days.  19  Maurice Marx MD   levETIRAcetam (KEPPRA) 500 MG tablet Take 1 tablet by mouth 2 times daily 19   Yadi Piña MD   atorvastatin (LIPITOR) 40 MG tablet TAKE 1 TABLET BY MOUTH EVERY DAY 19   Jaime Mishra DO   pantoprazole (PROTONIX) 40 MG tablet TAKE 1 TABLET BY MOUTH EVERY DAY 19   Sergo Mishra DO   omega-3 acid ethyl esters (LOVAZA) 1 g capsule TAKE 1 CAPSULE BY MOUTH FOUR TIMES DAILY 19   Sergo Mishra DO   celecoxib (CELEBREX) 200 MG capsule TAKE 1 CAPSULE BY MOUTH EVERY DAY 19   Sergo Mishra DO   ezetimibe (ZETIA) 10 MG tablet Take 1 tablet by mouth daily 10/3/18   Pedro Mishra DO   Flaxseed, Linseed, (FLAX SEEDS PO) Take 1 capsule by mouth daily Ld 2019    Historical Provider, MD [place] Gomez EXTR Take 1 capsule by mouth daily Ld 2019    Historical Provider, MD   therapeutic multivitamin-minerals [facility]) tablet Take 1 tablet by mouth daily Ld 2019    Historical Provider, MD       Current medications:    Current Facility-Administered Medications   Medication Dose Route Frequency Provider Last Rate Last Dose    tranexamic acid (CYKLOKAPRON) 1,000 mg in dextrose 5 % 100 mL IVPB  1,000 mg Intravenous Once GILDA Khan 400 mL/hr at 04/26/19 1036 1,000 mg at 04/26/19 1036    0.9 % sodium chloride infusion   Intravenous Continuous GILDA Khan        ceFAZolin (ANCEF) 2 g in dextrose 5 % 100 mL IVPB  2 g Intravenous On Call to [address]        sodium chloride flush 0.9 % injection 10 mL  10 mL Intravenous 2 times per day GILDA Khan        sodium chloride flush 0.9 % injection 10 mL  10 mL Intravenous PRN GILDA Khan           Allergies:  No Known Allergies    Problem List:    Patient Active Problem List   Diagnosis Code    GERD (gastroesophageal reflux disease) K21.9    Mixed hyperlipidemia E78.2    Lumbago M54.5    Arthritis M19.90    Polyp of colon K63.5    Hyperlipidemia E78.5    Hernia, incisional K43.2    Hypoglycemia E16.2    Small bowel obstruction ([address] K56.609    New onset seizure ([address] R56.9    Humerus fracture S42.309A    Partial symptomatic epilepsy with complex partial seizures, not intractable, without status epilepticus ([address] G40.209    Closed 3-part fracture of proximal end of humerus, initial encounter S42.293A       Past Medical History:        Diagnosis Date    Arthritis     Dislocated shoulder     right    GERD (gastroesophageal reflux disease) 1/4/2015    Hyperlipidemia     Hypoglycemia     Liver disease     Mixed hyperlipidemia 1/4/2015    New onset seizure ([address] 4/17/2019       Past Surgical History:        Procedure Laterality Date    COLON SURGERY      COLONOSCOPY      INCISIONAL HERNIA REPAIR N/A 10/13/2016    INGUINAL HERNIA REPAIR Left 2/19/2013    laparoscopic left inguinal hernia repair    PANCREAS SURGERY      Whipple procedure    MD LAP,SURG,COLECT,TOT,W/PROCTECT,W/ILEOST N/A 8/20/2018    DIAGNOSTIC LAPAROSCOPY POSS LAPAROTOMY POSS BOWEL RESECTION performed by Kevin Lyons MD at [address] N/A 9/6/2018    LAPAROSCOPIC REVISION OF GASTROJEJUNOSTOMY; INTRAOPERATIVE EGD performed by Katiana Cook MD at [address]      right cyst removed 1965    SKIN BIOPSY      UPPER GASTROINTESTINAL ENDOSCOPY      UPPER GASTROINTESTINAL ENDOSCOPY N/A 2018    EGD BIOPSY performed by Katiana Cook MD at [address] History:    Social History     Tobacco Use    Smoking status: Former Smoker     Last attempt to quit: 2011     Years since quittin.2    Smokeless tobacco: Never Used   Substance Use Topics    Alcohol use: Yes     Alcohol/week: 6.0 oz     Types: 10 Cans of beer per week     Comment: moderate 3-4 per day                                Counseling given: Not Answered      Vital Signs (Current):   Vitals:    19 1153 19 1012   BP:  126/69   Pulse:  82   Resp:  18   Temp:  98.2 °F (36.8 °C)   TempSrc:  Temporal   SpO2:  97%   Weight: 150 lb (68 kg) 148 lb (67.1 kg)   Height: 5' 7\" (1.702 m) 5' 7\" (1.702 m)                                              BP Readings from Last 3 Encounters:   19 126/69   19 125/75   19 130/60       NPO Status: Time of last liquid consumption:                         Time of last solid consumption:                         Date of last liquid consumption: 19                        Date of last solid food consumption: 19    BMI:   Wt Readings from Last 3 Encounters:   19 148 lb (67.1 kg)   19 150 lb (68 kg)   19 153 lb 3.2 oz (69.5 kg)     Body mass index is 23.18 kg/m².     CBC:   Lab Results   Component Value Date    WBC 8.0 2019    RBC 3.56 2019    HGB 9.8 2019    HCT 29.3 2019    MCV 82.3 2019    RDW 15.1 2019     2019       CMP:   Lab Results   Component Value Date     2019    K 4.1 2019    K 4.3 2018    CL 96 2019    CO2 25 2019    BUN 12 2019    CREATININE 0.9 2019    GFRAA >60 2019 LABGLOM >60 04/19/2019    GLUCOSE 132 04/19/2019    PROT 6.6 04/17/2019    CALCIUM 8.9 04/19/2019    BILITOT 0.3 04/17/2019    ALKPHOS 37 04/17/2019    AST 52 04/17/2019    ALT 19 04/17/2019       POC Tests: No results for input(s): POCGLU, POCNA, POCK, POCCL, POCBUN, POCHEMO, POCHCT in the last 72 hours. Coags:   Lab Results   Component Value Date    PROTIME 11.9 04/18/2019    INR 1.0 04/18/2019    APTT 27.8 04/18/2019       HCG (If Applicable): No results found for: PREGTESTUR, PREGSERUM, HCG, HCGQUANT     ABGs: No results found for: PHART, PO2ART, YOK5JTQ, ROB8OBV, BEART, N1GAXPFL     Type & Screen (If Applicable):  No results found for: [facility]    Anesthesia Evaluation  Patient summary reviewed and Nursing notes reviewed no history of anesthetic complications:   Airway: Mallampati: III  TM distance: >3 FB   Neck ROM: full  Mouth opening: > = 3 FB Dental: normal exam         Pulmonary:Negative Pulmonary ROS breath sounds clear to auscultation                             Cardiovascular:  Exercise tolerance: good (>4 METS),           Rhythm: regular  Rate: normal                    Neuro/Psych:   (+) seizures: no interval change,             GI/Hepatic/Renal:   (+) GERD: no interval change,           Endo/Other: Negative Endo/Other ROS                    Abdominal:           Vascular: negative vascular ROS. Anesthesia Plan      general     ASA 3       Induction: intravenous. MIPS: Postoperative opioids intended and Prophylactic antiemetics administered. Anesthetic plan and risks discussed with patient and spouse.       Plan discussed with CRNA and surgical team.                  Myriam Torres MD   4/26/2019

## 2019-04-26 NOTE — H&P
Department of Orthopedic Surgery  History and Physical        CHIEF COMPLAINT:  Right shoulder injury     HISTORY OF PRESENT ILLNESS:                 The patient is a 78 y. o. male who presents with right shoulder pain after the patient had a seizure. Ralcj Luis He did not fall but rather his arm became very spasmodic. He is right hand dominant. Denies any numbness or paresthesias. Denies other orthopedic complaints at this time. He was seen in ED at [facility] and was transferred to [facility] where attempted closed reduction was performed. He was admitted and worked-up for seizure and discharged. He is seen 5 days post-injury.  CT scan of right shoulder demonstrates persistent anterior shoulder dislocation with 3-part proximal humerus fracture,     Past Medical History:    Past Medical History             Diagnosis Date    Arthritis      GERD (gastroesophageal reflux disease) 1/4/2015    Hyperlipidemia      Hypoglycemia      Mixed hyperlipidemia 1/4/2015    Nausea & vomiting 1/4/2015    New onset seizure ([address] 4/17/2019    Pneumonia 1/4/2015         Past Surgical History:    Past Surgical History             Procedure Laterality Date    COLON SURGERY        COLONOSCOPY        INCISIONAL HERNIA REPAIR N/A 10/13/2016    INGUINAL HERNIA REPAIR Left 2/19/2013     laparoscopic left inguinal hernia repair    PANCREAS SURGERY         Whipple procedure    [facility] LAP,SURG,COLECT,TOT,W/PROCTECT,W/ILEOST N/A 8/20/2018     DIAGNOSTIC LAPAROSCOPY POSS LAPAROTOMY POSS BOWEL RESECTION performed by Katiana Cook MD at [address] 9/6/2018     LAPAROSCOPIC REVISION OF GASTROJEJUNOSTOMY; INTRAOPERATIVE EGD performed by Katiana Cook MD at [facility]         right cyst removed [address]        UPPER GASTROINTESTINAL ENDOSCOPY        UPPER GASTROINTESTINAL ENDOSCOPY N/A 9/5/2018     EGD BIOPSY performed by Katiana Cook MD at [address] Medications:   Current Hospital Medications   No current facility-administered medications for this visit. Allergies:  Patient has no known allergies.     Social History:   TOBACCO:   reports that he quit smoking about 8 years ago. He has never used smokeless tobacco.  ETOH:   reports that he drank about 6.0 oz of alcohol per week. DRUGS:   reports that he does not use drugs. ACTIVITIES OF DAILY LIVING:    OCCUPATION:    Family History:   Family History             Problem Relation Age of Onset    High Cholesterol Mother      Stroke Mother      Other Father            in , ? pneumonia and prostate cancer.  Diabetes Brother      Mental Illness Brother      Stroke Brother      Cancer Brother              REVIEW OF SYSTEMS:  CONSTITUTIONAL:  negative  EYES:  negative  HEENT:  negative  RESPIRATORY:  negative  CARDIOVASCULAR:  negative  GASTROINTESTINAL:  Whipple procedure  GENITOURINARY:  negative  INTEGUMENT/BREAST:  negative  HEMATOLOGIC/LYMPHATIC:  negative  ALLERGIC/IMMUNOLOGIC:  negative  ENDOCRINE:  negative  MUSCULOSKELETAL:  negative  NEUROLOGICAL:  negative  BEHAVIOR/PSYCH:  negative     PHYSICAL EXAM:    VITALS:  /75 (Site: Left Upper Arm, Position: Sitting)   Pulse 91   Resp 16   Ht 5' 7\" (1.702 m)   Wt 150 lb (68 kg)   BMI 23.49 kg/m²   CONSTITUTIONAL:  awake, alert, cooperative, no apparent distress, and appears stated age  EYES:  Lids and lashes normal, pupils equal, round and reactive to light, extra ocular muscles intact, sclera clear, conjunctiva normal  ENT:  Normocephalic, without obvious abnormality, atraumatic, sinuses nontender on palpation, external ears without lesions, oral pharynx with moist mucus membranes, tonsils without erythema or exudates, gums normal and good dentition.   NECK:  Supple, symmetrical, trachea midline, no adenopathy, thyroid symmetric, not enlarged and no tenderness, skin normal  LUNGS:  CTA  CARDIOVASCULAR:  RRR  ABDOMEN: for hardware removal, stiffness, as well as the possible need further surgery and unanticipated complications. The patient voiced understanding and all questions were answered. The patient elected to proceed with surgical intervention. History and Physical Update     Patient was seen and examined. Patient's history and physical was reviewed with the patient. There has been no significant interval changes.

## 2019-04-26 NOTE — CONSULTS
[address] [facility] for Medical Management      Reason for Consult:  Medical management    History of Present Illness:  78 y.o. male with a history of seizures, Hyperlipidemia presents following surgery for right shoulder    Informant(s) for H&P: patient    REVIEW OF SYSTEMS:  Complete ROS performed with patient, pertinent positives and negatives are listed in the HPI. PMH:  Past Medical History:   Diagnosis Date    Arthritis     Dislocated shoulder     right    GERD (gastroesophageal reflux disease) 1/4/2015    Hyperlipidemia     Hypoglycemia     Liver disease     Mixed hyperlipidemia 1/4/2015    New onset seizure ([address] 4/17/2019       Surgical History:  Past Surgical History:   Procedure Laterality Date    COLON SURGERY      COLONOSCOPY      INCISIONAL HERNIA REPAIR N/A 10/13/2016    INGUINAL HERNIA REPAIR Left 2/19/2013    laparoscopic left inguinal hernia repair    PANCREAS SURGERY      Whipple procedure    [place] LAP,SURG,COLECT,TOT,W/PROCTECT,W/ILEOST N/A 8/20/2018    DIAGNOSTIC LAPAROSCOPY POSS LAPAROTOMY POSS BOWEL RESECTION performed by Landry Grant MD at [address] 9/6/2018    LAPAROSCOPIC REVISION OF GASTROJEJUNOSTOMY; INTRAOPERATIVE EGD performed by Landry Grant MD at [address]      right cyst removed [address] [facility]      UPPER GASTROINTESTINAL ENDOSCOPY      UPPER GASTROINTESTINAL ENDOSCOPY N/A 9/5/2018    EGD BIOPSY performed by Landry Grant MD at [place] [address]       Medications Prior to Admission:    Prior to Admission medications    Medication Sig Start Date End Date Taking? Authorizing Provider   temazepam (RESTORIL) 15 MG capsule Take 1 capsule by mouth as needed for Sleep for up to 30 days. 4/25/19 5/25/19  Rolf Mishra DO   oxyCODONE-acetaminophen (PERCOCET) 5-325 MG per tablet Take 1 tablet by mouth every 6 hours as needed for Pain for up to 5 days. movements intact, conjunctivae normal  ENT: tympanic membrane, external ear and ear canal normal bilaterally, oropharynx clear and moist with normal mucous membranes  Neck: neck supple and non tender without mass, no thyromegaly or thyroid nodules, no cervical lymphadenopathy   Pulmonary/Chest: clear to auscultation bilaterally- no wheezes, rales or rhonchi, normal air movement, no respiratory distress  Cardiovascular: normal rate, normal S1 and S2, no gallops, intact distal pulses and no carotid bruits  Abdomen: soft, non-tender, non-distended, normal bowel sounds, no masses or organomegaly      LABS:  No results for input(s): NA, K, CL, CO2, BUN, CREATININE, GLUCOSE, CALCIUM in the last 72 hours. No results for input(s): WBC, RBC, HGB, HCT, MCV, MCH, MCHC, RDW, PLT, MPV in the last 72 hours. No results for input(s): POCGLU in the last 72 hours. Radiology:   XR SHOULDER RIGHT (MIN 2 VIEWS)   Final Result      Satisfactory alignment status post right shoulder arthroplasty. EKG:       ASSESSMENT:      Active Problems:    Closed 3-part fracture of proximal end of humerus, initial encounter    Closed 3-part fracture of proximal end of right humerus  Resolved Problems:    * No resolved hospital problems. *      PLAN:    1. Right humorous Fracture and now has undergonr right total reverse Arthroplasty  2. Hyperlipidemia / on Lipitor  3. Seizure / continue Vanessa Basil    Thank you very much for this interesting consult and we will continue to follow along. Feel free to call with any questions at [phone]. Electronically signed by Lopez Maher MD on 4/26/2019 at 6:45 PM    NOTE: This report was transcribed using voice recognition software.  Every effort was made to ensure accuracy; however, inadvertent computerized transcription errors may be present.

## 2019-04-26 NOTE — PROGRESS NOTES
Call placed to Dr Ambreen Conte for medical management    Electronically signed by Greer Simmonds, RN on 4/26/2019 at 5:55 PM

## 2019-04-26 NOTE — PROGRESS NOTES
1537 admitted to pacu post op right shoulder dressing dry and intact  Sling intac and is able to move fingers well / pink / warm   1545 x-rays done post op in pacu of right shoulder area

## 2019-04-27 VITALS
OXYGEN SATURATION: 99 % | HEART RATE: 78 BPM | SYSTOLIC BLOOD PRESSURE: 127 MMHG | WEIGHT: 148 LBS | HEIGHT: 67 IN | DIASTOLIC BLOOD PRESSURE: 62 MMHG | TEMPERATURE: 98.6 F | RESPIRATION RATE: 16 BRPM | BODY MASS INDEX: 23.23 KG/M2

## 2019-04-27 PROBLEM — S42.291A CLOSED 3-PART FRACTURE OF PROXIMAL END OF RIGHT HUMERUS: Status: RESOLVED | Noted: 2019-04-26 | Resolved: 2019-04-27

## 2019-04-27 PROBLEM — S42.293A: Status: RESOLVED | Noted: 2019-04-26 | Resolved: 2019-04-27

## 2019-04-27 LAB — MRSA CULTURE ONLY: NORMAL

## 2019-04-27 PROCEDURE — 6370000000 HC RX 637 (ALT 250 FOR IP): Performed by: INTERNAL MEDICINE

## 2019-04-27 PROCEDURE — G8987 SELF CARE CURRENT STATUS: HCPCS

## 2019-04-27 PROCEDURE — 97166 OT EVAL MOD COMPLEX 45 MIN: CPT

## 2019-04-27 PROCEDURE — G8988 SELF CARE GOAL STATUS: HCPCS

## 2019-04-27 PROCEDURE — 97530 THERAPEUTIC ACTIVITIES: CPT

## 2019-04-27 PROCEDURE — 80177 DRUG SCRN QUAN LEVETIRACETAM: CPT

## 2019-04-27 PROCEDURE — 97116 GAIT TRAINING THERAPY: CPT

## 2019-04-27 PROCEDURE — 99024 POSTOP FOLLOW-UP VISIT: CPT | Performed by: ORTHOPAEDIC SURGERY

## 2019-04-27 PROCEDURE — 6360000002 HC RX W HCPCS: Performed by: PHYSICIAN ASSISTANT

## 2019-04-27 PROCEDURE — 2700000000 HC OXYGEN THERAPY PER DAY

## 2019-04-27 PROCEDURE — 6370000000 HC RX 637 (ALT 250 FOR IP): Performed by: PHYSICIAN ASSISTANT

## 2019-04-27 PROCEDURE — 99233 SBSQ HOSP IP/OBS HIGH 50: CPT | Performed by: INTERNAL MEDICINE

## 2019-04-27 PROCEDURE — 97161 PT EVAL LOW COMPLEX 20 MIN: CPT

## 2019-04-27 RX ADMIN — OXYCODONE HYDROCHLORIDE AND ACETAMINOPHEN 2 TABLET: 5; 325 TABLET ORAL at 03:39

## 2019-04-27 RX ADMIN — LEVETIRACETAM 500 MG: 500 TABLET ORAL at 08:13

## 2019-04-27 RX ADMIN — DOCUSATE SODIUM 100 MG: 100 CAPSULE, LIQUID FILLED ORAL at 08:13

## 2019-04-27 RX ADMIN — Medication 2 G: at 03:39

## 2019-04-27 RX ADMIN — PANTOPRAZOLE SODIUM 40 MG: 40 TABLET, DELAYED RELEASE ORAL at 08:13

## 2019-04-27 RX ADMIN — Medication 1 TABLET: at 08:13

## 2019-04-27 RX ADMIN — OXYCODONE HYDROCHLORIDE AND ACETAMINOPHEN 2 TABLET: 5; 325 TABLET ORAL at 08:14

## 2019-04-27 RX ADMIN — ATORVASTATIN CALCIUM 40 MG: 40 TABLET, FILM COATED ORAL at 08:16

## 2019-04-27 RX ADMIN — OXYCODONE HYDROCHLORIDE AND ACETAMINOPHEN 2 TABLET: 5; 325 TABLET ORAL at 12:16

## 2019-04-27 RX ADMIN — OMEGA-3-ACID ETHYL ESTERS 1 G: 900 CAPSULE, LIQUID FILLED ORAL at 08:13

## 2019-04-27 ASSESSMENT — PAIN DESCRIPTION - DESCRIPTORS: DESCRIPTORS: ACHING;DISCOMFORT

## 2019-04-27 ASSESSMENT — PAIN SCALES - GENERAL
PAINLEVEL_OUTOF10: 7

## 2019-04-27 ASSESSMENT — PAIN DESCRIPTION - PAIN TYPE
TYPE: ACUTE PAIN;SURGICAL PAIN
TYPE: SURGICAL PAIN

## 2019-04-27 ASSESSMENT — PAIN DESCRIPTION - LOCATION
LOCATION: ARM;SHOULDER
LOCATION: SHOULDER

## 2019-04-27 ASSESSMENT — PAIN - FUNCTIONAL ASSESSMENT: PAIN_FUNCTIONAL_ASSESSMENT: PREVENTS OR INTERFERES SOME ACTIVE ACTIVITIES AND ADLS

## 2019-04-27 ASSESSMENT — PAIN DESCRIPTION - ORIENTATION
ORIENTATION: RIGHT
ORIENTATION: RIGHT

## 2019-04-27 NOTE — PROGRESS NOTES
Department of Orthopedic Surgery  Allen Werner MD      Subjective:  Pt has little complaints today.  Pain controlled    Vitals  VITALS:  /62   Pulse 78   Temp 98.6 °F (37 °C)   Resp 16   Ht 5' 7\" (1.702 m)   Wt 148 lb (67.1 kg)   SpO2 99%   BMI 23.18 kg/m²     PHYSICAL EXAM:    Orientation:  alert and oriented to person, place and time    Right Upper Extremity    Dressing/Incision:  dressing in place, clean, dry and intact    Upper Extremity Motor :  Radial, Median, Ulnar, PIN, AIN nerves  intact    Upper Extremity Sensory: intact to light touch    Pulses:   2    Compartments:soft        LABS:  CBC:   Lab Results   Component Value Date    WBC 8.0 04/18/2019    RBC 3.56 04/18/2019    HGB 9.8 04/18/2019    HCT 29.3 04/18/2019    MCV 82.3 04/18/2019    MCH 27.5 04/18/2019    MCHC 33.4 04/18/2019    RDW 15.1 04/18/2019     04/18/2019    MPV 9.2 04/18/2019       ASSESSMENT: POD # 1 right reverse TSA and ORIF scapula coracoid      PLAN:  1:  Non weight bearing  2:  Sling   3:  Patient has painmedication at home  4:  D/C Plan:  Home today  5:  Follow up as scheduled     Electronically signed by Clinton Fuchs MD on 4/27/2019 at 10:45 AM

## 2019-04-27 NOTE — PROGRESS NOTES
[facility] Progress Note    Admitting Date and Time: 4/26/2019  9:30 AM  Admit Dx: Closed 3-part fracture of proximal end of humerus, initial encounter [S42.293A]  Closed 3-part fracture of proximal end of right humerus, initial encounter [S42.291A]    Subjective:  Patient is being followed for Closed 3-part fracture of proximal end of humerus, initial encounter [S42.293A]  Closed 3-part fracture of proximal end of right humerus, initial encounter [S42.291A]   Pt feels ok, no shortness of breath, no chest pain. ROS: denies fever, chills, cp, sob, n/v, HA unless stated above.       sodium chloride flush  10 mL Intravenous 2 times per day    docusate sodium  100 mg Oral BID    ceFAZolin (ANCEF) IVPB  2 g Intravenous Q8H    atorvastatin  40 mg Oral Daily    levETIRAcetam  500 mg Oral BID    pantoprazole  40 mg Oral Daily    omega-3 acid ethyl esters  1 g Oral Daily    therapeutic multivitamin-minerals  1 tablet Oral Daily       sodium chloride flush 10 mL PRN   ondansetron 4 mg Q6H PRN   oxyCODONE-acetaminophen 1 tablet Q4H PRN   Or     oxyCODONE-acetaminophen 2 tablet Q4H PRN   HYDROmorphone 0.25 mg Q4H PRN   Or     HYDROmorphone 0.5 mg Q4H PRN   oxyCODONE-acetaminophen 1 tablet Q6H PRN   LORazepam 0.5 mg Nightly PRN        Objective:    /62   Pulse 78   Temp 98.6 °F (37 °C)   Resp 16   Ht 5' 7\" (1.702 m)   Wt 148 lb (67.1 kg)   SpO2 99%   BMI 23.18 kg/m²     General Appearance: alert and oriented to person, place and time and in no acute distress  Skin: warm and dry  Head: normocephalic and atraumatic  Eyes: pupils equal, round, and reactive to light, extraocular eye movements intact, conjunctival hemorrhage  Neck: neck supple and non tender without mass   Pulmonary/Chest: clear to auscultation bilaterally- no wheezes, rales or rhonchi, normal air movement, no respiratory distress  Cardiovascular: normal rate, normal S1 and S2 and no carotid bruits  Abdomen: soft, non-tender, non-distended, normal bowel sounds, no masses or organomegaly  Extremities: right upper ext in sling. Neurologic: no cranial nerve deficit and speech normal        No results for input(s): NA, K, CL, CO2, BUN, CREATININE, GLUCOSE, CALCIUM in the last 72 hours. No results for input(s): WBC, RBC, HGB, HCT, MCV, MCH, MCHC, RDW, PLT, MPV in the last 72 hours. Assessment:    Active Problems:    Seizure ([address]    Closed 3-part fracture of proximal end of humerus, initial encounter    Closed 3-part fracture of proximal end of right humerus  Resolved Problems:    * No resolved hospital problems. *      Plan:  1. Right humorous fracture s/p right total revere arthroplasty. POD#1, postop care with IS, PT/OT, pain management and DVT prophylaxis,  2. Hx of HLD, continue on lipitor. 3.  Hx of seizure, pateint presented with seizure that caused him the injury, I will obtain keppra levels, continue on keppra. Unfortunately no neurology service available for help in this facility  4. Hx of GERD, continue on pantoprazole. NOTE: This report was transcribed using voice recognition software. Every effort was made to ensure accuracy; however, inadvertent computerized transcription errors may be present.   Electronically signed by Bogdan Rendon MD on 4/27/2019 at 7:57 AM

## 2019-04-27 NOTE — OP NOTE
[address] [address]                                OPERATIVE REPORT    PATIENT NAME: Ghislaine Sofia                     :        1950  MED REC NO:   [id number]                            ROOM:       2524  ACCOUNT NO:   [de-identified]                           ADMIT DATE: 2019  PROVIDER:     Kaylee Young MD    DATE OF PROCEDURE:  2019    PREOPERATIVE DIAGNOSES:  1. Right comminuted proximal humerus fracture dislocation. 2.  Right scapular fracture involving coracoid. POSTOPERATIVE DIAGNOSES:  1. Right greater than three-part intra-articular proximal humerus  fracture dislocation. 2.  Comminuted right glenoid fracture. 3.  Right scapula coracoid fracture. PROCEDURES PERFORMED:  1. Right scapula coracoid open reduction with internal fixation using a  5.0 Heydi cannulated screw and washer. 2.  Right reverse total shoulder arthroplasty using the [employer] fracture  stem press fit, size 14 with a size medium anteriorly augmented  comprehensive base plate with a size 41, +3 glenosphere, and a size 41  standard poly +5.  3.  Bone graft from humeral head to glenoid. 4.  Repair of rotator cuff and bone grafting. 5.  Biceps tenodesis. 6.  Application of 1 gm vancomycin powder at closure. SPECIMEN:  Humeral head was sent for pathology. ANESTHESIA:  General.    ESTIMATED BLOOD LOSS:  Less than 150. ASSISTANT:  Naveed Hamilton, physician assistant certified. She was  present throughout the procedure. Her assistance was used for preop  positioning, intraoperative retraction, closure, and dressing  application. Her assistance expedited the case and decreased the  surgical time. An orthopedic surgery resident was unavailable for case  coverage at the time of surgery. DISPOSITION:  The patient remained stable throughout the procedure.     INDICATIONS:  The patient is a very pleasant 79-year-old gentleman who  sustained a seizure and a ground-level fall with fracture dislocation of  the right shoulder. He was seen at a [facility] and discharged after  the seizures were worked up. He was subsequently referred for  definitive management of his fracture dislocation with persistent  anterior dislocation of the humeral head. CT scan demonstrated  compression and shear fracture of the humeral head as well as suggested  scapular fracture with coracoid and likely glenoid involvement. After  extensive discussion with the patient as well as his wife, they wished  to proceed with surgical intervention in the form of open reduction and  internal fixation with possible reverse total shoulder arthroplasty. The risks, the benefits, alternatives, and complications were fully  outlined and explained including but not limited to the risks of  infection; damage to nerves, vessels, tendons; failure to improve his  symptoms or worsening symptoms; malunion, nonunion; symptomatic  hardware, hardware failure; periprosthetic fractures; dislocations as  well as unforeseen complications were outlined. They understood and  wished to proceed. DESCRIPTION OF PROCEDURE:  The patient was identified in the holding  area. The right shoulder was identified as the surgical site. He was  then seen by Anesthesia, received tranexamic acid, and was taken to the  operating room and underwent general anesthesia per Anesthesia  Department. He was then transferred to a well-padded operating room  table and placed in a well-padded beach-chair position. The right  shoulder and upper extremity were prepared and draped in a standard  sterile fashion. Perioperative antibiotics were provided. Surgical  site pause was undertaken confirming the site and procedure. Anterior  deltopectoral incision was then created over the anterior aspect of the  shoulder. Cephalic vein was identified and retracted.   The  deltopectoral interval was there was comminution present. Given  the anterior bone loss, a medium augment was selected and placed  anteriorly to help supply support anteriorly. The augment was placed as  inferior as possible given the comminution. The reamer was placed. This resulted in significant amount of bone loss; therefore, bone was  harvested from the head, impacted into the vault followed by gently  reaming the vault followed by selection of the medium augmented base  plate which was impacted, and a 35-mm center screw was inserted. This  provided modest compression. This was augmented with superior,  inferior, anterior, and posterior 15-mm locking screws. This provided  enhanced purchase and allowed compression through the center screw. Center screw was seated, and a size 41-mm +3 glenosphere was selected  with inferior offset and impacted with good Cheng taper fit achieved. The shoulder was brought out anteriorly. The biceps was previously  tenotomized. The long head of the biceps was tagged for later  tenodesis. Tag sutures were placed into the remnants of the rotator  cuff superiorly which had bone fragments present with number 5  FiberWire. Sequential reaming was up to size 14. The size 14  comprehensive fracture stem was selected and placed in 30 degrees of  retroversion. Good press fit was achieved with the proximal porous  coating. With this placed, the sutures were placed through the fracture  eyelets and the stem. A previously placed _____ had been placed prior  to stem insertion and used to bring down the superior rotator cuff as  well as subscapularis. Side-to-side sutures were placed as well. Prior  to repair, the trialing was undertaken from standard to +3 to +5 tray. The +5 tray was found to be stable throughout a range of motion. Bone  graft from the humeral head was then placed around the implanted stem. The final 44 +5 tray was selected with a standard poly and impacted.    Bone graft was then placed anteriorly, and the shoulder was reduced and  brought through a range of motion and found to be stable. The rotator  cuff tuberosities were repaired back down around the bone grafted stem. The long head of the biceps was tenodesed to the inferior aspect of the  subscapularis. Excellent reapproximation of the subscapularis  anteriorly as well as good reapproximation of the comminuted greater  tuberosity was achieved around the implant. With the sutures tied, the  shoulder was brought through a range of motion and found to have good  stability and good range of motion. External rotation was estimated at  25 to 30 degrees, internal rotation to the buttock, forward flexion was  approximately 130 degrees, and abduction 100 degrees. Pulsatile lavage  was then undertaken. 500 mg was placed deep into the joint prior to  repair of the tuberosities followed by 500 mg into the soft tissues  followed by deltopectoral closure with 0 Vicryl followed by 2-0 Vicryl,  Monocryl suture, and an impervious dressing as well sling and swath. The patient tolerated the procedure well, was taken to recovery room.         Gordon Geller MD    D: 04/26/2019 15:33:51       T: 04/26/2019 15:39:11     [email or web address]  Job#: [id number]     Doc#: [id number]    CC:

## 2019-04-27 NOTE — PROGRESS NOTES
Signs  Patient Currently in Pain: Yes       Orientation  Orientation  Overall Orientation Status: Within Normal Limits  Social/Functional History  Social/Functional History  Lives With: Spouse  Type of Home: House  Home Layout: Two level  Home Access: Stairs to enter with rails  Entrance Stairs - Number of Steps: 2  Home Equipment: (No AD use PTA)  ADL Assistance: Independent  Homemaking Assistance: Independent  Ambulation Assistance: Independent  Transfer Assistance: Independent  Active : Yes  Mode of Transportation: Car  Occupation: Retired  Additional Comments: Highly indep  Cognition        Objective          AROM RLE (degrees)  RLE General AROM: RT sling/OT assessment ROM, full range RT hand/wrist  AROM LLE (degrees)  LLE AROM : WNL  AROM RUE (degrees)  RUE General AROM: RT sling/OT assessment ROM, full range RT hand/wrist  Strength RLE  Strength RLE: WNL  Strength LLE  Strength LLE: WNL           Transfers  Sit to Stand: Independent  Stand to sit: Independent  Ambulation  Ambulation?: Yes  Ambulation 1  Surface: level tile  Device: No Device  Assistance: Independent  Quality of Gait: No deviations  Distance: 200+'  Stairs/Curb  Stairs? : (x 4 indep)              Plan   Plan  Plan Comment: VOWSC 59/59    G-Code       OutComes Score                                                  AM-PAC Score             Goals          Therapy Time   Individual Concurrent Group Co-treatment   Time In           Time Out           Minutes                   Rachel Calvo PT

## 2019-04-27 NOTE — PROGRESS NOTES
P Quality Flow/Interdisciplinary Rounds Progress Note        Quality Flow Rounds held on April 27, 2019    Disciplines Attending:  Bedside Nurse, ,  and Nursing Unit [address] Mak Nuñez was admitted on 4/26/2019  9:30 AM    Anticipated Discharge Date:  Expected Discharge Date: 04/28/19    Disposition:    Jeferson Score:  Jeferson Scale Score: 20    Readmission Risk              Risk of Unplanned Readmission:        8           Discussed patient goal for the day, patient clinical progression, and barriers to discharge.   The following Goal(s) of the Day/Commitment(s) have been identified:  Pain Control      Bel Fowler  April 27, 2019

## 2019-04-27 NOTE — PROGRESS NOTES
POSS BOWEL RESECTION performed by Thierno Orozco MD at [address] N/A 9/6/2018    LAPAROSCOPIC REVISION OF GASTROJEJUNOSTOMY; INTRAOPERATIVE EGD performed by Thierno Orozco MD at [address] [facility]      right cyst removed [facility]      UPPER GASTROINTESTINAL ENDOSCOPY      UPPER GASTROINTESTINAL ENDOSCOPY N/A 9/5/2018    EGD BIOPSY performed by Thierno Orozco MD at [facility] ENDOSCOPY       Precautions: falls risk; NMB RUE, sling,   Therapy orders per Noah Thomas PA *PROM only forward flexion and abduction not greater than 90*, ER not greater then 20*     Home Setup: Patient lives with his wife in a 2-floor home (1 steps to enter); patient's bedroom and bathroom are on the second floor . Patient has a half bath on first floor. Equipment (AE/DME/AD) Owned: none    Prior Level of Function (PLOF): (per patient)   ADLs: I   IADLs: I   Functional Transfers/Mobility: I  Family/Caregiver Assistance Available: yes, wife    Social History: (per patient)   Driving: yes   Occupation: retired   Hobbies/Interests: unknown    Pain Level: Patient is experiencing pain in forearm and hand from sling. Cognition: Patient isalert and oriented x3. Safety Awareness: Fair- cues to take his time with tasks    Vision/Perception:   Hearing: WFL  Vision: WFL   Glasses: yes       Functional Assessment:   Current Status:  Comments:   Self-Feeding: Set up Pt is R hand dominant-   Grooming: Min    Upper Body Dressing: MAx    Lower Body Dressing: Mod    Bathing:  Mod    Toileting: CGA    Bed Mobility: Supine-to-Sit: I  Sit-to-Supine: DNA    Functional Transfers: Sit-to-Stand: SBA from EOB (with no AD) Assist with IV pole   Functional Mobility: SBA  Assist with IV pole     Sitting Balance: Good (seated at edge of bed)  Standing Balance: Good ( with no AD)  Endurance/Activity Tolerance: Good     UE Assessment:   Strength: ROM:  Comments:   R UE:   DNA/5 grossly AROM distal fingers Limited FMC/dexterity noted during ADL tasks   L UE: 4/5 grossly AROM grossly [facility] FMC/dexterity noted during ADL tasks     Hand Dominance:    [x] Right   [] Left     Sensation: WFL with some tingling in R fingers  Tone: WFL  Edema: none noted in R fingers      [address] - '[address]'  How much help from another person does the patient currently need. .. Total (1) A Lot (2) A Little (3) None (4)   Putting on and taking off regular lower body clothing?  x     Bathing (including washing, rinsing, drying)? x     Toileting, which includes using toilet, bedpan, or urinal?   x    Putting on and taking off regular upper body clothing?  x     Taking care of personal grooming such as brushing teeth?   x    Eating meals?     x    AM-PAC Raw Score: 15    Comments: Upon this OTR's arrival to patient's room, patient supine in bed. Patient sitting in bedside chair with call light in reach and wife present upon conclusion of this evaluation. During the start and prior to conclusion of this session, environmental modifications (including line management, as appropriate) were completed for patient's safety and efficiency of treatment session. Throughout this session, patient education was provided regarding proper hand placement, techniques to maximize safety, energy conservation techniques, and Instruction in light finger AROm and reinforce no WB or AROM of RUE; good understanding indicated.     Current Performance Deficits:  ADL's x   Functional Mobility/Transfers / Bed Mobility    IADL's    Balance    Strength x   ROM x   Activity Tolerance / Endurance    Fine Motor Coordination x   Gross Motor Coordination    Cognition    Safety Awareness    Sensation    Vision / Proprioception      Patient / Family Goal: to return home     Treatment Plan: Skilled OT treatment to be provided for ADL re-training, neuromuscular re-education, functional transfer/mobility training, cognitive re-training, equipment needs, energy conservation techniques, patient and/or family education/training, environmental modifications, compensatory techniques for ADL performance, and any splinting/positioning needs, all as needed. When appropriate, clear delegation orders will be provided for nursing staff. Pt would benefit from continued skilled OT to increase safety and independence with completion of ADL/IADL tasks for functional independence and quality of life.          Short-Term Goals:  Time Frame: 1 week weeks    1. Increase feeding to set up with L hand  2. Increase grooming to set up with L hand  3. Increase UB bath/ dress to Mod A   4. Increase LB bath/ dress to set up  5. Increase toilet transfers including clothing management and personal hygiene to  Set up    Rehab Potential: good     Patient and/or family indicated understanding of this OT plan of care: yes    Time Out: 9:44  Total Session Time: 25 minutes    Evaluation time includes thorough review of current medical information, gathering information on past medical history/social history and prior level of function, completion of standardized testing/informal observation of tasks, assessment of data, and development of POC/Goals        OT Evaluation Complexity Level: Moderate  This level of OT evaluation was determined based upon the of the following: complexity of occupational profile and review of medical/therapy history completed, number of performance deficits demonstrated, and level of clinical decision making required to develop this OT plan of care. Low Complexity Moderate Complexity High Complexity Comments:   Occupational Profile and Medical/Therapy History  x  extended review of patient's medical/therapy history completed. Number of Performance Deficits  x  Patient demonstrates 4 performance deficits. Clinical Decision Making  x  Clinical decision making of moderate analytic complexity needed for development of this OT plan of care.  Patient demonstrates few comorbidities that affect occupational performance. Few modifications needed to facilitate activities during this evaluation.      Tabitha Head, OTR/L 1909

## 2019-04-29 LAB — KEPPRA: 18 UG/ML (ref 12–46)

## 2019-04-29 NOTE — DISCHARGE SUMMARY
Physician Discharge Summary     Patient ID:  Cher Ceballos  [id number]  74 y.o.  1950    Admit date: 4/26/2019    Discharge date and time: 4/27/2019 12:40 PM     Admitting Physician: Dontrell Correa MD     Discharge Physician: Dontrell Correa MD    Admission Diagnoses: Closed 3-part fracture of proximal end of humerus, initial encounter [S42.293A]  Closed 3-part fracture of proximal end of right humerus, initial encounter [S42.291A]    Discharge Diagnoses: s/p right reverse TSA and ORIF of scapula coracoid    Admission Condition: good    Discharged Condition: good    Hospital Course: The patient was admitted after undergoing right reverse TSA and ORIF of scapula coracoid by Dontrell Correa MD on 04/26/2019. The patient was subsequently taken to the PACU and the floor in stable condition. The patient was continued on antibiotics for 24 hours post-operatively as they received a dose of antibiotics pre-operatively as well. The patient was started on physical therapy with instructions to be NWB. On POD 1, the dressing was evaluated, revealing the wound to be benign in appearance without obvious signs of infection including erythema or purulence. On POD 1, after the patient had made appropriate gains in regaining independent function, the patient was discharged to home in stable condition. Treatments: s/p right reverse TSA and ORIF of scapula coracoid    Disposition: The patient was provided instructions regarding appropriate care of the dressing or splint to the operative extremity. The patient is to follow up with Dontrell Correa MD in 1 weeks, and to call the office for an appointment. The patient was given prescription for percocet upon discharge.            Signed:  Miriam Alejandro  4/29/2019  7:31 AM

## 2019-05-03 ENCOUNTER — TELEPHONE (OUTPATIENT)
Dept: ORTHOPEDIC SURGERY | Age: 69
End: 2019-05-03

## 2019-05-03 DIAGNOSIS — S42.101A FRACTURE OF UNSPECIFIED PART OF SCAPULA, RIGHT SHOULDER, INITIAL ENCOUNTER FOR CLOSED FRACTURE: Primary | ICD-10-CM

## 2019-05-06 ENCOUNTER — OFFICE VISIT (OUTPATIENT)
Dept: ORTHOPEDIC SURGERY | Age: 69
End: 2019-05-06

## 2019-05-06 VITALS
TEMPERATURE: 98.4 F | SYSTOLIC BLOOD PRESSURE: 104 MMHG | DIASTOLIC BLOOD PRESSURE: 66 MMHG | HEART RATE: 92 BPM | RESPIRATION RATE: 18 BRPM

## 2019-05-06 DIAGNOSIS — S42.101A FRACTURE OF UNSPECIFIED PART OF SCAPULA, RIGHT SHOULDER, INITIAL ENCOUNTER FOR CLOSED FRACTURE: ICD-10-CM

## 2019-05-06 PROCEDURE — 99024 POSTOP FOLLOW-UP VISIT: CPT | Performed by: ORTHOPAEDIC SURGERY

## 2019-05-06 RX ORDER — OXYCODONE HYDROCHLORIDE AND ACETAMINOPHEN 5; 325 MG/1; MG/1
1 TABLET ORAL EVERY 6 HOURS PRN
Qty: 28 TABLET | Refills: 0 | Status: SHIPPED | OUTPATIENT
Start: 2019-05-06 | End: 2019-05-17 | Stop reason: SDUPTHER

## 2019-05-06 NOTE — PROGRESS NOTES
Be seen back today regards his reverse total shoulder arthroplasty for fracture. I did explain intraoperative findings to both him and his wife. All questions answered. Physical exam: Shoulder incision healing well. No signs of infection. Radial, ulnar, median and axillary nerve sensation intact light touch. 5/5 motor testing in the deltoid, biceps, triceps, wrist flexors and extensors as well as digital intrinsics. 2+ radial pulse neurovascular intact. Shoulder range of motion passively abduction 90°. External rotation to neutral.    X-rays in office today: AP and scapular Y views demonstrate reduced reverse total shoulder arthroplasty with stable fixation of the scapula. Impression office x-rays: Stable reverse total shoulder arthroplasty and scapula coracoid ORIF. Assessment: 10 days postop right reverse total shoulder arthroplasty for fracture with graft augmentation of glenoid and fixation of coracoid fracture    Plan    Patient was educated on passive shoulder range of motion exercises. Patient requested a refill on pain medication was provided. Patient will follow up in 3-4 weeks with new x-rays. Informed consent was obtained for continued opioid treatment. Patient agrees to continue the current treatment and agrees the benefits of opioid treatment ( decreased pain, improved function) continue to outweigh the potential risks ( confusion, change in thinking ability, depression, dry mouth, nausea, constipation, vomiting, impaired coordination/balance, sleepiness, damage liver or kidneys, opioid-induced hyperalgesia, physical dependence, addiction, withdrawal, respiratory depression and even death).

## 2019-05-13 ENCOUNTER — HOSPITAL ENCOUNTER (OUTPATIENT)
Age: 69
Discharge: HOME | End: 2019-05-15
Payer: MEDICARE

## 2019-05-13 DIAGNOSIS — E87.1 HYPONATREMIA: ICD-10-CM

## 2019-05-13 DIAGNOSIS — Z12.5 SCREENING PSA (PROSTATE SPECIFIC ANTIGEN): ICD-10-CM

## 2019-05-13 DIAGNOSIS — G40.209 PARTIAL SYMPTOMATIC EPILEPSY WITH COMPLEX PARTIAL SEIZURES, NOT INTRACTABLE, WITHOUT STATUS EPILEPTICUS (HCC): ICD-10-CM

## 2019-05-13 DIAGNOSIS — M25.511 ACUTE PAIN OF RIGHT SHOULDER: ICD-10-CM

## 2019-05-13 DIAGNOSIS — R79.9 ABNORMAL FINDING OF BLOOD CHEMISTRY: ICD-10-CM

## 2019-05-13 DIAGNOSIS — E78.5 HYPERLIPIDEMIA, UNSPECIFIED HYPERLIPIDEMIA TYPE: ICD-10-CM

## 2019-05-13 DIAGNOSIS — D64.9 ANEMIA, UNSPECIFIED TYPE: ICD-10-CM

## 2019-05-13 DIAGNOSIS — E55.9 VITAMIN D DEFICIENCY: ICD-10-CM

## 2019-05-13 DIAGNOSIS — R53.83 FATIGUE, UNSPECIFIED TYPE: ICD-10-CM

## 2019-05-13 LAB — HBA1C MFR BLD: 5.2 % (ref 4–5.6)

## 2019-05-13 PROCEDURE — 80053 COMPREHEN METABOLIC PANEL: CPT

## 2019-05-13 PROCEDURE — 83735 ASSAY OF MAGNESIUM: CPT

## 2019-05-13 PROCEDURE — 80061 LIPID PANEL: CPT

## 2019-05-13 PROCEDURE — 81015 MICROSCOPIC EXAM OF URINE: CPT

## 2019-05-13 PROCEDURE — 83036 HEMOGLOBIN GLYCOSYLATED A1C: CPT

## 2019-05-13 PROCEDURE — 82607 VITAMIN B-12: CPT

## 2019-05-13 PROCEDURE — 85651 RBC SED RATE NONAUTOMATED: CPT

## 2019-05-13 PROCEDURE — 82306 VITAMIN D 25 HYDROXY: CPT

## 2019-05-13 PROCEDURE — G0103 PSA SCREENING: HCPCS

## 2019-05-13 PROCEDURE — 84443 ASSAY THYROID STIM HORMONE: CPT

## 2019-05-13 PROCEDURE — 82746 ASSAY OF FOLIC ACID SERUM: CPT

## 2019-05-13 PROCEDURE — 80177 DRUG SCRN QUAN LEVETIRACETAM: CPT

## 2019-05-14 LAB
ALBUMIN SERPL-MCNC: 3.8 G/DL (ref 3.5–5.2)
ALP BLD-CCNC: 79 U/L (ref 40–129)
ALT SERPL-CCNC: 21 U/L (ref 0–40)
ANION GAP SERPL CALCULATED.3IONS-SCNC: 11 MMOL/L (ref 7–16)
AST SERPL-CCNC: 30 U/L (ref 0–39)
BACTERIA: ABNORMAL /HPF
BILIRUB SERPL-MCNC: 0.3 MG/DL (ref 0–1.2)
BUN BLDV-MCNC: 12 MG/DL (ref 8–23)
CALCIUM SERPL-MCNC: 9.6 MG/DL (ref 8.6–10.2)
CASTS: ABNORMAL /LPF
CHLORIDE BLD-SCNC: 96 MMOL/L (ref 98–107)
CHOLESTEROL, TOTAL: 90 MG/DL (ref 0–199)
CO2: 25 MMOL/L (ref 22–29)
CREAT SERPL-MCNC: 1 MG/DL (ref 0.7–1.2)
CRYSTALS, UA: ABNORMAL
FOLATE: >20 NG/ML (ref 4.8–24.2)
GFR AFRICAN AMERICAN: >60
GFR NON-AFRICAN AMERICAN: >60 ML/MIN/1.73
GLUCOSE BLD-MCNC: 75 MG/DL (ref 74–99)
HDLC SERPL-MCNC: 43 MG/DL
LDL CHOLESTEROL CALCULATED: 39 MG/DL (ref 0–99)
MAGNESIUM: 1.8 MG/DL (ref 1.6–2.6)
POTASSIUM SERPL-SCNC: 4.5 MMOL/L (ref 3.5–5)
PROSTATE SPECIFIC ANTIGEN: 1.15 NG/ML (ref 0–4)
RBC UA: ABNORMAL /HPF (ref 0–2)
SEDIMENTATION RATE, ERYTHROCYTE: 5 MM/HR (ref 0–15)
SODIUM BLD-SCNC: 132 MMOL/L (ref 132–146)
TOTAL PROTEIN: 6.6 G/DL (ref 6.4–8.3)
TRIGL SERPL-MCNC: 39 MG/DL (ref 0–149)
TSH SERPL DL<=0.05 MIU/L-ACNC: 2.94 UIU/ML (ref 0.27–4.2)
VITAMIN B-12: 1110 PG/ML (ref 211–946)
VITAMIN D 25-HYDROXY: 30 NG/ML (ref 30–100)
VLDLC SERPL CALC-MCNC: 8 MG/DL
WBC UA: ABNORMAL /HPF (ref 0–5)

## 2019-05-15 LAB — KEPPRA: 23 UG/ML (ref 12–46)

## 2019-05-17 DIAGNOSIS — S42.101A FRACTURE OF UNSPECIFIED PART OF SCAPULA, RIGHT SHOULDER, INITIAL ENCOUNTER FOR CLOSED FRACTURE: ICD-10-CM

## 2019-05-19 RX ORDER — OXYCODONE HYDROCHLORIDE AND ACETAMINOPHEN 5; 325 MG/1; MG/1
1 TABLET ORAL EVERY 6 HOURS PRN
Qty: 28 TABLET | Refills: 0 | Status: SHIPPED | OUTPATIENT
Start: 2019-05-19 | End: 2019-05-26

## 2019-05-20 ENCOUNTER — TELEPHONE (OUTPATIENT)
Dept: NEUROLOGY | Age: 69
End: 2019-05-20

## 2019-05-20 NOTE — TELEPHONE ENCOUNTER
Patient Jason Hernandez called in stating that he saw Gordon Mendoza while in the hospital 4/18-4/20 for seizures. He was given Keppra 500 mg 1 tab bid upon discharge. He states that since he's started taking it, he gets tired and fatigued about 5:30-6:30 every day. He is wanting to know if there is a different way to take it. He does have a hospital follow up with Gordon Mendoza on 7/30/2019. Brody Miller did not prescribe the Keppra and will be out of the office until 5/28/2019. Please advise.

## 2019-05-21 NOTE — TELEPHONE ENCOUNTER
This medication really makes her drowsy---at 500 mg twice daily. He may take the first dose upon awakening and the second dose at night before retiring. His fatigue may be related to sleep deprivation as well.   Thank you

## 2019-05-22 NOTE — TELEPHONE ENCOUNTER
MA spoke with patient regarding dosage of Keppra, he states that he is taking AM & PM, but now seems to have \"worked\" it's way through. Patient will call back if any issues return.

## 2019-06-10 ENCOUNTER — OFFICE VISIT (OUTPATIENT)
Dept: ORTHOPEDIC SURGERY | Age: 69
End: 2019-06-10

## 2019-06-10 VITALS
BODY MASS INDEX: 22.6 KG/M2 | WEIGHT: 143.96 LBS | SYSTOLIC BLOOD PRESSURE: 123 MMHG | HEART RATE: 76 BPM | RESPIRATION RATE: 18 BRPM | HEIGHT: 67 IN | DIASTOLIC BLOOD PRESSURE: 73 MMHG | TEMPERATURE: 97.6 F

## 2019-06-10 DIAGNOSIS — S42.101A FRACTURE OF UNSPECIFIED PART OF SCAPULA, RIGHT SHOULDER, INITIAL ENCOUNTER FOR CLOSED FRACTURE: Primary | ICD-10-CM

## 2019-06-10 PROCEDURE — 99024 POSTOP FOLLOW-UP VISIT: CPT | Performed by: ORTHOPAEDIC SURGERY

## 2019-08-12 ENCOUNTER — OFFICE VISIT (OUTPATIENT)
Dept: ORTHOPEDIC SURGERY | Age: 69
End: 2019-08-12
Payer: MEDICARE

## 2019-08-12 VITALS — SYSTOLIC BLOOD PRESSURE: 134 MMHG | HEART RATE: 86 BPM | DIASTOLIC BLOOD PRESSURE: 76 MMHG | RESPIRATION RATE: 20 BRPM

## 2019-08-12 DIAGNOSIS — S42.291A CLOSED 3-PART FRACTURE OF PROXIMAL HUMERUS, RIGHT, INITIAL ENCOUNTER: Primary | ICD-10-CM

## 2019-08-12 PROCEDURE — 3017F COLORECTAL CA SCREEN DOC REV: CPT | Performed by: ORTHOPAEDIC SURGERY

## 2019-08-12 PROCEDURE — 99213 OFFICE O/P EST LOW 20 MIN: CPT | Performed by: ORTHOPAEDIC SURGERY

## 2019-08-12 PROCEDURE — G8420 CALC BMI NORM PARAMETERS: HCPCS | Performed by: ORTHOPAEDIC SURGERY

## 2019-08-12 PROCEDURE — 1123F ACP DISCUSS/DSCN MKR DOCD: CPT | Performed by: ORTHOPAEDIC SURGERY

## 2019-08-12 PROCEDURE — G8427 DOCREV CUR MEDS BY ELIG CLIN: HCPCS | Performed by: ORTHOPAEDIC SURGERY

## 2019-08-12 PROCEDURE — 1036F TOBACCO NON-USER: CPT | Performed by: ORTHOPAEDIC SURGERY

## 2019-08-12 PROCEDURE — 4040F PNEUMOC VAC/ADMIN/RCVD: CPT | Performed by: ORTHOPAEDIC SURGERY

## 2019-08-12 NOTE — PROGRESS NOTES
fracture with graft augmentation of glenoid and fixation of coracoid fracture    Plan    Discussed xray and findings with the patient. Offered to send the patient to formal therapy, he wound like to continue home exercises. Home exercise program provided to the patient. Patient to call if would like formal therapy. I have seen and evaluated the patient and agree with the above assessment and plan on today's visit. I have performed the key components of the history and physical examination with significant findings of doing well status post reverse total shoulder arthroplasty for fracture. I concur with the findings and plan as documented.     Jose Becerra MD  8/12/2019

## 2019-08-12 NOTE — PATIENT INSTRUCTIONS
body for comfort. This will help keep your arm at your side. 3. Hold one end of the elastic band with the hand of the painful arm. 4. Start with your forearm across your belly. Slowly rotate the forearm out away from your body. Keep your elbow and upper arm tucked against the towel roll or the side of your body until you begin to feel tightness in your shoulder. Slowly move your arm back to where you started. 5. Repeat 8 to 12 times. Internal rotator strengthening exercise    1. Start by tying a piece of elastic exercise material to a doorknob. You can use surgical tubing or Thera-Band. 2. Stand or sit with your shoulder relaxed and your elbow bent 90 degrees. Your upper arm should rest comfortably against your side. Squeeze a rolled towel between your elbow and your body for comfort. This will help keep your arm at your side. 3. Hold one end of the elastic band in the hand of the painful arm. 4. Slowly rotate your forearm toward your body until it touches your belly. Slowly move it back to where you started. 5. Keep your elbow and upper arm firmly tucked against the towel roll or at your side. 6. Repeat 8 to 12 times. Pendulum swing    1. Hold on to a table or the back of a chair with your good arm. Then bend forward a little and let your sore arm hang straight down. This exercise does not use the arm muscles. Rather, use your legs and your hips to create movement that makes your arm swing freely. 2. Use the movement from your hips and legs to guide the slightly swinging arm back and forth like a pendulum (or elephant trunk). Then guide it in circles that start small (about the size of a dinner plate). Make the circles a bit larger each day, as your pain allows. 3. Do this exercise for 5 minutes, 5 to 7 times each day. 4. As you have less pain, try bending over a little farther to do this exercise. This will increase the amount of movement at your shoulder.     Follow-up care is a key part of your

## 2019-08-22 ENCOUNTER — HOSPITAL ENCOUNTER (OUTPATIENT)
Age: 69
Discharge: HOME | End: 2019-08-24
Payer: MEDICARE

## 2019-08-22 DIAGNOSIS — R56.9 SEIZURE (HCC): ICD-10-CM

## 2019-08-22 LAB
BASOPHILS ABSOLUTE: 0.02 E9/L (ref 0–0.2)
BASOPHILS RELATIVE PERCENT: 0.5 % (ref 0–2)
EOSINOPHILS ABSOLUTE: 0.06 E9/L (ref 0.05–0.5)
EOSINOPHILS RELATIVE PERCENT: 1.4 % (ref 0–6)
HCT VFR BLD CALC: 33.9 % (ref 37–54)
HEMOGLOBIN: 10.4 G/DL (ref 12.5–16.5)
IMMATURE GRANULOCYTES #: 0 E9/L
IMMATURE GRANULOCYTES %: 0 % (ref 0–5)
LYMPHOCYTES ABSOLUTE: 1.15 E9/L (ref 1.5–4)
LYMPHOCYTES RELATIVE PERCENT: 26.1 % (ref 20–42)
MCH RBC QN AUTO: 25.8 PG (ref 26–35)
MCHC RBC AUTO-ENTMCNC: 30.7 % (ref 32–34.5)
MCV RBC AUTO: 84.1 FL (ref 80–99.9)
MONOCYTES ABSOLUTE: 0.59 E9/L (ref 0.1–0.95)
MONOCYTES RELATIVE PERCENT: 13.4 % (ref 2–12)
NEUTROPHILS ABSOLUTE: 2.58 E9/L (ref 1.8–7.3)
NEUTROPHILS RELATIVE PERCENT: 58.6 % (ref 43–80)
PDW BLD-RTO: 15.6 FL (ref 11.5–15)
PLATELET # BLD: 196 E9/L (ref 130–450)
PMV BLD AUTO: 8.7 FL (ref 7–12)
RBC # BLD: 4.03 E12/L (ref 3.8–5.8)
WBC # BLD: 4.4 E9/L (ref 4.5–11.5)

## 2019-08-22 PROCEDURE — 80177 DRUG SCRN QUAN LEVETIRACETAM: CPT

## 2019-08-22 PROCEDURE — 80053 COMPREHEN METABOLIC PANEL: CPT

## 2019-08-22 PROCEDURE — 85025 COMPLETE CBC W/AUTO DIFF WBC: CPT

## 2019-08-23 LAB
ALBUMIN SERPL-MCNC: 4.5 G/DL (ref 3.5–5.2)
ALP BLD-CCNC: 65 U/L (ref 40–129)
ALT SERPL-CCNC: 17 U/L (ref 0–40)
ANION GAP SERPL CALCULATED.3IONS-SCNC: 13 MMOL/L (ref 7–16)
AST SERPL-CCNC: 33 U/L (ref 0–39)
BILIRUB SERPL-MCNC: 0.4 MG/DL (ref 0–1.2)
BUN BLDV-MCNC: 7 MG/DL (ref 8–23)
CALCIUM SERPL-MCNC: 9.1 MG/DL (ref 8.6–10.2)
CHLORIDE BLD-SCNC: 96 MMOL/L (ref 98–107)
CO2: 24 MMOL/L (ref 22–29)
CREAT SERPL-MCNC: 0.8 MG/DL (ref 0.7–1.2)
GFR AFRICAN AMERICAN: >60
GFR NON-AFRICAN AMERICAN: >60 ML/MIN/1.73
GLUCOSE BLD-MCNC: 123 MG/DL (ref 74–99)
POTASSIUM SERPL-SCNC: 4.5 MMOL/L (ref 3.5–5)
SODIUM BLD-SCNC: 133 MMOL/L (ref 132–146)
TOTAL PROTEIN: 6.8 G/DL (ref 6.4–8.3)

## 2019-08-24 LAB — KEPPRA: 18 UG/ML (ref 12–46)

## 2019-08-29 ENCOUNTER — OFFICE VISIT (OUTPATIENT)
Dept: NEUROLOGY | Age: 69
End: 2019-08-29
Payer: MEDICARE

## 2019-08-29 VITALS
HEART RATE: 83 BPM | DIASTOLIC BLOOD PRESSURE: 71 MMHG | BODY MASS INDEX: 23.2 KG/M2 | WEIGHT: 147.8 LBS | HEIGHT: 67 IN | OXYGEN SATURATION: 100 % | RESPIRATION RATE: 18 BRPM | SYSTOLIC BLOOD PRESSURE: 126 MMHG

## 2019-08-29 DIAGNOSIS — R56.9 SEIZURE (HCC): Primary | ICD-10-CM

## 2019-08-29 PROCEDURE — G8420 CALC BMI NORM PARAMETERS: HCPCS | Performed by: CLINICAL NURSE SPECIALIST

## 2019-08-29 PROCEDURE — 99215 OFFICE O/P EST HI 40 MIN: CPT | Performed by: CLINICAL NURSE SPECIALIST

## 2019-08-29 PROCEDURE — 1123F ACP DISCUSS/DSCN MKR DOCD: CPT | Performed by: CLINICAL NURSE SPECIALIST

## 2019-08-29 PROCEDURE — G8427 DOCREV CUR MEDS BY ELIG CLIN: HCPCS | Performed by: CLINICAL NURSE SPECIALIST

## 2019-08-29 PROCEDURE — 4040F PNEUMOC VAC/ADMIN/RCVD: CPT | Performed by: CLINICAL NURSE SPECIALIST

## 2019-08-29 PROCEDURE — 1036F TOBACCO NON-USER: CPT | Performed by: CLINICAL NURSE SPECIALIST

## 2019-08-29 PROCEDURE — 3017F COLORECTAL CA SCREEN DOC REV: CPT | Performed by: CLINICAL NURSE SPECIALIST

## 2019-08-29 NOTE — PROGRESS NOTES
Da Mayorga is a 78 y.o. right handed male     Patient was admitted in April 2019 for a GTC seizure    Patient denies any previous similar s/s or previous seizure    His wife notes that they were driving and she noted he was staring straight ahead not talking and going half the speed limit. She attempted to speak to him but he continued to stare, be nonverbal and slow down even further. They arrived at their destination; he exited the car and performed some chores for his MIL. As they sat around the table, he stiffened and had a GTC seizure. In ED, his wife noted a few more of those staring spells where he is not responding but none lasted as long as his original spell. 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 Prior to Visit Medications    Medication Sig Taking?  Authorizing Provider   pantoprazole (PROTONIX) 40 MG tablet TAKE 1 TABLET BY MOUTH EVERY DAY Yes Jaime Mishra, DO   atorvastatin (LIPITOR) 40 MG tablet TAKE 1 TABLET BY MOUTH EVERY DAY Yes Jaime Mishra DO   levETIRAcetam (KEPPRA) 500 MG tablet Take 1 tablet by mouth 2 times daily Yes Jaime Mishra DO   sildenafil (VIAGRA) 100 MG tablet Take 1 tablet by mouth daily as needed for Erectile Dysfunction Yes Jaime Mishra DO   ezetimibe (ZETIA) 10 MG tablet Take 1 tablet by mouth daily Yes Chely Del Castillo decreased to tuning fork in ankles      Coordination:   FN, FFM and NA symmetrical     DTR:   No reflexes    No Babinski    No Blas's     Laboratory/Radiology:     CBC with Differential:    Lab Results   Component Value Date    WBC 4.4 2019    RBC 4.03 2019    HGB 10.4 2019    HCT 33.9 2019     2019    MCV 84.1 2019    MCH 25.8 2019    MCHC 30.7 2019    RDW 15.6 2019    LYMPHOPCT 26.1 2019    MONOPCT 13.4 2019    BASOPCT 0.5 2019    MONOSABS 0.59 2019    LYMPHSABS 1.15 2019    EOSABS 0.06 2019    BASOSABS 0.02 2019     CMP:    Lab Results   Component Value Date     2019    K 4.5 2019    K 4.3 2018    CL 96 2019    CO2 24 2019    BUN 7 2019    CREATININE 0.8 2019    GFRAA >60 2019    LABGLOM >60 2019    GLUCOSE 123 2019    PROT 6.8 2019    LABALBU 4.5 2019    CALCIUM 9.1 2019    BILITOT 0.4 2019    ALKPHOS 65 2019    AST 33 2019    ALT 17 2019     CT Head:   Remote  appreciated    MRI unrevealing for an acute event     EEG 2019   normal     I personally reviewed the patient's lab and imaging studies at this time.     Assessment:     Patient suffered a number of complex partial seizures and one GTC seizure   Now maintained on Keppra 500mg BID and doing well   Possibly r/t hyponatremia in April     Right humoral head fracture -- sling and followed by ortho     Whipple procedure in  for an intraductal papillary mucinous neoplasm     Plan:     Continue Keppra until at least 2021 per Dr Ivy Cargo     Discussed at length with patient and wife     No driving at this time (2019) -- patient and daughter on phone verbalized an understanding of this    I spent 45 minutes with the patient, with 50% or more counseling them on their dx     Rosalia Numbers  4:24 PM  2019

## 2020-01-28 ENCOUNTER — TELEPHONE (OUTPATIENT)
Dept: NEUROLOGY | Age: 70
End: 2020-01-28

## 2020-01-28 NOTE — TELEPHONE ENCOUNTER
LM to call to set up appointment for April/May in [place] w/Eugenio Miller December, [address]  Electronically signed by Eva Stephens on 1/28/20 at 2:22 PM

## 2020-04-24 ENCOUNTER — VIRTUAL VISIT (OUTPATIENT)
Dept: NEUROLOGY | Age: 70
End: 2020-04-24
Payer: MEDICARE

## 2020-04-24 PROCEDURE — 99443 PR PHYS/QHP TELEPHONE EVALUATION 21-30 MIN: CPT | Performed by: CLINICAL NURSE SPECIALIST

## 2020-04-24 NOTE — PROGRESS NOTES
Kelly Deluca is a 69 y.o. right handed male     evaluated via telephone on 4/24/2020. Consent:  He and/or health care decision maker is aware that that he may receive a bill for this telephone service, depending on his insurance coverage, and has provided verbal consent to proceed: Yes    I affirm this is a Patient Initiated Episode with an Established Patient who has not had a related appointment within my department in the past 7 days or scheduled within the next 24 hours. Total Time: minutes: 21-30 minutes    Consent:  The patient and/or health care decision maker is aware that that he may receive a bill for this telephone service, depending on his insurance coverage, and has provided verbal consent to proceed: Yes    Patient advised regarding steps to help prevent the spread of COVID-19   SOURCE - [email or web address]-melgar.[email or web address] html     1-Stay home except to get medical care  2-Clean your hands often for atleast 20 secnds, avoid touching: Avoid touching your eyes, nose, and mouth with unwashed hands. 3-Seek medical attention: Seek prompt medical attention if your illness is worsening (e.g., difficulty breathing). Call you doctor first.  3-Wear a facemask if you are sick   4-Cover your coughs and sneezes    Note: not billable if this call serves to triage the patient into an appointment for the relevant concern    Patient was admitted in April 2019 for a GTC seizure    Patient denies any previous similar s/s or previous seizure    His wife notes that they were driving and she noted he was staring straight ahead not talking and going half the speed limit. She attempted to speak to him but he continued to stare, be nonverbal and slow down even further. They arrived at their destination; he exited the car and performed some chores for his MIL. As they sat around the table, he stiffened and had a GTC seizure.   In ED, his wife noted a few more of those staring spells where he is not responding but none lasted as long as his original spell. MRI and EEG in the hospital were unrevealing   Keppra was started by Dr Manasa Narayan for 2 years (April 2021)    No prior similar complaints    He suffered a humoral head fracture and underwent surgery this spring as well and recovered well   Past history of hypoglycemia but no spells reported in almost a year   Denies ETOH use  Denies benzo use  His Na was 125 on admission and is the suspected culprit of his seizure   He was drinking a lot of Diet soda at the time   Eats well and denies any recent skipping of meals  No issues sleeping    No prior known stroke history     He does have a history of intraductal papillary mucinous neoplasm for which he underwent a Whipple procedure in 2015. Prior to Visit Medications    Medication Sig Taking?  Authorizing Provider   ezetimibe (ZETIA) 10 MG tablet Take 1 tablet by mouth daily Yes Jaime Mishra DO   pantoprazole (PROTONIX) 40 MG tablet TAKE 1 TABLET BY MOUTH EVERY DAY Yes Jaime Mishra DO   atorvastatin (LIPITOR) 40 MG tablet TAKE 1 TABLET BY MOUTH EVERY DAY Yes Jaime Mishra DO   celecoxib (CELEBREX) 200 MG capsule Take 1 capsule by mouth daily Yes Jaime Mishra DO   levETIRAcetam (KEPPRA) 500 MG tablet Take 1 tablet by mouth 2 times daily Yes Jaime Mishra DO   sildenafil (VIAGRA) 100 MG tablet Take 1 tablet by mouth daily as needed for Erectile Dysfunction Yes Jaime Mishra DO   omega-3 acid ethyl esters (LOVAZA) 1 g capsule TAKE 1 CAPSULE BY MOUTH FOUR TIMES DAILY Yes Jaime Mishra DO   Flaxseed, Linseed, (FLAX SEEDS PO) Take 1 capsule by mouth daily Ld 4/24/2019 Yes Historical Provider, MD Obinna MELGOZA Take 1 capsule by mouth daily Ld 4/24/2019 Yes Historical Provider, MD   therapeutic multivitamin-minerals (THERAGRAN-M) tablet Take 1 tablet by mouth daily Ld 4/24/2019 Yes Historical Provider, MD     Allergies as of 04/24/2020    (No Known Allergies)     Objective:

## 2020-06-11 ENCOUNTER — HOSPITAL ENCOUNTER (OUTPATIENT)
Age: 70
Discharge: HOME | End: 2020-06-13
Payer: MEDICARE

## 2020-06-11 LAB
ALBUMIN SERPL-MCNC: 5.4 G/DL (ref 3.5–5.2)
ALP BLD-CCNC: 59 U/L (ref 40–129)
ALT SERPL-CCNC: 17 U/L (ref 0–40)
ANION GAP SERPL CALCULATED.3IONS-SCNC: 16 MMOL/L (ref 7–16)
AST SERPL-CCNC: 38 U/L (ref 0–39)
BACTERIA: ABNORMAL /HPF
BASOPHILS ABSOLUTE: 0.03 E9/L (ref 0–0.2)
BASOPHILS RELATIVE PERCENT: 0.6 % (ref 0–2)
BILIRUB SERPL-MCNC: 0.5 MG/DL (ref 0–1.2)
BUN BLDV-MCNC: 7 MG/DL (ref 8–23)
CALCIUM SERPL-MCNC: 9.7 MG/DL (ref 8.6–10.2)
CHLORIDE BLD-SCNC: 94 MMOL/L (ref 98–107)
CHOLESTEROL, TOTAL: 127 MG/DL (ref 0–199)
CO2: 24 MMOL/L (ref 22–29)
CREAT SERPL-MCNC: 0.8 MG/DL (ref 0.7–1.2)
EOSINOPHILS ABSOLUTE: 0.14 E9/L (ref 0.05–0.5)
EOSINOPHILS RELATIVE PERCENT: 2.9 % (ref 0–6)
GFR AFRICAN AMERICAN: >60
GFR NON-AFRICAN AMERICAN: >60 ML/MIN/1.73
GLUCOSE BLD-MCNC: 97 MG/DL (ref 74–99)
HBA1C MFR BLD: 5.4 % (ref 4–5.6)
HCT VFR BLD CALC: 36.7 % (ref 37–54)
HDLC SERPL-MCNC: 78 MG/DL
HEMOGLOBIN: 11.4 G/DL (ref 12.5–16.5)
IMMATURE GRANULOCYTES #: 0.01 E9/L
IMMATURE GRANULOCYTES %: 0.2 % (ref 0–5)
LDL CHOLESTEROL CALCULATED: 43 MG/DL (ref 0–99)
LYMPHOCYTES ABSOLUTE: 0.93 E9/L (ref 1.5–4)
LYMPHOCYTES RELATIVE PERCENT: 19.2 % (ref 20–42)
MAGNESIUM: 1.9 MG/DL (ref 1.6–2.6)
MCH RBC QN AUTO: 26.4 PG (ref 26–35)
MCHC RBC AUTO-ENTMCNC: 31.1 % (ref 32–34.5)
MCV RBC AUTO: 85 FL (ref 80–99.9)
MONOCYTES ABSOLUTE: 0.52 E9/L (ref 0.1–0.95)
MONOCYTES RELATIVE PERCENT: 10.7 % (ref 2–12)
NEUTROPHILS ABSOLUTE: 3.21 E9/L (ref 1.8–7.3)
NEUTROPHILS RELATIVE PERCENT: 66.4 % (ref 43–80)
PDW BLD-RTO: 15.9 FL (ref 11.5–15)
PHOSPHORUS: 3.3 MG/DL (ref 2.5–4.5)
PLATELET # BLD: 195 E9/L (ref 130–450)
PMV BLD AUTO: 9 FL (ref 7–12)
POTASSIUM SERPL-SCNC: 4.4 MMOL/L (ref 3.5–5)
PROSTATE SPECIFIC ANTIGEN: 1.12 NG/ML (ref 0–4)
RBC # BLD: 4.32 E12/L (ref 3.8–5.8)
RBC UA: ABNORMAL /HPF (ref 0–2)
SODIUM BLD-SCNC: 134 MMOL/L (ref 132–146)
TOTAL PROTEIN: 8.1 G/DL (ref 6.4–8.3)
TRIGL SERPL-MCNC: 28 MG/DL (ref 0–149)
TSH SERPL DL<=0.05 MIU/L-ACNC: 2.9 UIU/ML (ref 0.27–4.2)
VITAMIN D 25-HYDROXY: 46 NG/ML (ref 30–100)
VLDLC SERPL CALC-MCNC: 6 MG/DL
WBC # BLD: 4.8 E9/L (ref 4.5–11.5)
WBC UA: ABNORMAL /HPF (ref 0–5)

## 2020-06-11 PROCEDURE — 84443 ASSAY THYROID STIM HORMONE: CPT

## 2020-06-11 PROCEDURE — G0103 PSA SCREENING: HCPCS

## 2020-06-11 PROCEDURE — 84100 ASSAY OF PHOSPHORUS: CPT

## 2020-06-11 PROCEDURE — 81015 MICROSCOPIC EXAM OF URINE: CPT

## 2020-06-11 PROCEDURE — 85025 COMPLETE CBC W/AUTO DIFF WBC: CPT

## 2020-06-11 PROCEDURE — 80177 DRUG SCRN QUAN LEVETIRACETAM: CPT

## 2020-06-11 PROCEDURE — 85651 RBC SED RATE NONAUTOMATED: CPT

## 2020-06-11 PROCEDURE — 82306 VITAMIN D 25 HYDROXY: CPT

## 2020-06-11 PROCEDURE — 80053 COMPREHEN METABOLIC PANEL: CPT

## 2020-06-11 PROCEDURE — 83036 HEMOGLOBIN GLYCOSYLATED A1C: CPT

## 2020-06-11 PROCEDURE — 83735 ASSAY OF MAGNESIUM: CPT

## 2020-06-11 PROCEDURE — 80061 LIPID PANEL: CPT

## 2020-06-12 LAB — SEDIMENTATION RATE, ERYTHROCYTE: 0 MM/HR (ref 0–15)

## 2020-06-13 LAB — KEPPRA: 19 UG/ML (ref 12–46)

## 2020-11-19 ENCOUNTER — TELEPHONE (OUTPATIENT)
Dept: NEUROLOGY | Age: 70
End: 2020-11-19

## 2020-11-19 NOTE — TELEPHONE ENCOUNTER
LM to set up Feb. 2021 jc/Eugenio Gamez in [place]  Electronically signed by Alberto Kirkland on 11/19/20 at 2:32 PM EST

## 2020-12-10 DIAGNOSIS — E78.2 MIXED HYPERLIPIDEMIA: Chronic | ICD-10-CM

## 2020-12-10 DIAGNOSIS — D64.9 ANEMIA, UNSPECIFIED TYPE: ICD-10-CM

## 2020-12-10 LAB
ALBUMIN SERPL-MCNC: 4.6 G/DL (ref 3.5–5.2)
ALP BLD-CCNC: 47 U/L (ref 40–129)
ALT SERPL-CCNC: 19 U/L (ref 0–40)
ANION GAP SERPL CALCULATED.3IONS-SCNC: 11 MMOL/L (ref 7–16)
AST SERPL-CCNC: 38 U/L (ref 0–39)
BASOPHILS ABSOLUTE: 0.02 E9/L (ref 0–0.2)
BASOPHILS RELATIVE PERCENT: 0.3 % (ref 0–2)
BILIRUB SERPL-MCNC: 0.4 MG/DL (ref 0–1.2)
BUN BLDV-MCNC: 9 MG/DL (ref 8–23)
CALCIUM SERPL-MCNC: 9.4 MG/DL (ref 8.6–10.2)
CHLORIDE BLD-SCNC: 94 MMOL/L (ref 98–107)
CO2: 25 MMOL/L (ref 22–29)
CREAT SERPL-MCNC: 0.8 MG/DL (ref 0.7–1.2)
EOSINOPHILS ABSOLUTE: 0.14 E9/L (ref 0.05–0.5)
EOSINOPHILS RELATIVE PERCENT: 1.9 % (ref 0–6)
GFR AFRICAN AMERICAN: >60
GFR NON-AFRICAN AMERICAN: >60 ML/MIN/1.73
GLUCOSE BLD-MCNC: 143 MG/DL (ref 74–99)
HCT VFR BLD CALC: 34.1 % (ref 37–54)
HEMOGLOBIN: 11.2 G/DL (ref 12.5–16.5)
IMMATURE GRANULOCYTES #: 0.02 E9/L
IMMATURE GRANULOCYTES %: 0.3 % (ref 0–5)
LYMPHOCYTES ABSOLUTE: 1.04 E9/L (ref 1.5–4)
LYMPHOCYTES RELATIVE PERCENT: 14 % (ref 20–42)
MCH RBC QN AUTO: 27.3 PG (ref 26–35)
MCHC RBC AUTO-ENTMCNC: 32.8 % (ref 32–34.5)
MCV RBC AUTO: 83 FL (ref 80–99.9)
MONOCYTES ABSOLUTE: 0.76 E9/L (ref 0.1–0.95)
MONOCYTES RELATIVE PERCENT: 10.2 % (ref 2–12)
NEUTROPHILS ABSOLUTE: 5.45 E9/L (ref 1.8–7.3)
NEUTROPHILS RELATIVE PERCENT: 73.3 % (ref 43–80)
PDW BLD-RTO: 15.6 FL (ref 11.5–15)
PLATELET # BLD: 205 E9/L (ref 130–450)
PMV BLD AUTO: 9.5 FL (ref 7–12)
POTASSIUM SERPL-SCNC: 4.4 MMOL/L (ref 3.5–5)
RBC # BLD: 4.11 E12/L (ref 3.8–5.8)
SODIUM BLD-SCNC: 130 MMOL/L (ref 132–146)
TOTAL PROTEIN: 7.1 G/DL (ref 6.4–8.3)
WBC # BLD: 7.4 E9/L (ref 4.5–11.5)

## 2021-02-03 ENCOUNTER — OFFICE VISIT (OUTPATIENT)
Dept: SURGERY | Age: 71
End: 2021-02-03
Payer: MEDICARE

## 2021-02-03 VITALS
TEMPERATURE: 97.4 F | BODY MASS INDEX: 24.04 KG/M2 | HEART RATE: 96 BPM | OXYGEN SATURATION: 95 % | SYSTOLIC BLOOD PRESSURE: 130 MMHG | WEIGHT: 149.6 LBS | HEIGHT: 66 IN | DIASTOLIC BLOOD PRESSURE: 76 MMHG

## 2021-02-03 DIAGNOSIS — R22.0 SWELLING, MASS, OR LUMP ON FACE: Primary | ICD-10-CM

## 2021-02-03 PROCEDURE — G8420 CALC BMI NORM PARAMETERS: HCPCS | Performed by: PLASTIC SURGERY

## 2021-02-03 PROCEDURE — G8482 FLU IMMUNIZE ORDER/ADMIN: HCPCS | Performed by: PLASTIC SURGERY

## 2021-02-03 PROCEDURE — 3017F COLORECTAL CA SCREEN DOC REV: CPT | Performed by: PLASTIC SURGERY

## 2021-02-03 PROCEDURE — 1123F ACP DISCUSS/DSCN MKR DOCD: CPT | Performed by: PLASTIC SURGERY

## 2021-02-03 PROCEDURE — 99204 OFFICE O/P NEW MOD 45 MIN: CPT | Performed by: PLASTIC SURGERY

## 2021-02-03 PROCEDURE — 4040F PNEUMOC VAC/ADMIN/RCVD: CPT | Performed by: PLASTIC SURGERY

## 2021-02-03 PROCEDURE — G8427 DOCREV CUR MEDS BY ELIG CLIN: HCPCS | Performed by: PLASTIC SURGERY

## 2021-02-03 PROCEDURE — 1036F TOBACCO NON-USER: CPT | Performed by: PLASTIC SURGERY

## 2021-02-03 NOTE — PROGRESS NOTES
Department of Plastic Surgery - Adult  Attending Consult Note      CHIEF COMPLAINT:   Mass of face    History Obtained From:  patient    HISTORY OF PRESENT ILLNESS:                The patient is a 66 y.o. male who presents with bilateral face mass. The patient states that they first noticed the mass many years ago. It has slowly grown in size since they first noticed the mass. The mass has not had a history of discharge. The pt has not had the mass biopsied previously. The patient states the mass is mildly painful at times if he gets irritated. The patient states the underlying masses are worse on the left compared to the right. And a more prominent after sleeping. The patient also feels that the masses are weighing down the lower eyelids causing discomfort. The patient also states he has visual field disturbance and has difficulty seeing books and other reading material and shifting in his vehicle secondary to the mass-effect.     Past Medical History:    Past Medical History:   Diagnosis Date    Arthritis     Dislocated shoulder     right    GERD (gastroesophageal reflux disease) 1/4/2015    Hyperlipidemia     Hypoglycemia     Hyponatremia     Liver disease     Mixed hyperlipidemia 1/4/2015    New onset seizure ([address] 4/17/2019     Past Surgical History:    Past Surgical History:   Procedure Laterality Date    COLON SURGERY      COLONOSCOPY      INCISIONAL HERNIA REPAIR N/A 10/13/2016    INGUINAL HERNIA REPAIR Left 2/19/2013    laparoscopic left inguinal hernia repair    PANCREAS SURGERY      Whipple procedure    NH LAP,SURG,COLECT,TOT,W/PROCTECT,W/ILEOST N/A 8/20/2018    DIAGNOSTIC LAPAROSCOPY POSS LAPAROTOMY POSS BOWEL RESECTION performed by Jovi Calderón MD at [address] 9/6/2018    LAPAROSCOPIC REVISION OF GASTROJEJUNOSTOMY; INTRAOPERATIVE EGD performed by Jovi Calderón MD at [address] [facility] Right 4/26/2019    RIGHT REVERSE TOTAL SHOULDER ARTHROPLASTY performed by Brunilda Carmona MD at [address]      right cyst removed 1965    SKIN BIOPSY      UPPER GASTROINTESTINAL ENDOSCOPY      UPPER GASTROINTESTINAL ENDOSCOPY N/A 9/5/2018    EGD BIOPSY performed by Kamari Casas MD at [facility] ENDOSCOPY     Current Medications:      Current Outpatient Medications   Medication Sig Dispense Refill    atorvastatin (LIPITOR) 40 MG tablet TAKE 1 TABLET BY MOUTH EVERY DAY 90 tablet 2    celecoxib (CELEBREX) 200 MG capsule Take 1 capsule by mouth daily 90 capsule 3    levETIRAcetam (KEPPRA) 500 MG tablet Take 1 tablet by mouth 2 times daily 180 tablet 3    omega-3 acid ethyl esters (LOVAZA) 1 g capsule TAKE 1 CAPSULE BY MOUTH FOUR TIMES DAILY 360 capsule 1    ezetimibe (ZETIA) 10 MG tablet Take 1 tablet by mouth daily 90 tablet 3    pantoprazole (PROTONIX) 40 MG tablet TAKE 1 TABLET BY MOUTH EVERY DAY 90 tablet 3    sildenafil (VIAGRA) 100 MG tablet Take 1 tablet by mouth daily as needed for Erectile Dysfunction 90 tablet 1    Flaxseed, Linseed, (FLAX SEEDS PO) Take 1 capsule by mouth daily Ld 4/24/2019      Saw Palmetto EXTR Take 1 capsule by mouth daily Ld 4/24/2019      therapeutic multivitamin-minerals (THERAGRAN-M) tablet Take 1 tablet by mouth daily Ld 4/24/2019       No current facility-administered medications for this visit. Allergies:  Patient has no known allergies.     Social History:   Social History     Socioeconomic History    Marital status:      Spouse name: Nadine Maurer Number of children: 2    Years of education: 16    Highest education level: Not on file   Occupational History    Occupation: -retired   Social Needs    Financial resource strain: Not hard at all   [place] insecurity     Worry: Never true     Inability: Never true    Transportation needs     Medical: No     Non-medical: No   Tobacco Use    Smoking status: Former Smoker     Quit date: 2/13/2011 Years since quittin.9    Smokeless tobacco: Never Used   Substance and Sexual Activity    Alcohol use: Yes     Alcohol/week: 2.0 standard drinks     Types: 2 Cans of beer per week     Frequency: 2-3 times a week     Drinks per session: 1 or 2     Binge frequency: Never    Drug use: No    Sexual activity: Yes     Partners: Female   Lifestyle    Physical activity     Days per week: 5 days     Minutes per session: 80 min    Stress: Only a little   Relationships    Social connections     Talks on phone: Three times a week     Gets together: Once a week     Attends Orthodox service: 1 to 4 times per year     Active member of club or organization: No     Attends meetings of clubs or organizations: Never     Relationship status:     Intimate partner violence     Fear of current or ex partner: No     Emotionally abused: No     Physically abused: No     Forced sexual activity: No   Other Topics Concern    Not on file   Social History Narrative    Not on file     Family History:   Family History   Problem Relation Age of Onset    High Cholesterol Mother     Stroke Mother     Other Father          in , ? pneumonia and prostate cancer.  Diabetes Brother     Mental Illness Brother     Stroke Brother     Cancer Brother        REVIEW OF SYSTEMS:    CONSTITUTIONAL:  negative for  fevers, chills, sweats and fatigue  EYES: negative for dipolpia or acute vision loss. RESPIRATORY:  negative for  dry cough, cough with sputum, dyspnea, wheezing and chest pain  HENT:negative for pain, headache, difficulty swallowing or nose bleeds. CARDIOVASCULAR:  negative for  chest pain, dyspnea, palpitations, syncope  GASTROINTESTINAL:  negative for nausea, vomiting, change in bowel habits, diarrhea, constipation and abdominal pain  EXTREMITIES: negative for edema  MUSCULOSKELETAL: negative for muscle weakness  SKIN: positive for lesionnegative for itching or rashes.   HEME: negative for easy brusing or bleeding  BEHAVIOR/PSYCH:  negative for poor appetite, increased appetite, decreased sleep and poor concentration  PSYCH: Alert and oriented to person place and time    I have reviewed the chief complaint and history of present illness including (ROS and PFSH) and vital documentation by my staff and I agree with their documentation and have added when applicable. PHYSICAL EXAM:            PHYSICAL EXAM:    VITALS:  /76 (Site: Right Upper Arm, Position: Sitting, Cuff Size: Medium Adult)   Pulse 96   Temp 97.4 °F (36.3 °C) (Temporal)   Ht 5' 6\" (1.676 m)   Wt 149 lb 9.6 oz (67.9 kg)   SpO2 95%   BMI 24.15 kg/m²   CONSTITUTIONAL:  awake, alert, cooperative, no apparent distress, and appears stated age  EYES: PERRLA, EOMI, no signs of occular infection  LUNGS:  No increased work of breathing, good air exchange, clear to auscultation bilaterally, no crackles or wheezing  CARDIOVASCULAR:  Normal apical impulse, regular rate and rhythm,   EXTREMITIES: no signs of clubbing or cyanosis. MUSCULOSKELETAL: negative for flaccid muscle tone or spastic movements. NEURO: Cranial nerves II-XII grossly intact. No signs of agitated mood. SKIN: subcutaneous mass of bilateral face-30 mm x 10 mm x 5 mm,  raised, no signs of bleeding,drainage or infection. Non tender to palpation. Mobile, subcutaneous , non-pulsitile. There is slight asymmetry with the left side being more pronounced than the right. There is more of a translucent to the left side compared to the right. There are wrinkling of both these masses.           DATA:    Labs: CBC:   Lab Results   Component Value Date    WBC 7.4 12/10/2020    RBC 4.11 12/10/2020    HGB 11.2 12/10/2020    HCT 34.1 12/10/2020    MCV 83.0 12/10/2020    MCH 27.3 12/10/2020    MCHC 32.8 12/10/2020    RDW 15.6 12/10/2020     12/10/2020    MPV 9.5 12/10/2020     BMP:    Lab Results   Component Value Date     12/10/2020    K 4.4 12/10/2020    K 4.3 09/04/2018    CL 94 12/10/2020    CO2 25 12/10/2020    BUN 9 12/10/2020    LABALBU 4.6 12/10/2020    CREATININE 0.8 12/10/2020    CALCIUM 9.4 12/10/2020    GFRAA >60 12/10/2020    LABGLOM >60 12/10/2020    GLUCOSE 143 12/10/2020       Radiology Review:  No radiology needed at this time    IMPRESSION/RECOMMENDATIONS:      Diagnosis  -) Bilateral midface festoons, symptomatic with irritation pain and visual field disturbance      -The patient was counseled on the need for surgical intervention if he would like to improve his visual field complaints and the irritation due to the fluctuation and fluid in this area. -The risks, benefits and options were discussed with the pt. The risks included but not limited to pain, bleeding, infection, heavy scarring, damage to surrounding structures, fluid collections, asymmetry, and need for further procedures. All of His questions were answered to their satisfaction and He agrees to proceed with the procedure. I did discuss with the patient that based on his medical history that elective surgery like this would be at slightly higher risk. He will discuss this with his primary care physician. Furthermore, he will obtain an attestation of his visual field disturbances to further support his history. Surgical Plan: Direct excision of festoons bilaterally    I did discuss that there is a slight increased risk of recurrence of festoons. The patient states that he will consider the pros and cons and will return to me following his eye doctor and primary care doctor visit. Photos obtained. Chaperone present for entire visit. This document is generated, in part, by voice recognition software and thus  syntax and grammatical errors are possible.     aJnet Farmer  2:46 PM  2/9/2021

## 2021-02-09 ENCOUNTER — VIRTUAL VISIT (OUTPATIENT)
Dept: NEUROLOGY | Age: 71
End: 2021-02-09
Payer: MEDICARE

## 2021-02-09 DIAGNOSIS — R56.9 SEIZURE (HCC): Primary | ICD-10-CM

## 2021-02-09 PROCEDURE — 99443 PR PHYS/QHP TELEPHONE EVALUATION 21-30 MIN: CPT | Performed by: CLINICAL NURSE SPECIALIST

## 2021-02-09 NOTE — PROGRESS NOTES
Sina Sherman is a 66 y.o. right handed male     evaluated via telephone on 2/9/2021. Consent:  He and/or health care decision maker is aware that that he may receive a bill for this telephone service, depending on his insurance coverage, and has provided verbal consent to proceed: Yes    I affirm this is a Patient Initiated Episode with an Established Patient who has not had a related appointment within my department in the past 7 days or scheduled within the next 24 hours. Total Time: minutes: 21-30 minutes    Consent:  The patient and/or health care decision maker is aware that that he may receive a bill for this telephone service, depending on his insurance coverage, and has provided verbal consent to proceed: Yes    Patient advised regarding steps to help prevent the spread of COVID-19   SOURCE - https://leanne-talia.info/. html     1-Stay home except to get medical care  2-Clean your hands often for atleast 20 secnds, avoid touching: Avoid touching your eyes, nose, and mouth with unwashed hands. 3-Seek medical attention: Seek prompt medical attention if your illness is worsening (e.g., difficulty breathing). Call you doctor first.  3-Wear a facemask if you are sick   4-Cover your coughs and sneezes    Note: not billable if this call serves to triage the patient into an appointment for the relevant concern    Patient was admitted in April 2019 for a GTC seizure    Patient denies any previous similar s/s or previous seizure    His wife notes that they were driving and she noted he was staring straight ahead not talking and going half the speed limit. She attempted to speak to him but he continued to stare, be nonverbal and slow down even further. They arrived at their destination; he exited the car and performed some chores for his MIL. As they sat around the table, he stiffened and had a GTC seizure. In ED, his wife noted a few more of those staring spells where he is not responding but none lasted as long as his original spell. 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 Prior to Visit Medications    Medication Sig Taking?  Authorizing Provider   atorvastatin (LIPITOR) 40 MG tablet TAKE 1 TABLET BY MOUTH EVERY DAY  Jaime Mishra,    celecoxib (CELEBREX) 200 MG capsule Take 1 capsule by mouth daily  Jaime Mishra DO   levETIRAcetam (KEPPRA) 500 MG tablet Take 1 tablet by mouth 2 times daily  Jaime Mishra DO   omega-3 acid ethyl esters (LOVAZA) 1 g capsule TAKE 1 CAPSULE BY MOUTH FOUR TIMES DAILY  Jaime Mishra DO   ezetimibe (ZETIA) 10 MG tablet Take 1 tablet by mouth daily  Jaime Mishra DO   pantoprazole (PROTONIX) 40 MG tablet TAKE 1 TABLET BY MOUTH EVERY DAY  Jaime Mishra DO   sildenafil (VIAGRA) 100 MG tablet Take 1 tablet by mouth daily as needed for Erectile Dysfunction  Jaime Mishra DO   Flaxseed, Linseed, (FLAX SEEDS PO) Take 1 capsule by mouth daily Ld 4/24/2019  Historical Provider, MD Obinna MELGOZA Take 1 capsule by mouth daily Ld 4/24/2019  Historical Provider, MD   therapeutic multivitamin-minerals (THERAGRAN-M) tablet Take 1 tablet by mouth daily Ld 4/24/2019  Historical Provider, MD     Allergies as of 02/09/2021  (No Known Allergies)     Objective:     Mental Status: Alert, oriented, thought content appropriate    Speech: clear  Language: appropriate    Breathing seems unlabored     Laboratory/Radiology:     CBC with Differential:    Lab Results   Component Value Date    WBC 7.4 12/10/2020    RBC 4.11 12/10/2020    HGB 11.2 12/10/2020    HCT 34.1 12/10/2020     12/10/2020    MCV 83.0 12/10/2020    MCH 27.3 12/10/2020    MCHC 32.8 12/10/2020    RDW 15.6 12/10/2020    LYMPHOPCT 14.0 12/10/2020    MONOPCT 10.2 12/10/2020    BASOPCT 0.3 12/10/2020    MONOSABS 0.76 12/10/2020    LYMPHSABS 1.04 12/10/2020    EOSABS 0.14 12/10/2020    BASOSABS 0.02 12/10/2020     CMP:    Lab Results   Component Value Date     12/10/2020    K 4.4 12/10/2020    K 4.3 2018    CL 94 12/10/2020    CO2 25 12/10/2020    BUN 9 12/10/2020    CREATININE 0.8 12/10/2020    GFRAA >60 12/10/2020    LABGLOM >60 12/10/2020    GLUCOSE 143 12/10/2020    PROT 7.1 12/10/2020    LABALBU 4.6 12/10/2020    CALCIUM 9.4 12/10/2020    BILITOT 0.4 12/10/2020    ALKPHOS 47 12/10/2020    AST 38 12/10/2020    ALT 19 12/10/2020     CT Head:   Remote  appreciated    MRI unrevealing for an acute event     EEG 2019   normal     I personally reviewed the patient's lab and imaging studies at this time.     Assessment:     Patient suffered a number of complex partial seizures and one GTC seizure   Now maintained on Keppra 500mg BID and doing well   Possibly r/t hyponatremia in 2019    Right humoral head fracture -- sling and followed by ortho     Whipple procedure in 2015 for an intraductal papillary mucinous neoplasm     Plan:     Begin weaning Keppra  Schedule provided    Call with issues and follow serially     Discussed at length with patient     Dilcia Giraldo  7:39 AM  2021

## 2021-02-09 NOTE — PROGRESS NOTES
Vicky Castillo was read the following message We want to confirm that, for purposes of billing, this is a virtual visit with your provider for which we will submit a claim for reimbursement with your insurance company. You will be responsible for any copays, coinsurance amounts or other amounts not covered by your insurance company. If you do not accept this, unfortunately we will not be able to schedule or proceed with a virtual visit with the provider. Do you accept? Syed Simmons responded Yes .

## 2021-04-13 DIAGNOSIS — E55.9 VITAMIN D DEFICIENCY: ICD-10-CM

## 2021-04-13 DIAGNOSIS — D64.9 ANEMIA, UNSPECIFIED TYPE: ICD-10-CM

## 2021-04-13 DIAGNOSIS — R53.83 FATIGUE, UNSPECIFIED TYPE: ICD-10-CM

## 2021-04-13 DIAGNOSIS — Z12.5 SCREENING PSA (PROSTATE SPECIFIC ANTIGEN): ICD-10-CM

## 2021-04-13 DIAGNOSIS — E16.2 HYPOGLYCEMIA: ICD-10-CM

## 2021-04-13 DIAGNOSIS — M19.90 ARTHRITIS: ICD-10-CM

## 2021-04-13 DIAGNOSIS — E78.2 MIXED HYPERLIPIDEMIA: ICD-10-CM

## 2021-04-13 PROBLEM — R56.9 SEIZURE (HCC): Status: RESOLVED | Noted: 2019-04-17 | Resolved: 2021-04-13

## 2021-04-13 LAB
ALBUMIN SERPL-MCNC: 4.9 G/DL (ref 3.5–5.2)
ALP BLD-CCNC: 51 U/L (ref 40–129)
ALT SERPL-CCNC: 19 U/L (ref 0–40)
ANION GAP SERPL CALCULATED.3IONS-SCNC: 11 MMOL/L (ref 7–16)
AST SERPL-CCNC: 40 U/L (ref 0–39)
BASOPHILS ABSOLUTE: 0.03 E9/L (ref 0–0.2)
BASOPHILS RELATIVE PERCENT: 0.7 % (ref 0–2)
BILIRUB SERPL-MCNC: 0.5 MG/DL (ref 0–1.2)
BUN BLDV-MCNC: 8 MG/DL (ref 8–23)
CALCIUM SERPL-MCNC: 9.8 MG/DL (ref 8.6–10.2)
CHLORIDE BLD-SCNC: 96 MMOL/L (ref 98–107)
CHOLESTEROL, TOTAL: 141 MG/DL (ref 0–199)
CO2: 26 MMOL/L (ref 22–29)
CREAT SERPL-MCNC: 1 MG/DL (ref 0.7–1.2)
EOSINOPHILS ABSOLUTE: 0.07 E9/L (ref 0.05–0.5)
EOSINOPHILS RELATIVE PERCENT: 1.6 % (ref 0–6)
GFR AFRICAN AMERICAN: >60
GFR NON-AFRICAN AMERICAN: >60 ML/MIN/1.73
GLUCOSE BLD-MCNC: 91 MG/DL (ref 74–99)
HBA1C MFR BLD: 5.2 % (ref 4–5.6)
HCT VFR BLD CALC: 36.1 % (ref 37–54)
HDLC SERPL-MCNC: 81 MG/DL
HEMOGLOBIN: 11.5 G/DL (ref 12.5–16.5)
IMMATURE GRANULOCYTES #: 0.01 E9/L
IMMATURE GRANULOCYTES %: 0.2 % (ref 0–5)
IRON SATURATION: 18 % (ref 20–55)
IRON: 64 MCG/DL (ref 59–158)
LDL CHOLESTEROL CALCULATED: 54 MG/DL (ref 0–99)
LYMPHOCYTES ABSOLUTE: 1.06 E9/L (ref 1.5–4)
LYMPHOCYTES RELATIVE PERCENT: 24.9 % (ref 20–42)
MAGNESIUM: 1.9 MG/DL (ref 1.6–2.6)
MCH RBC QN AUTO: 26.3 PG (ref 26–35)
MCHC RBC AUTO-ENTMCNC: 31.9 % (ref 32–34.5)
MCV RBC AUTO: 82.4 FL (ref 80–99.9)
MONOCYTES ABSOLUTE: 0.51 E9/L (ref 0.1–0.95)
MONOCYTES RELATIVE PERCENT: 12 % (ref 2–12)
NEUTROPHILS ABSOLUTE: 2.57 E9/L (ref 1.8–7.3)
NEUTROPHILS RELATIVE PERCENT: 60.6 % (ref 43–80)
PDW BLD-RTO: 16.9 FL (ref 11.5–15)
PHOSPHORUS: 3.6 MG/DL (ref 2.5–4.5)
PLATELET # BLD: 189 E9/L (ref 130–450)
PMV BLD AUTO: 9.1 FL (ref 7–12)
POTASSIUM SERPL-SCNC: 5 MMOL/L (ref 3.5–5)
PROSTATE SPECIFIC ANTIGEN: 1.38 NG/ML (ref 0–4)
RBC # BLD: 4.38 E12/L (ref 3.8–5.8)
SODIUM BLD-SCNC: 133 MMOL/L (ref 132–146)
T4 FREE: 1.5 NG/DL (ref 0.93–1.7)
TOTAL IRON BINDING CAPACITY: 355 MCG/DL (ref 250–450)
TOTAL PROTEIN: 7.3 G/DL (ref 6.4–8.3)
TRIGL SERPL-MCNC: 28 MG/DL (ref 0–149)
TSH SERPL DL<=0.05 MIU/L-ACNC: 3.13 UIU/ML (ref 0.27–4.2)
VITAMIN D 25-HYDROXY: 44 NG/ML (ref 30–100)
VLDLC SERPL CALC-MCNC: 6 MG/DL
WBC # BLD: 4.3 E9/L (ref 4.5–11.5)

## 2021-04-29 ENCOUNTER — OFFICE VISIT (OUTPATIENT)
Dept: PHYSICAL MEDICINE AND REHAB | Age: 71
End: 2021-04-29
Payer: MEDICARE

## 2021-04-29 VITALS
WEIGHT: 146 LBS | HEART RATE: 83 BPM | SYSTOLIC BLOOD PRESSURE: 151 MMHG | DIASTOLIC BLOOD PRESSURE: 86 MMHG | BODY MASS INDEX: 22.91 KG/M2 | HEIGHT: 67 IN

## 2021-04-29 DIAGNOSIS — M21.70 LEG LENGTH DISCREPANCY: ICD-10-CM

## 2021-04-29 DIAGNOSIS — M47.816 LUMBAR SPONDYLOSIS: ICD-10-CM

## 2021-04-29 DIAGNOSIS — G89.29 CHRONIC LEFT-SIDED LOW BACK PAIN WITH LEFT-SIDED SCIATICA: Primary | ICD-10-CM

## 2021-04-29 DIAGNOSIS — M54.17 RADICULOPATHY, LUMBOSACRAL REGION: ICD-10-CM

## 2021-04-29 DIAGNOSIS — M54.42 CHRONIC LEFT-SIDED LOW BACK PAIN WITH LEFT-SIDED SCIATICA: Primary | ICD-10-CM

## 2021-04-29 PROCEDURE — 3017F COLORECTAL CA SCREEN DOC REV: CPT | Performed by: PHYSICAL MEDICINE & REHABILITATION

## 2021-04-29 PROCEDURE — 4040F PNEUMOC VAC/ADMIN/RCVD: CPT | Performed by: PHYSICAL MEDICINE & REHABILITATION

## 2021-04-29 PROCEDURE — 99204 OFFICE O/P NEW MOD 45 MIN: CPT | Performed by: PHYSICAL MEDICINE & REHABILITATION

## 2021-04-29 PROCEDURE — 1036F TOBACCO NON-USER: CPT | Performed by: PHYSICAL MEDICINE & REHABILITATION

## 2021-04-29 PROCEDURE — G8420 CALC BMI NORM PARAMETERS: HCPCS | Performed by: PHYSICAL MEDICINE & REHABILITATION

## 2021-04-29 PROCEDURE — G8427 DOCREV CUR MEDS BY ELIG CLIN: HCPCS | Performed by: PHYSICAL MEDICINE & REHABILITATION

## 2021-04-29 PROCEDURE — 1123F ACP DISCUSS/DSCN MKR DOCD: CPT | Performed by: PHYSICAL MEDICINE & REHABILITATION

## 2021-04-29 RX ORDER — METHYLPREDNISOLONE 4 MG/1
TABLET ORAL
Qty: 1 KIT | Refills: 0 | Status: SHIPPED
Start: 2021-04-29 | End: 2021-05-26

## 2021-04-29 NOTE — PROGRESS NOTES
DAY 90 tablet 2    sildenafil (VIAGRA) 100 MG tablet Take 1 tablet by mouth daily as needed for Erectile Dysfunction 90 tablet 1    Flaxseed, Linseed, (FLAX SEEDS PO) Take 1 capsule by mouth daily Ld 2019      Saw Palmetto EXTR Take 1 capsule by mouth daily Ld 2019      therapeutic multivitamin-minerals (THERAGRAN-M) tablet Take 1 tablet by mouth daily Ld 2019       No current facility-administered medications for this visit. Past Medical History:   Diagnosis Date    Arthritis     Dislocated shoulder     right    GERD (gastroesophageal reflux disease) 2015    Hyperlipidemia     Hypoglycemia     Hyponatremia     Liver disease     Mixed hyperlipidemia 2015    New onset seizure ([address] 2019       Past Surgical History:   Procedure Laterality Date    COLON SURGERY      COLONOSCOPY      INCISIONAL HERNIA REPAIR N/A 10/13/2016    INGUINAL HERNIA REPAIR Left 2013    laparoscopic left inguinal hernia repair    PANCREAS SURGERY      Whipple procedure    CA LAP,SURG,COLECT,TOT,W/PROCTECT,W/ILEOST N/A 2018    DIAGNOSTIC LAPAROSCOPY POSS LAPAROTOMY POSS BOWEL RESECTION performed by Latisha Saldana MD at [address] 2018    LAPAROSCOPIC REVISION OF GASTROJEJUNOSTOMY; INTRAOPERATIVE EGD performed by Latisha Saldana MD at [facility] Right 2019    RIGHT REVERSE TOTAL SHOULDER ARTHROPLASTY performed by Bill Thompson MD at [address] [place] [address]      right cyst removed [address] UPPER GASTROINTESTINAL ENDOSCOPY      UPPER GASTROINTESTINAL ENDOSCOPY N/A 2018    EGD BIOPSY performed by Latisha Saldana MD at [facility] ENDOSCOPY       Family History   Problem Relation Age of Onset    High Cholesterol Mother     Stroke Mother     Other Father          in , ? pneumonia and prostate cancer.      Diabetes Brother     Mental Illness Brother     Stroke Brother  Cancer Brother        Social History     Tobacco Use    Smoking status: Former Smoker     Quit date: 2/13/2011     Years since quitting: 10.2    Smokeless tobacco: Never Used   Substance Use Topics    Alcohol use: Yes     Alcohol/week: 2.0 standard drinks     Types: 2 Cans of beer per week     Frequency: 2-3 times a week     Drinks per session: 1 or 2     Binge frequency: Never    Drug use: No          Functional Status: The patient is able to ambulate and perform activities of daily living without the use of an assistive device. ROS: For more complete ROS answered by the patient, please see . Constitutional: Denies fevers, chills, night sweats, unintentional weight loss     Skin: Denies rash or skin changes     Eyes: Denies vision changes    Ears/Nose/Throat: Denies nasal congestion or sore throat     Respiratory: Denies SOB or cough     Cardiovascular: Denies CP, palpitations, edema      Gastrointestinal: Denies abdominal pain,  N/V, constipation, or diarrhea    Genitourinary: Denies urinary symptoms    Neurologic: See HPI.     MSK: See HPI. Psychiatric: Denies sleep disturbance, anxiety, depression    Hematologic/Lymphatic/Immunologic: Denies bruising       Physical Exam:   Blood pressure (!) 151/86, pulse 83, height 5' 7\" (1.702 m), weight 146 lb (66.2 kg). General: well developed and well nourished in no acute distress. Body habitus is thin  HEENT: No rhinorrhea, sneezing, yawning, or lacrimation. No scleral icterus or conjunctival injection. Resp: symmetrical chest expansion, unlabored breathing, respirations unlabored. CV: Heart rate is regular. Peripheral pulses are palpable  Lymph: No visible regional lymphadenopathy. Skin: No rashes or ecchymosis. Normal turgor. Psych: Mood is calm. Affect is normal.   Ext: No edema noted     MSK:   Back/Hip Exam:   Inspection: Pelvis was asymmetric. Lumbar lordotic curvature was decreased. There was no scoliosis.   No ecchymoses or erythema. Palpation: Palpatory exam revealed tenderness along left lumbosacral paraspinals, no ttp midline spine, ttp left SI joint sulcus, ttp left piriformis, ttp left greater trochanters. There was no paraspinal spasms. There were no trigger points. ROM: ROM decreased  Special/provocative testing:   Supine SLR negative . There was leg length discrepancy. Neurological Exam:  Strength:   R  L  Hip Flex  5  5  Knee Ext  5  5  Ankle dorsi  5  4  EHL   5 4  Ankle Plantar  5  5    Sensory:  Intact for light touch in all lower extremity dermatomes. Reflexes:   R  L  Patellar  (0) (0)  Ankle Jerk  (0) (0)      Gait is antalgic         Imaging: (personally reviewed by me 04/29/21)  X-ray L spine  X-ray left hip       Impression:   Efrain Davila is a 66 y.o. male     1. Chronic left-sided low back pain with left-sided sciatica    2. Radiculopathy, lumbosacral region    3. Lumbar spondylosis    4. Leg length discrepancy        Plan:   · Refer to PT   · Imaging reviewed. · Obtain lumbar MRI   · Refer to orthotics for heel lift. · Medrol dose miguel. Stop celebrex while taking.  The patient was educated about the diagnosis, prognosis, indications, risks and benefits of treatment. An opportunity to ask questions was given to the patient and questions were answered. The patient agreed to proceed with the recommended treatment as described above.  Follow up after MRI/PT      Thank you for the consultation and for allowing me to participate in the care of this patient.         Sincerely,     Cong Cox DO, [facility]   Board Certified Physical Medicine and Rehabilitation

## 2021-05-05 ENCOUNTER — TELEPHONE (OUTPATIENT)
Dept: PHYSICAL MEDICINE AND REHAB | Age: 71
End: 2021-05-05

## 2021-05-05 RX ORDER — TIZANIDINE 2 MG/1
2 TABLET ORAL EVERY 8 HOURS PRN
Qty: 90 TABLET | Refills: 0 | Status: SHIPPED
Start: 2021-05-05 | End: 2021-05-06 | Stop reason: SINTOL

## 2021-05-05 NOTE — TELEPHONE ENCOUNTER
Can restart celebrex. I sent in zanaflex 2mg TID PRN. Can take zanaflex, celebrex and tylenol together as needed and if needed. Make sure he started PT.

## 2021-05-05 NOTE — TELEPHONE ENCOUNTER
Patient called and stated that he just finished his medrol dose miguel and he wanted to know if there is something else you can give him for pain, he stated his back is still hurting. Please advise.

## 2021-05-06 ENCOUNTER — APPOINTMENT (OUTPATIENT)
Dept: GENERAL RADIOLOGY | Age: 71
End: 2021-05-06
Payer: MEDICARE

## 2021-05-06 ENCOUNTER — HOSPITAL ENCOUNTER (EMERGENCY)
Age: 71
Discharge: HOME | End: 2021-05-06
Attending: EMERGENCY MEDICINE
Payer: MEDICARE

## 2021-05-06 VITALS
SYSTOLIC BLOOD PRESSURE: 132 MMHG | TEMPERATURE: 97.6 F | BODY MASS INDEX: 21.97 KG/M2 | RESPIRATION RATE: 18 BRPM | WEIGHT: 140 LBS | DIASTOLIC BLOOD PRESSURE: 82 MMHG | HEIGHT: 67 IN | HEART RATE: 71 BPM | OXYGEN SATURATION: 100 %

## 2021-05-06 DIAGNOSIS — T88.7XXA MEDICATION SIDE EFFECT: ICD-10-CM

## 2021-05-06 DIAGNOSIS — R42 DIZZINESS: Primary | ICD-10-CM

## 2021-05-06 LAB
ALBUMIN SERPL-MCNC: 3.2 G/DL (ref 3.5–5.2)
ALP BLD-CCNC: 40 U/L (ref 40–129)
ALT SERPL-CCNC: 15 U/L (ref 0–40)
ANION GAP SERPL CALCULATED.3IONS-SCNC: 9 MMOL/L (ref 7–16)
AST SERPL-CCNC: 21 U/L (ref 0–39)
BASOPHILS ABSOLUTE: 0.02 E9/L (ref 0–0.2)
BASOPHILS RELATIVE PERCENT: 0.3 % (ref 0–2)
BILIRUB SERPL-MCNC: 0.3 MG/DL (ref 0–1.2)
BUN BLDV-MCNC: 12 MG/DL (ref 6–23)
CALCIUM SERPL-MCNC: 7.3 MG/DL (ref 8.6–10.2)
CHLORIDE BLD-SCNC: 103 MMOL/L (ref 98–107)
CO2: 20 MMOL/L (ref 22–29)
CREAT SERPL-MCNC: 1 MG/DL (ref 0.7–1.2)
EOSINOPHILS ABSOLUTE: 0.11 E9/L (ref 0.05–0.5)
EOSINOPHILS RELATIVE PERCENT: 1.4 % (ref 0–6)
GFR AFRICAN AMERICAN: >60
GFR NON-AFRICAN AMERICAN: >60 ML/MIN/1.73
GLUCOSE BLD-MCNC: 91 MG/DL (ref 74–99)
HCT VFR BLD CALC: 33.8 % (ref 37–54)
HEMOGLOBIN: 11 G/DL (ref 12.5–16.5)
IMMATURE GRANULOCYTES #: 0.02 E9/L
IMMATURE GRANULOCYTES %: 0.3 % (ref 0–5)
LACTIC ACID: 0.8 MMOL/L (ref 0.5–2.2)
LYMPHOCYTES ABSOLUTE: 0.86 E9/L (ref 1.5–4)
LYMPHOCYTES RELATIVE PERCENT: 11.3 % (ref 20–42)
MCH RBC QN AUTO: 27.2 PG (ref 26–35)
MCHC RBC AUTO-ENTMCNC: 32.5 % (ref 32–34.5)
MCV RBC AUTO: 83.5 FL (ref 80–99.9)
MONOCYTES ABSOLUTE: 0.83 E9/L (ref 0.1–0.95)
MONOCYTES RELATIVE PERCENT: 10.9 % (ref 2–12)
NEUTROPHILS ABSOLUTE: 5.75 E9/L (ref 1.8–7.3)
NEUTROPHILS RELATIVE PERCENT: 75.8 % (ref 43–80)
PDW BLD-RTO: 17.3 FL (ref 11.5–15)
PLATELET # BLD: 181 E9/L (ref 130–450)
PMV BLD AUTO: 8.9 FL (ref 7–12)
POTASSIUM SERPL-SCNC: 3.5 MMOL/L (ref 3.5–5)
RBC # BLD: 4.05 E12/L (ref 3.8–5.8)
SODIUM BLD-SCNC: 132 MMOL/L (ref 132–146)
TOTAL PROTEIN: 5.1 G/DL (ref 6.4–8.3)
TROPONIN: <0.01 NG/ML (ref 0–0.03)
WBC # BLD: 7.6 E9/L (ref 4.5–11.5)

## 2021-05-06 PROCEDURE — 85025 COMPLETE CBC W/AUTO DIFF WBC: CPT

## 2021-05-06 PROCEDURE — 93005 ELECTROCARDIOGRAM TRACING: CPT | Performed by: EMERGENCY MEDICINE

## 2021-05-06 PROCEDURE — 84484 ASSAY OF TROPONIN QUANT: CPT

## 2021-05-06 PROCEDURE — 99284 EMERGENCY DEPT VISIT MOD MDM: CPT

## 2021-05-06 PROCEDURE — 80053 COMPREHEN METABOLIC PANEL: CPT

## 2021-05-06 PROCEDURE — 2580000003 HC RX 258: Performed by: EMERGENCY MEDICINE

## 2021-05-06 PROCEDURE — 83605 ASSAY OF LACTIC ACID: CPT

## 2021-05-06 PROCEDURE — 71045 X-RAY EXAM CHEST 1 VIEW: CPT

## 2021-05-06 RX ORDER — 0.9 % SODIUM CHLORIDE 0.9 %
1000 INTRAVENOUS SOLUTION INTRAVENOUS ONCE
Status: COMPLETED | OUTPATIENT
Start: 2021-05-06 | End: 2021-05-06

## 2021-05-06 RX ADMIN — SODIUM CHLORIDE 1000 ML: 9 INJECTION, SOLUTION INTRAVENOUS at 21:22

## 2021-05-06 NOTE — TELEPHONE ENCOUNTER
Called and spoke with the patient wife Shelby Leos and informed her that the physician sent a script to his pharmacy for zanaflex 2 mg tid prn and he can also use his celebrex and tylenol. Patient wife is aware and voiced understanding.

## 2021-05-07 LAB
EKG ATRIAL RATE: 76 BPM
EKG P AXIS: 68 DEGREES
EKG P-R INTERVAL: 136 MS
EKG Q-T INTERVAL: 404 MS
EKG QRS DURATION: 82 MS
EKG QTC CALCULATION (BAZETT): 454 MS
EKG R AXIS: -59 DEGREES
EKG T AXIS: 39 DEGREES
EKG VENTRICULAR RATE: 76 BPM

## 2021-05-07 PROCEDURE — 93010 ELECTROCARDIOGRAM REPORT: CPT | Performed by: INTERNAL MEDICINE

## 2021-05-07 NOTE — ED PROVIDER NOTES
HPI:  5/6/21, Time: 9:08 PM EDT         Raman Ayoub is a 66 y.o. male presenting to the ED for dizziness and generalized weakness, beginning 30 minutes after taking Tizanidine this afternoon. This is a new pill that was prescribed to him by his doctor for back pain. He states he's feeling better now. The complaint has been persistent, moderate in severity, and worsened by nothing. He denies falling down. Patient denies fever/chills, sore throat, cough, congestion, chest pain, shortness of breath, edema, headache, visual disturbances, focal paresthesias, focal weakness, abdominal pain, nausea, vomiting, diarrhea, constipation, dysuria, hematuria, trauma, neck or back pain, rash or other complaints. ROS:   A complete review of systems was performed and all pertinent positives and negatives are stated within HPI, all other systems reviewed and are negative.      --------------------------------------------- PAST HISTORY ---------------------------------------------  Past Medical History:  has a past medical history of Arthritis, Dislocated shoulder, GERD (gastroesophageal reflux disease), Hyperlipidemia, Hypoglycemia, Hyponatremia, Liver disease, Mixed hyperlipidemia, and New onset seizure ([facility] [address] Past Surgical History:  has a past surgical history that includes Colonoscopy; shoulder surgery; Inguinal hernia repair (Left, 2/19/2013); skin biopsy; Upper gastrointestinal endoscopy; Pancreas surgery; Incisional hernia repair (N/A, 10/13/2016); pr lap,surg,colect,tot,w/proctect,w/ileost (N/A, 8/20/2018); Colon surgery; Upper gastrointestinal endoscopy (N/A, 9/5/2018); pr lap,surg,enterectomy,resect & anast (N/A, 9/6/2018); and REVISION OF TOTAL SHOULDER (Right, 4/26/2019). Social History:  reports that he quit smoking about 10 years ago. He has never used smokeless tobacco. He reports current alcohol use of about 2.0 standard drinks of alcohol per week. He reports that he does not use drugs.     Family History: family history includes Cancer in his brother; Diabetes in his brother; High Cholesterol in his mother; Mental Illness in his brother; Other in his father; Stroke in his brother and mother. The patients home medications have been reviewed. Allergies: Patient has no known allergies. ----------------------------------------PHYSICAL EXAM--------------------------------------  Constitutional:  Well developed, well nourished, no acute distress, non-toxic appearance   Eyes:  PERRL, conjunctiva normal, EOMI, no nystagmus  HENT:  Atraumatic, external ears normal, nose normal, oropharynx moist, no pharyngeal exudates. Neck- normal range of motion, no nuchal rigidity   Respiratory:  No respiratory distress, normal breath sounds, no rales, no wheezing   Cardiovascular:  Normal rate, normal rhythm, no murmurs, no gallops, no rubs. Radial and DP pulses 2+ bilaterally. GI:  Soft, nondistended, normal bowel sounds, nontender, no organomegaly, no mass, no rebound, no guarding   :  No costovertebral angle tenderness   Musculoskeletal:  No edema, no tenderness, no deformities. Back- no tenderness  Integument:  Well hydrated, no visible rash. Adequate perfusion. Lymphatic:  No cervical lymphadenopathy noted   Neurologic:  Alert & oriented x 3, CN 2-12 normal,  no focal deficits noted. DTRs 2+ bilateral patellar. Normal gait. Psychiatric:  Speech and behavior appropriate       -------------------------------------------------- RESULTS -------------------------------------------------  I have personally reviewed all laboratory and imaging results for this patient. Results are listed below.      LABS:  Results for orders placed or performed during the hospital encounter of 05/06/21   CBC auto differential   Result Value Ref Range    WBC 7.6 4.5 - 11.5 E9/L    RBC 4.05 3.80 - 5.80 E12/L    Hemoglobin 11.0 (L) 12.5 - 16.5 g/dL    Hematocrit 33.8 (L) 37.0 - 54.0 %    MCV 83.5 80.0 - 99.9 fL    MCH 27.2 26.0 - 35.0 pg    MCHC 32.5 32.0 - 34.5 %    RDW 17.3 (H) 11.5 - 15.0 fL    Platelets 317 994 - 955 E9/L    MPV 8.9 7.0 - 12.0 fL    Neutrophils % 75.8 43.0 - 80.0 %    Immature Granulocytes % 0.3 0.0 - 5.0 %    Lymphocytes % 11.3 (L) 20.0 - 42.0 %    Monocytes % 10.9 2.0 - 12.0 %    Eosinophils % 1.4 0.0 - 6.0 %    Basophils % 0.3 0.0 - 2.0 %    Neutrophils Absolute 5.75 1.80 - 7.30 E9/L    Immature Granulocytes # 0.02 E9/L    Lymphocytes Absolute 0.86 (L) 1.50 - 4.00 E9/L    Monocytes Absolute 0.83 0.10 - 0.95 E9/L    Eosinophils Absolute 0.11 0.05 - 0.50 E9/L    Basophils Absolute 0.02 0.00 - 0.20 E9/L   Comprehensive Metabolic Panel   Result Value Ref Range    Sodium 132 132 - 146 mmol/L    Potassium 3.5 3.5 - 5.0 mmol/L    Chloride 103 98 - 107 mmol/L    CO2 20 (L) 22 - 29 mmol/L    Anion Gap 9 7 - 16 mmol/L    Glucose 91 74 - 99 mg/dL    BUN 12 6 - 23 mg/dL    CREATININE 1.0 0.7 - 1.2 mg/dL    GFR Non-African American >60 >=60 mL/min/1.73    GFR African American >60     Calcium 7.3 (L) 8.6 - 10.2 mg/dL    Total Protein 5.1 (L) 6.4 - 8.3 g/dL    Albumin 3.2 (L) 3.5 - 5.2 g/dL    Total Bilirubin 0.3 0.0 - 1.2 mg/dL    Alkaline Phosphatase 40 40 - 129 U/L    ALT 15 0 - 40 U/L    AST 21 0 - 39 U/L   Lactic Acid, Plasma   Result Value Ref Range    Lactic Acid 0.8 0.5 - 2.2 mmol/L   Troponin   Result Value Ref Range    Troponin <0.01 0.00 - 0.03 ng/mL       RADIOLOGY:  Interpreted by Radiologist.  XR CHEST PORTABLE   Final Result   No acute process.                EKG Interpretation  Time: 8:38 PM EDT  Rhythm: normal sinus   Rate: 76  Axis: normal  Conduction: left anterior fasciclar block  ST Segments: no acute change  T Waves: no acute change  Clinical Impression: non-specific EKG  Comparison to prior EKG: stable as compared to patient's most recent EKG      ------------------------- NURSING NOTES AND VITALS REVIEWED ---------------------------  The nursing notes within the ED encounter and vital signs as below have been are agreeable with the plan.    --------------------------- IMPRESSION AND DISPOSITION ---------------------------------    IMPRESSION  1. Dizziness    2.  Medication side effect        DISPOSITION  Disposition: Discharge to home  Patient condition is stable             Monae Guerra DO  05/06/21 [id number]

## 2021-05-07 NOTE — ED NOTES
LAYING  BP 97/58  HR 75    SITTING  BP 99/62  HR 80    STANDING  BP 95/54  HR 84       Alayna Tsai RN  05/06/21 2052

## 2021-05-11 ENCOUNTER — TELEPHONE (OUTPATIENT)
Dept: PHYSICAL MEDICINE AND REHAB | Age: 71
End: 2021-05-11

## 2021-05-12 ENCOUNTER — TELEPHONE (OUTPATIENT)
Dept: PHYSICAL MEDICINE AND REHAB | Age: 71
End: 2021-05-12

## 2021-05-12 NOTE — TELEPHONE ENCOUNTER
I spoke with his wife and told her that we would not recommend taking anything else if he had this happen with the zanaflex. She verbalized understanding.

## 2021-05-12 NOTE — TELEPHONE ENCOUNTER
Patient called in and states the tizanidine made him dizzy and he states he fell on floor (syncope) after taking this. Would like to know if a different med would help him and  be prescribed ?   Please advised

## 2021-05-12 NOTE — TELEPHONE ENCOUNTER
Attempted to reach the patient. Left voicemail and callback number and instructed patient to call the office.

## 2021-05-24 ENCOUNTER — TELEPHONE (OUTPATIENT)
Dept: PHYSICAL MEDICINE AND REHAB | Age: 71
End: 2021-05-24

## 2021-05-24 NOTE — TELEPHONE ENCOUNTER
Spoke with Gladis in PT and she said that they have been busy scheduling Post op patients and that she would be giving the patient a call today.

## 2021-05-24 NOTE — TELEPHONE ENCOUNTER
----- Message from Corbin Garcias DO sent at 5/24/2021  8:43 AM EDT -----  Please call patient to schedule appointment to review MRI. Also, can someone follow up on PT scheduling please? Referral was placed 4/29 and I see he is not scheduled.  Thank you

## 2021-05-25 ENCOUNTER — OFFICE VISIT (OUTPATIENT)
Dept: PHYSICAL MEDICINE AND REHAB | Age: 71
End: 2021-05-25
Payer: MEDICARE

## 2021-05-25 ENCOUNTER — TELEPHONE (OUTPATIENT)
Dept: PHYSICAL MEDICINE AND REHAB | Age: 71
End: 2021-05-25

## 2021-05-25 VITALS
HEART RATE: 99 BPM | HEIGHT: 63 IN | SYSTOLIC BLOOD PRESSURE: 134 MMHG | BODY MASS INDEX: 25.16 KG/M2 | WEIGHT: 142 LBS | DIASTOLIC BLOOD PRESSURE: 75 MMHG

## 2021-05-25 DIAGNOSIS — M54.17 LUMBOSACRAL RADICULITIS: Primary | ICD-10-CM

## 2021-05-25 DIAGNOSIS — M54.42 CHRONIC LEFT-SIDED LOW BACK PAIN WITH LEFT-SIDED SCIATICA: ICD-10-CM

## 2021-05-25 DIAGNOSIS — M54.17 RADICULOPATHY, LUMBOSACRAL REGION: ICD-10-CM

## 2021-05-25 DIAGNOSIS — M21.70 LEG LENGTH DISCREPANCY: ICD-10-CM

## 2021-05-25 DIAGNOSIS — G89.29 CHRONIC LEFT-SIDED LOW BACK PAIN WITH LEFT-SIDED SCIATICA: ICD-10-CM

## 2021-05-25 DIAGNOSIS — M47.816 LUMBAR SPONDYLOSIS: ICD-10-CM

## 2021-05-25 PROCEDURE — G8417 CALC BMI ABV UP PARAM F/U: HCPCS | Performed by: PHYSICAL MEDICINE & REHABILITATION

## 2021-05-25 PROCEDURE — 1123F ACP DISCUSS/DSCN MKR DOCD: CPT | Performed by: PHYSICAL MEDICINE & REHABILITATION

## 2021-05-25 PROCEDURE — 4040F PNEUMOC VAC/ADMIN/RCVD: CPT | Performed by: PHYSICAL MEDICINE & REHABILITATION

## 2021-05-25 PROCEDURE — G8427 DOCREV CUR MEDS BY ELIG CLIN: HCPCS | Performed by: PHYSICAL MEDICINE & REHABILITATION

## 2021-05-25 PROCEDURE — 3017F COLORECTAL CA SCREEN DOC REV: CPT | Performed by: PHYSICAL MEDICINE & REHABILITATION

## 2021-05-25 PROCEDURE — 1036F TOBACCO NON-USER: CPT | Performed by: PHYSICAL MEDICINE & REHABILITATION

## 2021-05-25 PROCEDURE — 99213 OFFICE O/P EST LOW 20 MIN: CPT | Performed by: PHYSICAL MEDICINE & REHABILITATION

## 2021-05-25 NOTE — PROGRESS NOTES
Social History     Tobacco Use    Smoking status: Former Smoker     Quit date: 2/13/2011     Years since quitting: 10.2    Smokeless tobacco: Never Used   Vaping Use    Vaping Use: Never used   Substance Use Topics    Alcohol use: Yes     Alcohol/week: 2.0 standard drinks     Types: 2 Cans of beer per week    Drug use: No          Functional Status: The patient is able to ambulate and perform activities of daily living without the use of an assistive device. ROS:   Constitutional: Denies fevers, chills, night sweats, unintentional weight loss     Skin: Denies rash or skin changes     Eyes: Denies vision changes    Ears/Nose/Throat: Denies nasal congestion or sore throat     Respiratory: Denies SOB or cough     Cardiovascular: Denies CP, palpitations, edema      Gastrointestinal: Denies abdominal pain,  N/V, constipation, or diarrhea    Genitourinary: Denies urinary symptoms    Neurologic: See HPI.     MSK: See HPI. Psychiatric: Denies sleep disturbance, anxiety, depression    Hematologic/Lymphatic/Immunologic: Denies bruising       Physical Exam:   Blood pressure 134/75, pulse 99, height 5' 3\" (1.6 m), weight 142 lb (64.4 kg). General: well developed and well nourished in no acute distress. Body habitus is thin  HEENT: No rhinorrhea, sneezing, yawning, or lacrimation. No scleral icterus or conjunctival injection. Resp: symmetrical chest expansion, unlabored breathing, respirations unlabored. CV: Heart rate is regular. Peripheral pulses are palpable  Lymph: No visible regional lymphadenopathy. Skin: No rashes or ecchymosis. Normal turgor. Psych: Mood is calm. Affect is normal.   Ext: No edema noted     MSK:   Back/Hip Exam:   Inspection: Pelvis was asymmetric. Lumbar lordotic curvature was decreased. There was no scoliosis. No ecchymoses or erythema.    Palpation: Palpatory exam revealed tenderness along left lumbosacral paraspinals, no ttp midline spine, ttp left SI joint sulcus, ttp

## 2021-05-25 NOTE — H&P
Emmy Almaguer, [address] [facility]  [address] [facility] [facility] [place]  Phone: [phone]  Fax: [phone]    PCP: Jenni Blake DO  Date of visit: 5/25/21    Chief Complaint   Patient presents with    Back Pain     follow up and results MRI      Interval:   Patient presents today for MRI follow up. MRI reveals severe DDD, stenosis, both central and NF, L4-5 and L5-S1. Facet arthropathy. He reports continued left sided low back, buttock and posterior leg pain. He was unable to tolerate zanaflex due to dizziness. The pain is rated Pain Score:   5, is described as cramping, and is located in the left low back with radiation to the left hamstring. The pain is better with sitting, laying down. The pain is worse with walking. There is no associated numbness/tingling. There is no weakness. There is no bowel/bladder changes. The prior workup has included: Xray L spine, X-ray hip     The prior treatment has included:  PT: none. He states he does home exercises. Chiropractic: yes with no relief. Modalities: none    OTC Tylenol: none    NSAIDS: celebrex with no relief   Medrol dose miguel   Opioids: none    Membrane stabilizers: none    Muscle relaxers: couldn't tolerate zanaflex   Previous injections: none .    Previous surgery at this site: none    No Known Allergies    Current Outpatient Medications   Medication Sig Dispense Refill    methylPREDNISolone (MEDROL DOSEPACK) 4 MG tablet Take by mouth as directed 1 kit 0    ezetimibe (ZETIA) 10 MG tablet Take 1 tablet by mouth daily 90 tablet 3    pantoprazole (PROTONIX) 40 MG tablet TAKE 1 TABLET BY MOUTH EVERY DAY 90 tablet 3    celecoxib (CELEBREX) 200 MG capsule TAKE 1 CAPSULE BY MOUTH DAILY 90 capsule 3    omega-3 acid ethyl esters (LOVAZA) 1 g capsule TAKE 1 CAPSULE BY MOUTH FOUR TIMES DAILY 360 capsule 1    atorvastatin (LIPITOR) 40 MG tablet TAKE 1 TABLET BY MOUTH EVERY DAY 90 tablet 2    sildenafil (VIAGRA) 100 MG tablet Take 1 tablet by mouth daily as needed for Erectile Dysfunction 90 tablet 1    Flaxseed, Linseed, (FLAX SEEDS PO) Take 1 capsule by mouth daily Ld 2019      Saw Palmetto EXTR Take 1 capsule by mouth daily Ld 2019      therapeutic multivitamin-minerals (THERAGRAN-M) tablet Take 1 tablet by mouth daily Ld 2019       No current facility-administered medications for this visit. Past Medical History:   Diagnosis Date    Arthritis     Dislocated shoulder     right    GERD (gastroesophageal reflux disease) 2015    Hyperlipidemia     Hypoglycemia     Hyponatremia     Liver disease     Mixed hyperlipidemia 2015    New onset seizure ([address] 2019       Past Surgical History:   Procedure Laterality Date    COLON SURGERY      COLONOSCOPY      INCISIONAL HERNIA REPAIR N/A 10/13/2016    INGUINAL HERNIA REPAIR Left 2013    laparoscopic left inguinal hernia repair    PANCREAS SURGERY      Whipple procedure    OH LAP,SURG,COLECT,TOT,W/PROCTECT,W/ILEOST N/A 2018    DIAGNOSTIC LAPAROSCOPY POSS LAPAROTOMY POSS BOWEL RESECTION performed by Radha An MD at [address] 2018    LAPAROSCOPIC REVISION OF GASTROJEJUNOSTOMY; INTRAOPERATIVE EGD performed by Radha An MD at [address] [facility] Right 2019    RIGHT REVERSE TOTAL SHOULDER ARTHROPLASTY performed by Jason Nava MD at [address]      right cyst removed [address] UPPER GASTROINTESTINAL ENDOSCOPY      UPPER GASTROINTESTINAL ENDOSCOPY N/A 2018    EGD BIOPSY performed by Radha An MD at [address] [place] [address] ENDOSCOPY       Family History   Problem Relation Age of Onset    High Cholesterol Mother     Stroke Mother     Other Father          in , ? pneumonia and prostate cancer.      Diabetes Brother     Mental Illness Brother     Stroke Brother     Cancer Brother Social History     Tobacco Use    Smoking status: Former Smoker     Quit date: 2/13/2011     Years since quitting: 10.2    Smokeless tobacco: Never Used   Vaping Use    Vaping Use: Never used   Substance Use Topics    Alcohol use: Yes     Alcohol/week: 2.0 standard drinks     Types: 2 Cans of beer per week    Drug use: No          Functional Status: The patient is able to ambulate and perform activities of daily living without the use of an assistive device. ROS:   Constitutional: Denies fevers, chills, night sweats, unintentional weight loss     Skin: Denies rash or skin changes     Eyes: Denies vision changes    Ears/Nose/Throat: Denies nasal congestion or sore throat     Respiratory: Denies SOB or cough     Cardiovascular: Denies CP, palpitations, edema      Gastrointestinal: Denies abdominal pain,  N/V, constipation, or diarrhea    Genitourinary: Denies urinary symptoms    Neurologic: See HPI.     MSK: See HPI. Psychiatric: Denies sleep disturbance, anxiety, depression    Hematologic/Lymphatic/Immunologic: Denies bruising       Physical Exam:   Blood pressure 134/75, pulse 99, height 5' 3\" (1.6 m), weight 142 lb (64.4 kg). General: well developed and well nourished in no acute distress. Body habitus is thin  HEENT: No rhinorrhea, sneezing, yawning, or lacrimation. No scleral icterus or conjunctival injection. Resp: symmetrical chest expansion, unlabored breathing, respirations unlabored. CV: Heart rate is regular. Peripheral pulses are palpable  Lymph: No visible regional lymphadenopathy. Skin: No rashes or ecchymosis. Normal turgor. Psych: Mood is calm. Affect is normal.   Ext: No edema noted     MSK:   Back/Hip Exam:   Inspection: Pelvis was asymmetric. Lumbar lordotic curvature was decreased. There was no scoliosis. No ecchymoses or erythema.    Palpation: Palpatory exam revealed tenderness along left lumbosacral paraspinals, no ttp midline spine, ttp left SI joint sulcus, ttp

## 2021-05-25 NOTE — TELEPHONE ENCOUNTER
Called patient to see if he wanted to come in any earlier today. Left message and callback number and instructed patient to call back.

## 2021-05-26 RX ORDER — ASPIRIN,CAFFEINE 35.5; 5 MG/1; MG/1
TABLET ORAL
COMMUNITY
End: 2022-08-16

## 2021-06-03 ENCOUNTER — HOSPITAL ENCOUNTER (OUTPATIENT)
Dept: OPERATING ROOM | Age: 71
Setting detail: OUTPATIENT SURGERY
Discharge: HOME | End: 2021-06-03
Attending: PHYSICAL MEDICINE & REHABILITATION
Payer: MEDICARE

## 2021-06-03 ENCOUNTER — HOSPITAL ENCOUNTER (OUTPATIENT)
Age: 71
Setting detail: OUTPATIENT SURGERY
Discharge: HOME | End: 2021-06-03
Attending: PHYSICAL MEDICINE & REHABILITATION | Admitting: PHYSICAL MEDICINE & REHABILITATION
Payer: MEDICARE

## 2021-06-03 VITALS
HEIGHT: 63 IN | BODY MASS INDEX: 24.8 KG/M2 | OXYGEN SATURATION: 98 % | HEART RATE: 79 BPM | WEIGHT: 140 LBS | DIASTOLIC BLOOD PRESSURE: 74 MMHG | RESPIRATION RATE: 14 BRPM | SYSTOLIC BLOOD PRESSURE: 133 MMHG

## 2021-06-03 DIAGNOSIS — M51.36 LUMBAR DEGENERATIVE DISC DISEASE: ICD-10-CM

## 2021-06-03 PROBLEM — M54.16 LUMBAR RADICULITIS: Status: ACTIVE | Noted: 2021-06-03

## 2021-06-03 PROCEDURE — 7100000010 HC PHASE II RECOVERY - FIRST 15 MIN: Performed by: PHYSICAL MEDICINE & REHABILITATION

## 2021-06-03 PROCEDURE — 2500000003 HC RX 250 WO HCPCS: Performed by: PHYSICAL MEDICINE & REHABILITATION

## 2021-06-03 PROCEDURE — 64483 NJX AA&/STRD TFRM EPI L/S 1: CPT | Performed by: PHYSICAL MEDICINE & REHABILITATION

## 2021-06-03 PROCEDURE — 3600000005 HC SURGERY LEVEL 5 BASE: Performed by: PHYSICAL MEDICINE & REHABILITATION

## 2021-06-03 PROCEDURE — 6360000002 HC RX W HCPCS: Performed by: PHYSICAL MEDICINE & REHABILITATION

## 2021-06-03 PROCEDURE — 2580000003 HC RX 258: Performed by: PHYSICAL MEDICINE & REHABILITATION

## 2021-06-03 PROCEDURE — 7100000011 HC PHASE II RECOVERY - ADDTL 15 MIN: Performed by: PHYSICAL MEDICINE & REHABILITATION

## 2021-06-03 PROCEDURE — 6360000004 HC RX CONTRAST MEDICATION: Performed by: PHYSICAL MEDICINE & REHABILITATION

## 2021-06-03 PROCEDURE — 3209999900 FLUORO FOR SURGICAL PROCEDURES

## 2021-06-03 PROCEDURE — 2709999900 HC NON-CHARGEABLE SUPPLY: Performed by: PHYSICAL MEDICINE & REHABILITATION

## 2021-06-03 RX ORDER — LIDOCAINE HYDROCHLORIDE 10 MG/ML
INJECTION, SOLUTION EPIDURAL; INFILTRATION; INTRACAUDAL; PERINEURAL PRN
Status: DISCONTINUED | OUTPATIENT
Start: 2021-06-03 | End: 2021-06-03 | Stop reason: ALTCHOICE

## 2021-06-03 ASSESSMENT — PAIN SCALES - GENERAL
PAINLEVEL_OUTOF10: 0
PAINLEVEL_OUTOF10: 0

## 2021-06-03 ASSESSMENT — PAIN - FUNCTIONAL ASSESSMENT: PAIN_FUNCTIONAL_ASSESSMENT: 0-10

## 2021-06-03 NOTE — H&P
Ángel Marita, [address] [facility]  [address] [facility] [place]  Phone: [phone]  Fax: [phone]     PCP: Lisa Uribe DO  Date of visit: 5/25/21          Chief Complaint   Patient presents with    Back Pain       follow up and results MRI       Interval:   Patient presents today for MRI follow up. MRI reveals severe DDD, stenosis, both central and NF, L4-5 and L5-S1. Facet arthropathy. He reports continued left sided low back, buttock and posterior leg pain. He was unable to tolerate zanaflex due to dizziness. The pain is rated Pain Score:   5, is described as cramping, and is located in the left low back with radiation to the left hamstring. The pain is better with sitting, laying down. The pain is worse with walking. There is no associated numbness/tingling. There is no weakness. There is no bowel/bladder changes.      The prior workup has included: Xray L spine, X-ray hip      The prior treatment has included:  PT: none. He states he does home exercises. Chiropractic: yes with no relief. Modalities: none    OTC Tylenol: none    NSAIDS: celebrex with no relief   Medrol dose miguel   Opioids: none    Membrane stabilizers: none    Muscle relaxers: couldn't tolerate zanaflex   Previous injections: none .    Previous surgery at this site: none     No Known Allergies            Current Outpatient Medications   Medication Sig Dispense Refill    methylPREDNISolone (MEDROL DOSEPACK) 4 MG tablet Take by mouth as directed 1 kit 0    ezetimibe (ZETIA) 10 MG tablet Take 1 tablet by mouth daily 90 tablet 3    pantoprazole (PROTONIX) 40 MG tablet TAKE 1 TABLET BY MOUTH EVERY DAY 90 tablet 3    celecoxib (CELEBREX) 200 MG capsule TAKE 1 CAPSULE BY MOUTH DAILY 90 capsule 3    omega-3 acid ethyl esters (LOVAZA) 1 g capsule TAKE 1 CAPSULE BY MOUTH FOUR TIMES DAILY 360 capsule 1    atorvastatin (LIPITOR) 40 MG tablet TAKE 1 TABLET BY MOUTH Diabetes Brother      Mental Illness Brother      Stroke Brother      Cancer Brother           Social History            Tobacco Use    Smoking status: Former Smoker       Quit date: 2/13/2011       Years since quitting: 10.2    Smokeless tobacco: Never Used   Vaping Use    Vaping Use: Never used   Substance Use Topics    Alcohol use: Yes       Alcohol/week: 2.0 standard drinks       Types: 2 Cans of beer per week    Drug use: No            Functional Status: The patient is able to ambulate and perform activities of daily living without the use of an assistive device. ROS:   Constitutional: Denies fevers, chills, night sweats, unintentional weight loss     Skin: Denies rash or skin changes     Eyes: Denies vision changes    Ears/Nose/Throat: Denies nasal congestion or sore throat     Respiratory: Denies SOB or cough     Cardiovascular: Denies CP, palpitations, edema      Gastrointestinal: Denies abdominal pain,  N/V, constipation, or diarrhea    Genitourinary: Denies urinary symptoms    Neurologic: See HPI.     MSK: See HPI. Psychiatric: Denies sleep disturbance, anxiety, depression    Hematologic/Lymphatic/Immunologic: Denies bruising         Physical Exam:   Blood pressure 134/75, pulse 99, height 5' 3\" (1.6 m), weight 142 lb (64.4 kg). General: well developed and well nourished in no acute distress. Body habitus is thin  HEENT: No rhinorrhea, sneezing, yawning, or lacrimation. No scleral icterus or conjunctival injection. Resp: symmetrical chest expansion, unlabored breathing, respirations unlabored. CV: Heart rate is regular. Peripheral pulses are palpable  Lymph: No visible regional lymphadenopathy. Skin: No rashes or ecchymosis. Normal turgor. Psych: Mood is calm. Affect is normal.   Ext: No edema noted      MSK:   Back/Hip Exam:   Inspection: Pelvis was asymmetric. Lumbar lordotic curvature was decreased. There was no scoliosis. No ecchymoses or erythema.    Palpation: Palpatory

## 2021-06-03 NOTE — OP NOTE
LUMBOSACRAL EPIDURAL STEROID INJECTION, TRANSFORAMINAL APPROACH       WITH FLUOROSCOPIC GUIDANCE    Patient: Andrea Richter                         MRN#: [id number]  : 1950   Date of procedure: 6/3/2021      Physician Performing Procedure:  Cheryle Ends, DO    Clinical Scenario: As per electronic documentation. Diagnosis: lumbosacral radiculitis     Injectate: A total of 3cc, consisting of 1 cc of Dexamethasone 10mg/ml,  with the remainder of normal saline    Levels Treated:  Left S1 neural foramen    Approach:   Transforaminal    Improvement after today's procedure: As per nursing record. Comments:  None     Pre-procedural evaluation: The patient was examined today just prior to performing the procedure listed above. The patient's heart rate was normal, lungs were clear to auscultation bilaterally. Procedure: The patient was prepped and draped in a sterile fashion in the prone position after informed consent was signed and all the patient's questions were answered including the risks, benefits, alternative treatment options, and prognosis. The risks include - but are not limited to - infection, allergic reaction, increased pain, lack of therapeutic benefit, steroid reaction, nerve damage, paralysis, stroke, epidural hematoma, syncope, headache, respiratory or cardiac arrest, and scar formation. The C-arm was positioned so that an oblique view of the neural foramen as noted above was visualized. The soft tissues overlying this structure were infiltrated with 2-3 cc. of 1% Lidocaine without Epinephrine. A 22 gauge 3.5 inch spinal needle was inserted toward the target using a trajectory view along the fluoroscope beam.  Under AP and lateral visualization, the needle was advanced so it did not puncture dura. Biplanar projections were used to confirm position. Aspiration was confirmed to be negative for CSF and/or blood.   A 1-2 cc. volume of Isovue contrast was injected at this level.  The contrast was observed to flow under the pedicle. Radiographs were obtained for documentation purposes. After attaining flow of contrast documented above, the above injectate was administered into the transforaminal epidural space. The patient tolerated the procedure well and was discharged after an appropriate period of observation. If there are any complications, the patient was instructed to call us. The patient is to follow-up with the requesting physician after the injection, preferably within three weeks.

## 2021-06-14 DIAGNOSIS — M54.17 LUMBOSACRAL RADICULITIS: ICD-10-CM

## 2021-06-29 DIAGNOSIS — Z12.5 SCREENING PSA (PROSTATE SPECIFIC ANTIGEN): ICD-10-CM

## 2021-06-29 DIAGNOSIS — R53.83 FATIGUE, UNSPECIFIED TYPE: ICD-10-CM

## 2021-06-29 DIAGNOSIS — D64.9 ANEMIA, UNSPECIFIED TYPE: ICD-10-CM

## 2021-06-29 LAB
ALBUMIN SERPL-MCNC: 4.7 G/DL (ref 3.5–5.2)
ALP BLD-CCNC: 45 U/L (ref 40–129)
ALT SERPL-CCNC: 17 U/L (ref 0–40)
ANION GAP SERPL CALCULATED.3IONS-SCNC: 12 MMOL/L (ref 7–16)
AST SERPL-CCNC: 34 U/L (ref 0–39)
BASOPHILS ABSOLUTE: 0.02 E9/L (ref 0–0.2)
BASOPHILS RELATIVE PERCENT: 0.4 % (ref 0–2)
BILIRUB SERPL-MCNC: 0.4 MG/DL (ref 0–1.2)
BUN BLDV-MCNC: 7 MG/DL (ref 6–23)
CALCIUM SERPL-MCNC: 9.3 MG/DL (ref 8.6–10.2)
CHLORIDE BLD-SCNC: 95 MMOL/L (ref 98–107)
CO2: 23 MMOL/L (ref 22–29)
CREAT SERPL-MCNC: 0.8 MG/DL (ref 0.7–1.2)
EOSINOPHILS ABSOLUTE: 0.12 E9/L (ref 0.05–0.5)
EOSINOPHILS RELATIVE PERCENT: 2.5 % (ref 0–6)
GFR AFRICAN AMERICAN: >60
GFR NON-AFRICAN AMERICAN: >60 ML/MIN/1.73
GLUCOSE BLD-MCNC: 129 MG/DL (ref 74–99)
HCT VFR BLD CALC: 34.9 % (ref 37–54)
HEMOGLOBIN: 11.3 G/DL (ref 12.5–16.5)
IMMATURE GRANULOCYTES #: 0.01 E9/L
IMMATURE GRANULOCYTES %: 0.2 % (ref 0–5)
IRON SATURATION: 12 % (ref 20–55)
IRON: 41 MCG/DL (ref 59–158)
LYMPHOCYTES ABSOLUTE: 1.08 E9/L (ref 1.5–4)
LYMPHOCYTES RELATIVE PERCENT: 22.2 % (ref 20–42)
MCH RBC QN AUTO: 27.4 PG (ref 26–35)
MCHC RBC AUTO-ENTMCNC: 32.4 % (ref 32–34.5)
MCV RBC AUTO: 84.7 FL (ref 80–99.9)
MONOCYTES ABSOLUTE: 0.65 E9/L (ref 0.1–0.95)
MONOCYTES RELATIVE PERCENT: 13.4 % (ref 2–12)
NEUTROPHILS ABSOLUTE: 2.98 E9/L (ref 1.8–7.3)
NEUTROPHILS RELATIVE PERCENT: 61.3 % (ref 43–80)
PDW BLD-RTO: 16.8 FL (ref 11.5–15)
PLATELET # BLD: 189 E9/L (ref 130–450)
PMV BLD AUTO: 8.8 FL (ref 7–12)
POTASSIUM SERPL-SCNC: 4.6 MMOL/L (ref 3.5–5)
PROSTATE SPECIFIC ANTIGEN: 1.95 NG/ML (ref 0–4)
RBC # BLD: 4.12 E12/L (ref 3.8–5.8)
SODIUM BLD-SCNC: 130 MMOL/L (ref 132–146)
TOTAL IRON BINDING CAPACITY: 341 MCG/DL (ref 250–450)
TOTAL PROTEIN: 7.2 G/DL (ref 6.4–8.3)
WBC # BLD: 4.9 E9/L (ref 4.5–11.5)

## 2021-07-06 ENCOUNTER — OFFICE VISIT (OUTPATIENT)
Dept: PHYSICAL MEDICINE AND REHAB | Age: 71
End: 2021-07-06
Payer: MEDICARE

## 2021-07-06 VITALS
SYSTOLIC BLOOD PRESSURE: 136 MMHG | BODY MASS INDEX: 25.52 KG/M2 | HEART RATE: 85 BPM | DIASTOLIC BLOOD PRESSURE: 74 MMHG | HEIGHT: 63 IN | WEIGHT: 144 LBS

## 2021-07-06 DIAGNOSIS — G89.29 CHRONIC LEFT-SIDED LOW BACK PAIN WITH LEFT-SIDED SCIATICA: ICD-10-CM

## 2021-07-06 DIAGNOSIS — M54.17 LUMBOSACRAL RADICULITIS: Primary | ICD-10-CM

## 2021-07-06 DIAGNOSIS — M47.816 LUMBAR SPONDYLOSIS: ICD-10-CM

## 2021-07-06 DIAGNOSIS — M54.42 CHRONIC LEFT-SIDED LOW BACK PAIN WITH LEFT-SIDED SCIATICA: ICD-10-CM

## 2021-07-06 PROCEDURE — 1036F TOBACCO NON-USER: CPT | Performed by: PHYSICAL MEDICINE & REHABILITATION

## 2021-07-06 PROCEDURE — 3017F COLORECTAL CA SCREEN DOC REV: CPT | Performed by: PHYSICAL MEDICINE & REHABILITATION

## 2021-07-06 PROCEDURE — 99213 OFFICE O/P EST LOW 20 MIN: CPT | Performed by: PHYSICAL MEDICINE & REHABILITATION

## 2021-07-06 PROCEDURE — G8427 DOCREV CUR MEDS BY ELIG CLIN: HCPCS | Performed by: PHYSICAL MEDICINE & REHABILITATION

## 2021-07-06 PROCEDURE — 4040F PNEUMOC VAC/ADMIN/RCVD: CPT | Performed by: PHYSICAL MEDICINE & REHABILITATION

## 2021-07-06 PROCEDURE — G8417 CALC BMI ABV UP PARAM F/U: HCPCS | Performed by: PHYSICAL MEDICINE & REHABILITATION

## 2021-07-06 PROCEDURE — 1123F ACP DISCUSS/DSCN MKR DOCD: CPT | Performed by: PHYSICAL MEDICINE & REHABILITATION

## 2021-07-06 NOTE — H&P
Helen Payment, [address] [facility]  [address] [facility] [place]  Phone: [phone]  Fax: [phone]    PCP: Abdi Michaud DO  Date of visit: 7/6/21    Chief Complaint   Patient presents with    Back Pain     follow up post [facility] SERVICES     Interval:   Patient presents today for injection follow up. He underwent left S1 TFESI and reports relief for a few days. His pain is back. The pain is rated Pain Score:   5, is described as cramping, and is located in the left low back with radiation to the left hamstring. The pain is better with sitting, laying down. The pain is worse with walking. There is no associated numbness/tingling. There is no weakness. There is no bowel/bladder changes. The prior workup has included: Xray L spine, X-ray hip     The prior treatment has included:  PT: none. He states he does home exercises. Chiropractic: yes with no relief.    Modalities: none    OTC Tylenol: none    NSAIDS: celebrex with no relief   Medrol dose miguel   Opioids: none    Membrane stabilizers: none    Muscle relaxers: couldn't tolerate zanaflex   Previous injections: left S1 TFESI   Previous surgery at this site: none    No Known Allergies    Current Outpatient Medications   Medication Sig Dispense Refill    Aspirin-Caffeine (JOHN BACK & BODY) 500-32.5 MG TABS Take by mouth As directed on bottle      ezetimibe (ZETIA) 10 MG tablet Take 1 tablet by mouth daily 90 tablet 3    pantoprazole (PROTONIX) 40 MG tablet TAKE 1 TABLET BY MOUTH EVERY DAY 90 tablet 3    celecoxib (CELEBREX) 200 MG capsule TAKE 1 CAPSULE BY MOUTH DAILY 90 capsule 3    omega-3 acid ethyl esters (LOVAZA) 1 g capsule TAKE 1 CAPSULE BY MOUTH FOUR TIMES DAILY 360 capsule 1    atorvastatin (LIPITOR) 40 MG tablet TAKE 1 TABLET BY MOUTH EVERY DAY 90 tablet 2    sildenafil (VIAGRA) 100 MG tablet Take 1 tablet by mouth daily as needed for Erectile Dysfunction 90 tablet 1    Flaxseed, Brother     Stroke Brother     Cancer Brother        Social History     Tobacco Use    Smoking status: Former Smoker     Quit date: 2/13/2011     Years since quitting: 10.4    Smokeless tobacco: Never Used   Vaping Use    Vaping Use: Never used   Substance Use Topics    Alcohol use: Yes     Alcohol/week: 2.0 standard drinks     Types: 2 Cans of beer per week    Drug use: No          Functional Status: The patient is able to ambulate and perform activities of daily living without the use of an assistive device. ROS:   Constitutional: Denies fevers, chills, night sweats, unintentional weight loss     Skin: Denies rash or skin changes     Eyes: Denies vision changes    Ears/Nose/Throat: Denies nasal congestion or sore throat     Respiratory: Denies SOB or cough     Cardiovascular: Denies CP, palpitations, edema      Gastrointestinal: Denies abdominal pain,  N/V, constipation, or diarrhea    Genitourinary: Denies urinary symptoms    Neurologic: See HPI.     MSK: See HPI. Psychiatric: Denies sleep disturbance, anxiety, depression    Hematologic/Lymphatic/Immunologic: Denies bruising       Physical Exam:   Blood pressure 136/74, pulse 85, height 5' 3\" (1.6 m), weight 144 lb (65.3 kg). General: well developed and well nourished in no acute distress. Body habitus is thin  HEENT: No rhinorrhea, sneezing, yawning, or lacrimation. No scleral icterus or conjunctival injection. Resp: symmetrical chest expansion, unlabored breathing, respirations unlabored. CV: Heart rate is regular. Peripheral pulses are palpable  Lymph: No visible regional lymphadenopathy. Skin: No rashes or ecchymosis. Normal turgor. Psych: Mood is calm. Affect is normal.   Ext: No edema noted     MSK:   Back/Hip Exam:   Inspection: Pelvis was asymmetric. Lumbar lordotic curvature was decreased. There was no scoliosis. No ecchymoses or erythema.    Palpation: Palpatory exam revealed tenderness along left lumbosacral paraspinals, no ttp midline spine, ttp left SI joint sulcus, ttp left piriformis, ttp left greater trochanters. There was no paraspinal spasms. There were no trigger points. ROM: ROM decreased  Special/provocative testing:   Supine SLR negative . There was leg length discrepancy. Neurological Exam:  Strength:   R  L  Hip Flex  5  5  Knee Ext  5  5  Ankle dorsi  5  4  EHL   5 4  Ankle Plantar  5  5    Sensory:  Intact for light touch in all lower extremity dermatomes. Reflexes:   R  L  Patellar  (0) (0)  Ankle Jerk  (0) (0)      Gait is antalgic         Imaging: (personally reviewed by me 07/06/21)  X-ray L spine  X-ray left hip   MRI L Spine     Impression:   Maren Lesch is a 66 y.o. male     1. Lumbosacral radiculitis    2. Chronic left-sided low back pain with left-sided sciatica    3. Lumbar spondylosis        Plan:   · Recommend left L5-S1 TFESI. Procedure risks, benefits and alternatives were discussed. Patient would like to proceed. · Still leery about medications. Discussed lyrica. He wants to try injection first.      The patient was educated about the diagnosis, prognosis, indications, risks and benefits of treatment. An opportunity to ask questions was given to the patient and questions were answered. The patient agreed to proceed with the recommended treatment as described above.       Follow up after injection       Candelario Camarillo DO, [facility]   Board Certified Physical Medicine and Rehabilitation

## 2021-07-06 NOTE — PROGRESS NOTES
Johnathonjhony Shaquille, [address] [facility]  [address] [facility] [place]  Phone: [phone]  Fax: [phone]    PCP: Felton Segovia DO  Date of visit: 7/6/21    Chief Complaint   Patient presents with    Back Pain     follow up post [facility] SERVICES     Interval:   Patient presents today for injection follow up. He underwent left S1 TFESI and reports relief for a few days. His pain is back. The pain is rated Pain Score:   5, is described as cramping, and is located in the left low back with radiation to the left hamstring. The pain is better with sitting, laying down. The pain is worse with walking. There is no associated numbness/tingling. There is no weakness. There is no bowel/bladder changes. The prior workup has included: Xray L spine, X-ray hip     The prior treatment has included:  PT: none. He states he does home exercises. Chiropractic: yes with no relief.    Modalities: none    OTC Tylenol: none    NSAIDS: celebrex with no relief   Medrol dose miguel   Opioids: none    Membrane stabilizers: none    Muscle relaxers: couldn't tolerate zanaflex   Previous injections: left S1 TFESI   Previous surgery at this site: none    No Known Allergies    Current Outpatient Medications   Medication Sig Dispense Refill    Aspirin-Caffeine (JOHN BACK & BODY) 500-32.5 MG TABS Take by mouth As directed on bottle      ezetimibe (ZETIA) 10 MG tablet Take 1 tablet by mouth daily 90 tablet 3    pantoprazole (PROTONIX) 40 MG tablet TAKE 1 TABLET BY MOUTH EVERY DAY 90 tablet 3    celecoxib (CELEBREX) 200 MG capsule TAKE 1 CAPSULE BY MOUTH DAILY 90 capsule 3    omega-3 acid ethyl esters (LOVAZA) 1 g capsule TAKE 1 CAPSULE BY MOUTH FOUR TIMES DAILY 360 capsule 1    atorvastatin (LIPITOR) 40 MG tablet TAKE 1 TABLET BY MOUTH EVERY DAY 90 tablet 2    sildenafil (VIAGRA) 100 MG tablet Take 1 tablet by mouth daily as needed for Erectile Dysfunction 90 tablet 1    Flaxseed, Brother     Stroke Brother     Cancer Brother        Social History     Tobacco Use    Smoking status: Former Smoker     Quit date: 2/13/2011     Years since quitting: 10.4    Smokeless tobacco: Never Used   Vaping Use    Vaping Use: Never used   Substance Use Topics    Alcohol use: Yes     Alcohol/week: 2.0 standard drinks     Types: 2 Cans of beer per week    Drug use: No          Functional Status: The patient is able to ambulate and perform activities of daily living without the use of an assistive device. ROS:   Constitutional: Denies fevers, chills, night sweats, unintentional weight loss     Skin: Denies rash or skin changes     Eyes: Denies vision changes    Ears/Nose/Throat: Denies nasal congestion or sore throat     Respiratory: Denies SOB or cough     Cardiovascular: Denies CP, palpitations, edema      Gastrointestinal: Denies abdominal pain,  N/V, constipation, or diarrhea    Genitourinary: Denies urinary symptoms    Neurologic: See HPI.     MSK: See HPI. Psychiatric: Denies sleep disturbance, anxiety, depression    Hematologic/Lymphatic/Immunologic: Denies bruising       Physical Exam:   Blood pressure 136/74, pulse 85, height 5' 3\" (1.6 m), weight 144 lb (65.3 kg). General: well developed and well nourished in no acute distress. Body habitus is thin  HEENT: No rhinorrhea, sneezing, yawning, or lacrimation. No scleral icterus or conjunctival injection. Resp: symmetrical chest expansion, unlabored breathing, respirations unlabored. CV: Heart rate is regular. Peripheral pulses are palpable  Lymph: No visible regional lymphadenopathy. Skin: No rashes or ecchymosis. Normal turgor. Psych: Mood is calm. Affect is normal.   Ext: No edema noted     MSK:   Back/Hip Exam:   Inspection: Pelvis was asymmetric. Lumbar lordotic curvature was decreased. There was no scoliosis. No ecchymoses or erythema.    Palpation: Palpatory exam revealed tenderness along left lumbosacral paraspinals, no ttp midline spine, ttp left SI joint sulcus, ttp left piriformis, ttp left greater trochanters. There was no paraspinal spasms. There were no trigger points. ROM: ROM decreased  Special/provocative testing:   Supine SLR negative . There was leg length discrepancy. Neurological Exam:  Strength:   R  L  Hip Flex  5  5  Knee Ext  5  5  Ankle dorsi  5  4  EHL   5 4  Ankle Plantar  5  5    Sensory:  Intact for light touch in all lower extremity dermatomes. Reflexes:   R  L  Patellar  (0) (0)  Ankle Jerk  (0) (0)      Gait is antalgic         Imaging: (personally reviewed by me 07/06/21)  X-ray L spine  X-ray left hip   MRI L Spine     Impression:   Tao Quintana is a 66 y.o. male     1. Lumbosacral radiculitis    2. Chronic left-sided low back pain with left-sided sciatica    3. Lumbar spondylosis        Plan:   · Recommend left L5-S1 TFESI. Procedure risks, benefits and alternatives were discussed. Patient would like to proceed. · Still leery about medications. Discussed lyrica. He wants to try injection first.      The patient was educated about the diagnosis, prognosis, indications, risks and benefits of treatment. An opportunity to ask questions was given to the patient and questions were answered. The patient agreed to proceed with the recommended treatment as described above.       Follow up after injection       DO Beatris, [facility]   Board Certified Physical Medicine and Rehabilitation

## 2021-07-08 ENCOUNTER — HOSPITAL ENCOUNTER (OUTPATIENT)
Age: 71
Setting detail: OUTPATIENT SURGERY
Discharge: HOME | End: 2021-07-08
Attending: PHYSICAL MEDICINE & REHABILITATION | Admitting: PHYSICAL MEDICINE & REHABILITATION
Payer: MEDICARE

## 2021-07-08 VITALS
RESPIRATION RATE: 22 BRPM | BODY MASS INDEX: 24.8 KG/M2 | TEMPERATURE: 97.9 F | DIASTOLIC BLOOD PRESSURE: 82 MMHG | WEIGHT: 140 LBS | OXYGEN SATURATION: 99 % | HEART RATE: 72 BPM | SYSTOLIC BLOOD PRESSURE: 145 MMHG | HEIGHT: 63 IN

## 2021-07-08 DIAGNOSIS — M79.2 NEUROPATHIC PAIN: Primary | ICD-10-CM

## 2021-07-08 DIAGNOSIS — M51.9 LUMBAR DISC DISEASE: ICD-10-CM

## 2021-07-08 PROCEDURE — 6360000004 HC RX CONTRAST MEDICATION: Performed by: PHYSICAL MEDICINE & REHABILITATION

## 2021-07-08 PROCEDURE — 2580000003 HC RX 258: Performed by: PHYSICAL MEDICINE & REHABILITATION

## 2021-07-08 PROCEDURE — 7100000011 HC PHASE II RECOVERY - ADDTL 15 MIN: Performed by: PHYSICAL MEDICINE & REHABILITATION

## 2021-07-08 PROCEDURE — 3600000005 HC SURGERY LEVEL 5 BASE: Performed by: PHYSICAL MEDICINE & REHABILITATION

## 2021-07-08 PROCEDURE — 7100000010 HC PHASE II RECOVERY - FIRST 15 MIN: Performed by: PHYSICAL MEDICINE & REHABILITATION

## 2021-07-08 PROCEDURE — 64483 NJX AA&/STRD TFRM EPI L/S 1: CPT | Performed by: PHYSICAL MEDICINE & REHABILITATION

## 2021-07-08 PROCEDURE — 6360000002 HC RX W HCPCS: Performed by: PHYSICAL MEDICINE & REHABILITATION

## 2021-07-08 PROCEDURE — 2709999900 HC NON-CHARGEABLE SUPPLY: Performed by: PHYSICAL MEDICINE & REHABILITATION

## 2021-07-08 PROCEDURE — 2500000003 HC RX 250 WO HCPCS: Performed by: PHYSICAL MEDICINE & REHABILITATION

## 2021-07-08 RX ORDER — PREGABALIN 50 MG/1
50 CAPSULE ORAL 2 TIMES DAILY
Qty: 60 CAPSULE | Refills: 1 | Status: SHIPPED | OUTPATIENT
Start: 2021-07-08 | End: 2021-07-20 | Stop reason: SINTOL

## 2021-07-08 RX ORDER — LIDOCAINE HYDROCHLORIDE 10 MG/ML
INJECTION, SOLUTION EPIDURAL; INFILTRATION; INTRACAUDAL; PERINEURAL PRN
Status: DISCONTINUED | OUTPATIENT
Start: 2021-07-08 | End: 2021-07-08 | Stop reason: ALTCHOICE

## 2021-07-08 ASSESSMENT — PAIN SCALES - GENERAL
PAINLEVEL_OUTOF10: 0
PAINLEVEL_OUTOF10: 0

## 2021-07-08 ASSESSMENT — PAIN - FUNCTIONAL ASSESSMENT: PAIN_FUNCTIONAL_ASSESSMENT: 0-10

## 2021-07-08 ASSESSMENT — PAIN DESCRIPTION - DESCRIPTORS: DESCRIPTORS: ACHING

## 2021-07-08 NOTE — H&P
Gilda Corbin, [address] [facility] Physical Medicine and Rehabilitation  [address] [facility] [address]  Phone: [phone]  Fax: [phone]     PCP: Felton Segovia DO  Date of visit: 7/6/21          Chief Complaint   Patient presents with    Back Pain       follow up post ZIYAD      Interval:   Patient presents today for injection follow up. He underwent left S1 TFESI and reports relief for a few days. His pain is back. The pain is rated Pain Score:   5, is described as cramping, and is located in the left low back with radiation to the left hamstring. The pain is better with sitting, laying down. The pain is worse with walking. There is no associated numbness/tingling. There is no weakness. There is no bowel/bladder changes.      The prior workup has included: Xray L spine, X-ray hip      The prior treatment has included:  PT: none. He states he does home exercises. Chiropractic: yes with no relief.    Modalities: none    OTC Tylenol: none    NSAIDS: celebrex with no relief   Medrol dose miguel   Opioids: none    Membrane stabilizers: none    Muscle relaxers: couldn't tolerate zanaflex   Previous injections: left S1 TFESI   Previous surgery at this site: none     No Known Allergies            Current Outpatient Medications   Medication Sig Dispense Refill    Aspirin-Caffeine (JOHN BACK & BODY) 500-32.5 MG TABS Take by mouth As directed on bottle        ezetimibe (ZETIA) 10 MG tablet Take 1 tablet by mouth daily 90 tablet 3    pantoprazole (PROTONIX) 40 MG tablet TAKE 1 TABLET BY MOUTH EVERY DAY 90 tablet 3    celecoxib (CELEBREX) 200 MG capsule TAKE 1 CAPSULE BY MOUTH DAILY 90 capsule 3    omega-3 acid ethyl esters (LOVAZA) 1 g capsule TAKE 1 CAPSULE BY MOUTH FOUR TIMES DAILY 360 capsule 1    atorvastatin (LIPITOR) 40 MG tablet TAKE 1 TABLET BY MOUTH EVERY DAY 90 tablet 2    sildenafil (VIAGRA) 100 MG tablet Take 1 tablet by mouth daily as needed for Erectile Dysfunction 90 tablet 1    Flaxseed, Linseed, (FLAX SEEDS PO) Take 1 capsule by mouth daily Ld 2019        Saw [place] EXTR Take 1 capsule by mouth daily Ld 2019        therapeutic multivitamin-minerals (THERAGRAN-M) tablet Take 1 tablet by mouth daily Ld 2019          No current facility-administered medications for this visit.              Past Medical History:   Diagnosis Date    Arthritis      Dislocated shoulder       right    GERD (gastroesophageal reflux disease) 2015    Hyperlipidemia      Hypoglycemia      Hyponatremia      Liver disease      Mixed hyperlipidemia 2015    New onset seizure ([address] 2019               Past Surgical History:   Procedure Laterality Date    COLON SURGERY        COLONOSCOPY        INCISIONAL HERNIA REPAIR N/A 10/13/2016    INGUINAL HERNIA REPAIR Left 2013     laparoscopic left inguinal hernia repair    NERVE BLOCK Left 6/3/2021     LEFT S1 TRANSFORAMINAL EPIDURAL STEROID INJECTION(REQUESTS LAST APPOINMENT) performed by Gilda Corbin DO at [facility] procedure    SD LAP,SURG,COLECT,TOT,W/PROCTECT,W/ILEOST N/A 2018     DIAGNOSTIC LAPAROSCOPY POSS LAPAROTOMY POSS BOWEL RESECTION performed by Wilner Geller MD at [address] N/A 2018     LAPAROSCOPIC REVISION OF GASTROJEJUNOSTOMY; INTRAOPERATIVE EGD performed by Wilner Geller MD at [address] [place] [address] Right 2019     RIGHT REVERSE TOTAL SHOULDER ARTHROPLASTY performed by Hiwot Wolfe MD at [address] [place] [address]         right cyst removed [address]        UPPER GASTROINTESTINAL ENDOSCOPY        UPPER GASTROINTESTINAL ENDOSCOPY N/A 2018     EGD BIOPSY performed by Wilner Geller MD at [place] [address]        Family History   Problem Relation Age of Onset    High Cholesterol Mother      Stroke Mother      Other Father            in 1978, ? pneumonia and prostate cancer.  Diabetes Brother      Mental Illness Brother      Stroke Brother      Cancer Brother           Social History      Tobacco Use    Smoking status: Former Smoker       Quit date: 2/13/2011       Years since quitting: 10.4    Smokeless tobacco: Never Used   Vaping Use    Vaping Use: Never used   Substance Use Topics    Alcohol use: Yes       Alcohol/week: 2.0 standard drinks       Types: 2 Cans of beer per week    Drug use: No            Functional Status: The patient is able to ambulate and perform activities of daily living without the use of an assistive device. ROS:   Constitutional: Denies fevers, chills, night sweats, unintentional weight loss     Skin: Denies rash or skin changes     Eyes: Denies vision changes    Ears/Nose/Throat: Denies nasal congestion or sore throat     Respiratory: Denies SOB or cough     Cardiovascular: Denies CP, palpitations, edema      Gastrointestinal: Denies abdominal pain,  N/V, constipation, or diarrhea    Genitourinary: Denies urinary symptoms    Neurologic: See HPI.     MSK: See HPI. Psychiatric: Denies sleep disturbance, anxiety, depression    Hematologic/Lymphatic/Immunologic: Denies bruising         Physical Exam:   Blood pressure 136/74, pulse 85, height 5' 3\" (1.6 m), weight 144 lb (65.3 kg). General: well developed and well nourished in no acute distress. Body habitus is thin  HEENT: No rhinorrhea, sneezing, yawning, or lacrimation. No scleral icterus or conjunctival injection. Resp: symmetrical chest expansion, unlabored breathing, respirations unlabored. CV: Heart rate is regular. Peripheral pulses are palpable  Lymph: No visible regional lymphadenopathy. Skin: No rashes or ecchymosis. Normal turgor. Psych: Mood is calm. Affect is normal.   Ext: No edema noted      MSK:   Back/Hip Exam:   Inspection: Pelvis was asymmetric. Lumbar lordotic curvature was decreased. There was no scoliosis.   No ecchymoses or erythema. Palpation: Palpatory exam revealed tenderness along left lumbosacral paraspinals, no ttp midline spine, ttp left SI joint sulcus, ttp left piriformis, ttp left greater trochanters. There was no paraspinal spasms. There were no trigger points. ROM: ROM decreased  Special/provocative testing:   Supine SLR negative . There was leg length discrepancy.     Neurological Exam:  Strength:                     R          L  Hip Flex                       5          5  Knee Ext                     5          5  Ankle dorsi                  5          4  EHL                             5          4  Ankle Plantar               5          5     Sensory:  Intact for light touch in all lower extremity dermatomes.      Reflexes:                     R          L  Patellar                        (0)        (0)  Ankle Jerk                   (0)        (0)        Gait is antalgic            Imaging: (personally reviewed by me 07/06/21)  X-ray L spine  X-ray left hip   MRI L Spine      Impression:   Barrett Bear is a 66 y.o. male      1. Lumbosacral radiculitis    2. Chronic left-sided low back pain with left-sided sciatica    3. Lumbar spondylosis          Plan:   · Recommend left L5-S1 TFESI. Procedure risks, benefits and alternatives were discussed. Patient would like to proceed. · Still leery about medications. Discussed lyrica. He wants to try injection first.      · The patient was educated about the diagnosis, prognosis, indications, risks and benefits of treatment. An opportunity to ask questions was given to the patient and questions were answered.   The patient agreed to proceed with the recommended treatment as described above.      · Follow up after injection         Brigid DO Ayanna, [facility]   Board Certified Physical Medicine and Rehabilitation

## 2021-07-08 NOTE — OP NOTE
LUMBOSACRAL EPIDURAL STEROID INJECTION, TRANSFORAMINAL APPROACH       WITH FLUOROSCOPIC GUIDANCE    Patient: Raisa Mccabe                         MRN#: [id number]  : 1950   Date of procedure: 2021      Physician Performing Procedure:  Helen Chacon DO    Clinical Scenario: As per electronic documentation. Diagnosis: lumbar radiculitis     Injectate: A total of 3cc, consisting of 1 cc of Dexamethasone 10mg/ml,  with the remainder of normal saline    Levels Treated:  Left L5-S1 neural foramen    Approach:   Transforaminal    Improvement after today's procedure: As per nursing record. Comments:  None     Pre-procedural evaluation: The patient was examined today just prior to performing the procedure listed above. The patient's heart rate was normal, lungs were clear to auscultation bilaterally. Procedure: The patient was prepped and draped in a sterile fashion in the prone position after informed consent was signed and all the patient's questions were answered including the risks, benefits, alternative treatment options, and prognosis. The risks include - but are not limited to - infection, allergic reaction, increased pain, lack of therapeutic benefit, steroid reaction, nerve damage, paralysis, stroke, epidural hematoma, syncope, headache, respiratory or cardiac arrest, and scar formation. The C-arm was positioned so that an oblique view of the neural foramen as noted above was visualized. The soft tissues overlying this structure were infiltrated with 2-3 cc. of 1% Lidocaine without Epinephrine. A 22 gauge 3.5 inch spinal needle was inserted toward the target using a trajectory view along the fluoroscope beam.  Under AP and lateral visualization, the needle was advanced so it did not puncture dura. Biplanar projections were used to confirm position. Aspiration was confirmed to be negative for CSF and/or blood. A 1-2 cc. volume of Isovue contrast was injected at this level. The contrast was observed to flow under the pedicle. Radiographs were obtained for documentation purposes. After attaining flow of contrast documented above, the above injectate was administered into the transforaminal epidural space. The patient tolerated the procedure well and was discharged after an appropriate period of observation. If there are any complications, the patient was instructed to call us. The patient is to follow-up with the requesting physician after the injection, preferably within three weeks.

## 2021-07-27 DIAGNOSIS — M54.17 LUMBOSACRAL RADICULITIS: ICD-10-CM

## 2021-08-24 ENCOUNTER — HOSPITAL ENCOUNTER (OUTPATIENT)
Age: 71
Discharge: HOME | End: 2021-08-26
Payer: MEDICARE

## 2021-08-24 ENCOUNTER — HOSPITAL ENCOUNTER (OUTPATIENT)
Age: 71
Discharge: HOME | End: 2021-08-24
Payer: MEDICARE

## 2021-08-24 ENCOUNTER — HOSPITAL ENCOUNTER (OUTPATIENT)
Dept: GENERAL RADIOLOGY | Age: 71
Discharge: HOME | End: 2021-08-26
Payer: MEDICARE

## 2021-08-24 DIAGNOSIS — Z01.811 PRE-OP CHEST EXAM: ICD-10-CM

## 2021-08-24 LAB
ANION GAP SERPL CALCULATED.3IONS-SCNC: 9 MMOL/L (ref 7–16)
APTT: 32 SEC (ref 24.5–35.1)
BACTERIA: ABNORMAL /HPF
BASOPHILS ABSOLUTE: 0.02 E9/L (ref 0–0.2)
BASOPHILS RELATIVE PERCENT: 0.4 % (ref 0–2)
BILIRUBIN URINE: NEGATIVE
BLOOD, URINE: NEGATIVE
BUN BLDV-MCNC: 8 MG/DL (ref 6–23)
CALCIUM SERPL-MCNC: 9.7 MG/DL (ref 8.6–10.2)
CHLORIDE BLD-SCNC: 95 MMOL/L (ref 98–107)
CLARITY: CLEAR
CO2: 27 MMOL/L (ref 22–29)
COLOR: YELLOW
CREAT SERPL-MCNC: 0.9 MG/DL (ref 0.7–1.2)
EOSINOPHILS ABSOLUTE: 0.09 E9/L (ref 0.05–0.5)
EOSINOPHILS RELATIVE PERCENT: 1.8 % (ref 0–6)
GFR AFRICAN AMERICAN: >60
GFR NON-AFRICAN AMERICAN: >60 ML/MIN/1.73
GLUCOSE BLD-MCNC: 90 MG/DL (ref 74–99)
GLUCOSE URINE: NEGATIVE MG/DL
HCT VFR BLD CALC: 35 % (ref 37–54)
HEMOGLOBIN: 11.3 G/DL (ref 12.5–16.5)
IMMATURE GRANULOCYTES #: 0.01 E9/L
IMMATURE GRANULOCYTES %: 0.2 % (ref 0–5)
INR BLD: 1
KETONES, URINE: ABNORMAL MG/DL
LEUKOCYTE ESTERASE, URINE: NEGATIVE
LYMPHOCYTES ABSOLUTE: 1.05 E9/L (ref 1.5–4)
LYMPHOCYTES RELATIVE PERCENT: 21.2 % (ref 20–42)
MCH RBC QN AUTO: 27.2 PG (ref 26–35)
MCHC RBC AUTO-ENTMCNC: 32.3 % (ref 32–34.5)
MCV RBC AUTO: 84.1 FL (ref 80–99.9)
MONOCYTES ABSOLUTE: 0.69 E9/L (ref 0.1–0.95)
MONOCYTES RELATIVE PERCENT: 13.9 % (ref 2–12)
NEUTROPHILS ABSOLUTE: 3.09 E9/L (ref 1.8–7.3)
NEUTROPHILS RELATIVE PERCENT: 62.5 % (ref 43–80)
NITRITE, URINE: NEGATIVE
PDW BLD-RTO: 15.5 FL (ref 11.5–15)
PH UA: 5.5 (ref 5–9)
PLATELET # BLD: 191 E9/L (ref 130–450)
PMV BLD AUTO: 8.7 FL (ref 7–12)
POTASSIUM SERPL-SCNC: 5.6 MMOL/L (ref 3.5–5)
PROTEIN UA: NEGATIVE MG/DL
PROTHROMBIN TIME: 11.6 SEC (ref 9.3–12.4)
RBC # BLD: 4.16 E12/L (ref 3.8–5.8)
RBC UA: ABNORMAL /HPF (ref 0–2)
SODIUM BLD-SCNC: 131 MMOL/L (ref 132–146)
SPECIFIC GRAVITY UA: 1.02 (ref 1–1.03)
UROBILINOGEN, URINE: 0.2 E.U./DL
WBC # BLD: 5 E9/L (ref 4.5–11.5)
WBC UA: ABNORMAL /HPF (ref 0–5)

## 2021-08-24 PROCEDURE — 36415 COLL VENOUS BLD VENIPUNCTURE: CPT

## 2021-08-24 PROCEDURE — 81001 URINALYSIS AUTO W/SCOPE: CPT

## 2021-08-24 PROCEDURE — 71046 X-RAY EXAM CHEST 2 VIEWS: CPT

## 2021-08-24 PROCEDURE — 85730 THROMBOPLASTIN TIME PARTIAL: CPT

## 2021-08-24 PROCEDURE — 85610 PROTHROMBIN TIME: CPT

## 2021-08-24 PROCEDURE — 85025 COMPLETE CBC W/AUTO DIFF WBC: CPT

## 2021-08-24 PROCEDURE — 80048 BASIC METABOLIC PNL TOTAL CA: CPT

## 2021-11-17 ENCOUNTER — HOSPITAL ENCOUNTER (OUTPATIENT)
Age: 71
Discharge: HOME | End: 2021-11-17
Payer: MEDICARE

## 2021-11-17 ENCOUNTER — HOSPITAL ENCOUNTER (OUTPATIENT)
Dept: ULTRASOUND IMAGING | Age: 71
Discharge: HOME | End: 2021-11-17
Payer: MEDICARE

## 2021-11-17 DIAGNOSIS — M79.89 LEG SWELLING: ICD-10-CM

## 2021-11-17 PROCEDURE — 93970 EXTREMITY STUDY: CPT

## 2022-08-16 PROBLEM — M51.379 DDD (DEGENERATIVE DISC DISEASE), LUMBOSACRAL: Status: ACTIVE | Noted: 2022-08-16

## 2022-08-16 PROBLEM — Z90.410 H/O WHIPPLE PROCEDURE: Status: ACTIVE | Noted: 2022-08-16

## 2022-08-16 PROBLEM — Z90.49 H/O WHIPPLE PROCEDURE: Status: ACTIVE | Noted: 2022-08-16

## 2022-08-16 PROBLEM — M51.37 DDD (DEGENERATIVE DISC DISEASE), LUMBOSACRAL: Status: ACTIVE | Noted: 2022-08-16

## 2022-08-17 DIAGNOSIS — R79.9 ABNORMAL FINDING OF BLOOD CHEMISTRY, UNSPECIFIED: ICD-10-CM

## 2022-08-17 DIAGNOSIS — Z12.5 SCREENING PSA (PROSTATE SPECIFIC ANTIGEN): ICD-10-CM

## 2022-08-17 DIAGNOSIS — K21.9 GASTROESOPHAGEAL REFLUX DISEASE, UNSPECIFIED WHETHER ESOPHAGITIS PRESENT: Chronic | ICD-10-CM

## 2022-08-17 DIAGNOSIS — E78.2 MIXED HYPERLIPIDEMIA: ICD-10-CM

## 2022-08-17 DIAGNOSIS — M51.37 DDD (DEGENERATIVE DISC DISEASE), LUMBOSACRAL: ICD-10-CM

## 2022-08-17 LAB
HBA1C MFR BLD: 5.3 % (ref 4–5.6)
PROSTATE SPECIFIC ANTIGEN: 1.18 NG/ML (ref 0–4)

## 2022-08-29 ENCOUNTER — EVALUATION (OUTPATIENT)
Dept: PHYSICAL THERAPY | Age: 72
End: 2022-08-29
Payer: MEDICARE

## 2022-08-29 DIAGNOSIS — Z98.890 HISTORY OF LUMBOSACRAL SPINE SURGERY: ICD-10-CM

## 2022-08-29 DIAGNOSIS — M51.37 DDD (DEGENERATIVE DISC DISEASE), LUMBOSACRAL: Primary | ICD-10-CM

## 2022-08-29 PROCEDURE — 97163 PT EVAL HIGH COMPLEX 45 MIN: CPT | Performed by: PHYSICAL THERAPIST

## 2022-08-29 NOTE — PROGRESS NOTES
Physical Therapy Treatment Note    Date: 2022  Patient Name: Jaxon Rodríguez  : 1950   MRN: [id number]  DOInjury: 1 year  DOSx: --  Referring Provider: DO Higinio Mejia [address].  [facility] Diagnosis:      Diagnosis Orders   1. DDD (degenerative disc disease), lumbosacral        2. History of lumbosacral spine surgery          Peder Fails has bilateral LE and trunk weakness and decreased endurance to walking. Treatment will be a progressive walking program and LE and trunk strengthening regimen. X = TO BE PERFORMED NEXT VISIT  > = PROGRESS TO THIS    S: See eval  O:   Time [id number]     Visit - Repeat outcome measure at mid point and end. Pain Pain 0/10     ROM      Exercise      Gait - Laps or treadmill to fatigue See michael.  Did 5 min 30 sec today. Sit / stands X     Lunges X     Calf raises  X     Step-ups with focus on foot dorsiflexion  X           [place] deadlift 2-leg   TE      TE                     A:  Tolerated well. Discussed anatomy, physiology, body mechanics, principles of loading, and progressive loading/activity. Patient will work on ambulating to fatigue, resting, and repeating for 45 minutes at home. Talked about safety and where to place seating.      P: Continue with rehab plan  Raisa Arellano PT    Treatment Charges: Mins Units   Initial Evaluation 40 1   Re-Evaluation     Ther Exercise         TE     Manual Therapy     MT     Ther Activities        TA     Gait Training          GT     Neuro Re-education NR     Modalities     Non-Billable Service Time     Other     Total Time/Units 40 1

## 2022-08-29 NOTE — PROGRESS NOTES
[address] [facility] OUTPATIENT REHABILITATION  PHYSICAL THERAPY INITIAL EVALUATION         Date:  2022   Patient: Bryan Sr  : [birth year]  MRN: [id number]  Referring Provider: DO Higinio Moreira [address] Diagnosis:      Diagnosis Orders   1. DDD (degenerative disc disease), lumbosacral        2. History of lumbosacral spine surgery            Physician Order: Eval and Treat     SUBJECTIVE:     History / Mechanism of Injury: Patient is here with his wife, Helena Hinds. Patient reports difficulty walking for ~1 year due to back problems. Prior to surgery, walking was painful and he would drag his left leg. He underwent L4-5 lumbar fusion on 2021; which was successful. He reports he no longer drags his leg and no longer has pain, however he is unable to walk any distance. He can complete home tasks and chores, but he becomes extremely fatigued after walking 300-400 feet (much of his property requires him to walk on inclines / declines. He denies any LE pain when this is occurring. His goal is to walk 3 miles a day 4-5 days per week; which is what he was able to to before he developed back problems. Chief complaint: gets exhausted walking 300 to 400 feet. Pain:   Pain 0/10      Disturbed Sleep: no  Bladder Dysfunction: no  Bowel Dysfunction: no     Imaging results:   Narrative   EXAMINATION:   MRI OF THE LUMBAR SPINE WITHOUT CONTRAST, 2021 4:11 pm       TECHNIQUE:   Multiplanar multisequence MRI of the lumbar spine was performed without the   administration of intravenous contrast.       COMPARISON:   None. HISTORY:   ORDERING SYSTEM PROVIDED HISTORY: Chronic left-sided low back pain with   left-sided sciatica   TECHNOLOGIST PROVIDED HISTORY:   Reason for exam:->low back pain radiating into left leg       FINDINGS:   BONES/ALIGNMENT: No evidence of acute fracture or joint dislocation.   Mild   chronic compression fracture deformities are seen in the L4 and L5 vertebral   bodies. Mild dextroscoliotic curvature of the lumbar spine, with an apex at   L3-4 and a Piña angle of approximately 17 degrees. SPINAL CORD: The conus terminates normally. SOFT TISSUES: No paraspinal mass identified. L1-L2: There is no significant disc herniation, spinal canal stenosis or   neural foraminal narrowing. L2-L3: Small disc bulge. Mild facet hypertrophy. No central canal stenosis. Mild stenosis of the left lateral recess. Mild right and moderate left   neural foraminal stenoses. L3-L4: Small disc bulge. Mild facet and ligamentous hypertrophy. No   significant central canal stenosis. Mild lateral recess stenoses. Moderate   right and severe left neural foraminal stenoses. L4-L5: Small disc bulge. Moderate facet and ligamentous hypertrophy. Moderate central canal stenosis. Severe lateral recess and neural foraminal   stenoses. L5-S1: Disc bulge. Moderate facet hypertrophy. Trace fluid in the facet   joints indicates arthrosis. Severe stenoses of the central canal, lateral   recesses and neural foramina. Impression   1. No acute fracture. Mild chronic compression fracture deformities of the   L4 and L5 vertebral bodies. 2. Prominent degenerative changes in the lower lumbar spine, worse at L5-S1,   where there are severe stenoses of the central canal, lateral recesses and   neural foramina.      Past Medical History:  Past Medical History:   Diagnosis Date    Arthritis     Dislocated shoulder, right     GERD (gastroesophageal reflux disease) 01/04/2015    Hyperlipidemia     Hypoglycemia     Hyponatremia     Liver disease     Mixed hyperlipidemia 01/04/2015    New onset seizure ([facility] [address] 04/17/2019     Past Surgical History:   Procedure Laterality Date    COLON SURGERY      COLONOSCOPY      INCISIONAL HERNIA REPAIR N/A 10/13/2016    INGUINAL HERNIA REPAIR Left 02/19/2013    laparoscopic left inguinal hernia repair    NERVE BLOCK Left 06/03/2021    LEFT S1 TRANSFORAMINAL EPIDURAL STEROID INJECTION(REQUESTS LAST APPOINMENT) performed by Abdiel Suazo DO at [address] [facility] [facility] [address] Left 07/08/2021    LEFT L5-S1 TRANSFORAMINAL EPIDURAL STEROID INJECTION(WANTS LATER) performed by Abdiel Suazo DO at [address] [facility]  2015    [place] procedure    SD LAP,SURG,COLECT,TOT,W/PROCTECT,W/ILEOST N/A 08/20/2018    DIAGNOSTIC LAPAROSCOPY POSS LAPAROTOMY POSS BOWEL RESECTION performed by Samia Young MD at [address] [facility] N/A 09/06/2018    LAPAROSCOPIC REVISION OF GASTROJEJUNOSTOMY; INTRAOPERATIVE EGD performed by Samia Young MD at [facility] Right 04/26/2019    RIGHT REVERSE TOTAL SHOULDER ARTHROPLASTY performed by Vikram Villanueva MD at [facility] 49      right cyst removed [facility] ENDOSCOPY      UPPER GASTROINTESTINAL ENDOSCOPY N/A 09/05/2018    EGD BIOPSY performed by Samia Young MD at [address] [place] [address] ENDOSCOPY       Medications:   Current Outpatient Medications   Medication Sig Dispense Refill    acetaminophen (TYLENOL) 500 MG tablet Take 500 mg by mouth in the morning and at bedtime      omega-3 acid ethyl esters (LOVAZA) 1 g capsule TAKE 1 CAPSULE BY MOUTH FOUR TIMES DAILY 360 capsule 5    Omega-3 Fatty Acids (FISH OIL) 1000 MG CAPS Take 1 capsule by mouth daily 90 capsule 5    atorvastatin (LIPITOR) 40 MG tablet TAKE 1 TABLET BY MOUTH EVERY DAY 90 tablet 2    ezetimibe (ZETIA) 10 MG tablet TAKE 1 TABLET BY MOUTH DAILY 90 tablet 3    pantoprazole (PROTONIX) 40 MG tablet TAKE 1 TABLET BY MOUTH EVERY DAY 90 tablet 3    sildenafil (VIAGRA) 100 MG tablet Take 1 tablet by mouth daily as needed for Erectile Dysfunction 90 tablet 1    celecoxib (CELEBREX) 200 MG capsule TAKE 1 CAPSULE BY MOUTH DAILY 90 capsule 3    Flaxseed, Linseed, (FLAX SEEDS PO) Take 1 capsule by mouth daily Ld 4/24/2019      Saw [place] EXTR Take 1 capsule by mouth daily Ld 4/24/2019      therapeutic multivitamin-minerals (THERAGRAN-M) tablet Take 1 tablet by mouth daily Ld 4/24/2019       No current facility-administered medications for this visit. Patient Goals: to walk 3 miles a day 4-5 days per week    Contraindications/Precautions: none    OBJECTIVE:     Estimated body mass index is 24.62 kg/m² as calculated from the following:    Height as of 8/16/22: 5' 3\" (1.6 m). Weight as of 8/16/22: 139 lb (63 kg). Observations: well nourished male, normal affect     Inspection: normal orthopedic exam         Functional Strength:   Unable to toe walk with greater problems with L foot. Heel walk is better than toe walk, but limited. Did not test squat; opted for Sit / Stand Test (noted below). Range of Motion:    Trunk:    Flexion:  [x] Normal   [] Limited    Extension:  [x] Normal   [] Limited     Right Rotation: [x] Normal   [] Limited    Left Rotation:  [x] Normal   [] Limited    Right Side Bending: [x] Normal   [] Limited    Left Side Bending: [x] Normal   [] Limited     Lower Extremity:   Right:   [x] Normal   [] Limited    Left:   [x] Normal   [] Limited       Strength:     Trunk: 4/5   R LE: 4/5   L LE: 4/5; ant tib and EHL 3+/5 to 4-/5. Palpation: Non-tender to palpation  . Special Tests:   Gait Test:  During an ambulation endurance test today, he ambulated 980 feet (7 x 140 ft laps) and 5 min 30 sec. Gait pattern was stable throughout. He does have L foot slap greater than R, but not foot drop. He also demonstrates frontal plane sway, but stable. He did not use an assistive device. Sit / Stand:   30-Sec. Chair Stand Test:  Number:  _9_  Test date: 8/29/2022    Notes: Good speed and control. Winded with performance. Scoring criteria.       Chair Stand--Below Average Scores Age  Men  Women    60-64  < 14  < 12    65-69  < 12  < 11    70-74  < 12 < 10    75-79  < 11  < 10    80-84  < 10  < 9    85-89  < 8  < 8    90-94  < 7  < 4         ASSESSMENT     Angella Solis has bilateral LE and trunk weakness and decreased endurance to walking. Treatment will be a progressive walking program and LE and trunk strengthening regimen. Problems:   Strength decreased   Limitations with walking, use of right lower extremity, use of left lower extremity, inability to participate in exercise regimen / fitness program    [x] There are no barriers affecting plan of care or recovery    [] Barriers to this patient's plan of care or recovery include:      Domestic Concerns:  [x] No  [] Yes:    Short Term goals (2 weeks)  Able to perform / complete the following functions / tasks:  ambulate 15-30 minutes in clinic    Long Term goals (6 weeks)  Strength WFL  Able to perform / complete the following functions / tasks:  able to ambulate 60 minutes  Independent with home exercise program (HEP)    Rehab Potential: [x] Good  [] Fair  [] Poor    PLAN       Treatment Plan:   instruction in home exercise program   therapeutic exercise   therapeutic activity   neuromuscular re-education     The following CPT codes are likely to be used in the care of this patient:   43591 PT Evaluation: High Complexity   70353 PT Re-Evaluation   86801 Therapeutic Exercise   46557 Neuromuscular Re-Education   28687 Therapeutic Activities     Suggested Professional Referral: [x] No  [] Yes:     Patient Education:  [x] Plans / Goals, Risks / Benefits discussed  [x] Home exercise program  Method of Education: [x] Verbal  [x] Demo  [x] Written  Comprehension of Education:  [x] Verbalizes understanding. [x] Demonstrates understanding. [] Needs Review. [] Demonstrates / verbalizes understanding of HEP / Obcathy Garciatiest previously given.     Frequency:  2 days per week for 4-6 weeks    Patient understands diagnosis/prognosis and consents to treatment, plan and goals: [x] Yes    [] No     Thank you for the opportunity to work with your patient. If you have questions or comments, please contact me at [phone]; fax: [phone]. Electronically signed by: Cuong Varghese PT         Please sign Physician's Certification and return to: [address] [place] [address] PHYSICAL THERAPY  [address] [address]  Dept: [phone]  Dept Fax: [phone]: [phone] Certification / Comments     Frequency/Duration 2 days per week for 4-6 weeks. Certification period from 8/29/2022  to 11/29/2022. I have reviewed the Plan of Care established for skilled therapy services and certify that the services are required and that they will be provided while the patient is under my care.     Physician's Comments/Revisions:               Physician's Printed Name:                                           [de-identified] Signature:                                                               Date:

## 2022-09-02 ENCOUNTER — TREATMENT (OUTPATIENT)
Dept: PHYSICAL THERAPY | Age: 72
End: 2022-09-02
Payer: MEDICARE

## 2022-09-02 DIAGNOSIS — Z98.890 HISTORY OF LUMBOSACRAL SPINE SURGERY: ICD-10-CM

## 2022-09-02 DIAGNOSIS — M51.37 DDD (DEGENERATIVE DISC DISEASE), LUMBOSACRAL: Primary | ICD-10-CM

## 2022-09-02 PROCEDURE — 97110 THERAPEUTIC EXERCISES: CPT

## 2022-09-09 ENCOUNTER — TREATMENT (OUTPATIENT)
Dept: PHYSICAL THERAPY | Age: 72
End: 2022-09-09
Payer: MEDICARE

## 2022-09-09 DIAGNOSIS — Z98.890 HISTORY OF LUMBOSACRAL SPINE SURGERY: ICD-10-CM

## 2022-09-09 DIAGNOSIS — M51.37 DDD (DEGENERATIVE DISC DISEASE), LUMBOSACRAL: Primary | ICD-10-CM

## 2022-09-09 PROCEDURE — 97110 THERAPEUTIC EXERCISES: CPT | Performed by: PHYSICAL THERAPIST

## 2022-09-12 ENCOUNTER — TREATMENT (OUTPATIENT)
Dept: PHYSICAL THERAPY | Age: 72
End: 2022-09-12
Payer: MEDICARE

## 2022-09-12 DIAGNOSIS — Z98.890 HISTORY OF LUMBOSACRAL SPINE SURGERY: ICD-10-CM

## 2022-09-12 DIAGNOSIS — M51.37 DDD (DEGENERATIVE DISC DISEASE), LUMBOSACRAL: Primary | ICD-10-CM

## 2022-09-12 PROCEDURE — 97110 THERAPEUTIC EXERCISES: CPT | Performed by: PHYSICAL THERAPIST

## 2022-09-12 NOTE — PROGRESS NOTES
Physical Therapy Treatment Note    Date: 2022  Patient Name: Bro Taylor  : 1950   MRN: [id number]  DOInjury: 1 year  DOSx: --  Referring Provider: DO Higinio Glaser  [facility]- [facility]     Medical Diagnosis:      Diagnosis Orders   1. DDD (degenerative disc disease), lumbosacral        2. History of lumbosacral spine surgery          Loretta Tamayo has bilateral LE and trunk weakness and decreased endurance to walking. Treatment will be a progressive walking program and LE and trunk strengthening regimen. X = TO BE PERFORMED NEXT VISIT  > = PROGRESS TO THIS    S: Took a 60 min nap after last Rx; otherwise fine. O:   Time 4153-9789     Visit - Repeat outcome measure at mid point and end. Pain Pain 0/10     ROM      Exercise      Gait - Laps or treadmill to fatigue   9 min 15 sec  9 min 20 sec  7 min 35 sec    on level ground (laps) 2-4 min rest break between sets          Sit / stands Review and add to HEP    Lunges Review and add to HEP    Calf raises  Review and add to HEP    Step-ups with focus on foot dorsiflexion  Review and add to HEP          [place] deadlift 2-leg   TE      TE                     A:  Significant improvement in walking duration. Tolerated well. Patient will work on ambulating to fatigue, resting, and repeating for 45 minutes at home. Talked about safety and where to place seating. P: Continue with rehab plan. Discussed setting up walking regimen at home just like we did today. Will explore [facility] at [facility] (patient has Silver Sneakers).      Toby Leon, PT    Treatment Charges: Mins Units   Initial Evaluation     Re-Evaluation     Ther Exercise         TE 40 3   Manual Therapy     MT     Ther Activities        TA     Gait Training          GT     Neuro Re-education NR     Modalities     Non-Billable Service Time     Other     Total Time/Units 40 3

## 2022-09-16 ENCOUNTER — TREATMENT (OUTPATIENT)
Dept: PHYSICAL THERAPY | Age: 72
End: 2022-09-16
Payer: MEDICARE

## 2022-09-16 DIAGNOSIS — M51.37 DDD (DEGENERATIVE DISC DISEASE), LUMBOSACRAL: Primary | ICD-10-CM

## 2022-09-16 DIAGNOSIS — Z98.890 HISTORY OF LUMBOSACRAL SPINE SURGERY: ICD-10-CM

## 2022-09-16 PROCEDURE — 97110 THERAPEUTIC EXERCISES: CPT | Performed by: PHYSICAL THERAPIST

## 2022-09-16 NOTE — PROGRESS NOTES
Physical Therapy Treatment Note    Date: 2022  Patient Name: Elidia Kern  : 1950   MRN: [id number]  DOInjury: 1 year  DOSx: --  Referring Provider: DO Higinio Gage Marcello [address] [place] [address]     Medical Diagnosis:      Diagnosis Orders   1. DDD (degenerative disc disease), lumbosacral        2. History of lumbosacral spine surgery          Christiano Corbin has bilateral LE and trunk weakness and decreased endurance to walking. Treatment will be a progressive walking program and LE and trunk strengthening regimen. X = TO BE PERFORMED NEXT VISIT  > = PROGRESS TO THIS    S: Took a 30 min nap after last Rx; otherwise fine. O:   Time 5160-1136     Visit - Repeat outcome measure at mid point and end. Pain Pain 0/10     ROM      Exercise      Gait - Laps or treadmill to fatigue   11 min 40 sec  10 min 35 sec  9 min 5 sec    on level ground (laps) 2-4 min rest break between sets          Sit / stands Review and add to HEP    Lunges Review and add to HEP    Calf raises  Review and add to HEP    Step-ups with focus on foot dorsiflexion  Review and add to HEP          [place] deadlift 2-leg   TE      TE                     A:  Significant improvement in walking duration again this visit. Tolerated well. Patient will work on ambulating to fatigue, resting, and repeating for 45 minutes at home. Talked about safety and where to place seating. P: Continue with rehab plan. Discussed setting up walking regimen at home just like we did today. Will explore [facility] at [facility] (patient has Silver Sneakers).      India Liriano, PT    Treatment Charges: Mins Units   Initial Evaluation     Re-Evaluation     Ther Exercise         TE 40 3   Manual Therapy     MT     Ther Activities        TA     Gait Training          GT     Neuro Re-education NR     Modalities     Non-Billable Service Time     Other     Total Time/Units 40 3

## 2022-09-23 ENCOUNTER — TREATMENT (OUTPATIENT)
Dept: PHYSICAL THERAPY | Age: 72
End: 2022-09-23
Payer: MEDICARE

## 2022-09-23 DIAGNOSIS — Z98.890 HISTORY OF LUMBOSACRAL SPINE SURGERY: ICD-10-CM

## 2022-09-23 DIAGNOSIS — M51.37 DDD (DEGENERATIVE DISC DISEASE), LUMBOSACRAL: Primary | ICD-10-CM

## 2022-09-23 PROCEDURE — 97110 THERAPEUTIC EXERCISES: CPT | Performed by: PHYSICAL THERAPIST

## 2022-09-23 NOTE — PROGRESS NOTES
Physical Therapy Treatment Note    Date: 2022  Patient Name: Sheri Skelton  : 1950   MRN: [id number]  DOInjury: 1 year  DOSx: --  Referring Provider: DO Higinio Jeffrey Marcello  [facility]     Medical Diagnosis:      Diagnosis Orders   1. DDD (degenerative disc disease), lumbosacral        2. History of lumbosacral spine surgery          Hiren Rendon has bilateral LE and trunk weakness and decreased endurance to walking. Treatment will be a progressive walking program and LE and trunk strengthening regimen. X = TO BE PERFORMED NEXT VISIT  > = PROGRESS TO THIS    S: Has not added walking due to quickly fatiguing on inclines / declines. O:   Time 1027-8774     Visit - Repeat outcome measure at mid point and end. Pain Pain 0/10     ROM      Exercise      Gait - Laps or treadmill to fatigue   13 min 45 sec  10 min 20 sec  10 min 18 sec    on level ground (laps) 2-4 min rest break between sets          Sit / stands Review and add to HEP    Lunges Review and add to HEP    Calf raises  Review and add to HEP    Step-ups with focus on foot dorsiflexion  Review and add to HEP          [place] deadlift 2-leg   TE      TE                     A:  Significant improvement in walking duration again this visit. Tolerated well. Patient will work on ambulating to fatigue, resting, and repeating for 45 minutes at home. Talked about safety and where to place seating. P: Continue with rehab plan. Discussed setting up walking regimen at home just like we did today. Will explore [place] (patient has Silver Sneakers). Patient has had difficulty setting up a home walking area. Discussed getting a treadmill. Will work on developing a circuit for cardiovascular challenge if walking regimen at home is not feasible.      Hollace Skiff, SOPHEI    Treatment Charges: Mins Units   Initial Evaluation     Re-Evaluation     Ther Exercise         TE 40 3   Manual Therapy     MT

## 2022-09-26 ENCOUNTER — TREATMENT (OUTPATIENT)
Dept: PHYSICAL THERAPY | Age: 72
End: 2022-09-26
Payer: MEDICARE

## 2022-09-26 DIAGNOSIS — M51.37 DDD (DEGENERATIVE DISC DISEASE), LUMBOSACRAL: Primary | ICD-10-CM

## 2022-09-26 DIAGNOSIS — Z98.890 HISTORY OF LUMBOSACRAL SPINE SURGERY: ICD-10-CM

## 2022-09-26 PROCEDURE — 97530 THERAPEUTIC ACTIVITIES: CPT

## 2022-09-26 NOTE — PROGRESS NOTES
Physical Therapy Treatment Note    Date: 2022  Patient Name: Marlen Murphy  : 1950   MRN: [id number]  DOInjury: 1 year  DOSx: --  Referring Provider: DO Higinio Loco Marcello [address] [place] [address]     Medical Diagnosis:     1. DDD (degenerative disc disease), lumbosacral          2. History of lumbosacral spine surgery         Lucero Moss has bilateral LE and trunk weakness and decreased endurance to walking. Treatment will be a progressive walking program and LE and trunk strengthening regimen. X = TO BE PERFORMED NEXT VISIT  > = PROGRESS TO THIS    S: Has not added walking due to quickly fatiguing on inclines / declines. O:   Time 5730-5734     Visit - Repeat outcome measure at mid point and end. Pain Pain 0/10     ROM      Exercise      Gait - Laps or treadmill to fatigue   11 min 12 sec  10 min 45 sec  7 min 35 sec    on level ground (laps) 2-4 min rest break between sets          Sit / stands Review and add to HEP    Lunges Review and add to HEP    Calf raises  Review and add to HEP    Step-ups with focus on foot dorsiflexion  Review and add to HEP          [place] deadlift 2-leg   TE      TE                     A:  Significant improvement in walking duration again this visit. Tolerated well. Patient will work on ambulating to fatigue, resting, and repeating for 45 minutes at home. Talked about safety and where to place seating. P: Continue with rehab plan. Discussed setting up walking regimen at home just like we did today. Will explore [facility] (patient has Silver Sneakers). Patient has had difficulty setting up a home walking area. Discussed getting a treadmill. Will work on developing a circuit for cardiovascular challenge if walking regimen at home is not feasible.      Jeniffer Aquino, PTA    Treatment Charges: Mins Units   Initial Evaluation     Re-Evaluation     Ther Exercise         TE     Manual Therapy     MT     Ther Activities TA 40 3   Gait Training          GT     Neuro Re-education NR     Modalities     Non-Billable Service Time     Other     Total Time/Units 40 3

## 2022-09-30 ENCOUNTER — TREATMENT (OUTPATIENT)
Dept: PHYSICAL THERAPY | Age: 72
End: 2022-09-30
Payer: MEDICARE

## 2022-09-30 DIAGNOSIS — Z98.890 HISTORY OF LUMBOSACRAL SPINE SURGERY: ICD-10-CM

## 2022-09-30 DIAGNOSIS — M51.37 DDD (DEGENERATIVE DISC DISEASE), LUMBOSACRAL: Primary | ICD-10-CM

## 2022-09-30 PROCEDURE — 97530 THERAPEUTIC ACTIVITIES: CPT | Performed by: PHYSICAL THERAPIST

## 2022-09-30 NOTE — PROGRESS NOTES
Physical Therapy Treatment Note    Date: 2022  Patient Name: Angeli Wilson  : 1950   MRN: [id number]  DOInjury: 1 year  DOSx: --  Referring Provider: DO Higinio Zaidi Val Free     Medical Diagnosis:     1. DDD (degenerative disc disease), lumbosacral          2. History of lumbosacral spine surgery         Ridge Feng has bilateral LE and trunk weakness and decreased endurance to walking. Treatment will be a progressive walking program and LE and trunk strengthening regimen. Access Code: [email or web address]  URL: [email or web address]  Date: 2022  Prepared by: Katiana Palomares    Exercises  Supine Bridge - 3 x weekly - 3 sets - 10-15 reps  Split Stance Shoulder Row with Resistance - 3 x weekly - 3 sets - 10-15 reps  Standing Floor Level Row - 3 x weekly - 3 sets - 10-15 reps  Sit to Stand with Arms Crossed - 3 x weekly - 3 sets - 10-15 reps      X = TO BE PERFORMED NEXT VISIT  > = PROGRESS TO THIS    S: Feels PT has not been helping. Continues to feel weak. O:   Time 3330-2730     Visit  Repeat outcome measure at mid point and end. Pain Pain 0/10     ROM      Exercise      Gait - Laps or treadmill to fatigue  2-4 min rest break between sets    Gait Laps 140 ft between ex bouts      Bridging  6 sec contraction  3 x 12     Clamshell X? Sit / stands 3 X 10           Mid row reach and pull Blue 2 x 15     Low row reach and pull Blue 2 x 15                 Lunges ? Calf raises  ? Step-ups with focus on foot dorsiflexion  ? [place] deadlift 2-leg ? TE      TE         Blue tubing Given 22           A:  Tolerated changes to program well. Written HEP updated. Large, functional, dynamic, global movements used to build strength, balance, endurance, and flexibility and to improve physical performance. P: Continue with new rehab plan.      Katiana Palomares PT    Treatment Charges: Mins Units   Initial Evaluation Re-Evaluation     Ther Exercise         TE     Manual Therapy     MT     Ther Activities        TA 40 3   Gait Training          GT     Neuro Re-education NR     Modalities     Non-Billable Service Time     Other     Total Time/Units 40 3

## 2022-10-03 ENCOUNTER — TREATMENT (OUTPATIENT)
Dept: PHYSICAL THERAPY | Age: 72
End: 2022-10-03
Payer: MEDICARE

## 2022-10-03 DIAGNOSIS — M51.37 DDD (DEGENERATIVE DISC DISEASE), LUMBOSACRAL: Primary | ICD-10-CM

## 2022-10-03 DIAGNOSIS — Z98.890 HISTORY OF LUMBOSACRAL SPINE SURGERY: ICD-10-CM

## 2022-10-03 PROCEDURE — 97530 THERAPEUTIC ACTIVITIES: CPT | Performed by: PHYSICAL THERAPIST

## 2022-10-03 NOTE — PROGRESS NOTES
Physical Therapy Treatment Note    Date: 10/3/2022  Patient Name: Dean Juarez  : 1950   MRN: [id number]  DOInjury: 1 year  DOSx: --  Referring Provider: DO Higinio Mena [address]     Medical Diagnosis:     1. DDD (degenerative disc disease), lumbosacral          2. History of lumbosacral spine surgery         Lucio Newer has bilateral LE and trunk weakness and decreased endurance to walking. Treatment will be a progressive walking program and LE and trunk strengthening regimen. Access Code: [email or web address]  URL: [email or web address]  Date: 2022  Prepared by: Katherine Shoemaker    Exercises  Supine Bridge - 3 x weekly - 3 sets - 10-15 reps  Split Stance Shoulder Row with Resistance - 3 x weekly - 3 sets - 10-15 reps  Standing Floor Level Row - 3 x weekly - 3 sets - 10-15 reps  Sit to Stand with Arms Crossed - 3 x weekly - 3 sets - 10-15 reps      X = TO BE PERFORMED NEXT VISIT  > = PROGRESS TO THIS    S: Reports DOMS after last visit. Feels good today. O:   Time 8588-3291     Visit  Repeat outcome measure at mid point and end. Pain Pain 0/10     ROM      Exercise      Gait - Laps or treadmill to fatigue  2-4 min rest break between sets    Gait Laps 140 ft between ex bouts      Bridging  6 sec contraction  3 x 12     Clamshell X? Sit / stands 3 X 10           Mid row reach and pull Blue 2 x 15     Low row reach and pull Blue 2 x 15                 Lunges ? Calf raises  ? Step-ups with focus on foot dorsiflexion  ? [place] deadlift 2-leg ? TE      TE         Blue tubing Given 22           A:  Tolerated changes to program well. Large, functional, dynamic, global movements used to build strength, balance, endurance, and flexibility and to improve physical performance. P: Continue with rehab plan. Patient to perform HEP Wednesday.       Katherine Shoemaekr, PT    Treatment Charges: Mins Units   Initial Evaluation Re-Evaluation     Ther Exercise         TE     Manual Therapy     MT     Ther Activities        TA 40 3   Gait Training          GT     Neuro Re-education NR     Modalities     Non-Billable Service Time     Other     Total Time/Units 40 3

## 2022-10-07 ENCOUNTER — TREATMENT (OUTPATIENT)
Dept: PHYSICAL THERAPY | Age: 72
End: 2022-10-07
Payer: MEDICARE

## 2022-10-07 DIAGNOSIS — Z98.890 HISTORY OF LUMBOSACRAL SPINE SURGERY: ICD-10-CM

## 2022-10-07 DIAGNOSIS — M51.37 DDD (DEGENERATIVE DISC DISEASE), LUMBOSACRAL: Primary | ICD-10-CM

## 2022-10-07 PROCEDURE — 97530 THERAPEUTIC ACTIVITIES: CPT | Performed by: PHYSICAL THERAPIST

## 2022-10-07 NOTE — PROGRESS NOTES
Physical Therapy Treatment Note    Date: 10/7/2022  Patient Name: Lulú Green  : 1950   MRN: [id number]  DOInjury: 1 year  DOSx: --  Referring Provider: Hosey Goltz, DO [facility] [address] [place] [address]     Medical Diagnosis:     1. DDD (degenerative disc disease), lumbosacral          2. History of lumbosacral spine surgery         Lars Cormier has bilateral LE and trunk weakness and decreased endurance to walking. Treatment will be a progressive walking program and LE and trunk strengthening regimen. Access Code: [email or web address]  URL: [email or web address]  Date: 10/07/2022  Prepared by: Marissa Cullen    Exercises  Supine Bridge - 3 x weekly - 3 sets - 10-15 reps  Clamshell - 3 x weekly - 3 sets - 10-15 reps  Split Stance Shoulder Row with Resistance - 3 x weekly - 3 sets - 10-15 reps  Standing Floor Level Row - 3 x weekly - 3 sets - 10-15 reps  Sit to Stand with Arms Crossed - 3 x weekly - 3 sets - 10-15 reps      X = TO BE PERFORMED NEXT VISIT  > = PROGRESS TO THIS    S: No new report. O:   Time 9233-5055     Visit  Repeat outcome measure at mid point and end. Pain Pain 0/10     ROM      Exercise      Gait - Laps or treadmill to fatigue  2-4 min rest break between sets    Gait Laps 140 ft between ex bouts      Bridging  6 sec contraction  3 x 12     Clamshell 6 sec contraction  3 x 12     Sit / stands 3 X 10           Mid row reach and pull Blue 2 x 15     Low row reach and pull Blue 2 x 15                 Lunges ? Calf raises  ? Step-ups with focus on foot dorsiflexion  ? [place] deadlift 2-leg ? TE      TE         Blue tubing Given 22           A:  Tolerated changes to program well. Large, functional, dynamic, global movements used to build strength, balance, endurance, and flexibility and to improve physical performance. P: Continue with rehab plan. Patient to perform HEP Wednesday.       Marissa Cullen, PT    Treatment Charges: Mins

## 2022-10-10 ENCOUNTER — TREATMENT (OUTPATIENT)
Dept: PHYSICAL THERAPY | Age: 72
End: 2022-10-10
Payer: MEDICARE

## 2022-10-10 DIAGNOSIS — Z98.890 HISTORY OF LUMBOSACRAL SPINE SURGERY: ICD-10-CM

## 2022-10-10 DIAGNOSIS — M51.37 DDD (DEGENERATIVE DISC DISEASE), LUMBOSACRAL: Primary | ICD-10-CM

## 2022-10-10 PROCEDURE — 97530 THERAPEUTIC ACTIVITIES: CPT | Performed by: PHYSICAL THERAPIST

## 2022-10-10 NOTE — PROGRESS NOTES
Physical Therapy Treatment Note    Date: 10/10/2022  Patient Name: Iker Montgomery  : 1950   MRN: [id number]  DOInjury: 1 year  DOSx: --  Referring Provider: DO Higinio Cyr Marcello [address] [place] [address]     Medical Diagnosis:     1. DDD (degenerative disc disease), lumbosacral          2. History of lumbosacral spine surgery         Lopez Darden has bilateral LE and trunk weakness and decreased endurance to walking. Treatment will be a progressive walking program and LE and trunk strengthening regimen. Access Code: [email or web address]  URL: [email or web address]  Date: 10/07/2022  Prepared by: Voncille Pump    Exercises  Supine Bridge - 3 x weekly - 3 sets - 10-15 reps  Clamshell - 3 x weekly - 3 sets - 10-15 reps  Split Stance Shoulder Row with Resistance - 3 x weekly - 3 sets - 10-15 reps  Standing Floor Level Row - 3 x weekly - 3 sets - 10-15 reps  Sit to Stand with Arms Crossed - 3 x weekly - 3 sets - 10-15 reps      X = TO BE PERFORMED NEXT VISIT  > = PROGRESS TO THIS    S: No new report. O:   Time 9797-1426     Visit 10/8-12 Repeat outcome measure at mid point and end. Pain Pain 0/10     ROM      Exercise      Gait - Laps or treadmill to fatigue  2-4 min rest break between sets    Gait Laps 140 ft between ex bouts      Bridging  6 sec contraction  3 x 12     Clamshell 6 sec contraction  3 x 12     Sit / stands 3 X 10           Mid row reach and pull Blue 2 x 15     Low row reach and pull Blue 2 x 15                 Lunges ? Calf raises  ? Step-ups with focus on foot dorsiflexion  ? [place] deadlift 2-leg ? TE      TE         Blue tubing Given 22           A:  Tolerated changes to program well. Large, functional, dynamic, global movements used to build strength, balance, endurance, and flexibility and to improve physical performance. P: Continue with rehab plan. Patient to perform HEP Wednesday.       Voncille Pump, PT    Treatment Charges: Mins Units   Initial Evaluation     Re-Evaluation     Ther Exercise         TE     Manual Therapy     MT     Ther Activities        TA 40 3   Gait Training          GT     Neuro Re-education NR     Modalities     Non-Billable Service Time     Other     Total Time/Units 40 3

## 2022-10-14 ENCOUNTER — TREATMENT (OUTPATIENT)
Dept: PHYSICAL THERAPY | Age: 72
End: 2022-10-14
Payer: MEDICARE

## 2022-10-14 DIAGNOSIS — M51.37 DDD (DEGENERATIVE DISC DISEASE), LUMBOSACRAL: Primary | ICD-10-CM

## 2022-10-14 DIAGNOSIS — Z98.890 HISTORY OF LUMBOSACRAL SPINE SURGERY: ICD-10-CM

## 2022-10-14 PROCEDURE — 97110 THERAPEUTIC EXERCISES: CPT | Performed by: PHYSICAL THERAPIST

## 2022-10-14 NOTE — PROGRESS NOTES
Physical Therapy Treatment Note    Date: 10/14/2022  Patient Name: Luzmaria Andrews  : 1950   MRN: [id number]  DOInjury: 1 year  DOSx: --  Referring Provider: DO Higinio Read Marcello [facility]     Medical Diagnosis:     1. DDD (degenerative disc disease), lumbosacral          2. History of lumbosacral spine surgery         Ki Rosenthal has bilateral LE and trunk weakness and decreased endurance to walking. Treatment will be a progressive walking program and LE and trunk strengthening regimen. Access Code: [email or web address]  URL: [email or web address]  Date: 10/07/2022  Prepared by: Jenn No    Exercises  Supine Bridge - 3 x weekly - 3 sets - 10-15 reps  Clamshell - 3 x weekly - 3 sets - 10-15 reps  Split Stance Shoulder Row with Resistance - 3 x weekly - 3 sets - 10-15 reps  Standing Floor Level Row - 3 x weekly - 3 sets - 10-15 reps  Sit to Stand with Arms Crossed - 3 x weekly - 3 sets - 10-15 reps      X = TO BE PERFORMED NEXT VISIT  > = PROGRESS TO THIS    S: Feels like PT is helping; notes improved ability to ambulate. O:   Time 3173-6897     Visit  Repeat outcome measure at mid point and end. Pain Pain 0/10     ROM      Exercise      Gait - Laps or treadmill to fatigue  2-4 min rest break between sets    Gait Laps 140 ft between ex bouts      Bridging  6 sec contraction  3 x 12     Clamshell 6 sec contraction  3 x 12     Sit / stands 3 X 10           Mid row reach and pull Blue 2 x 15 Black next? Low row reach and pull Blue 2 x 15 Black next? Lunges ? Calf raises  ? Step-ups with focus on foot dorsiflexion  ? [place] deadlift 2-leg ? TE      TE         Blue tubing Given 22           A:  Tolerated changes to program well. Large, functional, dynamic, global movements used to build strength, balance, endurance, and flexibility and to improve physical performance. P: Continue with rehab plan.     Jorge Deluna Denia Harding, PT    Treatment Charges: Mins Units   Initial Evaluation     Re-Evaluation     Ther Exercise         TE     Manual Therapy     MT     Ther Activities        TA 40 3   Gait Training          GT     Neuro Re-education NR     Modalities     Non-Billable Service Time     Other     Total Time/Units 40 3

## 2022-10-17 ENCOUNTER — TREATMENT (OUTPATIENT)
Dept: PHYSICAL THERAPY | Age: 72
End: 2022-10-17
Payer: MEDICARE

## 2022-10-17 DIAGNOSIS — Z98.890 HISTORY OF LUMBOSACRAL SPINE SURGERY: ICD-10-CM

## 2022-10-17 DIAGNOSIS — M51.37 DDD (DEGENERATIVE DISC DISEASE), LUMBOSACRAL: Primary | ICD-10-CM

## 2022-10-17 PROCEDURE — 97530 THERAPEUTIC ACTIVITIES: CPT | Performed by: PHYSICAL THERAPIST

## 2022-10-17 NOTE — PROGRESS NOTES
Physical Therapy Treatment Note    Date: 10/17/2022  Patient Name: Dione Morris  : 1950   MRN: [id number]  DOInjury: 1 year  DOSx: --  Referring Provider: DO Higinio Fung 97.  [facility]     Medical Diagnosis:     1. DDD (degenerative disc disease), lumbosacral          2. History of lumbosacral spine surgery         Jay Garber has bilateral LE and trunk weakness and decreased endurance to walking. Treatment will be a progressive walking program and LE and trunk strengthening regimen. Access Code: [email or web address]  URL: [email or web address]  Date: 10/07/2022  Prepared by: Shannan Torres    Exercises  Supine Bridge - 3 x weekly - 3 sets - 10-15 reps  Clamshell - 3 x weekly - 3 sets - 10-15 reps  Split Stance Shoulder Row with Resistance - 3 x weekly - 3 sets - 10-15 reps  Standing Floor Level Row - 3 x weekly - 3 sets - 10-15 reps  Sit to Stand with Arms Crossed - 3 x weekly - 3 sets - 10-15 reps      X = TO BE PERFORMED NEXT VISIT  > = PROGRESS TO THIS    S: States his puppy pulled him down again yesterday. His L side is sore, but otherwise ok. O:   Time 6578-3463     Visit - Repeat outcome measure at mid point and end. Pain Pain 0/10     ROM      Exercise      Gait - Laps or treadmill to fatigue  2-4 min rest break between sets    Gait Laps 140 ft between ex bouts      Bridging  6 sec contraction  3 x 12     Clamshell 6 sec contraction  3 x 12     Sit / stands 3 X 10           Mid row reach and pull Black 2 x 15     Low row reach and pull Black 2 x 15                 Lunges ? Calf raises  ? Step-ups with focus on foot dorsiflexion  ? [place] deadlift 2-leg ? TE      TE         Blue tubing Given 22     Black tubing Given 10/17/22     A:  Tolerated new tubing well. Exercise is not aggravate his soreness from recent fall.   Large, functional, dynamic, global movements used to build strength, balance, endurance, and flexibility and to improve physical performance. P: Continue with rehab plan.     Chintan Ashford PT    Treatment Charges: Mins Units   Initial Evaluation     Re-Evaluation     Ther Exercise         TE     Manual Therapy     MT     Ther Activities        TA 40 3   Gait Training          GT     Neuro Re-education NR     Modalities     Non-Billable Service Time     Other     Total Time/Units 40 3

## 2022-10-18 DIAGNOSIS — K21.9 GASTROESOPHAGEAL REFLUX DISEASE, UNSPECIFIED WHETHER ESOPHAGITIS PRESENT: Chronic | ICD-10-CM

## 2022-10-18 DIAGNOSIS — E78.2 MIXED HYPERLIPIDEMIA: ICD-10-CM

## 2022-10-18 DIAGNOSIS — M51.37 DDD (DEGENERATIVE DISC DISEASE), LUMBOSACRAL: ICD-10-CM

## 2022-10-18 LAB
BASOPHILS ABSOLUTE: 0.03 E9/L (ref 0–0.2)
BASOPHILS RELATIVE PERCENT: 0.6 % (ref 0–2)
EOSINOPHILS ABSOLUTE: 0.08 E9/L (ref 0.05–0.5)
EOSINOPHILS RELATIVE PERCENT: 1.5 % (ref 0–6)
HCT VFR BLD CALC: 31.2 % (ref 37–54)
HEMOGLOBIN: 9.7 G/DL (ref 12.5–16.5)
IMMATURE GRANULOCYTES #: 0 E9/L
IMMATURE GRANULOCYTES %: 0 % (ref 0–5)
LYMPHOCYTES ABSOLUTE: 1.09 E9/L (ref 1.5–4)
LYMPHOCYTES RELATIVE PERCENT: 21 % (ref 20–42)
MCH RBC QN AUTO: 24.4 PG (ref 26–35)
MCHC RBC AUTO-ENTMCNC: 31.1 % (ref 32–34.5)
MCV RBC AUTO: 78.4 FL (ref 80–99.9)
MONOCYTES ABSOLUTE: 0.69 E9/L (ref 0.1–0.95)
MONOCYTES RELATIVE PERCENT: 13.3 % (ref 2–12)
NEUTROPHILS ABSOLUTE: 3.3 E9/L (ref 1.8–7.3)
NEUTROPHILS RELATIVE PERCENT: 63.6 % (ref 43–80)
PDW BLD-RTO: 18.5 FL (ref 11.5–15)
PLATELET # BLD: 193 E9/L (ref 130–450)
PMV BLD AUTO: 8.8 FL (ref 7–12)
RBC # BLD: 3.98 E12/L (ref 3.8–5.8)
WBC # BLD: 5.2 E9/L (ref 4.5–11.5)

## 2022-10-19 LAB
ALBUMIN SERPL-MCNC: 4.8 G/DL (ref 3.5–5.2)
ALP BLD-CCNC: 63 U/L (ref 40–129)
ALT SERPL-CCNC: 30 U/L (ref 0–40)
ANION GAP SERPL CALCULATED.3IONS-SCNC: 14 MMOL/L (ref 7–16)
AST SERPL-CCNC: 58 U/L (ref 0–39)
BILIRUB SERPL-MCNC: 0.5 MG/DL (ref 0–1.2)
BUN BLDV-MCNC: 12 MG/DL (ref 6–23)
CALCIUM SERPL-MCNC: 9.5 MG/DL (ref 8.6–10.2)
CHLORIDE BLD-SCNC: 99 MMOL/L (ref 98–107)
CHOLESTEROL, TOTAL: 153 MG/DL (ref 0–199)
CO2: 23 MMOL/L (ref 22–29)
CREAT SERPL-MCNC: 1.1 MG/DL (ref 0.7–1.2)
GFR SERPL CREATININE-BSD FRML MDRD: >60 ML/MIN/1.73
GLUCOSE BLD-MCNC: 123 MG/DL (ref 74–99)
HDLC SERPL-MCNC: 103 MG/DL
LDL CHOLESTEROL CALCULATED: 45 MG/DL (ref 0–99)
POTASSIUM SERPL-SCNC: 4.2 MMOL/L (ref 3.5–5)
SODIUM BLD-SCNC: 136 MMOL/L (ref 132–146)
TOTAL PROTEIN: 7.4 G/DL (ref 6.4–8.3)
TRIGL SERPL-MCNC: 24 MG/DL (ref 0–149)
VLDLC SERPL CALC-MCNC: 5 MG/DL

## 2022-10-21 ENCOUNTER — TREATMENT (OUTPATIENT)
Dept: PHYSICAL THERAPY | Age: 72
End: 2022-10-21
Payer: MEDICARE

## 2022-10-21 DIAGNOSIS — M51.37 DDD (DEGENERATIVE DISC DISEASE), LUMBOSACRAL: Primary | ICD-10-CM

## 2022-10-21 DIAGNOSIS — Z98.890 HISTORY OF LUMBOSACRAL SPINE SURGERY: ICD-10-CM

## 2022-10-21 PROCEDURE — 97530 THERAPEUTIC ACTIVITIES: CPT | Performed by: PHYSICAL THERAPIST

## 2022-10-21 NOTE — PROGRESS NOTES
Physical Therapy Treatment Note    Date: 10/21/2022  Patient Name: Renetta Perez  : 1950   MRN: [id number]  DOInjury: 1 year  DOSx: --  Referring Provider: DO Higinio Landin [address]     Medical Diagnosis:     1. DDD (degenerative disc disease), lumbosacral          2. History of lumbosacral spine surgery         Flako Hernandez has bilateral LE and trunk weakness and decreased endurance to walking. Treatment will be a progressive walking program and LE and trunk strengthening regimen. Access Code: [email or web address]  URL: [email or web address]  Date: 10/21/2022  Prepared by: Abilio Denney    Exercises  Supine Bridge - 3 x weekly - 3 sets - 10-15 reps  Clamshell - 3 x weekly - 3 sets - 10-15 reps  Split Stance Shoulder Row with Resistance - 3 x weekly - 3 sets - 10-15 reps  Standing Floor Level Row - 3 x weekly - 3 sets - 10-15 reps  Sit to Stand with Arms Crossed - 3 x weekly - 3 sets - 10-15 reps  Step Up - 3 x weekly - 3 sets - 10-15 reps  Seated Ankle Dorsiflexion with Ankle Weight - 3 x weekly - 3 sets - 10-15 reps        X = TO BE PERFORMED NEXT VISIT  > = PROGRESS TO THIS    S: Dissatisfied with L foot slap when he walks. O:   Time 7289-2240     Visit  Repeat outcome measure at mid point and end. Pain Pain 0/10     ROM      Exercise      Gait - Laps or treadmill to fatigue  2-4 min rest break between sets    Gait Laps 140 ft between ex bouts      Bridging  6 sec contraction  3 x 12     Clamshell 6 sec contraction  3 x 12     Sit / stands 3 X 10           Mid row reach and pull Black 2 x 15     Low row reach and pull Black 2 x 15                 Lunges ? Calf raises  ? Step-ups with focus on foot dorsiflexion  2 x 15     Seated DF  3 lbs 3 x 10     [place] deadlift 2-leg ? TE      TE         Blue tubing Given 22     Black tubing Given 10/17/22     A:  Tolerated new tubing well.   Exercise is not aggravate his soreness from recent fall. Large, functional, dynamic, global movements used to build strength, balance, endurance, and flexibility and to improve physical performance. P: Continue with rehab plan.     Cheryl Madsen PT    Treatment Charges: Mins Units   Initial Evaluation     Re-Evaluation     Ther Exercise         TE     Manual Therapy     MT     Ther Activities        TA 40 3   Gait Training          GT     Neuro Re-education NR     Modalities     Non-Billable Service Time     Other     Total Time/Units 40 3

## 2022-10-31 ENCOUNTER — TREATMENT (OUTPATIENT)
Dept: PHYSICAL THERAPY | Age: 72
End: 2022-10-31
Payer: MEDICARE

## 2022-10-31 DIAGNOSIS — Z98.890 HISTORY OF LUMBOSACRAL SPINE SURGERY: ICD-10-CM

## 2022-10-31 DIAGNOSIS — M51.37 DDD (DEGENERATIVE DISC DISEASE), LUMBOSACRAL: Primary | ICD-10-CM

## 2022-10-31 PROCEDURE — 97530 THERAPEUTIC ACTIVITIES: CPT | Performed by: PHYSICAL THERAPIST

## 2022-10-31 NOTE — PROGRESS NOTES
Physical Therapy Treatment Note    Date: 10/31/2022  Patient Name: Jeremias Young  : 1950   MRN: [id number]  DOInjury: 1 year  DOSx: --  Referring Provider: DO Higinio Main [address] [place] [address]     Medical Diagnosis:     1. DDD (degenerative disc disease), lumbosacral          2. History of lumbosacral spine surgery         Hillary Chavez has bilateral LE and trunk weakness and decreased endurance to walking. Treatment will be a progressive walking program and LE and trunk strengthening regimen. Access Code: [email or web address]  URL: [email or web address]  Date: 10/21/2022  Prepared by: Dylan Elizalde    Exercises  Supine Bridge - 3 x weekly - 3 sets - 10-15 reps  Clamshell - 3 x weekly - 3 sets - 10-15 reps  Split Stance Shoulder Row with Resistance - 3 x weekly - 3 sets - 10-15 reps  Standing Floor Level Row - 3 x weekly - 3 sets - 10-15 reps  Sit to Stand with Arms Crossed - 3 x weekly - 3 sets - 10-15 reps  Step Up - 3 x weekly - 3 sets - 10-15 reps  Seated Ankle Dorsiflexion with Ankle Weight - 3 x weekly - 3 sets - 10-15 reps        X = TO BE PERFORMED NEXT VISIT  > = PROGRESS TO THIS    S: Received weight-adjustable cuff weight for foot exercise and has been performing with 3 lbs. O:   Time 2025-9887     Visit -12 Repeat outcome measure at mid point and end. Pain Pain 0/10     ROM      Exercise      Gait - Laps or treadmill to fatigue  2-4 min rest break between sets    Gait Laps 140 ft between ex bouts      Bridging  6 sec contraction  3 x 12     Clamshell 6 sec contraction  3 x 12     Sit / stands 3 X 10           Mid row reach and pull Black 2 x 15     Low row reach and pull Black 2 x 15                 Lunges ? Calf raises  ? Step-ups with focus on foot dorsiflexion  2 x 15     Seated DF  3 lbs 3 x 10     Greenlandic deadlift 2-leg ? TE      TE         Blue tubing Given 22     Black tubing Given 10/17/22     A:  Tolerated new tubing well. Exercise is not aggravate his soreness from recent fall. Large, functional, dynamic, global movements used to build strength, balance, endurance, and flexibility and to improve physical performance. P: Continue with rehab plan x1 visit.     Andrea El PT    Treatment Charges: Mins Units   Initial Evaluation     Re-Evaluation     Ther Exercise         TE     Manual Therapy     MT     Ther Activities        TA 40 3   Gait Training          GT     Neuro Re-education NR     Modalities     Non-Billable Service Time     Other     Total Time/Units 40 3

## 2022-11-04 ENCOUNTER — TREATMENT (OUTPATIENT)
Dept: PHYSICAL THERAPY | Age: 72
End: 2022-11-04
Payer: MEDICARE

## 2022-11-04 DIAGNOSIS — Z98.890 HISTORY OF LUMBOSACRAL SPINE SURGERY: ICD-10-CM

## 2022-11-04 DIAGNOSIS — M51.37 DDD (DEGENERATIVE DISC DISEASE), LUMBOSACRAL: Primary | ICD-10-CM

## 2022-11-04 PROCEDURE — 97530 THERAPEUTIC ACTIVITIES: CPT | Performed by: PHYSICAL THERAPIST

## 2022-11-04 NOTE — PROGRESS NOTES
[facility] OUTPATIENT REHABILITATION   PHYSICAL THERAPY DISCHARGE REPORT      Date:  2022   Patient: Ly Dillon  :   MRN: [id number]  Referring Provider: Jessika Vance DO  [address]     Medical Diagnosis:      Diagnosis Orders   1. DDD (degenerative disc disease), lumbosacral        2. History of lumbosacral spine surgery            ATTENDANCE:  Patient has attended 13 of 15 scheduled treatments from 22  to 22. Cancellations: 0. No shows: 0.  TREATMENTS RECEIVED:  AROM, stretching, therapeutic exercise, strengthening, HEP    INITIAL STATUS:  Strength decreased   Limitations with walking, use of right lower extremity, use of left lower extremity, inability to participate in exercise regimen / fitness program    FINAL STATUS:   Strength WFL  Performing daily tasks and chores, however long distance walking remains limited due to fatigue and terrain limitations      GOALS:   Goals to improve strength and function were obtained. Goal to walk 60 minutes continuously not met. PATIENT GOALS:  to walk 3 miles a day 4-5 days per week    REASON FOR DISCHARGE: Rehab plan completed. PATIENT EDUCATION / INSTRUCTIONS: Written HEP of core and leg exercises along with recommendations for community ambulation as much as possible. RECOMMENDATIONS: Patient is to call or stop in with questions/concerns/change in status. Thank you for the opportunity to work with your patient. If you have questions or comments, please contact me [phone]; fax [phone].     Jenni Lerma, PT 2022

## 2022-11-04 NOTE — PROGRESS NOTES
Physical Therapy Treatment Note    Date: 2022  Patient Name: Marilou Ozuna  : 1950   MRN: [id number]  DOInjury: 1 year  DOSx: --  Referring Provider: DO Higinio Cevallos [facility]     Medical Diagnosis:     1. DDD (degenerative disc disease), lumbosacral          2. History of lumbosacral spine surgery         Celina Rain has bilateral LE and trunk weakness and decreased endurance to walking. Treatment will be a progressive walking program and LE and trunk strengthening regimen. Access Code: [email or web address]  URL: [email or web address]  Date: 10/21/2022  Prepared by: Cheryl Madsen    Exercises  Supine Bridge - 3 x weekly - 3 sets - 10-15 reps  Clamshell - 3 x weekly - 3 sets - 10-15 reps  Split Stance Shoulder Row with Resistance - 3 x weekly - 3 sets - 10-15 reps  Standing Floor Level Row - 3 x weekly - 3 sets - 10-15 reps  Sit to Stand with Arms Crossed - 3 x weekly - 3 sets - 10-15 reps  Step Up - 3 x weekly - 3 sets - 10-15 reps  Seated Ankle Dorsiflexion with Ankle Weight - 3 x weekly - 3 sets - 10-15 reps        X = TO BE PERFORMED NEXT VISIT  > = PROGRESS TO THIS    S: No new report. O:   Time 4117-6657     Visit 15/8-12 Repeat outcome measure at mid point and end. Pain Pain 0/10     ROM      Exercise      Gait - Laps or treadmill to fatigue  2-4 min rest break between sets    Gait Laps 140 ft between ex bouts      Bridging  6 sec contraction  3 x 12     Clamshell 6 sec contraction  3 x 12     Sit / stands 3 X 10           Mid row reach and pull Black 2 x 15     Low row reach and pull Black 2 x 15                 Lunges ? Calf raises  ? Step-ups with focus on foot dorsiflexion  2 x 15     Seated DF  3 lbs 3 x 10     Montenegrin deadlift 2-leg ? TE      TE         Blue tubing Given 22     Black tubing Given 10/17/22     A:  Tolerated new tubing well. Exercise is not aggravate his soreness from recent fall.   Large, functional, dynamic, global movements used to build strength, balance, endurance, and flexibility and to improve physical performance.    P: See discharge note   Voncille Pump, PT    Treatment Charges: Mins Units   Initial Evaluation     Re-Evaluation     Ther Exercise         TE     Manual Therapy     MT     Ther Activities        TA 40 3   Gait Training          GT     Neuro Re-education NR     Modalities     Non-Billable Service Time     Other     Total Time/Units 40 3

## 2022-11-28 DIAGNOSIS — R79.9 ABNORMAL FINDING OF BLOOD CHEMISTRY, UNSPECIFIED: ICD-10-CM

## 2022-11-28 LAB
ANISOCYTOSIS: ABNORMAL
BASOPHILS ABSOLUTE: 0.05 E9/L (ref 0–0.2)
BASOPHILS RELATIVE PERCENT: 0.9 % (ref 0–2)
BURR CELLS: ABNORMAL
EOSINOPHILS ABSOLUTE: 0.05 E9/L (ref 0.05–0.5)
EOSINOPHILS RELATIVE PERCENT: 0.9 % (ref 0–6)
HCT VFR BLD CALC: 34.5 % (ref 37–54)
HEMOGLOBIN: 11.2 G/DL (ref 12.5–16.5)
LYMPHOCYTES ABSOLUTE: 1.07 E9/L (ref 1.5–4)
LYMPHOCYTES RELATIVE PERCENT: 21 % (ref 20–42)
MCH RBC QN AUTO: 27.2 PG (ref 26–35)
MCHC RBC AUTO-ENTMCNC: 32.5 % (ref 32–34.5)
MCV RBC AUTO: 83.7 FL (ref 80–99.9)
MONOCYTES ABSOLUTE: 0.46 E9/L (ref 0.1–0.95)
MONOCYTES RELATIVE PERCENT: 8.8 % (ref 2–12)
NEUTROPHILS ABSOLUTE: 3.47 E9/L (ref 1.8–7.3)
NEUTROPHILS RELATIVE PERCENT: 68.4 % (ref 43–80)
OVALOCYTES: ABNORMAL
PDW BLD-RTO: 23.5 FL (ref 11.5–15)
PLATELET # BLD: 167 E9/L (ref 130–450)
PMV BLD AUTO: 9 FL (ref 7–12)
POIKILOCYTES: ABNORMAL
POLYCHROMASIA: ABNORMAL
RBC # BLD: 4.12 E12/L (ref 3.8–5.8)
SCHISTOCYTES: ABNORMAL
WBC # BLD: 5.1 E9/L (ref 4.5–11.5)

## 2023-02-24 ENCOUNTER — TELEPHONE (OUTPATIENT)
Dept: PHYSICAL THERAPY | Age: 73
End: 2023-02-24

## 2023-03-15 ENCOUNTER — HOSPITAL ENCOUNTER (INPATIENT)
Age: 73
LOS: 3 days | Discharge: HOME HEALTH | DRG: 683 | End: 2023-03-19
Attending: EMERGENCY MEDICINE | Admitting: INTERNAL MEDICINE
Payer: MEDICARE

## 2023-03-15 DIAGNOSIS — N17.9 AKI (ACUTE KIDNEY INJURY) (HCC): Primary | ICD-10-CM

## 2023-03-15 DIAGNOSIS — M25.511 CHRONIC RIGHT SHOULDER PAIN: ICD-10-CM

## 2023-03-15 DIAGNOSIS — E78.2 MIXED HYPERLIPIDEMIA: ICD-10-CM

## 2023-03-15 DIAGNOSIS — R19.7 DIARRHEA OF PRESUMED INFECTIOUS ORIGIN: ICD-10-CM

## 2023-03-15 DIAGNOSIS — G89.29 CHRONIC RIGHT SHOULDER PAIN: ICD-10-CM

## 2023-03-15 DIAGNOSIS — E87.1 HYPONATREMIA: ICD-10-CM

## 2023-03-15 DIAGNOSIS — I88.0 MESENTERIC LYMPHADENITIS: ICD-10-CM

## 2023-03-15 PROCEDURE — 99285 EMERGENCY DEPT VISIT HI MDM: CPT

## 2023-03-16 ENCOUNTER — APPOINTMENT (OUTPATIENT)
Dept: CT IMAGING | Age: 73
DRG: 683 | End: 2023-03-16
Payer: MEDICARE

## 2023-03-16 PROBLEM — N17.9 AKI (ACUTE KIDNEY INJURY) (HCC): Status: ACTIVE | Noted: 2023-03-16

## 2023-03-16 LAB
ACANTHOCYTES: ABNORMAL
ACANTHOCYTES: ABNORMAL
ALBUMIN SERPL-MCNC: 3.7 G/DL (ref 3.5–5.2)
ALBUMIN SERPL-MCNC: 4 G/DL (ref 3.5–5.2)
ALP SERPL-CCNC: 75 U/L (ref 40–129)
ALP SERPL-CCNC: 75 U/L (ref 40–129)
ALT SERPL-CCNC: 18 U/L (ref 0–40)
ALT SERPL-CCNC: 20 U/L (ref 0–40)
ANION GAP SERPL CALCULATED.3IONS-SCNC: 14 MMOL/L (ref 7–16)
ANISOCYTOSIS: ABNORMAL
AST SERPL-CCNC: 30 U/L (ref 0–39)
AST SERPL-CCNC: 35 U/L (ref 0–39)
BASOPHILIC STIPPLING: ABNORMAL
BASOPHILS # BLD: 0 E9/L (ref 0–0.2)
BASOPHILS # BLD: 0.02 E9/L (ref 0–0.2)
BASOPHILS NFR BLD: 0.3 % (ref 0–2)
BASOPHILS NFR BLD: 0.4 % (ref 0–2)
BILIRUB SERPL-MCNC: 0.4 MG/DL (ref 0–1.2)
BILIRUB SERPL-MCNC: 0.5 MG/DL (ref 0–1.2)
BUN SERPL-MCNC: 42 MG/DL (ref 6–23)
BUN SERPL-MCNC: 47 MG/DL (ref 6–23)
BUN SERPL-MCNC: 47 MG/DL (ref 6–23)
BURR CELLS: ABNORMAL
BURR CELLS: ABNORMAL
CALCIUM SERPL-MCNC: 8.8 MG/DL (ref 8.6–10.2)
CALCIUM SERPL-MCNC: 9.1 MG/DL (ref 8.6–10.2)
CALCIUM SERPL-MCNC: 9.3 MG/DL (ref 8.6–10.2)
CHLORIDE SERPL-SCNC: 90 MMOL/L (ref 98–107)
CHLORIDE SERPL-SCNC: 92 MMOL/L (ref 98–107)
CHLORIDE SERPL-SCNC: 95 MMOL/L (ref 98–107)
CHLORIDE UR-SCNC: <20 MMOL/L
CHOLESTEROL, TOTAL: 61 MG/DL (ref 0–199)
CO2 SERPL-SCNC: 14 MMOL/L (ref 22–29)
CO2 SERPL-SCNC: 19 MMOL/L (ref 22–29)
CO2 SERPL-SCNC: 20 MMOL/L (ref 22–29)
CREAT SERPL-MCNC: 3.1 MG/DL (ref 0.7–1.2)
CREAT SERPL-MCNC: 3.1 MG/DL (ref 0.7–1.2)
CREAT SERPL-MCNC: 3.3 MG/DL (ref 0.7–1.2)
CREAT UR-MCNC: 215 MG/DL (ref 40–278)
CREAT UR-MCNC: 216 MG/DL (ref 40–278)
EOSINOPHIL # BLD: 0 E9/L (ref 0.05–0.5)
EOSINOPHIL # BLD: 0.04 E9/L (ref 0.05–0.5)
EOSINOPHIL NFR BLD: 0.3 % (ref 0–6)
EOSINOPHIL NFR BLD: 0.5 % (ref 0–6)
ERYTHROCYTE [DISTWIDTH] IN BLOOD BY AUTOMATED COUNT: 13.7 FL (ref 11.5–15)
ERYTHROCYTE [DISTWIDTH] IN BLOOD BY AUTOMATED COUNT: 13.8 FL (ref 11.5–15)
ERYTHROCYTE [SEDIMENTATION RATE] IN BLOOD BY WESTERGREN METHOD: 5 MM/HR (ref 0–15)
ETHANOLAMINE SERPL-MCNC: <10 MG/DL (ref 0–0.08)
GLUCOSE SERPL-MCNC: 119 MG/DL (ref 74–99)
GLUCOSE SERPL-MCNC: 158 MG/DL (ref 74–99)
GLUCOSE SERPL-MCNC: 201 MG/DL (ref 74–99)
HBA1C MFR BLD: 5.5 % (ref 4–5.6)
HCT VFR BLD AUTO: 37.8 % (ref 37–54)
HCT VFR BLD AUTO: 38.2 % (ref 37–54)
HDLC SERPL-MCNC: 28 MG/DL
HGB BLD-MCNC: 13.3 G/DL (ref 12.5–16.5)
HGB BLD-MCNC: 13.3 G/DL (ref 12.5–16.5)
HYPOCHROMIA: ABNORMAL
IMM GRANULOCYTES # BLD: 0.06 E9/L
IMM GRANULOCYTES NFR BLD: 0.8 % (ref 0–5)
LACTATE BLDV-SCNC: 0.8 MMOL/L (ref 0.5–2.2)
LDLC SERPL CALC-MCNC: 24 MG/DL (ref 0–99)
LIPASE: 12 U/L (ref 13–60)
LIPASE: 15 U/L (ref 13–60)
LYMPHOCYTES # BLD: 0.45 E9/L (ref 1.5–4)
LYMPHOCYTES # BLD: 0.84 E9/L (ref 1.5–4)
LYMPHOCYTES NFR BLD: 10.9 % (ref 20–42)
LYMPHOCYTES NFR BLD: 5.1 % (ref 20–42)
MAGNESIUM SERPL-MCNC: 2 MG/DL (ref 1.6–2.6)
MCH RBC QN AUTO: 32 PG (ref 26–35)
MCH RBC QN AUTO: 32.3 PG (ref 26–35)
MCHC RBC AUTO-ENTMCNC: 34.8 % (ref 32–34.5)
MCHC RBC AUTO-ENTMCNC: 35.2 % (ref 32–34.5)
MCV RBC AUTO: 91.7 FL (ref 80–99.9)
MCV RBC AUTO: 91.8 FL (ref 80–99.9)
MONOCYTES # BLD: 0.9 E9/L (ref 0.1–0.95)
MONOCYTES # BLD: 0.98 E9/L (ref 0.1–0.95)
MONOCYTES NFR BLD: 11 % (ref 2–12)
MONOCYTES NFR BLD: 11.7 % (ref 2–12)
NEUTROPHILS # BLD: 5.84 E9/L (ref 1.8–7.3)
NEUTROPHILS # BLD: 7.48 E9/L (ref 1.8–7.3)
NEUTS SEG NFR BLD: 75.8 % (ref 43–80)
NEUTS SEG NFR BLD: 83.9 % (ref 43–80)
OSMOLALITY URINE: 484 MOSM/KG (ref 300–900)
OVALOCYTES: ABNORMAL
OVALOCYTES: ABNORMAL
PHOSPHATE SERPL-MCNC: 4.1 MG/DL (ref 2.5–4.5)
PLATELET # BLD AUTO: 165 E9/L (ref 130–450)
PLATELET # BLD AUTO: 179 E9/L (ref 130–450)
PMV BLD AUTO: 8.6 FL (ref 7–12)
PMV BLD AUTO: 8.7 FL (ref 7–12)
POIKILOCYTES: ABNORMAL
POIKILOCYTES: ABNORMAL
POLYCHROMASIA: ABNORMAL
POTASSIUM SERPL-SCNC: 3.7 MMOL/L (ref 3.5–5)
POTASSIUM SERPL-SCNC: 3.9 MMOL/L (ref 3.5–5)
POTASSIUM SERPL-SCNC: 4.1 MMOL/L (ref 3.5–5)
PROT SERPL-MCNC: 6.5 G/DL (ref 6.4–8.3)
PROT SERPL-MCNC: 6.8 G/DL (ref 6.4–8.3)
PROT UR-MCNC: 105 MG/DL (ref 0–12)
PROTEIN/CREAT RATIO: 0.5
PROTEIN/CREAT RATIO: 0.5 (ref 0–0.2)
RBC # BLD AUTO: 4.12 E12/L (ref 3.8–5.8)
RBC # BLD AUTO: 4.16 E12/L (ref 3.8–5.8)
SODIUM SERPL-SCNC: 123 MMOL/L (ref 132–146)
SODIUM SERPL-SCNC: 124 MMOL/L (ref 132–146)
SODIUM SERPL-SCNC: 125 MMOL/L (ref 132–146)
SODIUM UR-SCNC: <20 MMOL/L
TRIGL SERPL-MCNC: 44 MG/DL (ref 0–149)
TSH SERPL-MCNC: 3.15 UIU/ML (ref 0.27–4.2)
URATE SERPL-MCNC: 11.3 MG/DL (ref 3.4–7)
VLDLC SERPL CALC-MCNC: 9 MG/DL
WBC # BLD: 7.7 E9/L (ref 4.5–11.5)
WBC # BLD: 8.9 E9/L (ref 4.5–11.5)

## 2023-03-16 PROCEDURE — 84300 ASSAY OF URINE SODIUM: CPT

## 2023-03-16 PROCEDURE — 83605 ASSAY OF LACTIC ACID: CPT

## 2023-03-16 PROCEDURE — 83735 ASSAY OF MAGNESIUM: CPT

## 2023-03-16 PROCEDURE — 84156 ASSAY OF PROTEIN URINE: CPT

## 2023-03-16 PROCEDURE — 87328 CRYPTOSPORIDIUM AG IA: CPT

## 2023-03-16 PROCEDURE — 97535 SELF CARE MNGMENT TRAINING: CPT

## 2023-03-16 PROCEDURE — 82436 ASSAY OF URINE CHLORIDE: CPT

## 2023-03-16 PROCEDURE — 83036 HEMOGLOBIN GLYCOSYLATED A1C: CPT

## 2023-03-16 PROCEDURE — 83690 ASSAY OF LIPASE: CPT

## 2023-03-16 PROCEDURE — 84100 ASSAY OF PHOSPHORUS: CPT

## 2023-03-16 PROCEDURE — 87425 ROTAVIRUS AG IA: CPT

## 2023-03-16 PROCEDURE — 2500000003 HC RX 250 WO HCPCS: Performed by: INTERNAL MEDICINE

## 2023-03-16 PROCEDURE — 82077 ASSAY SPEC XCP UR&BREATH IA: CPT

## 2023-03-16 PROCEDURE — 93005 ELECTROCARDIOGRAM TRACING: CPT | Performed by: EMERGENCY MEDICINE

## 2023-03-16 PROCEDURE — 6370000000 HC RX 637 (ALT 250 FOR IP): Performed by: INTERNAL MEDICINE

## 2023-03-16 PROCEDURE — 89055 LEUKOCYTE ASSESSMENT FECAL: CPT

## 2023-03-16 PROCEDURE — 87449 NOS EACH ORGANISM AG IA: CPT

## 2023-03-16 PROCEDURE — 97165 OT EVAL LOW COMPLEX 30 MIN: CPT

## 2023-03-16 PROCEDURE — 74176 CT ABD & PELVIS W/O CONTRAST: CPT

## 2023-03-16 PROCEDURE — 80048 BASIC METABOLIC PNL TOTAL CA: CPT

## 2023-03-16 PROCEDURE — 2580000003 HC RX 258: Performed by: EMERGENCY MEDICINE

## 2023-03-16 PROCEDURE — 85025 COMPLETE CBC W/AUTO DIFF WBC: CPT

## 2023-03-16 PROCEDURE — 97161 PT EVAL LOW COMPLEX 20 MIN: CPT

## 2023-03-16 PROCEDURE — 84443 ASSAY THYROID STIM HORMONE: CPT

## 2023-03-16 PROCEDURE — 87324 CLOSTRIDIUM AG IA: CPT

## 2023-03-16 PROCEDURE — 83935 ASSAY OF URINE OSMOLALITY: CPT

## 2023-03-16 PROCEDURE — 84550 ASSAY OF BLOOD/URIC ACID: CPT

## 2023-03-16 PROCEDURE — 2060000000 HC ICU INTERMEDIATE R&B

## 2023-03-16 PROCEDURE — 85651 RBC SED RATE NONAUTOMATED: CPT

## 2023-03-16 PROCEDURE — 82570 ASSAY OF URINE CREATININE: CPT

## 2023-03-16 PROCEDURE — 36415 COLL VENOUS BLD VENIPUNCTURE: CPT

## 2023-03-16 PROCEDURE — 80053 COMPREHEN METABOLIC PANEL: CPT

## 2023-03-16 PROCEDURE — 80061 LIPID PANEL: CPT

## 2023-03-16 PROCEDURE — 2580000003 HC RX 258: Performed by: INTERNAL MEDICINE

## 2023-03-16 PROCEDURE — 87329 GIARDIA AG IA: CPT

## 2023-03-16 RX ORDER — ONDANSETRON 2 MG/ML
4 INJECTION INTRAMUSCULAR; INTRAVENOUS EVERY 6 HOURS PRN
Status: DISCONTINUED | OUTPATIENT
Start: 2023-03-16 | End: 2023-03-17

## 2023-03-16 RX ORDER — PANTOPRAZOLE SODIUM 40 MG/1
40 TABLET, DELAYED RELEASE ORAL DAILY
Status: DISCONTINUED | OUTPATIENT
Start: 2023-03-16 | End: 2023-03-19 | Stop reason: HOSPADM

## 2023-03-16 RX ORDER — FAMOTIDINE 20 MG/1
20 TABLET, FILM COATED ORAL EVERY EVENING
Status: DISCONTINUED | OUTPATIENT
Start: 2023-03-16 | End: 2023-03-19 | Stop reason: HOSPADM

## 2023-03-16 RX ORDER — BISMUTH SUBSALICYLATE 262 MG/1
524 TABLET, CHEWABLE ORAL
Status: DISCONTINUED | OUTPATIENT
Start: 2023-03-16 | End: 2023-03-19 | Stop reason: HOSPADM

## 2023-03-16 RX ORDER — METRONIDAZOLE 500 MG/100ML
500 INJECTION, SOLUTION INTRAVENOUS EVERY 8 HOURS
Status: DISCONTINUED | OUTPATIENT
Start: 2023-03-16 | End: 2023-03-19 | Stop reason: HOSPADM

## 2023-03-16 RX ORDER — 0.9 % SODIUM CHLORIDE 0.9 %
1000 INTRAVENOUS SOLUTION INTRAVENOUS ONCE
Status: COMPLETED | OUTPATIENT
Start: 2023-03-16 | End: 2023-03-16

## 2023-03-16 RX ORDER — SODIUM CHLORIDE 9 MG/ML
INJECTION, SOLUTION INTRAVENOUS CONTINUOUS
Status: DISCONTINUED | OUTPATIENT
Start: 2023-03-16 | End: 2023-03-18

## 2023-03-16 RX ORDER — FAMOTIDINE 20 MG/1
40 TABLET, FILM COATED ORAL EVERY EVENING
Status: DISCONTINUED | OUTPATIENT
Start: 2023-03-16 | End: 2023-03-16 | Stop reason: DRUGHIGH

## 2023-03-16 RX ADMIN — PANTOPRAZOLE SODIUM 40 MG: 40 TABLET, DELAYED RELEASE ORAL at 12:09

## 2023-03-16 RX ADMIN — FAMOTIDINE 20 MG: 20 TABLET, FILM COATED ORAL at 18:28

## 2023-03-16 RX ADMIN — SODIUM CHLORIDE: 9 INJECTION, SOLUTION INTRAVENOUS at 10:14

## 2023-03-16 RX ADMIN — BISMUTH SUBSALICYLATE 524 MG: 262 TABLET, CHEWABLE ORAL at 18:28

## 2023-03-16 RX ADMIN — SODIUM CHLORIDE 1000 ML: 9 INJECTION, SOLUTION INTRAVENOUS at 00:37

## 2023-03-16 RX ADMIN — METRONIDAZOLE 500 MG: 500 INJECTION, SOLUTION INTRAVENOUS at 12:11

## 2023-03-16 RX ADMIN — METRONIDAZOLE 500 MG: 500 INJECTION, SOLUTION INTRAVENOUS at 19:56

## 2023-03-16 ASSESSMENT — PAIN SCALES - GENERAL: PAINLEVEL_OUTOF10: 0

## 2023-03-16 ASSESSMENT — LIFESTYLE VARIABLES: HOW OFTEN DO YOU HAVE A DRINK CONTAINING ALCOHOL: NEVER

## 2023-03-16 NOTE — ED NOTES
This nurse ambulated PT to restroom, reminded him of the urine and BM samples that still need collected. PT states he, \"tried but nothing came out. \" This nurse will continue to remind PT that samples are needed.       Paula Guadalupe, ADAM  03/16/23 7808

## 2023-03-16 NOTE — ED NOTES
N2N called to [facility] on 5th floor for room 538-1. Receiving nurse had no questions following N2N report.       Hilda Good RN  03/16/23 0008

## 2023-03-16 NOTE — H&P
Dictate [id number]
on Lipitor and Zetia. 6.  Diffuse arthritis, on Celebrex. 7.  Erectile dysfunction, on Viagra. PLAN AND RECOMMENDATIONS:  At this time, currently, the patient appeared  to be stable. The exact etiology of the diarrhea at this time is  uncertain. This very well could be secretory versus infectious. The  patient will be kept on a clear liquid diet. Culture will be obtained. The patient was maintained on Pepto-Bismol and Flagyl. GI consult will  be obtained with Dr. Carlos Alberto Davis and Dr. Socorro Khan. The patient will be treated  with IV hydration therapy. Renal consult has been obtained. We will  continue to observe his kidney function closely, correct the electrolyte  imbalance and make further recommendation as clinically indicated.         Flako Conception, DO    D: 03/16/2023 13:21:16       T: 03/16/2023 13:26:23     [email or web address]  Job#: [id number]     Doc#: [id number]    CC:

## 2023-03-16 NOTE — CONSULTS
[facility] MD Sharon Paulino MD Jerrald Norman, MD Raylene Leap, CNP Crystal Lambling, PABLO Crawford CNP  Consultation  Patient's Name: Mikhail Cm  4:30 PM  3/16/2023    Nephrologist: Sharon Caceres MD    Reason for Consult:  Acute kidney injury  Requesting Physician:  Ana Badillo DO    Chief Complaint:  Diarrhea, fatigue, weakness     History Obtained From: Patient, chart    History of Present Ilness:         Mr. Grover Cherry is a pleasant 75year old gentleman who presented to the emergency room due to ongoing diarrhea and fatigue for six days. He reports having anywhere from 10-15 loose bowel movements daily during this six day period. His oral intake has been poor and he has gotten progressively fatigued over the past few days. He has tried to keep up with his fluid intake during this time. He did see his primary care provider yesterday and was started on flagyl and medication to control the diarrhea, but did not get relief. His family felt that he was more confused and fatigued yesterday which prompted them to come to the emergency room for further evaluation. His wife tells me that he had C. Difficile a few years ago. He was found to have hyponatremia and acute kidney injury upon arrival to the emergency room and was given a normal saline bolus. CT of the abdomen in the emergency room revealed multiple sub centimeter lymph nodes within the small bowel suggesting mesenteric adenitis. He did have a Whipple procedure 8 years ago and feels that he has declined steadily since then. We were consulted for acute kidney injury. Creatinine upon arrival to the emergency room was 3.1 mg/dL. He was given a normal saline bolus and creatinine increased slightly to 3.3 mg.dL. His sodium level was 124 upon admissopm and has increased to 125 mmol/L. He denies a prior history of chronic kidney disease.  He did have acute kidney injury that completely resolved

## 2023-03-16 NOTE — ED PROVIDER NOTES
Procedure Laterality Date    COLON SURGERY      COLONOSCOPY      INCISIONAL HERNIA REPAIR N/A 10/13/2016    INGUINAL HERNIA REPAIR Left 02/19/2013    laparoscopic left inguinal hernia repair    NERVE BLOCK Left 06/03/2021    LEFT S1 TRANSFORAMINAL EPIDURAL STEROID INJECTION(REQUESTS LAST APPOINMENT) performed by Saadia Escalante DO at [facility] Left 07/08/2021    LEFT L5-S1 TRANSFORAMINAL EPIDURAL STEROID INJECTION(WANTS LATER) performed by Saadia Escalante DO at [facility]  2015    Whipple procedure    NM LAPS COLECTOMY ABDL W/PROCTECTOMY W/ILEOSTOMY N/A 08/20/2018    DIAGNOSTIC LAPAROSCOPY POSS LAPAROTOMY POSS BOWEL RESECTION performed by Leona Peacock MD at [address] [place] [address] N/A 09/06/2018    LAPAROSCOPIC REVISION OF GASTROJEJUNOSTOMY; INTRAOPERATIVE EGD performed by Leona Peacock MD at [place] [address] Right 04/26/2019    RIGHT REVERSE TOTAL SHOULDER ARTHROPLASTY performed by Henri Parsons MD at [facility]      right cyst removed [facility] ENDOSCOPY      UPPER GASTROINTESTINAL ENDOSCOPY N/A 09/05/2018    EGD BIOPSY performed by Leona Peacock MD at [facility]       Previous Medications    ACETAMINOPHEN (TYLENOL) 500 MG TABLET    Take 500 mg by mouth in the morning and at bedtime    ATORVASTATIN (LIPITOR) 40 MG TABLET    TAKE 1 TABLET BY MOUTH EVERY DAY    CELECOXIB (CELEBREX) 200 MG CAPSULE    Take 1 capsule by mouth daily    EZETIMIBE (ZETIA) 10 MG TABLET    TAKE 1 TABLET BY MOUTH DAILY    FAMOTIDINE (PEPCID) 40 MG TABLET    Take 1 tablet by mouth every evening    FLAXSEED, LINSEED, (FLAX SEEDS PO)    Take 1 capsule by mouth daily Ld 4/24/2019    LOPERAMIDE (RA ANTI-DIARRHEAL) 2 MG CAPSULE    Take 1 capsule by mouth 4 times daily as needed for Diarrhea    METRONIDAZOLE (FLAGYL) 500 MG TABLET

## 2023-03-17 PROBLEM — E44.0 MODERATE PROTEIN-CALORIE MALNUTRITION (HCC): Status: ACTIVE | Noted: 2023-03-17

## 2023-03-17 LAB
ALBUMIN SERPL-MCNC: 3.8 G/DL (ref 3.5–5.2)
ALP SERPL-CCNC: 73 U/L (ref 40–129)
ALT SERPL-CCNC: 16 U/L (ref 0–40)
ANION GAP SERPL CALCULATED.3IONS-SCNC: 12 MMOL/L (ref 7–16)
AST SERPL-CCNC: 24 U/L (ref 0–39)
BASOPHILS # BLD: 0.02 E9/L (ref 0–0.2)
BASOPHILS NFR BLD: 0.3 % (ref 0–2)
BILIRUB SERPL-MCNC: 0.3 MG/DL (ref 0–1.2)
BUN SERPL-MCNC: 40 MG/DL (ref 6–23)
CALCIUM SERPL-MCNC: 8.8 MG/DL (ref 8.6–10.2)
CHLORIDE SERPL-SCNC: 100 MMOL/L (ref 98–107)
CO2 SERPL-SCNC: 16 MMOL/L (ref 22–29)
CREAT SERPL-MCNC: 2.2 MG/DL (ref 0.7–1.2)
CRYPTOSP AG STL QL IA: NORMAL
EKG ATRIAL RATE: 85 BPM
EKG P AXIS: 74 DEGREES
EKG P-R INTERVAL: 136 MS
EKG Q-T INTERVAL: 392 MS
EKG QRS DURATION: 82 MS
EKG QTC CALCULATION (BAZETT): 466 MS
EKG R AXIS: -48 DEGREES
EKG T AXIS: 38 DEGREES
EKG VENTRICULAR RATE: 85 BPM
EOSINOPHIL # BLD: 0.05 E9/L (ref 0.05–0.5)
EOSINOPHIL NFR BLD: 0.7 % (ref 0–6)
ERYTHROCYTE [DISTWIDTH] IN BLOOD BY AUTOMATED COUNT: 13.8 FL (ref 11.5–15)
G LAMBLIA AG STL QL IA: NORMAL
GLUCOSE SERPL-MCNC: 112 MG/DL (ref 74–99)
HCT VFR BLD AUTO: 37.9 % (ref 37–54)
HGB BLD-MCNC: 13.2 G/DL (ref 12.5–16.5)
IMM GRANULOCYTES # BLD: 0.08 E9/L
IMM GRANULOCYTES NFR BLD: 1.1 % (ref 0–5)
LYMPHOCYTES # BLD: 1.07 E9/L (ref 1.5–4)
LYMPHOCYTES NFR BLD: 14.4 % (ref 20–42)
MAGNESIUM SERPL-MCNC: 1.9 MG/DL (ref 1.6–2.6)
MCH RBC QN AUTO: 31.9 PG (ref 26–35)
MCHC RBC AUTO-ENTMCNC: 34.8 % (ref 32–34.5)
MCV RBC AUTO: 91.5 FL (ref 80–99.9)
MONOCYTES # BLD: 0.78 E9/L (ref 0.1–0.95)
MONOCYTES NFR BLD: 10.5 % (ref 2–12)
NEUTROPHILS # BLD: 5.42 E9/L (ref 1.8–7.3)
NEUTS SEG NFR BLD: 73 % (ref 43–80)
PHOSPHATE SERPL-MCNC: 3.1 MG/DL (ref 2.5–4.5)
PLATELET # BLD AUTO: 194 E9/L (ref 130–450)
PMV BLD AUTO: 8.3 FL (ref 7–12)
POTASSIUM SERPL-SCNC: 3.7 MMOL/L (ref 3.5–5)
PROT SERPL-MCNC: 6.5 G/DL (ref 6.4–8.3)
RBC # BLD AUTO: 4.14 E12/L (ref 3.8–5.8)
ROTAVIRUS ANTIGEN: NORMAL
SODIUM SERPL-SCNC: 128 MMOL/L (ref 132–146)
WBC # BLD: 7.4 E9/L (ref 4.5–11.5)
WBC #/AREA STL HPF: NORMAL /[HPF]

## 2023-03-17 PROCEDURE — 36415 COLL VENOUS BLD VENIPUNCTURE: CPT

## 2023-03-17 PROCEDURE — 84100 ASSAY OF PHOSPHORUS: CPT

## 2023-03-17 PROCEDURE — 6360000002 HC RX W HCPCS: Performed by: NURSE PRACTITIONER

## 2023-03-17 PROCEDURE — 85025 COMPLETE CBC W/AUTO DIFF WBC: CPT

## 2023-03-17 PROCEDURE — 2500000003 HC RX 250 WO HCPCS: Performed by: INTERNAL MEDICINE

## 2023-03-17 PROCEDURE — 6370000000 HC RX 637 (ALT 250 FOR IP): Performed by: INTERNAL MEDICINE

## 2023-03-17 PROCEDURE — 80053 COMPREHEN METABOLIC PANEL: CPT

## 2023-03-17 PROCEDURE — 6370000000 HC RX 637 (ALT 250 FOR IP): Performed by: NURSE PRACTITIONER

## 2023-03-17 PROCEDURE — 83735 ASSAY OF MAGNESIUM: CPT

## 2023-03-17 PROCEDURE — 97530 THERAPEUTIC ACTIVITIES: CPT | Performed by: PHYSICAL THERAPIST

## 2023-03-17 PROCEDURE — 2580000003 HC RX 258: Performed by: NURSE PRACTITIONER

## 2023-03-17 PROCEDURE — 2060000000 HC ICU INTERMEDIATE R&B

## 2023-03-17 PROCEDURE — 93010 ELECTROCARDIOGRAM REPORT: CPT | Performed by: INTERNAL MEDICINE

## 2023-03-17 RX ORDER — SODIUM BICARBONATE 650 MG/1
650 TABLET ORAL 4 TIMES DAILY
Status: DISCONTINUED | OUTPATIENT
Start: 2023-03-17 | End: 2023-03-19 | Stop reason: HOSPADM

## 2023-03-17 RX ORDER — ONDANSETRON 4 MG/1
4 TABLET, ORALLY DISINTEGRATING ORAL EVERY 8 HOURS PRN
Status: DISCONTINUED | OUTPATIENT
Start: 2023-03-17 | End: 2023-03-19 | Stop reason: HOSPADM

## 2023-03-17 RX ORDER — CELECOXIB 100 MG/1
200 CAPSULE ORAL DAILY
Status: DISCONTINUED | OUTPATIENT
Start: 2023-03-17 | End: 2023-03-19 | Stop reason: HOSPADM

## 2023-03-17 RX ORDER — EZETIMIBE 10 MG/1
10 TABLET ORAL DAILY
Status: DISCONTINUED | OUTPATIENT
Start: 2023-03-17 | End: 2023-03-19 | Stop reason: HOSPADM

## 2023-03-17 RX ORDER — SODIUM CHLORIDE 9 MG/ML
INJECTION, SOLUTION INTRAVENOUS PRN
Status: DISCONTINUED | OUTPATIENT
Start: 2023-03-17 | End: 2023-03-19 | Stop reason: HOSPADM

## 2023-03-17 RX ORDER — SODIUM CHLORIDE 0.9 % (FLUSH) 0.9 %
10 SYRINGE (ML) INJECTION PRN
Status: DISCONTINUED | OUTPATIENT
Start: 2023-03-17 | End: 2023-03-19 | Stop reason: HOSPADM

## 2023-03-17 RX ORDER — ACETAMINOPHEN 325 MG/1
650 TABLET ORAL EVERY 6 HOURS PRN
Status: DISCONTINUED | OUTPATIENT
Start: 2023-03-17 | End: 2023-03-19 | Stop reason: HOSPADM

## 2023-03-17 RX ORDER — ATORVASTATIN CALCIUM 40 MG/1
40 TABLET, FILM COATED ORAL DAILY
Status: DISCONTINUED | OUTPATIENT
Start: 2023-03-17 | End: 2023-03-19 | Stop reason: HOSPADM

## 2023-03-17 RX ORDER — ENOXAPARIN SODIUM 100 MG/ML
30 INJECTION SUBCUTANEOUS DAILY
Status: DISCONTINUED | OUTPATIENT
Start: 2023-03-17 | End: 2023-03-19 | Stop reason: HOSPADM

## 2023-03-17 RX ORDER — SODIUM CHLORIDE 0.9 % (FLUSH) 0.9 %
10 SYRINGE (ML) INJECTION EVERY 12 HOURS SCHEDULED
Status: DISCONTINUED | OUTPATIENT
Start: 2023-03-17 | End: 2023-03-19 | Stop reason: HOSPADM

## 2023-03-17 RX ORDER — ACETAMINOPHEN 650 MG/1
650 SUPPOSITORY RECTAL EVERY 6 HOURS PRN
Status: DISCONTINUED | OUTPATIENT
Start: 2023-03-17 | End: 2023-03-19 | Stop reason: HOSPADM

## 2023-03-17 RX ORDER — ONDANSETRON 2 MG/ML
4 INJECTION INTRAMUSCULAR; INTRAVENOUS EVERY 6 HOURS PRN
Status: DISCONTINUED | OUTPATIENT
Start: 2023-03-17 | End: 2023-03-19 | Stop reason: HOSPADM

## 2023-03-17 RX ADMIN — BISMUTH SUBSALICYLATE 524 MG: 262 TABLET, CHEWABLE ORAL at 11:33

## 2023-03-17 RX ADMIN — METRONIDAZOLE 500 MG: 500 INJECTION, SOLUTION INTRAVENOUS at 03:22

## 2023-03-17 RX ADMIN — METRONIDAZOLE 500 MG: 500 INJECTION, SOLUTION INTRAVENOUS at 18:07

## 2023-03-17 RX ADMIN — ENOXAPARIN SODIUM 30 MG: 100 INJECTION SUBCUTANEOUS at 11:33

## 2023-03-17 RX ADMIN — Medication 10 ML: at 11:33

## 2023-03-17 RX ADMIN — SODIUM BICARBONATE 650 MG: 650 TABLET ORAL at 13:50

## 2023-03-17 RX ADMIN — SODIUM BICARBONATE 650 MG: 650 TABLET ORAL at 22:03

## 2023-03-17 RX ADMIN — SODIUM BICARBONATE 650 MG: 650 TABLET ORAL at 18:04

## 2023-03-17 RX ADMIN — FAMOTIDINE 20 MG: 20 TABLET, FILM COATED ORAL at 18:04

## 2023-03-17 RX ADMIN — ATORVASTATIN CALCIUM 40 MG: 40 TABLET, FILM COATED ORAL at 11:33

## 2023-03-17 RX ADMIN — METRONIDAZOLE 500 MG: 500 INJECTION, SOLUTION INTRAVENOUS at 11:35

## 2023-03-17 RX ADMIN — PANTOPRAZOLE SODIUM 40 MG: 40 TABLET, DELAYED RELEASE ORAL at 08:34

## 2023-03-17 RX ADMIN — BISMUTH SUBSALICYLATE 524 MG: 262 TABLET, CHEWABLE ORAL at 22:02

## 2023-03-17 NOTE — PLAN OF CARE
Problem: Discharge Planning  Goal: Discharge to home or other facility with appropriate resources  3/16/2023 2318 by Zach Lindsay  Outcome: Progressing  3/16/2023 1349 by Tony Bailey RN  Outcome: Progressing     Problem: Pain  Goal: Verbalizes/displays adequate comfort level or baseline comfort level  3/16/2023 2318 by Zach Lindsay  Outcome: Progressing  3/16/2023 1349 by Tony Bailey RN  Outcome: Progressing     Problem: ABCDS Injury Assessment  Goal: Absence of physical injury  3/16/2023 2318 by Zach Lindsay  Outcome: Progressing  3/16/2023 1349 by Tony Bailey RN  Outcome: Progressing     Problem: Safety - Adult  Goal: Free from fall injury  Outcome: Progressing

## 2023-03-17 NOTE — CARE COORDINATION
3-17-Cm note: met with pt for transition of care, pt lives with his wife, he is independent, no hx of LOU, hx of MVI  home care, pt plans on going home with no needs, his wife will transport home . Electronically signed by Joelle Curran RN on 3/17/2023 at 3:51 PM

## 2023-03-17 NOTE — ACP (ADVANCE CARE PLANNING)
Advance Care Planning   Healthcare Decision Maker:    Primary Decision Maker: Remigio Mcguire - [phone]    Secondary Decision Maker: WHITNEYCAROL - Child - [phone]      Today we documented Decision Maker(s) consistent with Legal Next of Kin hierarchy.

## 2023-03-17 NOTE — CARE COORDINATION
Case Management Assessment  Initial Evaluation    Date/Time of Evaluation: 3/17/2023 3:47 PM  Assessment Completed by: Iain Sweeney RN    If patient is discharged prior to next notation, then this note serves as note for discharge by case management. Patient Name: Sejal Villavicencio                   YOB: 1950  Diagnosis: Diarrhea of presumed infectious origin [R19.7]  Hyponatremia [E87.1]  Mesenteric lymphadenitis [I88.0]  ALFRED (acute kidney injury) ([facility] [address] [N17.9]                   Date / Time: 3/15/2023 11:52 PM    Patient Admission Status: Inpatient   Readmission Risk (Low < 19, Mod (19-27), High > 27): Readmission Risk Score: 10.5    Current PCP: Javier Carlson, DO  PCP verified by CM? Yes    Chart Reviewed: Yes      History Provided by: Patient  Patient Orientation: Alert and Oriented    Patient Cognition: Alert    Hospitalization in the last 30 days (Readmission):  No    If yes, Readmission Assessment in CM Navigator will be completed. Advance Directives:      Code Status: Full Code   Patient's Primary Decision Maker is: Legal Next of Kin    Primary Decision MakerVale Mcgarry Spouse - [phone]    Secondary Decision Maker: [id number] - Child - [phone]    Discharge Planning:    Patient lives with:   Type of Home:    Primary Care Giver: Self  Patient Support Systems include: Spouse/Significant Other, Children     ADLS  Prior functional level: Independent in ADLs/IADLs  Current functional level: Independent in ADLs/IADLs    PT AM-PAC: 20 /24  OT AM-PAC: 16 /24    Family can provide assistance at DC: Yes  Would you like Case Management to discuss the discharge plan with any other family members/significant others, and if so, who?  No  Plans to Return to Present Housing: Yes  Other Identified Issues/Barriers to RETURNING to current housing: none   Potential Assistance needed at discharge:              Potential DME:  none   Patient expects to discharge to:  home   Plan for

## 2023-03-18 LAB
ALBUMIN SERPL-MCNC: 3.1 G/DL (ref 3.5–5.2)
ALP SERPL-CCNC: 58 U/L (ref 40–129)
ALT SERPL-CCNC: 15 U/L (ref 0–40)
ANION GAP SERPL CALCULATED.3IONS-SCNC: 10 MMOL/L (ref 7–16)
AST SERPL-CCNC: 28 U/L (ref 0–39)
BASOPHILS # BLD: 0.02 E9/L (ref 0–0.2)
BASOPHILS NFR BLD: 0.3 % (ref 0–2)
BILIRUB SERPL-MCNC: 0.3 MG/DL (ref 0–1.2)
BUN SERPL-MCNC: 23 MG/DL (ref 6–23)
C DIFF TOXIN/ANTIGEN: NORMAL
CALCIUM SERPL-MCNC: 8 MG/DL (ref 8.6–10.2)
CHLORIDE SERPL-SCNC: 106 MMOL/L (ref 98–107)
CO2 SERPL-SCNC: 15 MMOL/L (ref 22–29)
CREAT SERPL-MCNC: 1.3 MG/DL (ref 0.7–1.2)
EOSINOPHIL # BLD: 0.08 E9/L (ref 0.05–0.5)
EOSINOPHIL NFR BLD: 1.2 % (ref 0–6)
ERYTHROCYTE [DISTWIDTH] IN BLOOD BY AUTOMATED COUNT: 14.1 FL (ref 11.5–15)
GLUCOSE SERPL-MCNC: 94 MG/DL (ref 74–99)
HCT VFR BLD AUTO: 32.1 % (ref 37–54)
HGB BLD-MCNC: 11.2 G/DL (ref 12.5–16.5)
IMM GRANULOCYTES # BLD: 0.1 E9/L
IMM GRANULOCYTES NFR BLD: 1.5 % (ref 0–5)
LYMPHOCYTES # BLD: 1.36 E9/L (ref 1.5–4)
LYMPHOCYTES NFR BLD: 20.6 % (ref 20–42)
MAGNESIUM SERPL-MCNC: 1.7 MG/DL (ref 1.6–2.6)
MCH RBC QN AUTO: 31.8 PG (ref 26–35)
MCHC RBC AUTO-ENTMCNC: 34.9 % (ref 32–34.5)
MCV RBC AUTO: 91.2 FL (ref 80–99.9)
MONOCYTES # BLD: 0.8 E9/L (ref 0.1–0.95)
MONOCYTES NFR BLD: 12.1 % (ref 2–12)
NEUTROPHILS # BLD: 4.23 E9/L (ref 1.8–7.3)
NEUTS SEG NFR BLD: 64.3 % (ref 43–80)
PHOSPHATE SERPL-MCNC: 2.2 MG/DL (ref 2.5–4.5)
PLATELET # BLD AUTO: 186 E9/L (ref 130–450)
PMV BLD AUTO: 8.4 FL (ref 7–12)
POTASSIUM SERPL-SCNC: 3.3 MMOL/L (ref 3.5–5)
PROT SERPL-MCNC: 5.4 G/DL (ref 6.4–8.3)
RBC # BLD AUTO: 3.52 E12/L (ref 3.8–5.8)
SODIUM SERPL-SCNC: 131 MMOL/L (ref 132–146)
WBC # BLD: 6.6 E9/L (ref 4.5–11.5)

## 2023-03-18 PROCEDURE — 84100 ASSAY OF PHOSPHORUS: CPT

## 2023-03-18 PROCEDURE — 80053 COMPREHEN METABOLIC PANEL: CPT

## 2023-03-18 PROCEDURE — 83735 ASSAY OF MAGNESIUM: CPT

## 2023-03-18 PROCEDURE — 6360000002 HC RX W HCPCS: Performed by: INTERNAL MEDICINE

## 2023-03-18 PROCEDURE — 6360000002 HC RX W HCPCS: Performed by: NURSE PRACTITIONER

## 2023-03-18 PROCEDURE — 85025 COMPLETE CBC W/AUTO DIFF WBC: CPT

## 2023-03-18 PROCEDURE — 6370000000 HC RX 637 (ALT 250 FOR IP): Performed by: NURSE PRACTITIONER

## 2023-03-18 PROCEDURE — 6370000000 HC RX 637 (ALT 250 FOR IP): Performed by: INTERNAL MEDICINE

## 2023-03-18 PROCEDURE — 2060000000 HC ICU INTERMEDIATE R&B

## 2023-03-18 PROCEDURE — 36415 COLL VENOUS BLD VENIPUNCTURE: CPT

## 2023-03-18 PROCEDURE — 2500000003 HC RX 250 WO HCPCS: Performed by: INTERNAL MEDICINE

## 2023-03-18 RX ORDER — SODIUM CHLORIDE AND POTASSIUM CHLORIDE 300; 900 MG/100ML; MG/100ML
INJECTION, SOLUTION INTRAVENOUS CONTINUOUS
Status: DISCONTINUED | OUTPATIENT
Start: 2023-03-18 | End: 2023-03-18

## 2023-03-18 RX ORDER — POTASSIUM CHLORIDE 7.45 MG/ML
10 INJECTION INTRAVENOUS PRN
Status: DISCONTINUED | OUTPATIENT
Start: 2023-03-18 | End: 2023-03-19 | Stop reason: HOSPADM

## 2023-03-18 RX ORDER — MAGNESIUM SULFATE IN WATER 40 MG/ML
2000 INJECTION, SOLUTION INTRAVENOUS PRN
Status: DISCONTINUED | OUTPATIENT
Start: 2023-03-18 | End: 2023-03-19 | Stop reason: HOSPADM

## 2023-03-18 RX ORDER — SODIUM CHLORIDE AND POTASSIUM CHLORIDE 150; 450 MG/100ML; MG/100ML
INJECTION, SOLUTION INTRAVENOUS CONTINUOUS
Status: DISCONTINUED | OUTPATIENT
Start: 2023-03-18 | End: 2023-03-19 | Stop reason: HOSPADM

## 2023-03-18 RX ORDER — POTASSIUM CHLORIDE 20 MEQ/1
40 TABLET, EXTENDED RELEASE ORAL PRN
Status: DISCONTINUED | OUTPATIENT
Start: 2023-03-18 | End: 2023-03-19 | Stop reason: HOSPADM

## 2023-03-18 RX ADMIN — POTASSIUM CHLORIDE AND SODIUM CHLORIDE: 900; 300 INJECTION, SOLUTION INTRAVENOUS at 10:35

## 2023-03-18 RX ADMIN — SODIUM BICARBONATE 650 MG: 650 TABLET ORAL at 17:31

## 2023-03-18 RX ADMIN — ATORVASTATIN CALCIUM 40 MG: 40 TABLET, FILM COATED ORAL at 10:33

## 2023-03-18 RX ADMIN — BISMUTH SUBSALICYLATE 524 MG: 262 TABLET, CHEWABLE ORAL at 10:33

## 2023-03-18 RX ADMIN — SODIUM BICARBONATE 650 MG: 650 TABLET ORAL at 10:34

## 2023-03-18 RX ADMIN — SODIUM BICARBONATE 650 MG: 650 TABLET ORAL at 21:32

## 2023-03-18 RX ADMIN — BISMUTH SUBSALICYLATE 524 MG: 262 TABLET, CHEWABLE ORAL at 21:40

## 2023-03-18 RX ADMIN — PANTOPRAZOLE SODIUM 40 MG: 40 TABLET, DELAYED RELEASE ORAL at 10:34

## 2023-03-18 RX ADMIN — METRONIDAZOLE 500 MG: 500 INJECTION, SOLUTION INTRAVENOUS at 03:28

## 2023-03-18 RX ADMIN — POTASSIUM CHLORIDE AND SODIUM CHLORIDE: 450; 150 INJECTION, SOLUTION INTRAVENOUS at 14:19

## 2023-03-18 RX ADMIN — METRONIDAZOLE 500 MG: 500 INJECTION, SOLUTION INTRAVENOUS at 11:00

## 2023-03-18 RX ADMIN — FAMOTIDINE 20 MG: 20 TABLET, FILM COATED ORAL at 17:31

## 2023-03-18 RX ADMIN — SODIUM BICARBONATE 650 MG: 650 TABLET ORAL at 14:17

## 2023-03-18 RX ADMIN — ENOXAPARIN SODIUM 30 MG: 100 INJECTION SUBCUTANEOUS at 10:33

## 2023-03-18 RX ADMIN — METRONIDAZOLE 500 MG: 500 INJECTION, SOLUTION INTRAVENOUS at 21:42

## 2023-03-18 ASSESSMENT — PAIN SCALES - GENERAL: PAINLEVEL_OUTOF10: 0

## 2023-03-18 NOTE — PLAN OF CARE
Problem: Discharge Planning  Goal: Discharge to home or other facility with appropriate resources  3/18/2023 1253 by Leticia Naylor RN  Outcome: Progressing  3/18/2023 1247 by Leticia Naylor RN  Outcome: Progressing     Problem: Pain  Goal: Verbalizes/displays adequate comfort level or baseline comfort level  3/18/2023 1253 by Leticia Naylor RN  Outcome: Progressing  3/18/2023 1247 by Leticia Naylor RN  Outcome: Progressing     Problem: ABCDS Injury Assessment  Goal: Absence of physical injury  3/18/2023 1253 by Leticia Naylor RN  Outcome: Progressing  3/18/2023 1247 by Leticia Naylor RN  Outcome: Progressing     Problem: Safety - Adult  Goal: Free from fall injury  3/18/2023 1253 by Leticia Naylor RN  Outcome: Progressing  3/18/2023 1247 by Leticia Naylor RN  Outcome: Progressing     Problem: Nutrition Deficit:  Goal: Optimize nutritional status  3/18/2023 1253 by Leticia Naylor RN  Outcome: Progressing  3/18/2023 1247 by Leticia Naylor RN  Outcome: Progressing

## 2023-03-18 NOTE — CONSULTS
[address]                                  CONSULTATION    PATIENT NAME: Delroy Tapia                     :        1950  MED REC NO:   [id number]                            ROOM:       [id number]  ACCOUNT NO:   [de-identified]                           ADMIT DATE: 03/15/2023  PROVIDER:     Meño Fine    CONSULT DATE:  2023    PMD:  Dr. Ubaldo Gar:  Acute diarrhea. HISTORY OF PRESENT ILLNESS:  The patient is a 59-year-old male with past  medical history of arthritis, GERD, hyperlipidemia, seizure, liver  disease, admitted to the hospital for severe diarrhea for 10 days up to  10 to 15 times a day. GI consult was requested. The patient was seen  in his room. Nurse with me. He denies any abdominal pain. He denies  any nausea or vomiting. He reports that his diarrhea started 10 days  prior to his admission, up to 15 bowel movements a day. He denied any  melena or hematochezia. His last colonoscopy as an outpatient in our  office by me revealed no colitis and was done for surveillance for colon  polyps and was told to follow up in five years as no polyps were removed  as per the patient. Records not available to me at this time. We will  obtain records in the office in the morning. The patient reports also  his diarrhea has been improving tremendously and now he is having only  three bowel movements per day, soft stool. He has been placed on IV  Flagyl. His stool for C. diff was ordered and result was still pending  when I examined him. Stool culture also was sent. PAST MEDICAL HISTORY:  As above and history of chronic pancreatitis. MEDICATIONS:  He was on Flagyl, glucosamine, Zofran, Protonix, Lipitor,  Pepcid, Viagra, Celebrex, Zetia, and multivitamin.     PAST SURGICAL HISTORY:  Incisional hernia repair, nerve block,  pancreatic surgery with open procedure, partial

## 2023-03-18 NOTE — PROGRESS NOTES
[facility] MD Mayte Durán MD Elijio Llano, MD Avi Cree, YONATAN Ramírez, ANP Floydene Schwab, CNP  Progress Note    3/17/2023    SUBJECTIVE:   3/17: Seen while laying in bed. Diarrhea slowed down earlier this morning, but he did just have a loose bowel movement. Appetite continues to improve. He denies chest pain, dyspnea, or palpitations. PROBLEM LIST:    Principal Problem:    ALFRED (acute kidney injury) ([facility] [address] [facility]  Active Problems: Moderate protein-calorie malnutrition ([facility] [address]  Resolved Problems:    * No resolved hospital problems. *         DIET:    ADULT DIET; Regular; Low Potassium (Less than 3000 mg/day);  Low Phosphorus (Less than 1000 mg)  ADULT ORAL NUTRITION SUPPLEMENT; Breakfast, Dinner; Frozen Oral Supplement     MEDS (scheduled):    atorvastatin  40 mg Oral Daily    [Held by provider] celecoxib  200 mg Oral Daily    [Held by provider] ezetimibe  10 mg Oral Daily    sodium chloride flush  10 mL IntraVENous 2 times per day    enoxaparin  30 mg SubCUTAneous Daily    sodium bicarbonate  650 mg Oral 4x Daily    metroNIDAZOLE  500 mg IntraVENous Q8H    bismuth subsalicylate  120 mg Oral 4x Daily AC & HS    pantoprazole  40 mg Oral Daily    famotidine  20 mg Oral QPM       MEDS (infusions):   sodium chloride      sodium chloride 125 mL/hr at 03/16/23 1205       MEDS (prn):  sodium chloride flush, sodium chloride, ondansetron **OR** ondansetron, acetaminophen **OR** acetaminophen, magnesium hydroxide    PHYSICAL EXAM:     Patient Vitals for the past 24 hrs:   BP Temp Temp src Pulse Resp SpO2 Height   03/17/23 1213 -- -- -- -- -- -- 5' 3\" (1.6 m)   03/17/23 0833 129/74 -- -- 77 18 98 % --   03/17/23 0317 123/86 98 °F (36.7 °C) Infrared 80 18 97 % --   03/16/23 2147 (!) 140/79 -- -- -- -- -- --   03/16/23 2139 -- -- -- 81 -- -- --   03/16/23 1950 (!) 161/83 97.3 °F (36.3 °C) Infrared 75 18 97 % --   @      Intake/Output Summary (Last 24 hours) at 3/17/2023
Comprehensive Nutrition Assessment    Type and Reason for Visit:  Initial, Positive Nutrition Screen (MST=2)    Nutrition Recommendations/Plan:   Recommend Magic Cup BID. Recommended ONS are lowest in K+ and PO4 on formulary. Will monitor acceptance/labs and modify as needed. Consider standard high calorie, high protein ONS if/when restriction lifted. Pt meets criteria for acute malnutrition; noted electrolyte abnormalities on admit with minimal PO x ~6 days PTA and should be considered at increased risk for metabolic complications related to refeeding. Monitor serum K, Mg, PO4 levels & correct PRN prior to and during progression of feeding. Malnutrition Assessment:  Malnutrition Status: Moderate malnutrition (03/17/23 1226)    Context:  Acute Illness     Findings of the 6 clinical characteristics of malnutrition:  Energy Intake:  50% or less of estimated energy requirements for 5 or more days (Inadequate PO x ~6+ days; notes improved appetite since admission. tolerated breakfast well.)  Weight Loss:  Unable to assess (Subjective wt loss; pt unable to specify amt/time frame. Bedscale w/ error on attempt at visit.)     Body Fat Loss: Moderate body fat loss Buccal region, Triceps, Orbital   Muscle Mass Loss: Moderate muscle mass loss Temples (temporalis), Clavicles (pectoralis & deltoids), Calf (gastrocnemius), Hand (interosseous)  Fluid Accumulation:  Unable to assess     Strength:  Not Performed    Nutrition Assessment:    Pt admitted with ALFRED; noted multifactorial in the setting of profuse diarrhea, poor intake, and potentially nephrotoxic medications; electrolyte abnormalities. PMHx pancreatic dysfunction with h/o prior Whipple procedure; Essential hypertension;HLD. Noted decreased PO intake PTA; progressive weakness. Meets criteria for acute, moderate malnutrition. Recommend begin ONS; ONS are lower on K+, PO4. Will monitor acceptance/labs and modify as needed.     Nutrition Related Findings:
This patient is on medication that requires renal, weight, and/or indication dose adjustment. Date Body Weight IBW  Adjusted BW SCr  CrCl Dialysis status   3/16/2023 135 lb (61.2 kg) Ideal body weight: 56.9 kg (125 lb 7.1 oz)  Adjusted ideal body weight: 58.6 kg (129 lb 4.2 oz) Serum creatinine: 3.1 mg/dL (H) 03/16/23 0015  Estimated creatinine clearance: 17 mL/min (A) N/a       Pharmacy has dose-adjusted the following medication(s):    Date Previous Order Adjusted Order   3/16/2023 Famotidine 40mg  PO QPM Famotidine 20mg PO QPM       These changes were made per protocol according to the [facility] Clinical Guidance for Pharmacists. *Please note this dose may need readjusted if patient's condition changes. Please contact pharmacy with any questions regarding these changes.     KRYS Huizar [facility]  3/16/2023  10:52 AM
0600 -- -- -- -- -- -- -- 134 lb (60.8 kg)   03/17/23 1643 (!) 144/83 97.9 °F (36.6 °C) Axillary 77 18 96 % -- --   03/17/23 1213 -- -- -- -- -- -- 5' 3\" (1.6 m) --     @      Intake/Output Summary (Last 24 hours) at 3/18/2023 1204  Last data filed at 3/18/2023 0751  Gross per 24 hour   Intake --   Output 1675 ml   Net -1675 ml           Wt Readings from Last 3 Encounters:   03/18/23 134 lb (60.8 kg)   03/14/23 135 lb (61.2 kg)   01/17/23 135 lb (61.2 kg)       Constitutional:  in no acute distress  HEENT: NC/AT, EOMI, sclera and conjunctiva are clear and anicteric, mucus membranes moist  Neck: Trachea midline, no JVD  Cardiovascular: S1, S2 regular rhythm, no murmur,or rub  Respiratory:  CTAB. No crackles, no wheeze  Gastrointestinal:  Soft, nontender, nondistended, NABS  Ext: no edema, feet warm  Skin: dry, no rash  Neuro: awake, alert, interactive      DATA:    Recent Labs     03/16/23  0948 03/17/23  0828 03/18/23  0829   WBC 7.7 7.4 6.6   HGB 13.3 13.2 11.2*   HCT 38.2 37.9 32.1*   MCV 91.8 91.5 91.2    194 186       Recent Labs     03/16/23  0948 03/16/23  1914 03/17/23  0828 03/18/23  0829   * 123* 128* 131*   K 3.7 3.9 3.7 3.3*   CL 92* 95* 100 106   CO2 19* 14* 16* 15*   MG 2.0  --  1.9 1.7   PHOS 4.1  --  3.1 2.2*   BUN 47* 47* 40* 23   CREATININE 3.3* 3.1* 2.2* 1.3*   ALT 18  --  16 15   AST 30  --  24 28   BILITOT 0.4  --  0.3 0.3   ALKPHOS 75  --  73 58         Lab Results   Component Value Date    LABPROT 0.5 (H) 03/16/2023    LABPROT 0.5 03/16/2023       Assessment  Acute kidney injury in the setting of volume contraction secondary to profuse diarrhea and poor oral intake. Exacerbated by taking Celebrex daily while volume contracted. Urine indices have not been collected. Dry on examination, poor skin turgor. Hyponatremia, hypovolemia secondary to volume depletion secondary to diarrhea and poor oral intake.  TSH normal.  Metabolic acidosis due to ALFRED     CT abdomen- bilateral kidneys
07/08/2021    LEFT L5-S1 TRANSFORAMINAL EPIDURAL STEROID INJECTION(WANTS LATER) performed by Paxton Corea DO at [address] [facility]  2015    Whipple procedure    AK LAPS COLECTOMY ABDL W/PROCTECTOMY W/ILEOSTOMY N/A 08/20/2018    DIAGNOSTIC LAPAROSCOPY POSS LAPAROTOMY POSS BOWEL RESECTION performed by Chris Askew MD at [address] [facility] [address] N/A 09/06/2018    LAPAROSCOPIC REVISION OF GASTROJEJUNOSTOMY; INTRAOPERATIVE EGD performed by [facility] As[facility]w MD [facility] Right 04/26/2019    RIGHT REVERSE TOTAL SHOULDER ARTHROPLASTY performed by Lamin Ames MD at [place] [address].      right cyst removed [address] [facility] ENDOSCOPY      UPPER GASTROINTESTINAL ENDOSCOPY N/A 09/05/2018    EGD BIOPSY performed by Chris Askew MD at [address] [facility] [address]:    Precautions: Activity as tolerated, falls, diarrhea    Social history: Patient lives with spouse in a two story home bedroom and bathroom 2nd floor full flight stairs with Rail  with 1 step  to enter home.   Tub shower       Equipment owned: Jossue Ford,      [address] - Inpatient   How much help is needed turning from your back to your side while in a flat bed without using bedrails?: None  How much help is needed moving from lying on your back to sitting on the side of a flat bed without using bedrails?: A Little  How much help is needed moving to and from a bed to a chair?: A Little  How much help is needed standing up from a chair using your arms?: A Little  How much help is needed walking in hospital room?: A Little  How much help is needed climbing 3-5 steps with a railing?: A Little  AM-PAC Inpatient Mobility Raw Score : 19  AM-PAC Inpatient T-Scale Score : 45.44  Mobility Inpatient CMS 0-100% Score: 41.77  Mobility Inpatient CMS G-Code Modifier : CK    Nursing cleared
tablet Oral Daily    [Held by provider] celecoxib  200 mg Oral Daily    [Held by provider] ezetimibe  10 mg Oral Daily    sodium chloride flush  10 mL IntraVENous 2 times per day    enoxaparin  30 mg SubCUTAneous Daily    sodium bicarbonate  650 mg Oral 4x Daily    metroNIDAZOLE  500 mg IntraVENous Q8H    bismuth subsalicylate  313 mg Oral 4x Daily AC & HS    pantoprazole  40 mg Oral Daily    famotidine  20 mg Oral QPM     Continuous Infusions:   sodium chloride      sodium chloride 125 mL/hr at 03/16/23 1205       Objective Data:  CBC with Differential:    Lab Results   Component Value Date/Time    WBC 7.4 03/17/2023 08:28 AM    RBC 4.14 03/17/2023 08:28 AM    HGB 13.2 03/17/2023 08:28 AM    HCT 37.9 03/17/2023 08:28 AM     03/17/2023 08:28 AM    MCV 91.5 03/17/2023 08:28 AM    MCH 31.9 03/17/2023 08:28 AM    MCHC 34.8 03/17/2023 08:28 AM    RDW 13.8 03/17/2023 08:28 AM    LYMPHOPCT 14.4 03/17/2023 08:28 AM    MONOPCT 10.5 03/17/2023 08:28 AM    BASOPCT 0.3 03/17/2023 08:28 AM    MONOSABS 0.78 03/17/2023 08:28 AM    LYMPHSABS 1.07 03/17/2023 08:28 AM    EOSABS 0.05 03/17/2023 08:28 AM    BASOSABS 0.02 03/17/2023 08:28 AM     BMP:    Lab Results   Component Value Date/Time     03/17/2023 08:28 AM    K 3.7 03/17/2023 08:28 AM    K 4.3 09/04/2018 06:05 PM     03/17/2023 08:28 AM    CO2 16 03/17/2023 08:28 AM    BUN 40 03/17/2023 08:28 AM    LABALBU 3.8 03/17/2023 08:28 AM    CREATININE 2.2 03/17/2023 08:28 AM    CALCIUM 8.8 03/17/2023 08:28 AM    GFRAA >60 08/24/2021 03:49 PM    LABGLOM 31 03/17/2023 08:28 AM    GLUCOSE 112 03/17/2023 08:28 AM     Assessment:  Acute renal failure, multifactorial in the setting of profuse diarrhea, poor intake, and potentially nephrotoxic medications  Multiple electrolyte disturbances associate with #1 including acidosis, hyponatremia and hypochloremia, likely secondary to GI loss  Acute diarrhea presumed infectious versus secondary etiology  Pancreatic dysfunction
loss  Acute diarrhea presumed infectious versus secondary etiology  Pancreatic dysfunction with prior Whipple procedure  Essential hypertension  Hyperlipidemia  Osteoarthritis on Celebrex  Degenerative disc disease and arthritis of the lumbar spine status post laminectomy and lumbar fusion    Plan:   Phyllis Thibodeaux is doing better overall. He is having more formed and less frequent bowel movements. He is only had 4 bowel movements in the last 24 hours after presenting with 15 to 20/day. Infectious work-up thus far has returned negative although C. difficile is pending. He was acidotic with multiple electrolyte disturbance and acute kidney injury. Multiple potentially nephrotoxic medications are prescribed at baseline. These medications were held. Fluids are being administered. Electrolytes are being corrected. Nephrology is following. Close monitoring of intake and output. Oral bicarbonate was added and we await today's labs to assess for ongoing response. With underlying pancreatic dysfunction and some degree of chronic diarrhea, the GI team has been asked to follow and their recommendations have been reviewed, no acute intervention required during this hospitalization. Chronic comorbidities, labs and vital signs are being monitored and addressed accordingly. PT, OT and  following for discharge planning purposes. Anticipate discharge Monday pending continued improvement and normalization of renal function. Continue current therapy. See orders for further plan of care. More than 50% of my  time was spent at the bedside counseling/coordinating care with the patient and/or family with face to face contact. This time was spent reviewing notes and laboratory data as well as instructing and counseling the patient. Time I spent with the family or surrogate(s) is included only if the patient was incapable of providing the necessary information or participating in medical decisions.  I also discussed
quality of life. Treatment: OT treatment provided this date includes:   Instructions/training on safety, sequencing, and adapted techniques for completion of ADLs. Instruction/training on safe functional mobility/transfer techniques including hand and feet placement   Instruction/training on energy conservation/work simplification for completion of ADLs    Rehab Potential: Good for established goals. Patient / Family Goal: not stated       Patient and/or family were instructed on functional diagnosis, prognosis/goals and OT plan of care. Demonstrated fair plus understanding. Eval Complexity: Low    Time In: 2:42 PM   Time Out: 3:07 PM    Total Treatment Time: 10      Min Units   OT Eval Low 97165  X  1    OT Eval Medium 62313      OT Eval High 88563      OT Re-Eval [id number]            ADL/Self Care 66751 10 1   Therapeutic Activities 14956       Therapeutic Ex 06370       Orthotic Management 94413       Manual 47305     Neuro Re-Ed 09845       Non-Billable Time        Evaluation Time additionally includes thorough review of current medical information, gathering information on past medical history/social history and prior level of function, interpretation of standardized testing/informal observation of tasks, assessment of data and development of plan of care and goals.         Evaluating OT: Rupesh Milian OTR/L #[id number]
session %     Patient education  Patient educated on role of Physical Therapy, risks of immobility, safety and plan of care,  importance of mobility while in hospital , ankle pumps, quad set and glut set for edema control, blood clot prevention, and safety      Patient response to education:   Pt verbalized understanding Pt demonstrated skill Pt requires further education in this area   Yes Partial Yes      Treatment:  Patient practiced and was instructed/facilitated in the following treatment: Patient assisted to EOB. Sat edge of bed 10 minutes with Supervision  to increase dynamic sitting balance and activity tolerance. Pt performed bed mobility, transfers, ambulation in hallway, stair training. Therapeutic Exercises:  not performed  x  reps. At end of session, patient in chair with  call light and phone within reach,  all lines and tubes intact, nursing notified. Patient would benefit from continued skilled Physical Therapy to improve functional independence and quality of life.          Patient's/ family goals   home    Time in  1030  Time out  1109    Total Treatment Time  39 minutes      CPT codes:  Therapeutic activities (80819)   39 minutes  3 unit(s)    Siomara Malone, PT

## 2023-03-19 VITALS
TEMPERATURE: 97.2 F | DIASTOLIC BLOOD PRESSURE: 90 MMHG | OXYGEN SATURATION: 98 % | BODY MASS INDEX: 23.74 KG/M2 | RESPIRATION RATE: 20 BRPM | SYSTOLIC BLOOD PRESSURE: 150 MMHG | WEIGHT: 134 LBS | HEIGHT: 63 IN | HEART RATE: 76 BPM

## 2023-03-19 LAB
ALBUMIN SERPL-MCNC: 3.1 G/DL (ref 3.5–5.2)
ALP SERPL-CCNC: 58 U/L (ref 40–129)
ALT SERPL-CCNC: 15 U/L (ref 0–40)
ANION GAP SERPL CALCULATED.3IONS-SCNC: 9 MMOL/L (ref 7–16)
AST SERPL-CCNC: 27 U/L (ref 0–39)
BASOPHILS # BLD: 0.03 E9/L (ref 0–0.2)
BASOPHILS NFR BLD: 0.4 % (ref 0–2)
BILIRUB SERPL-MCNC: 0.4 MG/DL (ref 0–1.2)
BUN SERPL-MCNC: 15 MG/DL (ref 6–23)
CALCIUM SERPL-MCNC: 8 MG/DL (ref 8.6–10.2)
CHLORIDE SERPL-SCNC: 105 MMOL/L (ref 98–107)
CO2 SERPL-SCNC: 18 MMOL/L (ref 22–29)
CREAT SERPL-MCNC: 1 MG/DL (ref 0.7–1.2)
EOSINOPHIL # BLD: 0.09 E9/L (ref 0.05–0.5)
EOSINOPHIL NFR BLD: 1.3 % (ref 0–6)
ERYTHROCYTE [DISTWIDTH] IN BLOOD BY AUTOMATED COUNT: 13.9 FL (ref 11.5–15)
GLUCOSE SERPL-MCNC: 94 MG/DL (ref 74–99)
HCT VFR BLD AUTO: 32.2 % (ref 37–54)
HGB BLD-MCNC: 11.4 G/DL (ref 12.5–16.5)
IMM GRANULOCYTES # BLD: 0.15 E9/L
IMM GRANULOCYTES NFR BLD: 2.1 % (ref 0–5)
LYMPHOCYTES # BLD: 1.41 E9/L (ref 1.5–4)
LYMPHOCYTES NFR BLD: 20 % (ref 20–42)
MAGNESIUM SERPL-MCNC: 1.5 MG/DL (ref 1.6–2.6)
MCH RBC QN AUTO: 31.8 PG (ref 26–35)
MCHC RBC AUTO-ENTMCNC: 35.4 % (ref 32–34.5)
MCV RBC AUTO: 89.7 FL (ref 80–99.9)
MONOCYTES # BLD: 1 E9/L (ref 0.1–0.95)
MONOCYTES NFR BLD: 14.2 % (ref 2–12)
NEUTROPHILS # BLD: 4.37 E9/L (ref 1.8–7.3)
NEUTS SEG NFR BLD: 62 % (ref 43–80)
PHOSPHATE SERPL-MCNC: 2 MG/DL (ref 2.5–4.5)
PLATELET # BLD AUTO: 183 E9/L (ref 130–450)
PMV BLD AUTO: 8.3 FL (ref 7–12)
POTASSIUM SERPL-SCNC: 3.6 MMOL/L (ref 3.5–5)
PROT SERPL-MCNC: 5.4 G/DL (ref 6.4–8.3)
RBC # BLD AUTO: 3.59 E12/L (ref 3.8–5.8)
SODIUM SERPL-SCNC: 132 MMOL/L (ref 132–146)
WBC # BLD: 7.1 E9/L (ref 4.5–11.5)

## 2023-03-19 PROCEDURE — 6360000002 HC RX W HCPCS: Performed by: INTERNAL MEDICINE

## 2023-03-19 PROCEDURE — 85025 COMPLETE CBC W/AUTO DIFF WBC: CPT

## 2023-03-19 PROCEDURE — 2500000003 HC RX 250 WO HCPCS: Performed by: INTERNAL MEDICINE

## 2023-03-19 PROCEDURE — 6370000000 HC RX 637 (ALT 250 FOR IP): Performed by: INTERNAL MEDICINE

## 2023-03-19 PROCEDURE — 36415 COLL VENOUS BLD VENIPUNCTURE: CPT

## 2023-03-19 PROCEDURE — 6370000000 HC RX 637 (ALT 250 FOR IP): Performed by: NURSE PRACTITIONER

## 2023-03-19 PROCEDURE — 6360000002 HC RX W HCPCS: Performed by: NURSE PRACTITIONER

## 2023-03-19 PROCEDURE — 83735 ASSAY OF MAGNESIUM: CPT

## 2023-03-19 PROCEDURE — 84100 ASSAY OF PHOSPHORUS: CPT

## 2023-03-19 PROCEDURE — 80053 COMPREHEN METABOLIC PANEL: CPT

## 2023-03-19 RX ORDER — CELECOXIB 200 MG/1
200 CAPSULE ORAL DAILY
Qty: 90 CAPSULE | Refills: 3
Start: 2023-03-26

## 2023-03-19 RX ORDER — EZETIMIBE 10 MG/1
10 TABLET ORAL DAILY
Qty: 90 TABLET | Refills: 3
Start: 2023-03-26

## 2023-03-19 RX ORDER — SODIUM BICARBONATE 650 MG/1
650 TABLET ORAL 3 TIMES DAILY
Qty: 21 TABLET | Refills: 0 | Status: SHIPPED | OUTPATIENT
Start: 2023-03-19 | End: 2023-03-26

## 2023-03-19 RX ORDER — MAGNESIUM OXIDE 400 MG/1
400 TABLET ORAL DAILY
Qty: 7 TABLET | Refills: 0 | Status: SHIPPED | OUTPATIENT
Start: 2023-03-19 | End: 2023-03-26

## 2023-03-19 RX ADMIN — MAGNESIUM SULFATE HEPTAHYDRATE 2000 MG: 40 INJECTION, SOLUTION INTRAVENOUS at 09:01

## 2023-03-19 RX ADMIN — ATORVASTATIN CALCIUM 40 MG: 40 TABLET, FILM COATED ORAL at 08:35

## 2023-03-19 RX ADMIN — SODIUM BICARBONATE 650 MG: 650 TABLET ORAL at 08:35

## 2023-03-19 RX ADMIN — METRONIDAZOLE 500 MG: 500 INJECTION, SOLUTION INTRAVENOUS at 03:21

## 2023-03-19 RX ADMIN — POTASSIUM CHLORIDE AND SODIUM CHLORIDE: 450; 150 INJECTION, SOLUTION INTRAVENOUS at 06:38

## 2023-03-19 RX ADMIN — ENOXAPARIN SODIUM 30 MG: 100 INJECTION SUBCUTANEOUS at 08:35

## 2023-03-19 RX ADMIN — PANTOPRAZOLE SODIUM 40 MG: 40 TABLET, DELAYED RELEASE ORAL at 08:35

## 2023-03-19 NOTE — DISCHARGE SUMMARY
Ld 4/24/2019      therapeutic multivitamin-minerals (THERAGRAN-M) tablet Take 1 tablet by mouth daily Ld 4/24/2019             FOLLOW UP/INSTRUCTIONS:  This patient is instructed to follow-up with his primary care physician. Patient is instructed to follow-up with the consults listed above as directed by them. he is instructed to resume home medications and take new medications as indicated in the list above. If the patient has a recurrence of symptoms, he is instructed to go to the ED. Preparing for this patient's discharge, including paperwork, orders, instructions, and meeting with patient did require > 40 minutes.     Anh Ames, LINDSEY - CNP     3/19/2023  8:32 AM

## 2023-08-25 ENCOUNTER — APPOINTMENT (OUTPATIENT)
Dept: CT IMAGING | Age: 73
DRG: 565 | End: 2023-08-25
Payer: MEDICARE

## 2023-08-25 ENCOUNTER — HOSPITAL ENCOUNTER (INPATIENT)
Age: 73
LOS: 3 days | Discharge: NURSING, REHAB OR LONG-TERM CARE | DRG: 565 | End: 2023-08-29
Attending: STUDENT IN AN ORGANIZED HEALTH CARE EDUCATION/TRAINING PROGRAM | Admitting: INTERNAL MEDICINE
Payer: MEDICARE

## 2023-08-25 DIAGNOSIS — R11.2 NAUSEA AND VOMITING, UNSPECIFIED VOMITING TYPE: ICD-10-CM

## 2023-08-25 DIAGNOSIS — F41.9 ANXIETY: ICD-10-CM

## 2023-08-25 DIAGNOSIS — E78.2 MIXED HYPERLIPIDEMIA: ICD-10-CM

## 2023-08-25 DIAGNOSIS — R74.8 ELEVATED CK: ICD-10-CM

## 2023-08-25 DIAGNOSIS — R26.2 UNABLE TO AMBULATE: ICD-10-CM
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
## 2023-08-25 DIAGNOSIS — T79.6XXA TRAUMATIC RHABDOMYOLYSIS, INITIAL ENCOUNTER (HCC): ICD-10-CM

## 2023-08-25 DIAGNOSIS — M25.469 EFFUSION OF KNEE, UNSPECIFIED LATERALITY: ICD-10-CM

## 2023-08-25 LAB
ALBUMIN SERPL-MCNC: 4.5 G/DL (ref 3.5–5.2)
ALP SERPL-CCNC: 75 U/L (ref 40–129)
ALT SERPL-CCNC: 23 U/L (ref 0–40)
ANION GAP SERPL CALCULATED.3IONS-SCNC: 14 MMOL/L (ref 7–16)
AST SERPL-CCNC: 54 U/L (ref 0–39)
BACTERIA URNS QL MICRO: ABNORMAL
BASOPHILS # BLD: 0.01 K/UL (ref 0–0.2)
BASOPHILS NFR BLD: 0 % (ref 0–2)
BILIRUB SERPL-MCNC: 1 MG/DL (ref 0–1.2)
BILIRUB UR QL STRIP: NEGATIVE
BUN SERPL-MCNC: 12 MG/DL (ref 6–23)
CALCIUM SERPL-MCNC: 9.1 MG/DL (ref 8.6–10.2)
CHLORIDE SERPL-SCNC: 89 MMOL/L (ref 98–107)
CK SERPL-CCNC: 665 U/L (ref 20–200)
CLARITY UR: CLEAR
CO2 SERPL-SCNC: 24 MMOL/L (ref 22–29)
COLOR UR: YELLOW
CREAT SERPL-MCNC: 0.8 MG/DL (ref 0.7–1.2)
EOSINOPHIL # BLD: 0.01 K/UL (ref 0.05–0.5)
EOSINOPHILS RELATIVE PERCENT: 0 % (ref 0–6)
EPI CELLS #/AREA URNS HPF: ABNORMAL /HPF
ERYTHROCYTE [DISTWIDTH] IN BLOOD BY AUTOMATED COUNT: 14.1 % (ref 11.5–15)
GFR SERPL CREATININE-BSD FRML MDRD: >60 ML/MIN/1.73M2
GLUCOSE SERPL-MCNC: 114 MG/DL (ref 74–99)
GLUCOSE UR STRIP-MCNC: 250 MG/DL
HCT VFR BLD AUTO: 33.1 % (ref 37–54)
HGB BLD-MCNC: 11.7 G/DL (ref 12.5–16.5)
HGB UR QL STRIP.AUTO: ABNORMAL
IMM GRANULOCYTES # BLD AUTO: <0.03 K/UL (ref 0–0.58)
IMM GRANULOCYTES NFR BLD: 0 % (ref 0–5)
KETONES UR STRIP-MCNC: 15 MG/DL
LACTATE BLDV-SCNC: 0.9 MMOL/L (ref 0.5–2.2)
LEUKOCYTE ESTERASE UR QL STRIP: NEGATIVE
LIPASE SERPL-CCNC: 13 U/L (ref 13–60)
LYMPHOCYTES NFR BLD: 0.61 K/UL (ref 1.5–4)
LYMPHOCYTES RELATIVE PERCENT: 8 % (ref 20–42)
MCH RBC QN AUTO: 32.1 PG (ref 26–35)
MCHC RBC AUTO-ENTMCNC: 35.3 G/DL (ref 32–34.5)
MCV RBC AUTO: 90.9 FL (ref 80–99.9)
MONOCYTES NFR BLD: 0.7 K/UL (ref 0.1–0.95)
MONOCYTES NFR BLD: 9 % (ref 2–12)
NEUTROPHILS NFR BLD: 83 % (ref 43–80)
NEUTS SEG NFR BLD: 6.57 K/UL (ref 1.8–7.3)
NITRITE UR QL STRIP: NEGATIVE
PH UR STRIP: 7 [PH] (ref 5–9)
PLATELET # BLD AUTO: 113 K/UL (ref 130–450)
PMV BLD AUTO: 8.3 FL (ref 7–12)
POTASSIUM SERPL-SCNC: 3.9 MMOL/L (ref 3.5–5)
PROT SERPL-MCNC: 7 G/DL (ref 6.4–8.3)
PROT UR STRIP-MCNC: 30 MG/DL
RBC # BLD AUTO: 3.64 M/UL (ref 3.8–5.8)
RBC #/AREA URNS HPF: ABNORMAL /HPF
SODIUM SERPL-SCNC: 127 MMOL/L (ref 132–146)
SP GR UR STRIP: 1.01 (ref 1–1.03)
TROPONIN I SERPL HS-MCNC: 60 NG/L (ref 0–11)
UROBILINOGEN UR STRIP-ACNC: 0.2 EU/DL (ref 0–1)
WBC #/AREA URNS HPF: ABNORMAL /HPF
WBC OTHER # BLD: 7.9 K/UL (ref 4.5–11.5)

## 2023-08-25 PROCEDURE — 6360000002 HC RX W HCPCS: Performed by: STUDENT IN AN ORGANIZED HEALTH CARE EDUCATION/TRAINING PROGRAM

## 2023-08-25 PROCEDURE — 36415 COLL VENOUS BLD VENIPUNCTURE: CPT

## 2023-08-25 PROCEDURE — 70450 CT HEAD/BRAIN W/O DYE: CPT

## 2023-08-25 PROCEDURE — 74177 CT ABD & PELVIS W/CONTRAST: CPT

## 2023-08-25 PROCEDURE — 6360000004 HC RX CONTRAST MEDICATION: Performed by: RADIOLOGY

## 2023-08-25 PROCEDURE — 96361 HYDRATE IV INFUSION ADD-ON: CPT

## 2023-08-25 PROCEDURE — 85025 COMPLETE CBC W/AUTO DIFF WBC: CPT

## 2023-08-25 PROCEDURE — 82550 ASSAY OF CK (CPK): CPT

## 2023-08-25 PROCEDURE — 84484 ASSAY OF TROPONIN QUANT: CPT

## 2023-08-25 PROCEDURE — 2580000003 HC RX 258: Performed by: STUDENT IN AN ORGANIZED HEALTH CARE EDUCATION/TRAINING PROGRAM

## 2023-08-25 PROCEDURE — 99285 EMERGENCY DEPT VISIT HI MDM: CPT

## 2023-08-25 PROCEDURE — 83605 ASSAY OF LACTIC ACID: CPT

## 2023-08-25 PROCEDURE — 81001 URINALYSIS AUTO W/SCOPE: CPT

## 2023-08-25 PROCEDURE — 96375 TX/PRO/DX INJ NEW DRUG ADDON: CPT

## 2023-08-25 PROCEDURE — 80053 COMPREHEN METABOLIC PANEL: CPT

## 2023-08-25 PROCEDURE — 96374 THER/PROPH/DIAG INJ IV PUSH: CPT

## 2023-08-25 PROCEDURE — 93005 ELECTROCARDIOGRAM TRACING: CPT | Performed by: STUDENT IN AN ORGANIZED HEALTH CARE EDUCATION/TRAINING PROGRAM

## 2023-08-25 PROCEDURE — 83690 ASSAY OF LIPASE: CPT

## 2023-08-25 RX ORDER — PROCHLORPERAZINE EDISYLATE 5 MG/ML
10 INJECTION INTRAMUSCULAR; INTRAVENOUS ONCE
Status: COMPLETED | OUTPATIENT
Start: 2023-08-25 | End: 2023-08-25

## 2023-08-25 RX ORDER — 0.9 % SODIUM CHLORIDE 0.9 %
1000 INTRAVENOUS SOLUTION INTRAVENOUS ONCE
Status: COMPLETED | OUTPATIENT
Start: 2023-08-25 | End: 2023-08-25

## 2023-08-25 RX ADMIN — PROCHLORPERAZINE EDISYLATE 10 MG: 5 INJECTION INTRAMUSCULAR; INTRAVENOUS at 21:47

## 2023-08-25 RX ADMIN — SODIUM CHLORIDE 1000 ML: 9 INJECTION, SOLUTION INTRAVENOUS at 21:47

## 2023-08-25 RX ADMIN — IOPAMIDOL 70 ML: 755 INJECTION, SOLUTION INTRAVENOUS at 22:31

## 2023-08-25 ASSESSMENT — ENCOUNTER SYMPTOMS
PHOTOPHOBIA: 0
VOMITING: 1
ABDOMINAL DISTENTION: 0
ABDOMINAL PAIN: 1
NAUSEA: 1
COUGH: 0
SHORTNESS OF BREATH: 0
BACK PAIN: 0
CHEST TIGHTNESS: 0
DIARRHEA: 0

## 2023-08-26 ENCOUNTER — APPOINTMENT (OUTPATIENT)
Dept: GENERAL RADIOLOGY | Age: 73
DRG: 565 | End: 2023-08-26
Payer: MEDICARE

## 2023-08-26 PROBLEM — R29.6 FALLS FREQUENTLY: Status: ACTIVE | Noted: 2023-08-26

## 2023-08-26 PROBLEM — M62.82 RHABDOMYOLYSIS: Status: ACTIVE | Noted: 2023-08-26

## 2023-08-26 LAB
B PARAP IS1001 DNA NPH QL NAA+NON-PROBE: NOT DETECTED
B PERT DNA SPEC QL NAA+PROBE: NOT DETECTED
C PNEUM DNA NPH QL NAA+NON-PROBE: NOT DETECTED
CK SERPL-CCNC: 588 U/L (ref 20–200)
EKG ATRIAL RATE: 71 BPM
EKG P AXIS: 68 DEGREES
EKG P-R INTERVAL: 148 MS
EKG Q-T INTERVAL: 440 MS
EKG QRS DURATION: 90 MS
EKG QTC CALCULATION (BAZETT): 478 MS
EKG R AXIS: -14 DEGREES
EKG T AXIS: 50 DEGREES
EKG VENTRICULAR RATE: 71 BPM
FLUAV RNA NPH QL NAA+NON-PROBE: NOT DETECTED
FLUBV RNA NPH QL NAA+NON-PROBE: NOT DETECTED
HADV DNA NPH QL NAA+NON-PROBE: NOT DETECTED
HCOV 229E RNA NPH QL NAA+NON-PROBE: NOT DETECTED
HCOV HKU1 RNA NPH QL NAA+NON-PROBE: NOT DETECTED
HCOV NL63 RNA NPH QL NAA+NON-PROBE: NOT DETECTED
HCOV OC43 RNA NPH QL NAA+NON-PROBE: NOT DETECTED
HMPV RNA NPH QL NAA+NON-PROBE: NOT DETECTED
HPIV1 RNA NPH QL NAA+NON-PROBE: NOT DETECTED
HPIV2 RNA NPH QL NAA+NON-PROBE: NOT DETECTED
HPIV3 RNA NPH QL NAA+NON-PROBE: NOT DETECTED
HPIV4 RNA NPH QL NAA+NON-PROBE: NOT DETECTED
LV EF: 60 %
LVEF MODALITY: NORMAL
M PNEUMO DNA NPH QL NAA+NON-PROBE: NOT DETECTED
PROCALCITONIN SERPL-MCNC: 0.08 NG/ML (ref 0–0.08)
RSV RNA NPH QL NAA+NON-PROBE: NOT DETECTED
RV+EV RNA NPH QL NAA+NON-PROBE: NOT DETECTED
SARS-COV-2 RNA NPH QL NAA+NON-PROBE: NOT DETECTED
SPECIMEN DESCRIPTION: NORMAL
TROPONIN I SERPL HS-MCNC: 36 NG/L (ref 0–11)
TROPONIN I SERPL HS-MCNC: 37 NG/L (ref 0–11)
TROPONIN I SERPL HS-MCNC: 53 NG/L (ref 0–11)

## 2023-08-26 PROCEDURE — 6360000002 HC RX W HCPCS: Performed by: NURSE PRACTITIONER

## 2023-08-26 PROCEDURE — 82550 ASSAY OF CK (CPK): CPT

## 2023-08-26 PROCEDURE — C9113 INJ PANTOPRAZOLE SODIUM, VIA: HCPCS | Performed by: NURSE PRACTITIONER

## 2023-08-26 PROCEDURE — 87899 AGENT NOS ASSAY W/OPTIC: CPT

## 2023-08-26 PROCEDURE — 73564 X-RAY EXAM KNEE 4 OR MORE: CPT

## 2023-08-26 PROCEDURE — 2580000003 HC RX 258: Performed by: NURSE PRACTITIONER

## 2023-08-26 PROCEDURE — 87449 NOS EACH ORGANISM AG IA: CPT

## 2023-08-26 PROCEDURE — 84145 PROCALCITONIN (PCT): CPT

## 2023-08-26 PROCEDURE — 6360000002 HC RX W HCPCS: Performed by: STUDENT IN AN ORGANIZED HEALTH CARE EDUCATION/TRAINING PROGRAM

## 2023-08-26 PROCEDURE — 99223 1ST HOSP IP/OBS HIGH 75: CPT | Performed by: INTERNAL MEDICINE

## 2023-08-26 PROCEDURE — 6370000000 HC RX 637 (ALT 250 FOR IP): Performed by: STUDENT IN AN ORGANIZED HEALTH CARE EDUCATION/TRAINING PROGRAM

## 2023-08-26 PROCEDURE — 87086 URINE CULTURE/COLONY COUNT: CPT

## 2023-08-26 PROCEDURE — 73502 X-RAY EXAM HIP UNI 2-3 VIEWS: CPT

## 2023-08-26 PROCEDURE — 84484 ASSAY OF TROPONIN QUANT: CPT

## 2023-08-26 PROCEDURE — 93306 TTE W/DOPPLER COMPLETE: CPT

## 2023-08-26 PROCEDURE — 36415 COLL VENOUS BLD VENIPUNCTURE: CPT

## 2023-08-26 PROCEDURE — 93010 ELECTROCARDIOGRAM REPORT: CPT | Performed by: INTERNAL MEDICINE

## 2023-08-26 PROCEDURE — 0202U NFCT DS 22 TRGT SARS-COV-2: CPT

## 2023-08-26 PROCEDURE — 6370000000 HC RX 637 (ALT 250 FOR IP): Performed by: NURSE PRACTITIONER

## 2023-08-26 PROCEDURE — 1200000000 HC SEMI PRIVATE

## 2023-08-26 RX ORDER — SODIUM CHLORIDE AND POTASSIUM CHLORIDE 300; 900 MG/100ML; MG/100ML
INJECTION, SOLUTION INTRAVENOUS CONTINUOUS
Status: DISCONTINUED | OUTPATIENT
Start: 2023-08-26 | End: 2023-08-28

## 2023-08-26 RX ORDER — PANTOPRAZOLE SODIUM 40 MG/10ML
40 INJECTION, POWDER, LYOPHILIZED, FOR SOLUTION INTRAVENOUS 2 TIMES DAILY
Status: DISCONTINUED | OUTPATIENT
Start: 2023-08-26 | End: 2023-08-28

## 2023-08-26 RX ORDER — LORAZEPAM 2 MG/ML
0.5 INJECTION INTRAMUSCULAR ONCE
Status: COMPLETED | OUTPATIENT
Start: 2023-08-26 | End: 2023-08-26

## 2023-08-26 RX ORDER — ENOXAPARIN SODIUM 100 MG/ML
40 INJECTION SUBCUTANEOUS DAILY
Status: DISCONTINUED | OUTPATIENT
Start: 2023-08-26 | End: 2023-08-29 | Stop reason: HOSPADM

## 2023-08-26 RX ORDER — PANTOPRAZOLE SODIUM 40 MG/1
40 TABLET, DELAYED RELEASE ORAL DAILY
Status: DISCONTINUED | OUTPATIENT
Start: 2023-08-26 | End: 2023-08-29 | Stop reason: HOSPADM

## 2023-08-26 RX ORDER — ATORVASTATIN CALCIUM 40 MG/1
40 TABLET, FILM COATED ORAL DAILY
Status: DISCONTINUED | OUTPATIENT
Start: 2023-08-26 | End: 2023-08-29 | Stop reason: HOSPADM

## 2023-08-26 RX ORDER — SODIUM CHLORIDE 0.9 % (FLUSH) 0.9 %
10 SYRINGE (ML) INJECTION EVERY 12 HOURS SCHEDULED
Status: DISCONTINUED | OUTPATIENT
Start: 2023-08-26 | End: 2023-08-29 | Stop reason: HOSPADM

## 2023-08-26 RX ORDER — SODIUM CHLORIDE 0.9 % (FLUSH) 0.9 %
10 SYRINGE (ML) INJECTION PRN
Status: DISCONTINUED | OUTPATIENT
Start: 2023-08-26 | End: 2023-08-29 | Stop reason: HOSPADM

## 2023-08-26 RX ORDER — POTASSIUM CHLORIDE 20 MEQ/1
40 TABLET, EXTENDED RELEASE ORAL PRN
Status: DISCONTINUED | OUTPATIENT
Start: 2023-08-26 | End: 2023-08-29 | Stop reason: HOSPADM

## 2023-08-26 RX ORDER — METOPROLOL SUCCINATE 25 MG/1
25 TABLET, EXTENDED RELEASE ORAL DAILY
Status: DISCONTINUED | OUTPATIENT
Start: 2023-08-26 | End: 2023-08-29 | Stop reason: HOSPADM

## 2023-08-26 RX ORDER — ACETAMINOPHEN 650 MG/1
650 SUPPOSITORY RECTAL EVERY 6 HOURS PRN
Status: DISCONTINUED | OUTPATIENT
Start: 2023-08-26 | End: 2023-08-29 | Stop reason: HOSPADM

## 2023-08-26 RX ORDER — SODIUM CHLORIDE 9 MG/ML
INJECTION, SOLUTION INTRAVENOUS PRN
Status: DISCONTINUED | OUTPATIENT
Start: 2023-08-26 | End: 2023-08-29 | Stop reason: HOSPADM

## 2023-08-26 RX ORDER — POTASSIUM CHLORIDE 7.45 MG/ML
10 INJECTION INTRAVENOUS PRN
Status: DISCONTINUED | OUTPATIENT
Start: 2023-08-26 | End: 2023-08-29 | Stop reason: HOSPADM

## 2023-08-26 RX ORDER — ONDANSETRON 2 MG/ML
4 INJECTION INTRAMUSCULAR; INTRAVENOUS EVERY 6 HOURS PRN
Status: DISCONTINUED | OUTPATIENT
Start: 2023-08-26 | End: 2023-08-29 | Stop reason: HOSPADM

## 2023-08-26 RX ORDER — MAGNESIUM SULFATE 1 G/100ML
1000 INJECTION INTRAVENOUS PRN
Status: DISCONTINUED | OUTPATIENT
Start: 2023-08-26 | End: 2023-08-29 | Stop reason: HOSPADM

## 2023-08-26 RX ORDER — ONDANSETRON 4 MG/1
4 TABLET, ORALLY DISINTEGRATING ORAL EVERY 8 HOURS PRN
Status: DISCONTINUED | OUTPATIENT
Start: 2023-08-26 | End: 2023-08-29 | Stop reason: HOSPADM

## 2023-08-26 RX ORDER — HYDROCODONE BITARTRATE AND ACETAMINOPHEN 5; 325 MG/1; MG/1
1 TABLET ORAL ONCE
Status: COMPLETED | OUTPATIENT
Start: 2023-08-26 | End: 2023-08-26

## 2023-08-26 RX ORDER — ASPIRIN 81 MG/1
81 TABLET, CHEWABLE ORAL DAILY
Status: DISCONTINUED | OUTPATIENT
Start: 2023-08-26 | End: 2023-08-29 | Stop reason: HOSPADM

## 2023-08-26 RX ORDER — ACETAMINOPHEN 325 MG/1
650 TABLET ORAL EVERY 6 HOURS PRN
Status: DISCONTINUED | OUTPATIENT
Start: 2023-08-26 | End: 2023-08-29 | Stop reason: HOSPADM

## 2023-08-26 RX ADMIN — ENOXAPARIN SODIUM 40 MG: 100 INJECTION SUBCUTANEOUS at 10:04

## 2023-08-26 RX ADMIN — HYDROCODONE BITARTRATE AND ACETAMINOPHEN 1 TABLET: 5; 325 TABLET ORAL at 02:20

## 2023-08-26 RX ADMIN — ASPIRIN 81 MG: 81 TABLET, CHEWABLE ORAL at 10:04

## 2023-08-26 RX ADMIN — ACETAMINOPHEN 650 MG: 325 TABLET ORAL at 21:01

## 2023-08-26 RX ADMIN — Medication 10 ML: at 23:08

## 2023-08-26 RX ADMIN — ACETAMINOPHEN 650 MG: 325 TABLET ORAL at 15:00

## 2023-08-26 RX ADMIN — PANTOPRAZOLE SODIUM 40 MG: 40 INJECTION, POWDER, FOR SOLUTION INTRAVENOUS at 21:01

## 2023-08-26 RX ADMIN — POTASSIUM CHLORIDE AND SODIUM CHLORIDE: 900; 300 INJECTION, SOLUTION INTRAVENOUS at 10:04

## 2023-08-26 RX ADMIN — METOPROLOL SUCCINATE 25 MG: 25 TABLET, EXTENDED RELEASE ORAL at 10:04

## 2023-08-26 RX ADMIN — LORAZEPAM 0.5 MG: 2 INJECTION INTRAMUSCULAR; INTRAVENOUS at 03:38

## 2023-08-26 RX ADMIN — PANTOPRAZOLE SODIUM 40 MG: 40 INJECTION, POWDER, FOR SOLUTION INTRAVENOUS at 10:21

## 2023-08-26 RX ADMIN — Medication 10 ML: at 10:09

## 2023-08-26 ASSESSMENT — PAIN - FUNCTIONAL ASSESSMENT
PAIN_FUNCTIONAL_ASSESSMENT: PREVENTS OR INTERFERES WITH ALL ACTIVE AND SOME PASSIVE ACTIVITIES
PAIN_FUNCTIONAL_ASSESSMENT: PREVENTS OR INTERFERES SOME ACTIVE ACTIVITIES AND ADLS

## 2023-08-26 ASSESSMENT — PAIN DESCRIPTION - DESCRIPTORS
DESCRIPTORS: ACHING
DESCRIPTORS: THROBBING;TENDER
DESCRIPTORS: HEAVINESS;DISCOMFORT

## 2023-08-26 ASSESSMENT — PAIN DESCRIPTION - LOCATION
LOCATION: ABDOMEN;BACK
LOCATION: KNEE
LOCATION: KNEE

## 2023-08-26 ASSESSMENT — PAIN SCALES - GENERAL
PAINLEVEL_OUTOF10: 8
PAINLEVEL_OUTOF10: 2
PAINLEVEL_OUTOF10: 5

## 2023-08-26 ASSESSMENT — PAIN DESCRIPTION - PAIN TYPE: TYPE: ACUTE PAIN

## 2023-08-26 ASSESSMENT — PAIN DESCRIPTION - FREQUENCY: FREQUENCY: CONTINUOUS

## 2023-08-26 ASSESSMENT — PAIN DESCRIPTION - ORIENTATION
ORIENTATION: LEFT
ORIENTATION: LEFT

## 2023-08-26 ASSESSMENT — PAIN DESCRIPTION - ONSET: ONSET: GRADUAL

## 2023-08-26 NOTE — ED PROVIDER NOTES
Nuvia Briones is a 27-year-old male present emergency department with concern for fall patient had diffuse fatigue and weakness that started this morning. Patient was having nausea and vomiting and took 60 mg of Zofran without improvement of symptoms. Patient was diffusely weak and had fallen to the ground he did not know if he hit his head he does not think that he lost consciousness. Patient was unable to ambulate since this morning due to diffuse weakness patient's wife put him on a slide and slid him into the family room patient was unable to stand or ambulate today    The history is provided by the patient and medical records. Review of Systems   Constitutional:  Positive for fatigue. Negative for chills, diaphoresis and fever. Eyes:  Negative for photophobia and visual disturbance. Respiratory:  Negative for cough, chest tightness and shortness of breath. Cardiovascular:  Negative for chest pain, palpitations and leg swelling. Gastrointestinal:  Positive for abdominal pain, nausea and vomiting. Negative for abdominal distention and diarrhea. Genitourinary:  Negative for dysuria. Musculoskeletal:  Negative for back pain, neck pain and neck stiffness. Skin:  Negative for pallor and rash. Neurological:  Positive for weakness and light-headedness. Negative for headaches. Psychiatric/Behavioral:  Negative for confusion. Physical Exam  Vitals and nursing note reviewed. Constitutional:       General: He is not in acute distress. Appearance: He is ill-appearing. HENT:      Head: Normocephalic and atraumatic. Eyes:      General: No scleral icterus. Conjunctiva/sclera: Conjunctivae normal.      Pupils: Pupils are equal, round, and reactive to light. Cardiovascular:      Rate and Rhythm: Normal rate and regular rhythm. Pulmonary:      Effort: Pulmonary effort is normal.      Breath sounds: Normal breath sounds.    Abdominal:      General: Bowel sounds are normal. There

## 2023-08-26 NOTE — H&P
POSS LAPAROTOMY POSS BOWEL RESECTION performed by Jesse Casanova MD at [address] N/A 2018    LAPAROSCOPIC REVISION OF GASTROJEJUNOSTOMY; INTRAOPERATIVE EGD performed by Jesse Casanova MD at [address] Right 2019    RIGHT REVERSE TOTAL SHOULDER ARTHROPLASTY performed by Sariah Allison MD at [address]      right cyst removed [address] ENDOSCOPY      UPPER GASTROINTESTINAL ENDOSCOPY N/A 2018    EGD BIOPSY performed by Jesse Casanova MD at [facility] Hx:  Family History   Problem Relation Age of Onset    High Cholesterol Mother     Stroke Mother     Other Father          in 1, ? pneumonia and prostate cancer. Diabetes Brother     Mental Illness Brother     Stroke Brother     Cancer Brother        HOME MEDICATIONS:  Prior to Admission medications    Medication Sig Start Date End Date Taking?  Authorizing Provider   celecoxib (CELEBREX) 200 MG capsule TAKE 1 CAPSULE BY MOUTH DAILY 23   Sandy Mishra, DO   pantoprazole (PROTONIX) 40 MG tablet Take 1 tablet by mouth daily 23   London Lopez,    omega-3 acid ethyl esters (LOVAZA) 1 g capsule TAKE 1 CAPSULE BY MOUTH FOUR TIMES DAILY 23   Avelino Briones, DO   ondansetron (ZOFRAN-ODT) 8 MG TBDP disintegrating tablet Place 0.5 tablets under the tongue every 8 hours as needed for Nausea or Vomiting 23   LINDSEY Su - CNP   ezetimibe (ZETIA) 10 MG tablet Take 1 tablet by mouth daily 23   London Lopez DO   Misc Natural Products (GLUCOSAMINE CHONDROITIN ADV PO) Take by mouth daily    Historical Provider, MD   turmeric 500 MG CAPS Take by mouth daily    Historical Provider, MD   ondansetron (ZOFRAN-ODT) 4 MG disintegrating tablet Take 1 tablet by mouth 3 times daily as needed for Nausea or Vomiting 3/14/23   Avelino Briones DO   atorvastatin (LIPITOR) 40 MG tablet

## 2023-08-26 NOTE — CONSULTS
rate 70's-80's    Echocardiogram: 4/18/19 (Dr. Kasia Dailey)   Left ventricular internal dimensions were normal in diastole and systole. No regional wall motion abnormalities seen. Normal left ventricular ejection fraction. Ejection fraction is visually estimated at 60%. Normal right ventricular size and function. Normal left ventricular diastolic filling pattern. Mild mitral annular calcification. No significant valve disease. 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 Please feel free to contact me if you have any questions or concerns.     Brooklyn Wolff MD  [facility]) Cardiology

## 2023-08-27 ENCOUNTER — APPOINTMENT (OUTPATIENT)
Dept: ULTRASOUND IMAGING | Age: 73
DRG: 565 | End: 2023-08-27
Payer: MEDICARE

## 2023-08-27 LAB
ANION GAP SERPL CALCULATED.3IONS-SCNC: 12 MMOL/L (ref 7–16)
BASOPHILS # BLD: 0.01 K/UL (ref 0–0.2)
BASOPHILS NFR BLD: 0 % (ref 0–2)
BUN SERPL-MCNC: 12 MG/DL (ref 6–23)
CALCIUM SERPL-MCNC: 9.1 MG/DL (ref 8.6–10.2)
CHLORIDE SERPL-SCNC: 96 MMOL/L (ref 98–107)
CHOLEST SERPL-MCNC: 114 MG/DL
CK SERPL-CCNC: 356 U/L (ref 20–200)
CO2 SERPL-SCNC: 22 MMOL/L (ref 22–29)
CREAT SERPL-MCNC: 0.9 MG/DL (ref 0.7–1.2)
EOSINOPHIL # BLD: 0.06 K/UL (ref 0.05–0.5)
EOSINOPHILS RELATIVE PERCENT: 1 % (ref 0–6)
ERYTHROCYTE [DISTWIDTH] IN BLOOD BY AUTOMATED COUNT: 15.1 % (ref 11.5–15)
ERYTHROCYTE [SEDIMENTATION RATE] IN BLOOD BY WESTERGREN METHOD: 10 MM/HR (ref 0–15)
GFR SERPL CREATININE-BSD FRML MDRD: >60 ML/MIN/1.73M2
GLUCOSE SERPL-MCNC: 112 MG/DL (ref 74–99)
HCT VFR BLD AUTO: 31.8 % (ref 37–54)
HDLC SERPL-MCNC: 68 MG/DL
HGB BLD-MCNC: 10.7 G/DL (ref 12.5–16.5)
IMM GRANULOCYTES # BLD AUTO: <0.03 K/UL (ref 0–0.58)
IMM GRANULOCYTES NFR BLD: 0 % (ref 0–5)
L PNEUMO1 AG UR QL IA.RAPID: NEGATIVE
LDLC SERPL CALC-MCNC: 36 MG/DL
LYMPHOCYTES NFR BLD: 1.24 K/UL (ref 1.5–4)
LYMPHOCYTES RELATIVE PERCENT: 17 % (ref 20–42)
MAGNESIUM SERPL-MCNC: 1.6 MG/DL (ref 1.6–2.6)
MCH RBC QN AUTO: 31.8 PG (ref 26–35)
MCHC RBC AUTO-ENTMCNC: 33.6 G/DL (ref 32–34.5)
MCV RBC AUTO: 94.4 FL (ref 80–99.9)
MONOCYTES NFR BLD: 0.77 K/UL (ref 0.1–0.95)
MONOCYTES NFR BLD: 10 % (ref 2–12)
NEUTROPHILS NFR BLD: 72 % (ref 43–80)
NEUTS SEG NFR BLD: 5.42 K/UL (ref 1.8–7.3)
PHOSPHATE SERPL-MCNC: 2.4 MG/DL (ref 2.5–4.5)
PLATELET # BLD AUTO: 125 K/UL (ref 130–450)
PMV BLD AUTO: 9.2 FL (ref 7–12)
POTASSIUM SERPL-SCNC: 4.2 MMOL/L (ref 3.5–5)
RBC # BLD AUTO: 3.37 M/UL (ref 3.8–5.8)
S PNEUM AG SPEC QL: NEGATIVE
SODIUM SERPL-SCNC: 130 MMOL/L (ref 132–146)
SPECIMEN SOURCE: NORMAL
T4 FREE SERPL-MCNC: 1.7 NG/DL (ref 0.9–1.7)
TRIGL SERPL-MCNC: 50 MG/DL
TSH SERPL DL<=0.05 MIU/L-ACNC: 5.89 UIU/ML (ref 0.76–16.11)
VLDLC SERPL CALC-MCNC: 10 MG/DL
WBC OTHER # BLD: 7.5 K/UL (ref 4.5–11.5)

## 2023-08-27 PROCEDURE — 6370000000 HC RX 637 (ALT 250 FOR IP): Performed by: NURSE PRACTITIONER

## 2023-08-27 PROCEDURE — 36415 COLL VENOUS BLD VENIPUNCTURE: CPT

## 2023-08-27 PROCEDURE — C9113 INJ PANTOPRAZOLE SODIUM, VIA: HCPCS | Performed by: NURSE PRACTITIONER

## 2023-08-27 PROCEDURE — 1200000000 HC SEMI PRIVATE

## 2023-08-27 PROCEDURE — 82085 ASSAY OF ALDOLASE: CPT

## 2023-08-27 PROCEDURE — 84100 ASSAY OF PHOSPHORUS: CPT

## 2023-08-27 PROCEDURE — 97110 THERAPEUTIC EXERCISES: CPT | Performed by: PHYSICAL THERAPIST

## 2023-08-27 PROCEDURE — 93970 EXTREMITY STUDY: CPT

## 2023-08-27 PROCEDURE — 84439 ASSAY OF FREE THYROXINE: CPT

## 2023-08-27 PROCEDURE — 85025 COMPLETE CBC W/AUTO DIFF WBC: CPT

## 2023-08-27 PROCEDURE — 85652 RBC SED RATE AUTOMATED: CPT

## 2023-08-27 PROCEDURE — 97530 THERAPEUTIC ACTIVITIES: CPT | Performed by: PHYSICAL THERAPIST

## 2023-08-27 PROCEDURE — 84443 ASSAY THYROID STIM HORMONE: CPT

## 2023-08-27 PROCEDURE — 2580000003 HC RX 258: Performed by: NURSE PRACTITIONER

## 2023-08-27 PROCEDURE — 83735 ASSAY OF MAGNESIUM: CPT

## 2023-08-27 PROCEDURE — 6370000000 HC RX 637 (ALT 250 FOR IP): Performed by: INTERNAL MEDICINE

## 2023-08-27 PROCEDURE — 80061 LIPID PANEL: CPT

## 2023-08-27 PROCEDURE — 99232 SBSQ HOSP IP/OBS MODERATE 35: CPT | Performed by: INTERNAL MEDICINE

## 2023-08-27 PROCEDURE — 80048 BASIC METABOLIC PNL TOTAL CA: CPT

## 2023-08-27 PROCEDURE — 6360000002 HC RX W HCPCS: Performed by: NURSE PRACTITIONER

## 2023-08-27 PROCEDURE — 82550 ASSAY OF CK (CPK): CPT

## 2023-08-27 PROCEDURE — 97161 PT EVAL LOW COMPLEX 20 MIN: CPT | Performed by: PHYSICAL THERAPIST

## 2023-08-27 RX ORDER — OXYCODONE HYDROCHLORIDE AND ACETAMINOPHEN 5; 325 MG/1; MG/1
1 TABLET ORAL EVERY 8 HOURS PRN
Status: DISCONTINUED | OUTPATIENT
Start: 2023-08-27 | End: 2023-08-29 | Stop reason: HOSPADM

## 2023-08-27 RX ORDER — LANOLIN ALCOHOL/MO/W.PET/CERES
6 CREAM (GRAM) TOPICAL NIGHTLY PRN
Status: DISCONTINUED | OUTPATIENT
Start: 2023-08-27 | End: 2023-08-29 | Stop reason: HOSPADM

## 2023-08-27 RX ORDER — LORAZEPAM 0.5 MG/1
0.5 TABLET ORAL NIGHTLY PRN
Status: DISCONTINUED | OUTPATIENT
Start: 2023-08-27 | End: 2023-08-29 | Stop reason: HOSPADM

## 2023-08-27 RX ADMIN — MAGNESIUM SULFATE HEPTAHYDRATE 1000 MG: 1 INJECTION, SOLUTION INTRAVENOUS at 14:54

## 2023-08-27 RX ADMIN — Medication 10 ML: at 20:04

## 2023-08-27 RX ADMIN — ASPIRIN 81 MG: 81 TABLET, CHEWABLE ORAL at 08:37

## 2023-08-27 RX ADMIN — PANTOPRAZOLE SODIUM 40 MG: 40 INJECTION, POWDER, FOR SOLUTION INTRAVENOUS at 08:44

## 2023-08-27 RX ADMIN — OXYCODONE AND ACETAMINOPHEN 1 TABLET: 5; 325 TABLET ORAL at 01:31

## 2023-08-27 RX ADMIN — METOPROLOL SUCCINATE 25 MG: 25 TABLET, EXTENDED RELEASE ORAL at 08:37

## 2023-08-27 RX ADMIN — Medication 6 MG: at 01:31

## 2023-08-27 RX ADMIN — PANTOPRAZOLE SODIUM 40 MG: 40 INJECTION, POWDER, FOR SOLUTION INTRAVENOUS at 20:04

## 2023-08-27 RX ADMIN — MAGNESIUM SULFATE HEPTAHYDRATE 1000 MG: 1 INJECTION, SOLUTION INTRAVENOUS at 16:24

## 2023-08-27 RX ADMIN — POTASSIUM CHLORIDE AND SODIUM CHLORIDE: 900; 300 INJECTION, SOLUTION INTRAVENOUS at 04:59

## 2023-08-27 RX ADMIN — OXYCODONE AND ACETAMINOPHEN 1 TABLET: 5; 325 TABLET ORAL at 16:20

## 2023-08-27 RX ADMIN — ENOXAPARIN SODIUM 40 MG: 100 INJECTION SUBCUTANEOUS at 08:37

## 2023-08-27 ASSESSMENT — PAIN SCALES - GENERAL
PAINLEVEL_OUTOF10: 0
PAINLEVEL_OUTOF10: 6
PAINLEVEL_OUTOF10: 0
PAINLEVEL_OUTOF10: 10

## 2023-08-27 ASSESSMENT — PAIN DESCRIPTION - ORIENTATION: ORIENTATION: LEFT

## 2023-08-27 ASSESSMENT — PAIN DESCRIPTION - DESCRIPTORS: DESCRIPTORS: ACHING;DISCOMFORT;STABBING

## 2023-08-27 ASSESSMENT — PAIN DESCRIPTION - LOCATION: LOCATION: KNEE;BACK

## 2023-08-27 NOTE — CARE COORDINATION
Case Management Assessment  Initial Evaluation    Date/Time of Evaluation: 8/27/2023 10:36 AM  Assessment Completed by: Juma Camacho RN    If patient is discharged prior to next notation, then this note serves as note for discharge by case management. Patient Name: Lyn Millan                   YOB: 1950  Diagnosis: Falls frequently [R29.6]                   Date / Time: 8/25/2023  7:31 PM    Patient Admission Status: Inpatient   Readmission Risk (Low < 19, Mod (19-27), High > 27): Readmission Risk Score: 13.7    Current PCP: Hilda Sampson, DO  PCP verified by CM? Yes    Chart Reviewed: Yes      History Provided by: Patient  Patient Orientation: Alert and Oriented, Person, Place, Situation    Patient Cognition: Alert    Hospitalization in the last 30 days (Readmission):  No      Advance Directives:      Code Status: Full Code   Patient's Primary Decision Maker is: Legal Next of Kin    Primary Decision MakerSandor Lydia Spouse - [phone]    Secondary Decision Maker: Bart Hansen  Child - [phone]    Discharge Planning:    Patient lives with: Spouse/Significant Other Type of Home: House  Primary Care Giver: Self  Patient Support Systems include: Spouse/Significant Other, Children   Current Financial resources:    Current community resources:    Current services prior to admission: None            Current DME:              Type of Home Care services:  None    ADLS  Prior functional level: Independent in ADLs/IADLs  Current functional level: Assistance with the following:, Mobility    PT AM-PAC:   /24  OT AM-PAC:   /24    Family can provide assistance at DC: Yes  Would you like Case Management to discuss the discharge plan with any other family members/significant others, and if so, who?  Yes (if needed with pt's wife Archie Gomez)  Plans to Return to Present Housing: Unknown at present  Potential Assistance needed at discharge: N/A            Potential DME:    Patient expects to

## 2023-08-27 NOTE — ACP (ADVANCE CARE PLANNING)
Advance Care Planning   Healthcare Decision Maker:    Primary Decision Maker: Tanvi Bhatt - [phone]    Secondary Decision Maker: JD GREEN - [facility] - [phone]

## 2023-08-27 NOTE — PLAN OF CARE
Problem: Pain  Goal: Verbalizes/displays adequate comfort level or baseline comfort level  8/27/2023 1422 by Matthew Morrison RN  Outcome: Progressing     Problem: Skin/Tissue Integrity  Goal: Absence of new skin breakdown  Description: 1. Monitor for areas of redness and/or skin breakdown  2. Assess vascular access sites hourly  3. Every 4-6 hours minimum:  Change oxygen saturation probe site  4. Every 4-6 hours:  If on nasal continuous positive airway pressure, respiratory therapy assess nares and determine need for appliance change or resting period.   8/27/2023 1422 by Matthew Morrison RN  Outcome: Progressing     Problem: Safety - Adult  Goal: Free from fall injury  8/27/2023 1422 by Matthew Morrison RN  Outcome: Progressing     Problem: ABCDS Injury Assessment  Goal: Absence of physical injury  8/27/2023 1422 by Matthew Morrison RN  Outcome: Progressing     Problem: Musculoskeletal - Adult  Goal: Return mobility to safest level of function  Outcome: Progressing  Goal: Return ADL status to a safe level of function  Outcome: Progressing

## 2023-08-28 LAB
ANION GAP SERPL CALCULATED.3IONS-SCNC: 11 MMOL/L (ref 7–16)
BASOPHILS # BLD: 0.02 K/UL (ref 0–0.2)
BASOPHILS NFR BLD: 0 % (ref 0–2)
BUN SERPL-MCNC: 8 MG/DL (ref 6–23)
CALCIUM SERPL-MCNC: 8.9 MG/DL (ref 8.6–10.2)
CHLORIDE SERPL-SCNC: 96 MMOL/L (ref 98–107)
CK SERPL-CCNC: 161 U/L (ref 20–200)
CO2 SERPL-SCNC: 23 MMOL/L (ref 22–29)
CREAT SERPL-MCNC: 0.7 MG/DL (ref 0.7–1.2)
EOSINOPHIL # BLD: 0.08 K/UL (ref 0.05–0.5)
EOSINOPHILS RELATIVE PERCENT: 1 % (ref 0–6)
ERYTHROCYTE [DISTWIDTH] IN BLOOD BY AUTOMATED COUNT: 14.8 % (ref 11.5–15)
GFR SERPL CREATININE-BSD FRML MDRD: >60 ML/MIN/1.73M2
GLUCOSE SERPL-MCNC: 149 MG/DL (ref 74–99)
HCT VFR BLD AUTO: 28.9 % (ref 37–54)
HGB BLD-MCNC: 9.9 G/DL (ref 12.5–16.5)
IMM GRANULOCYTES # BLD AUTO: <0.03 K/UL (ref 0–0.58)
IMM GRANULOCYTES NFR BLD: 0 % (ref 0–5)
LYMPHOCYTES NFR BLD: 0.9 K/UL (ref 1.5–4)
LYMPHOCYTES RELATIVE PERCENT: 16 % (ref 20–42)
MAGNESIUM SERPL-MCNC: 1.7 MG/DL (ref 1.6–2.6)
MCH RBC QN AUTO: 32.2 PG (ref 26–35)
MCHC RBC AUTO-ENTMCNC: 34.3 G/DL (ref 32–34.5)
MCV RBC AUTO: 94.1 FL (ref 80–99.9)
MICROORGANISM SPEC CULT: NO GROWTH
MONOCYTES NFR BLD: 0.61 K/UL (ref 0.1–0.95)
MONOCYTES NFR BLD: 11 % (ref 2–12)
NEUTROPHILS NFR BLD: 71 % (ref 43–80)
NEUTS SEG NFR BLD: 3.99 K/UL (ref 1.8–7.3)
PHOSPHATE SERPL-MCNC: 3 MG/DL (ref 2.5–4.5)
PLATELET # BLD AUTO: 131 K/UL (ref 130–450)
PMV BLD AUTO: 9 FL (ref 7–12)
POTASSIUM SERPL-SCNC: 4.2 MMOL/L (ref 3.5–5)
PROCALCITONIN SERPL-MCNC: 0.09 NG/ML (ref 0–0.08)
RBC # BLD AUTO: 3.07 M/UL (ref 3.8–5.8)
SODIUM SERPL-SCNC: 130 MMOL/L (ref 132–146)
SPECIMEN DESCRIPTION: NORMAL
WBC OTHER # BLD: 5.6 K/UL (ref 4.5–11.5)

## 2023-08-28 PROCEDURE — 36415 COLL VENOUS BLD VENIPUNCTURE: CPT

## 2023-08-28 PROCEDURE — 6360000002 HC RX W HCPCS

## 2023-08-28 PROCEDURE — 97530 THERAPEUTIC ACTIVITIES: CPT | Performed by: PHYSICAL THERAPIST

## 2023-08-28 PROCEDURE — 6370000000 HC RX 637 (ALT 250 FOR IP)

## 2023-08-28 PROCEDURE — 82550 ASSAY OF CK (CPK): CPT

## 2023-08-28 PROCEDURE — 2580000003 HC RX 258: Performed by: NURSE PRACTITIONER

## 2023-08-28 PROCEDURE — 1200000000 HC SEMI PRIVATE

## 2023-08-28 PROCEDURE — 84100 ASSAY OF PHOSPHORUS: CPT

## 2023-08-28 PROCEDURE — 97535 SELF CARE MNGMENT TRAINING: CPT

## 2023-08-28 PROCEDURE — 6360000002 HC RX W HCPCS: Performed by: NURSE PRACTITIONER

## 2023-08-28 PROCEDURE — 83735 ASSAY OF MAGNESIUM: CPT

## 2023-08-28 PROCEDURE — 84145 PROCALCITONIN (PCT): CPT

## 2023-08-28 PROCEDURE — 97110 THERAPEUTIC EXERCISES: CPT

## 2023-08-28 PROCEDURE — 97165 OT EVAL LOW COMPLEX 30 MIN: CPT

## 2023-08-28 PROCEDURE — C9113 INJ PANTOPRAZOLE SODIUM, VIA: HCPCS | Performed by: NURSE PRACTITIONER

## 2023-08-28 PROCEDURE — 97530 THERAPEUTIC ACTIVITIES: CPT

## 2023-08-28 PROCEDURE — 85025 COMPLETE CBC W/AUTO DIFF WBC: CPT

## 2023-08-28 PROCEDURE — 80048 BASIC METABOLIC PNL TOTAL CA: CPT

## 2023-08-28 PROCEDURE — 6370000000 HC RX 637 (ALT 250 FOR IP): Performed by: NURSE PRACTITIONER

## 2023-08-28 PROCEDURE — 6370000000 HC RX 637 (ALT 250 FOR IP): Performed by: INTERNAL MEDICINE

## 2023-08-28 RX ORDER — LISINOPRIL 10 MG/1
10 TABLET ORAL DAILY
Status: DISCONTINUED | OUTPATIENT
Start: 2023-08-28 | End: 2023-08-29 | Stop reason: HOSPADM

## 2023-08-28 RX ADMIN — METOPROLOL SUCCINATE 25 MG: 25 TABLET, EXTENDED RELEASE ORAL at 09:27

## 2023-08-28 RX ADMIN — ENOXAPARIN SODIUM 40 MG: 100 INJECTION SUBCUTANEOUS at 10:50

## 2023-08-28 RX ADMIN — OXYCODONE AND ACETAMINOPHEN 1 TABLET: 5; 325 TABLET ORAL at 21:21

## 2023-08-28 RX ADMIN — LORAZEPAM 0.5 MG: 0.5 TABLET ORAL at 21:21

## 2023-08-28 RX ADMIN — Medication 10 ML: at 09:27

## 2023-08-28 RX ADMIN — OXYCODONE AND ACETAMINOPHEN 1 TABLET: 5; 325 TABLET ORAL at 09:34

## 2023-08-28 RX ADMIN — ASPIRIN 81 MG: 81 TABLET, CHEWABLE ORAL at 09:27

## 2023-08-28 RX ADMIN — LISINOPRIL 10 MG: 10 TABLET ORAL at 09:45

## 2023-08-28 RX ADMIN — ONDANSETRON 4 MG: 2 INJECTION INTRAMUSCULAR; INTRAVENOUS at 10:44

## 2023-08-28 RX ADMIN — Medication 6 MG: at 03:19

## 2023-08-28 RX ADMIN — POTASSIUM CHLORIDE AND SODIUM CHLORIDE: 900; 300 INJECTION, SOLUTION INTRAVENOUS at 04:26

## 2023-08-28 RX ADMIN — PANTOPRAZOLE SODIUM 40 MG: 40 INJECTION, POWDER, FOR SOLUTION INTRAVENOUS at 09:27

## 2023-08-28 RX ADMIN — Medication 10 ML: at 21:21

## 2023-08-28 RX ADMIN — ONDANSETRON 4 MG: 2 INJECTION INTRAMUSCULAR; INTRAVENOUS at 17:04

## 2023-08-28 ASSESSMENT — PAIN DESCRIPTION - ORIENTATION: ORIENTATION: LEFT;LOWER;POSTERIOR

## 2023-08-28 ASSESSMENT — PAIN SCALES - GENERAL
PAINLEVEL_OUTOF10: 0
PAINLEVEL_OUTOF10: 0
PAINLEVEL_OUTOF10: 10

## 2023-08-28 ASSESSMENT — PAIN DESCRIPTION - LOCATION: LOCATION: LEG;BACK

## 2023-08-28 NOTE — CARE COORDINATION
2023 1311 CM note: Per liaison Zelda Cid accepted pt for acute rehab and bed will be available 23. Pt and wife Daryn Mims ,at bedside, informed and agreeable to plan. For Acute rRhab, NO PRECERT required, will need signed ROMMEL.  Hanna MEDINA

## 2023-08-28 NOTE — PLAN OF CARE
Problem: Skin/Tissue Integrity  Goal: Absence of new skin breakdown  Description: 1. Monitor for areas of redness and/or skin breakdown  2. Assess vascular access sites hourly  3. Every 4-6 hours minimum:  Change oxygen saturation probe site  4. Every 4-6 hours:  If on nasal continuous positive airway pressure, respiratory therapy assess nares and determine need for appliance change or resting period.   8/27/2023 2110 by Zee Quiñonez RN  Outcome: Progressing  8/27/2023 1422 by Javi Sheridan RN  Outcome: Progressing

## 2023-08-28 NOTE — PLAN OF CARE
Problem: Pain  Goal: Verbalizes/displays adequate comfort level or baseline comfort level  Outcome: Progressing  Flowsheets (Taken 8/28/2023 2074 by Lesley Miller RN)  Verbalizes/displays adequate comfort level or baseline comfort level:   Encourage patient to monitor pain and request assistance   Administer analgesics based on type and severity of pain and evaluate response   Assess pain using appropriate pain scale   Notify Licensed Independent Practitioner if interventions unsuccessful or patient reports new pain   Consider cultural and social influences on pain and pain management   Implement non-pharmacological measures as appropriate and evaluate response     Problem: Skin/Tissue Integrity  Goal: Absence of new skin breakdown  Description: 1. Monitor for areas of redness and/or skin breakdown  2. Assess vascular access sites hourly  3. Every 4-6 hours minimum:  Change oxygen saturation probe site  4. Every 4-6 hours:  If on nasal continuous positive airway pressure, respiratory therapy assess nares and determine need for appliance change or resting period.   Outcome: Progressing     Problem: Safety - Adult  Goal: Free from fall injury  Outcome: Progressing     Problem: ABCDS Injury Assessment  Goal: Absence of physical injury  Outcome: Progressing

## 2023-08-28 NOTE — CARE COORDINATION
8/28/2023 1111 CM note: CM met with patient and dtr/RU at bedside regarding transition of care needs. PT recommendations for SNF reviewed. Pt/family requesting [facility] referral-referral placed to [facility] to review. SNF list provided to pt/family for back up plan. Will await [facility] determination and follow up with pt/family. For AR/SNF-NO PRECERT. SNF would require NANI Ferreira RN

## 2023-08-29 VITALS
TEMPERATURE: 98.5 F | BODY MASS INDEX: 23.05 KG/M2 | WEIGHT: 135 LBS | SYSTOLIC BLOOD PRESSURE: 135 MMHG | OXYGEN SATURATION: 97 % | RESPIRATION RATE: 20 BRPM | HEART RATE: 76 BPM | HEIGHT: 64 IN | DIASTOLIC BLOOD PRESSURE: 78 MMHG

## 2023-08-29 LAB
ALDOLASE SERPL-CCNC: 4.6 U/L (ref 1.2–7.6)
ANION GAP SERPL CALCULATED.3IONS-SCNC: 12 MMOL/L (ref 7–16)
BASOPHILS # BLD: 0.02 K/UL (ref 0–0.2)
BASOPHILS NFR BLD: 0 % (ref 0–2)
BUN SERPL-MCNC: 9 MG/DL (ref 6–23)
CALCIUM SERPL-MCNC: 9.1 MG/DL (ref 8.6–10.2)
CHLORIDE SERPL-SCNC: 96 MMOL/L (ref 98–107)
CK SERPL-CCNC: 113 U/L (ref 20–200)
CO2 SERPL-SCNC: 22 MMOL/L (ref 22–29)
CREAT SERPL-MCNC: 0.7 MG/DL (ref 0.7–1.2)
EOSINOPHIL # BLD: 0.07 K/UL (ref 0.05–0.5)
EOSINOPHILS RELATIVE PERCENT: 1 % (ref 0–6)
ERYTHROCYTE [DISTWIDTH] IN BLOOD BY AUTOMATED COUNT: 14.9 % (ref 11.5–15)
GFR SERPL CREATININE-BSD FRML MDRD: >60 ML/MIN/1.73M2
GLUCOSE SERPL-MCNC: 132 MG/DL (ref 74–99)
HCT VFR BLD AUTO: 30.8 % (ref 37–54)
HGB BLD-MCNC: 10.2 G/DL (ref 12.5–16.5)
IMM GRANULOCYTES # BLD AUTO: 0.03 K/UL (ref 0–0.58)
IMM GRANULOCYTES NFR BLD: 1 % (ref 0–5)
LYMPHOCYTES NFR BLD: 1.14 K/UL (ref 1.5–4)
LYMPHOCYTES RELATIVE PERCENT: 21 % (ref 20–42)
MAGNESIUM SERPL-MCNC: 1.6 MG/DL (ref 1.6–2.6)
MCH RBC QN AUTO: 31.7 PG (ref 26–35)
MCHC RBC AUTO-ENTMCNC: 33.1 G/DL (ref 32–34.5)
MCV RBC AUTO: 95.7 FL (ref 80–99.9)
MONOCYTES NFR BLD: 0.67 K/UL (ref 0.1–0.95)
MONOCYTES NFR BLD: 12 % (ref 2–12)
NEUTROPHILS NFR BLD: 64 % (ref 43–80)
NEUTS SEG NFR BLD: 3.46 K/UL (ref 1.8–7.3)
PHOSPHATE SERPL-MCNC: 3.4 MG/DL (ref 2.5–4.5)
PLATELET # BLD AUTO: 142 K/UL (ref 130–450)
PMV BLD AUTO: 8.5 FL (ref 7–12)
POTASSIUM SERPL-SCNC: 4.1 MMOL/L (ref 3.5–5)
RBC # BLD AUTO: 3.22 M/UL (ref 3.8–5.8)
SODIUM SERPL-SCNC: 130 MMOL/L (ref 132–146)
WBC OTHER # BLD: 5.4 K/UL (ref 4.5–11.5)

## 2023-08-29 PROCEDURE — 6370000000 HC RX 637 (ALT 250 FOR IP): Performed by: NURSE PRACTITIONER

## 2023-08-29 PROCEDURE — 83735 ASSAY OF MAGNESIUM: CPT

## 2023-08-29 PROCEDURE — 80048 BASIC METABOLIC PNL TOTAL CA: CPT

## 2023-08-29 PROCEDURE — 82550 ASSAY OF CK (CPK): CPT

## 2023-08-29 PROCEDURE — 84100 ASSAY OF PHOSPHORUS: CPT

## 2023-08-29 PROCEDURE — 36415 COLL VENOUS BLD VENIPUNCTURE: CPT

## 2023-08-29 PROCEDURE — 85025 COMPLETE CBC W/AUTO DIFF WBC: CPT

## 2023-08-29 RX ORDER — OXYCODONE HYDROCHLORIDE AND ACETAMINOPHEN 5; 325 MG/1; MG/1
1 TABLET ORAL EVERY 8 HOURS PRN
Qty: 15 TABLET | Refills: 0 | Status: ON HOLD | OUTPATIENT
Start: 2023-08-29 | End: 2023-09-03

## 2023-08-29 RX ORDER — METOPROLOL SUCCINATE 25 MG/1
25 TABLET, EXTENDED RELEASE ORAL DAILY
Qty: 30 TABLET | Refills: 3 | Status: ON HOLD | DISCHARGE
Start: 2023-08-29

## 2023-08-29 RX ORDER — LORAZEPAM 0.5 MG/1
0.5 TABLET ORAL NIGHTLY PRN
Qty: 5 TABLET | Refills: 0 | Status: ON HOLD | OUTPATIENT
Start: 2023-08-29 | End: 2023-09-03

## 2023-08-29 RX ORDER — LISINOPRIL 10 MG/1
10 TABLET ORAL DAILY
Qty: 30 TABLET | Refills: 3 | DISCHARGE
Start: 2023-08-29 | End: 2023-09-03 | Stop reason: DRUGHIGH

## 2023-08-29 RX ORDER — ATORVASTATIN CALCIUM 40 MG/1
40 TABLET, FILM COATED ORAL DAILY
Qty: 90 TABLET | Refills: 1 | DISCHARGE
Start: 2023-09-12 | End: 2023-09-03 | Stop reason: ALTCHOICE

## 2023-08-29 RX ORDER — ASPIRIN 81 MG/1
81 TABLET, CHEWABLE ORAL DAILY
Qty: 30 TABLET | Refills: 3 | Status: ON HOLD | DISCHARGE
Start: 2023-08-29

## 2023-08-29 RX ADMIN — LISINOPRIL 10 MG: 10 TABLET ORAL at 12:07

## 2023-08-29 RX ADMIN — ASPIRIN 81 MG: 81 TABLET, CHEWABLE ORAL at 12:07

## 2023-08-29 RX ADMIN — METOPROLOL SUCCINATE 25 MG: 25 TABLET, EXTENDED RELEASE ORAL at 12:07

## 2023-08-29 RX ADMIN — PANTOPRAZOLE SODIUM 40 MG: 40 TABLET, DELAYED RELEASE ORAL at 12:07

## 2023-08-29 ASSESSMENT — PAIN SCALES - GENERAL
PAINLEVEL_OUTOF10: 0
PAINLEVEL_OUTOF10: 0

## 2023-08-29 NOTE — DISCHARGE SUMMARY
Internal Medicine Progress Note     [facility]     Paris Vance. Amado Antoine., JANINA.A.C.O.I. Yoon Kathleen D.O., TRAYOMarcelloIMarcello West D.O. Macarena Pierre, MSN, APRN, NP-C  Mendoza Nobles. Miles Hart, MSN, APRN-CNP       Internal Medicine  Discharge Summary    NAME: Matt Felipe  :  1950  MRN:  [id number]  PCP:Avelino Briones DO  ADMITTED: 2023      DISCHARGED: 23    ADMITTING PHYSICIAN: Osiris Aguilar DO    CONSULTANT(S):   IP CONSULT TO SOCIAL WORK  IP CONSULT TO CARDIOLOGY     ADMITTING DIAGNOSIS:   Falls frequently [R29.6]     DISCHARGE DIAGNOSES:   Elevated troponin secondary to demand ischemia with non-STEMI ruled out, started medical management  Mild rhabdomyolysis secondary to mechanical fall and significant struggle to get up off the ground, resolved  Multifactorial dehydration hyponatremia and hypochloremia, likely secondary to poor intake GI loss, resolved  Pancreatic dysfunction with prior Whipple procedure  Poorly controlled essential hypertension, not on directed therapy  Hyperlipidemia, statin being held on admission secondary to #1  Osteoarthritis on Celebrex  Degenerative disc disease and arthritis of the lumbar spine status post laminectomy and lumbar fusion    BRIEF HISTORY OF PRESENT ILLNESS:   Matt Felipe is a 78-year-old male who presented to [facility] for evaluation after a fall and inability to ambulate. States that he has not been feeling well for the last few days. He had increasing fatigue with nausea and decreased intake. He did have some vomiting but took Zofran and escalating doses before eventual relief. He had a minor fall and struggled quite a bit to get up off of the ground but was able to get up under his own power. He denies any fevers or chills associated or any known sick contact or exposures. No headache or acute neurologic complaints aside from that of generalized weakness.   Denies any chest pains,

## 2023-08-29 NOTE — PROGRESS NOTES
Internal Medicine Progress Note    [facility]    Monochanel Irving López., JANINA.TOÑITOOMarcelloI. Dot Jennings D.O., DAVID Kamara D.O. Rico Washington D.O. Mariia Matias, MSN, APRN, NP-C  Jace Gale. Heriberto Chadwick, MSN, APRN-CNP  Russ Richey, MSN, APRN-CNP     Primary Care Physician: Jessica Kamara DO   Admitting Physician:  Min Song DO  Admission date and time: 8/25/2023  7:31 PM    Room:  [address] [place] [address]  Admitting diagnosis: Falls frequently [R29.6]    Patient Name: Alayna Anderson  MRN: [id number]    Date of Service: 8/27/2023     Subjective:  Anastasiia Chin is a 75 y.o. male who was seen and examined today,8/27/2023, at the bedside. Seem to be doing better. Electrolytes are improving and sodium is noted to improve. CPK declining. Denies chest pain or shortness of breath. Complaining of left calf tenderness. No family present during my examination. Review of System:   Constitutional:   Denies fever or chills, weight loss or gain, fatigue or malaise. HEENT:   Denies ear pain, sore throat, sinus or eye problems. Cardiovascular:   Denies any chest pain, irregular heartbeats, or palpitations. Respiratory:   Denies shortness of breath, coughing, sputum production, hemoptysis, or wheezing. Gastrointestinal:   Denies nausea, vomiting, diarrhea, or constipation. Denies any abdominal pain. Genitourinary:    Denies any urgency, frequency, hematuria. Voiding  without difficulty. Extremities:   Denies lower extremity swelling, edema or cyanosis. Left calf tender  Neurology:    Denies any headache or focal neurological deficits, Denies generalized weakness or memory difficulty. Psch:   Denies being anxious or depressed. Musculoskeletal:    Denies  myalgias, joint complaints or back pain. Integumentary:   Denies any rashes, ulcers, or excoriations. Denies bruising. Hematologic/Lymphatic:  Denies bruising or bleeding.     Physical
Physical Therapy    Physical Therapy Treatment Note/Plan of Care    Room #:  4789/0193-66  Patient Name: Eva Mcknight  YOB: 1950  MRN: [id number]    Date of Service: 8/28/2023     Tentative placement recommendation: Subacute Rehab  Equipment recommendation: To be determined      Evaluating Physical Therapist: Radha De La Torre [place]  #[id number]      Specific Provider Orders/Date/Referring Provider :  08/26/23 0615    PT evaluation and treat  Start:  08/26/23 0615,   End:  08/26/23 0615,   ONE TIME,   Standing Count:  1 Occurrences,   R         Richelle Ríos, APRN - CNP     Admitting Diagnosis:   Falls frequently [R29.6]    Admitted with    fall down steps, d/t weakness and fatigue, abdominal pain and nausea and vomiting  Surgery: none  Visit Diagnoses         Codes    Fall, initial encounter    -  Primary W19. XXXA    Nausea and vomiting, unspecified vomiting type     R11.2    Effusion of knee, unspecified laterality     M25.469    Unable to ambulate     R26.2    Elevated CK     R74.8            Patient Active Problem List   Diagnosis    GERD (gastroesophageal reflux disease)    Mixed hyperlipidemia    Lumbago    Arthritis    Polyp of colon    Hyperlipidemia    Hernia, incisional    Hypoglycemia    Small bowel obstruction (HCC)    Humerus fracture    Lumbar radiculitis    DDD (degenerative disc disease), lumbosacral    H/O Whipple procedure    History of lumbosacral spine surgery    ALFRED (acute kidney injury) ([address])    Moderate protein-calorie malnutrition ([address])    Rhabdomyolysis    Falls frequently        ASSESSMENT of Current Deficits Patient exhibits decreased strength, balance, endurance, range of motion, and pain left knee  impairing functional mobility, transfers, gait , gait distance, and tolerance to activity. Decreased strength, balance and endurance  increases patient's risk for fall.    Insufficient quad strength for straight leg-raise LLE and increased  swelling and  pain along with knee flexion on
Physician Progress Note      PATIENT:               Flory Hernandez  CSN #:                  [id number]  :                       1950  ADMIT DATE:       2023 7:31 PM  DISCH DATE:  RESPONDING  PROVIDER #:        Mian Lomas CNP          QUERY TEXT:    Pt noted with have \"mild rhabdomyolysis secondary to mechanical fall and   significant struggle to get up off the ground\" per attending documentation   . If possible, please document in progress notes and discharge summary   if you are evaluating and/or treating any of the following: The medical record reflects the following:  Risk Factors: fall with inability to get off the ground, dehydration  Clinical Indicators: /588, Na 127/130  H&P \"He had a minor fall and struggled quite a bit to get up off of the ground   but was able to get up under his own power. \"  Treatment: IVF, BMP    Per [email or web address]  Traumatic rhabdomyolysis cause examples: crush syndrome, prolonged   immobilization  Nontraumatic rhabdomyolysis cause examples:  marked exertion, hyperthermia,   metabolic myopathy, drugs or toxins, infections, electrolyte disorders. Thank you, Sandra Ramos RN BSN CDS  [phone]  Options provided:  -- Traumatic rhabdomyolysis  -- Nontraumatic rhabdomyolysis  -- Other - I will add my own diagnosis  -- Disagree - Not applicable / Not valid  -- Disagree - Clinically unable to determine / Unknown  -- Refer to Clinical Documentation Reviewer    PROVIDER RESPONSE TEXT:    This patient has traumatic rhabdomyolysis. Query created by: Venice Thomas on 2023 2:19 PM      QUERY TEXT:    Patient admitted with dehydration, hyponatremia, hypochloremia, mild   rhabdomyolysis, and \"NSTEMI type I vs type II, with plans for conservative   medical management\" per attending.  attending notes \"enzymes have trended   downward consistent with demand ischemia\".  If possible, please document in
Minimal Assist   Sit to supine: Minimal Assist   Rolling:Modified [place]     Functional Transfers Moderate Assist from EOB and bedside chair sit to stand. Transfer training with verbal cues for hand placement throughout session to improve safety. Supervision    Functional Mobility Moderate Assist with wheeled walker to improve balance, verbal cues for walker sequence and safety. Patient completed EOB <> BSC; Short distances. Modified [place]    Balance Sitting:     Static: fair -    Dynamic: poor +  Standing: fair - with wheeled walker  Sitting:     Static: good     Dynamic: good   Standing: good  with wheeled walker   Activity Tolerance fair   good    Visual/  Perceptual Glasses: Yes                Hand Dominance: Right     AROM (PROM) Strength Additional Info:  Goal:   RUE  WFL 4-/5 good  and wfl FMC/dexterity noted during ADL tasks   Improve overall RUE strength  for participation in functional tasks   LUE WFL 4/5 good  and wfl FMC/dexterity noted during ADL tasks   Improve overall LUE strength  for participation in functional tasks      Vitals:  HR at rest: 71 bpm     SpO2 at rest: 97%       Hearing: WFL   Sensation:  No c/o numbness or tingling  Tone: WFL   Edema: None    Comments: Nursing approved therapy session. Upon arrival the patient was seated at EOB. At end of session, patient was supine with HOB elevated with call light and phone within reach, all lines and tubes intact. Overall patient demonstrated decreased independence and safety during completion of ADL/functional transfer/mobility tasks. Pt would benefit from continued skilled OT to increase safety and independence with completion of ADL/IADL tasks for functional independence and quality of life.     Treatment: OT treatment provided this date includes:   Instruction/training on safety and adapted techniques for completion of ADLs   Instruction/training on safe functional mobility/transfer techniques
rhabdomyolysis secondary to mechanical fall and significant struggle to get up off the ground, resolved  Multifactorial dehydration hyponatremia and hypochloremia, likely secondary to poor intake GI loss, resolved  Pancreatic dysfunction with prior Whipple procedure  Poorly controlled essential hypertension, not on directed therapy  Hyperlipidemia, statin being held on admission secondary to #1  Osteoarthritis on Celebrex  Degenerative disc disease and arthritis of the lumbar spine status post laminectomy and lumbar fusion      Plan:   Adron Six has improved overall. From a cardiac standpoint, enzymes have trended downward consistent with demand ischemia and echocardiogram has been performed and reviewed by the cardiovascular team.  Conservative medical management no additional cardiac work-up as per their service. CPK has trended downward with fluid administration. We will continue to hold statin and fluids can be discontinued. Electrolyte abnormalities have improved as well and his oral intake is improved. Dietary and nutritional supplements will be added. Blood pressure control will continue to be addressed. Low-dose beta-blocker was added due to elevated cardiac enzymes we will add low-dose lisinopril and assess for response. PT, OT and social work will follow for discharge planning. Tentative plan for discharge to skilled nursing facility tomorrow pending ongoing improvement and the ability to achieve this. Chronic comorbidities, lab values and vital signs are being monitored and addressed accordingly. Adron Six requires this high level of physician care and nursing on the IMC/Telemetry unit due the complexity of decision management and chance of rapid decline or death. Continued cardiac monitoring and higher level of nursing are required. I am readily available for any further decision-making and intervention.      More than 50% of my  time was spent at the bedside counseling/coordinating care with the
benefit from continued skilled Physical Therapy to improve functional independence and quality of life.          Patient's/ family goals   home    Time in 26  Time out  535    Total Treatment Time  15 minutes        CPT codes:    Therapeutic activities (86866)   15 minutes  1 unit(s)    Mi Andrade, PT
skilled Physical Therapy to improve functional independence and quality of life. Patient's/ family goals   home    Time in  355  Time out  455    Total Treatment Time  40 minutes    Evaluation time includes thorough review of current medical information, gathering information on past medical history/social history and prior level of function, completion of standardized testing/informal observation of tasks, assessment of data, and development of Plan of care and goals.      CPT codes:  Low Complexity PT evaluation (42931)  Therapeutic activities (43877)   30 minutes  2 unit(s)  Therapeutic exercises (18258)   10 minutes  1 unit(s)    Alba Lopez PT
03/16/2023     10/18/2022     Lab Results   Component Value Date    LDLCALC 24 03/16/2023    LDLCALC 45 10/18/2022    LDLCALC 54 04/13/2021    LDLCHOLESTEROL 36 08/27/2023     Lab Results   Component Value Date    LABVLDL 9 03/16/2023    LABVLDL 5 10/18/2022    LABVLDL 6 04/13/2021    VLDL 10 08/27/2023     No results found for: CHOLHDLRATIO  No results for input(s): PROBNP in the last 72 hours. Lab Results   Component Value Date    CRP <3.0 08/14/2023     Lab Results   Component Value Date    SEDRATE 1 08/14/2023       Cardiac Tests:  EKG personally reviewed: SR, rate 71, PRWP, NSSTT changes    Telemetry personally reviewed (date: 8/27/2023): SR, rate 70's-80's    Echocardiogram: 4/18/19 (Dr. Raisa Blanco)   Left ventricular internal dimensions were normal in diastole and systole. No regional wall motion abnormalities seen. Normal left ventricular ejection fraction. Ejection fraction is visually estimated at 60%. Normal right ventricular size and function. Normal left ventricular diastolic filling pattern. Mild mitral annular calcification. No significant valve disease. Echocardiogram: 8/26/23 (Dr. Kaykay Henning)   Normal left ventricular systolic function. Ejection fraction is visually estimated at > 60%. Mild concentric left ventricular hypertrophy. Normal right ventricular size and function (TAPSE 2.3 cm). There is doppler evidence of stage I diastolic dysfunction. Mild tricuspid regurgitation. PASP is estimated at 26 mmHg (normal estimated PASP).     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

## 2023-08-29 NOTE — CARE COORDINATION
8/29/2023 09[facility] note: [facility] accepted patient for acute rehab. NO PRECERT required, ROMMEL is signed. Arrangements made for pt to be transported to [facility] at 12pm via [facility]. Pt, wife Anjali Swanson and [facility] liaison informed of arrangements. N-N [id number]  JORGE LUIS Avelar RN

## 2023-09-03 ENCOUNTER — APPOINTMENT (OUTPATIENT)
Dept: GENERAL RADIOLOGY | Age: 73
DRG: 544 | End: 2023-09-03
Payer: MEDICARE

## 2023-09-03 ENCOUNTER — APPOINTMENT (OUTPATIENT)
Dept: CT IMAGING | Age: 73
DRG: 544 | End: 2023-09-03
Payer: MEDICARE

## 2023-09-03 ENCOUNTER — HOSPITAL ENCOUNTER (INPATIENT)
Age: 73
LOS: 2 days | Discharge: NURSING, REHAB OR LONG-TERM CARE | DRG: 544 | End: 2023-09-05
Attending: EMERGENCY MEDICINE | Admitting: INTERNAL MEDICINE
Payer: MEDICARE

## 2023-09-03 ENCOUNTER — APPOINTMENT (OUTPATIENT)
Dept: ULTRASOUND IMAGING | Age: 73
DRG: 544 | End: 2023-09-03
Payer: MEDICARE

## 2023-09-03 DIAGNOSIS — S82.142A TIBIAL PLATEAU FRACTURE, LEFT, CLOSED, INITIAL ENCOUNTER: Primary | ICD-10-CM

## 2023-09-03 DIAGNOSIS — F41.9 ANXIETY: ICD-10-CM

## 2023-09-03 LAB
ABO + RH BLD: NORMAL
ARM BAND NUMBER: NORMAL
BLOOD BANK SAMPLE EXPIRATION: NORMAL
BLOOD GROUP ANTIBODIES SERPL: NEGATIVE
ERYTHROCYTE [DISTWIDTH] IN BLOOD BY AUTOMATED COUNT: 14.6 % (ref 11.5–15)
HCT VFR BLD AUTO: 26.4 % (ref 37–54)
HGB BLD-MCNC: 8.9 G/DL (ref 12.5–16.5)
INR PPP: 1.1
MCH RBC QN AUTO: 32.2 PG (ref 26–35)
MCHC RBC AUTO-ENTMCNC: 33.7 G/DL (ref 32–34.5)
MCV RBC AUTO: 95.7 FL (ref 80–99.9)
PARTIAL THROMBOPLASTIN TIME: 33.6 SEC (ref 24.5–35.1)
PLATELET # BLD AUTO: 225 K/UL (ref 130–450)
PMV BLD AUTO: 8.4 FL (ref 7–12)
PROTHROMBIN TIME: 12.2 SEC (ref 9.3–12.4)
RBC # BLD AUTO: 2.76 M/UL (ref 3.8–5.8)
WBC OTHER # BLD: 4.6 K/UL (ref 4.5–11.5)

## 2023-09-03 PROCEDURE — 6370000000 HC RX 637 (ALT 250 FOR IP): Performed by: INTERNAL MEDICINE

## 2023-09-03 PROCEDURE — 73700 CT LOWER EXTREMITY W/O DYE: CPT

## 2023-09-03 PROCEDURE — 85610 PROTHROMBIN TIME: CPT

## 2023-09-03 PROCEDURE — 85730 THROMBOPLASTIN TIME PARTIAL: CPT

## 2023-09-03 PROCEDURE — 86850 RBC ANTIBODY SCREEN: CPT

## 2023-09-03 PROCEDURE — 36415 COLL VENOUS BLD VENIPUNCTURE: CPT

## 2023-09-03 PROCEDURE — 85027 COMPLETE CBC AUTOMATED: CPT

## 2023-09-03 PROCEDURE — 86900 BLOOD TYPING SEROLOGIC ABO: CPT

## 2023-09-03 PROCEDURE — 1200000000 HC SEMI PRIVATE

## 2023-09-03 PROCEDURE — 87040 BLOOD CULTURE FOR BACTERIA: CPT

## 2023-09-03 PROCEDURE — 93971 EXTREMITY STUDY: CPT

## 2023-09-03 PROCEDURE — 86901 BLOOD TYPING SEROLOGIC RH(D): CPT

## 2023-09-03 PROCEDURE — 99285 EMERGENCY DEPT VISIT HI MDM: CPT

## 2023-09-03 PROCEDURE — 73610 X-RAY EXAM OF ANKLE: CPT

## 2023-09-03 RX ORDER — EZETIMIBE 10 MG/1
10 TABLET ORAL DAILY
Status: DISCONTINUED | OUTPATIENT
Start: 2023-09-04 | End: 2023-09-05 | Stop reason: HOSPADM

## 2023-09-03 RX ORDER — ATORVASTATIN CALCIUM 40 MG/1
40 TABLET, FILM COATED ORAL NIGHTLY
Status: DISCONTINUED | OUTPATIENT
Start: 2023-09-03 | End: 2023-09-05 | Stop reason: HOSPADM

## 2023-09-03 RX ORDER — LORAZEPAM 0.5 MG/1
0.5 TABLET ORAL NIGHTLY PRN
Status: DISCONTINUED | OUTPATIENT
Start: 2023-09-03 | End: 2023-09-03 | Stop reason: SDUPTHER

## 2023-09-03 RX ORDER — BISACODYL 10 MG
10 SUPPOSITORY, RECTAL RECTAL DAILY PRN
COMMUNITY

## 2023-09-03 RX ORDER — DOCUSATE SODIUM 100 MG/1
100 CAPSULE, LIQUID FILLED ORAL EVERY 12 HOURS
Status: DISCONTINUED | OUTPATIENT
Start: 2023-09-03 | End: 2023-09-05 | Stop reason: HOSPADM

## 2023-09-03 RX ORDER — SODIUM CHLORIDE 1 G/1
1 TABLET ORAL
COMMUNITY

## 2023-09-03 RX ORDER — ATORVASTATIN CALCIUM 40 MG/1
40 TABLET, FILM COATED ORAL NIGHTLY
COMMUNITY
End: 2023-09-20 | Stop reason: SDUPTHER

## 2023-09-03 RX ORDER — POLYETHYLENE GLYCOL 3350 17 G/17G
17 POWDER, FOR SOLUTION ORAL DAILY PRN
COMMUNITY

## 2023-09-03 RX ORDER — SODIUM CHLORIDE 0.9 % (FLUSH) 0.9 %
5-40 SYRINGE (ML) INJECTION PRN
Status: DISCONTINUED | OUTPATIENT
Start: 2023-09-03 | End: 2023-09-05 | Stop reason: HOSPADM

## 2023-09-03 RX ORDER — 0.9 % SODIUM CHLORIDE 0.9 %
500 INTRAVENOUS SOLUTION INTRAVENOUS ONCE
Status: DISCONTINUED | OUTPATIENT
Start: 2023-09-03 | End: 2023-09-05 | Stop reason: HOSPADM

## 2023-09-03 RX ORDER — GLUCAGON 1 MG/ML
1 KIT INJECTION PRN
Status: DISCONTINUED | OUTPATIENT
Start: 2023-09-03 | End: 2023-09-05 | Stop reason: HOSPADM

## 2023-09-03 RX ORDER — ACETAMINOPHEN 650 MG/1
650 SUPPOSITORY RECTAL EVERY 6 HOURS PRN
Status: DISCONTINUED | OUTPATIENT
Start: 2023-09-03 | End: 2023-09-05 | Stop reason: HOSPADM

## 2023-09-03 RX ORDER — SODIUM CHLORIDE 1 G/1
1 TABLET ORAL
Status: DISCONTINUED | OUTPATIENT
Start: 2023-09-04 | End: 2023-09-05 | Stop reason: HOSPADM

## 2023-09-03 RX ORDER — DOCUSATE SODIUM 100 MG/1
100 CAPSULE, LIQUID FILLED ORAL EVERY 12 HOURS
COMMUNITY

## 2023-09-03 RX ORDER — LISINOPRIL 5 MG/1
10 TABLET ORAL DAILY
COMMUNITY

## 2023-09-03 RX ORDER — LISINOPRIL 10 MG/1
10 TABLET ORAL DAILY
Status: DISCONTINUED | OUTPATIENT
Start: 2023-09-04 | End: 2023-09-05 | Stop reason: HOSPADM

## 2023-09-03 RX ORDER — POLYETHYLENE GLYCOL 3350 17 G/17G
17 POWDER, FOR SOLUTION ORAL DAILY PRN
Status: DISCONTINUED | OUTPATIENT
Start: 2023-09-03 | End: 2023-09-05 | Stop reason: HOSPADM

## 2023-09-03 RX ORDER — ACETAMINOPHEN 325 MG/1
650 TABLET ORAL EVERY 6 HOURS PRN
Status: DISCONTINUED | OUTPATIENT
Start: 2023-09-03 | End: 2023-09-05 | Stop reason: HOSPADM

## 2023-09-03 RX ORDER — SODIUM CHLORIDE 0.9 % (FLUSH) 0.9 %
5-40 SYRINGE (ML) INJECTION EVERY 12 HOURS SCHEDULED
Status: DISCONTINUED | OUTPATIENT
Start: 2023-09-03 | End: 2023-09-05 | Stop reason: HOSPADM

## 2023-09-03 RX ORDER — DEXTROSE MONOHYDRATE 100 MG/ML
INJECTION, SOLUTION INTRAVENOUS CONTINUOUS PRN
Status: DISCONTINUED | OUTPATIENT
Start: 2023-09-03 | End: 2023-09-05 | Stop reason: HOSPADM

## 2023-09-03 RX ORDER — ACETAMINOPHEN 325 MG/1
650 TABLET ORAL EVERY 6 HOURS PRN
COMMUNITY

## 2023-09-03 RX ORDER — SODIUM CHLORIDE 9 MG/ML
INJECTION, SOLUTION INTRAVENOUS PRN
Status: DISCONTINUED | OUTPATIENT
Start: 2023-09-03 | End: 2023-09-05 | Stop reason: HOSPADM

## 2023-09-03 RX ORDER — ASPIRIN 81 MG/1
81 TABLET, CHEWABLE ORAL DAILY
Status: DISCONTINUED | OUTPATIENT
Start: 2023-09-04 | End: 2023-09-05 | Stop reason: HOSPADM

## 2023-09-03 RX ORDER — PANTOPRAZOLE SODIUM 40 MG/1
40 TABLET, DELAYED RELEASE ORAL DAILY
Status: DISCONTINUED | OUTPATIENT
Start: 2023-09-04 | End: 2023-09-05 | Stop reason: HOSPADM

## 2023-09-03 RX ORDER — METOPROLOL SUCCINATE 25 MG/1
25 TABLET, EXTENDED RELEASE ORAL DAILY
Status: DISCONTINUED | OUTPATIENT
Start: 2023-09-04 | End: 2023-09-05 | Stop reason: HOSPADM

## 2023-09-03 RX ORDER — ENOXAPARIN SODIUM 100 MG/ML
40 INJECTION SUBCUTANEOUS DAILY
Status: ON HOLD | COMMUNITY
End: 2023-09-05 | Stop reason: HOSPADM

## 2023-09-03 RX ORDER — LORAZEPAM 0.5 MG/1
0.5 TABLET ORAL NIGHTLY PRN
Status: DISCONTINUED | OUTPATIENT
Start: 2023-09-03 | End: 2023-09-05 | Stop reason: HOSPADM

## 2023-09-03 RX ADMIN — LORAZEPAM 0.5 MG: 0.5 TABLET ORAL at 23:00

## 2023-09-03 RX ADMIN — ATORVASTATIN CALCIUM 40 MG: 40 TABLET, FILM COATED ORAL at 22:59

## 2023-09-03 ASSESSMENT — ENCOUNTER SYMPTOMS
SHORTNESS OF BREATH: 0
CHEST TIGHTNESS: 0

## 2023-09-03 ASSESSMENT — PAIN - FUNCTIONAL ASSESSMENT: PAIN_FUNCTIONAL_ASSESSMENT: NONE - DENIES PAIN

## 2023-09-03 NOTE — ED NOTES
Nursing supervisor Sly Maravilla from [facility] called and updated on pt condition.      Radha Laura, RN  09/03/23 2467

## 2023-09-03 NOTE — ED NOTES
Floor RN requested IV access. Pt states that he does not want an IV or IV medication. Pt refusing IV access at this time.  Notified 3rd floor of pts request.      Mil Quiroz, RN  09/03/23 409 [address], RN  09/03/23 7477

## 2023-09-03 NOTE — CONSULTS
Department of Orthopedic Surgery  Consult Note    Reason for Consult:  Left Knee pain    HISTORY OF PRESENT ILLNESS:       Patient is a 75 y.o. male who presents with left knee pain after sustaining a fall August 25, 2023, reports hitting head but denies loss of consciousness. States that he was finishing cutting his lawn when he lost his balance and fell in the front of his driveway. Unable to weight bear on the left knee afterwards. Denies use of blood thinners. Patient was brought to ED and evaluated, x-rays were taken did not identify any acute fractures or dislocations. He was admitted for inability to ambulate, nausea and vomiting by the Medicine service and was later disc harge to a subacute rehab facility. Patient was sent to [facility] and was performing physical therapy throughout the week. The pain has not improved. Patient subsequently returned to ED for repeat evaluation. CT scan was taken identifying a lateral tibial plateau fracture leading to orthopaedic involvement this time. Patient reports prior lumbar surgery approximately 2 years ago which altered his ambulatory status. Since that time patient has had to use assistive devices such as walker, cart for grocery store, etc.  Also reports left ankle pain sustained following his fall. denies numbness/tingling/paresthesias. Denies any other orthopedic complaints at this time.       Past Medical History:        Diagnosis Date    Arthritis     Dislocated shoulder, right     GERD (gastroesophageal reflux disease) 01/04/2015    Hyperlipidemia     Hypoglycemia     Hyponatremia     Liver disease     Mixed hyperlipidemia 01/04/2015    New onset seizure ([address]) 04/17/2019     Past Surgical History:        Procedure Laterality Date    COLON SURGERY      COLONOSCOPY      INCISIONAL HERNIA REPAIR N/A 10/13/2016    INGUINAL HERNIA REPAIR Left 02/19/2013    laparoscopic left inguinal hernia repair    NERVE BLOCK Left 06/03/2021    LEFT S1 TRANSFORAMINAL

## 2023-09-04 LAB
ALBUMIN SERPL-MCNC: 3.4 G/DL (ref 3.5–5.2)
ALP SERPL-CCNC: 67 U/L (ref 40–129)
ALT SERPL-CCNC: 20 U/L (ref 0–40)
ANION GAP SERPL CALCULATED.3IONS-SCNC: 11 MMOL/L (ref 7–16)
AST SERPL-CCNC: 28 U/L (ref 0–39)
BASOPHILS # BLD: 0.02 K/UL (ref 0–0.2)
BASOPHILS NFR BLD: 1 % (ref 0–2)
BILIRUB SERPL-MCNC: 0.6 MG/DL (ref 0–1.2)
BUN SERPL-MCNC: 13 MG/DL (ref 6–23)
CALCIUM SERPL-MCNC: 8.9 MG/DL (ref 8.6–10.2)
CHLORIDE SERPL-SCNC: 93 MMOL/L (ref 98–107)
CO2 SERPL-SCNC: 24 MMOL/L (ref 22–29)
CREAT SERPL-MCNC: 0.8 MG/DL (ref 0.7–1.2)
EOSINOPHIL # BLD: 0.11 K/UL (ref 0.05–0.5)
EOSINOPHILS RELATIVE PERCENT: 3 % (ref 0–6)
ERYTHROCYTE [DISTWIDTH] IN BLOOD BY AUTOMATED COUNT: 14.6 % (ref 11.5–15)
ERYTHROCYTE [SEDIMENTATION RATE] IN BLOOD BY WESTERGREN METHOD: 32 MM/HR (ref 0–15)
GFR SERPL CREATININE-BSD FRML MDRD: >60 ML/MIN/1.73M2
GLUCOSE SERPL-MCNC: 90 MG/DL (ref 74–99)
HCT VFR BLD AUTO: 27 % (ref 37–54)
HGB BLD-MCNC: 9 G/DL (ref 12.5–16.5)
IMM GRANULOCYTES # BLD AUTO: <0.03 K/UL (ref 0–0.58)
IMM GRANULOCYTES NFR BLD: 1 % (ref 0–5)
LYMPHOCYTES NFR BLD: 1.18 K/UL (ref 1.5–4)
LYMPHOCYTES RELATIVE PERCENT: 27 % (ref 20–42)
MAGNESIUM SERPL-MCNC: 1.6 MG/DL (ref 1.6–2.6)
MCH RBC QN AUTO: 32 PG (ref 26–35)
MCHC RBC AUTO-ENTMCNC: 33.3 G/DL (ref 32–34.5)
MCV RBC AUTO: 96.1 FL (ref 80–99.9)
MONOCYTES NFR BLD: 0.56 K/UL (ref 0.1–0.95)
MONOCYTES NFR BLD: 13 % (ref 2–12)
NEUTROPHILS NFR BLD: 57 % (ref 43–80)
NEUTS SEG NFR BLD: 2.51 K/UL (ref 1.8–7.3)
PHOSPHATE SERPL-MCNC: 3.4 MG/DL (ref 2.5–4.5)
PLATELET # BLD AUTO: 249 K/UL (ref 130–450)
PMV BLD AUTO: 8.4 FL (ref 7–12)
POTASSIUM SERPL-SCNC: 3.9 MMOL/L (ref 3.5–5)
PROCALCITONIN SERPL-MCNC: 0.07 NG/ML (ref 0–0.08)
PROT SERPL-MCNC: 6.3 G/DL (ref 6.4–8.3)
RBC # BLD AUTO: 2.81 M/UL (ref 3.8–5.8)
SODIUM SERPL-SCNC: 128 MMOL/L (ref 132–146)
T4 FREE SERPL-MCNC: 1.4 NG/DL (ref 0.9–1.7)
TSH SERPL DL<=0.05 MIU/L-ACNC: 3.64 UIU/ML (ref 0.76–16.11)
WBC OTHER # BLD: 4.4 K/UL (ref 4.5–11.5)

## 2023-09-04 PROCEDURE — 1200000000 HC SEMI PRIVATE

## 2023-09-04 PROCEDURE — 6370000000 HC RX 637 (ALT 250 FOR IP): Performed by: INTERNAL MEDICINE

## 2023-09-04 PROCEDURE — 86140 C-REACTIVE PROTEIN: CPT

## 2023-09-04 PROCEDURE — 84439 ASSAY OF FREE THYROXINE: CPT

## 2023-09-04 PROCEDURE — 84145 PROCALCITONIN (PCT): CPT

## 2023-09-04 PROCEDURE — 85652 RBC SED RATE AUTOMATED: CPT

## 2023-09-04 PROCEDURE — 84100 ASSAY OF PHOSPHORUS: CPT

## 2023-09-04 PROCEDURE — 84443 ASSAY THYROID STIM HORMONE: CPT

## 2023-09-04 PROCEDURE — 85025 COMPLETE CBC W/AUTO DIFF WBC: CPT

## 2023-09-04 PROCEDURE — 80053 COMPREHEN METABOLIC PANEL: CPT

## 2023-09-04 PROCEDURE — 83735 ASSAY OF MAGNESIUM: CPT

## 2023-09-04 PROCEDURE — 36415 COLL VENOUS BLD VENIPUNCTURE: CPT

## 2023-09-04 RX ORDER — OXYCODONE HYDROCHLORIDE 5 MG/1
5 TABLET ORAL EVERY 6 HOURS PRN
Qty: 28 TABLET | Refills: 0 | Status: SHIPPED | OUTPATIENT
Start: 2023-09-04 | End: 2023-09-11

## 2023-09-04 RX ORDER — POTASSIUM CHLORIDE 7.45 MG/ML
10 INJECTION INTRAVENOUS PRN
Status: DISCONTINUED | OUTPATIENT
Start: 2023-09-04 | End: 2023-09-05 | Stop reason: HOSPADM

## 2023-09-04 RX ORDER — OXYCODONE HYDROCHLORIDE AND ACETAMINOPHEN 5; 325 MG/1; MG/1
1 TABLET ORAL EVERY 4 HOURS PRN
Status: DISCONTINUED | OUTPATIENT
Start: 2023-09-04 | End: 2023-09-05 | Stop reason: HOSPADM

## 2023-09-04 RX ORDER — MAGNESIUM SULFATE IN WATER 40 MG/ML
2000 INJECTION, SOLUTION INTRAVENOUS PRN
Status: DISCONTINUED | OUTPATIENT
Start: 2023-09-04 | End: 2023-09-05 | Stop reason: HOSPADM

## 2023-09-04 RX ORDER — ONDANSETRON 4 MG/1
4 TABLET, ORALLY DISINTEGRATING ORAL EVERY 8 HOURS PRN
Status: DISCONTINUED | OUTPATIENT
Start: 2023-09-04 | End: 2023-09-05 | Stop reason: HOSPADM

## 2023-09-04 RX ORDER — POTASSIUM CHLORIDE 20 MEQ/1
40 TABLET, EXTENDED RELEASE ORAL PRN
Status: DISCONTINUED | OUTPATIENT
Start: 2023-09-04 | End: 2023-09-05 | Stop reason: HOSPADM

## 2023-09-04 RX ORDER — ASPIRIN 81 MG/1
81 TABLET ORAL 2 TIMES DAILY
Qty: 30 TABLET | Refills: 0 | Status: SHIPPED | OUTPATIENT
Start: 2023-09-04

## 2023-09-04 RX ADMIN — LISINOPRIL 10 MG: 10 TABLET ORAL at 09:13

## 2023-09-04 RX ADMIN — LORAZEPAM 0.5 MG: 0.5 TABLET ORAL at 21:56

## 2023-09-04 RX ADMIN — ACETAMINOPHEN 650 MG: 325 TABLET ORAL at 21:56

## 2023-09-04 RX ADMIN — DOCUSATE SODIUM 100 MG: 100 CAPSULE, LIQUID FILLED ORAL at 21:56

## 2023-09-04 RX ADMIN — ACETAMINOPHEN 650 MG: 325 TABLET ORAL at 15:43

## 2023-09-04 RX ADMIN — METOPROLOL SUCCINATE 25 MG: 25 TABLET, EXTENDED RELEASE ORAL at 09:13

## 2023-09-04 RX ADMIN — Medication 1 G: at 16:25

## 2023-09-04 RX ADMIN — EZETIMIBE 10 MG: 10 TABLET ORAL at 09:13

## 2023-09-04 RX ADMIN — PANTOPRAZOLE SODIUM 40 MG: 40 TABLET, DELAYED RELEASE ORAL at 09:13

## 2023-09-04 RX ADMIN — ACETAMINOPHEN 650 MG: 325 TABLET ORAL at 09:16

## 2023-09-04 RX ADMIN — DOCUSATE SODIUM 100 MG: 100 CAPSULE, LIQUID FILLED ORAL at 09:12

## 2023-09-04 RX ADMIN — ONDANSETRON 4 MG: 4 TABLET, ORALLY DISINTEGRATING ORAL at 09:12

## 2023-09-04 RX ADMIN — ATORVASTATIN CALCIUM 40 MG: 40 TABLET, FILM COATED ORAL at 21:56

## 2023-09-04 ASSESSMENT — PAIN DESCRIPTION - ORIENTATION
ORIENTATION: LEFT
ORIENTATION: LEFT

## 2023-09-04 ASSESSMENT — PAIN SCALES - GENERAL
PAINLEVEL_OUTOF10: 3
PAINLEVEL_OUTOF10: 3

## 2023-09-04 ASSESSMENT — PAIN DESCRIPTION - LOCATION
LOCATION: HIP;LEG
LOCATION: LEG

## 2023-09-04 ASSESSMENT — PAIN DESCRIPTION - DESCRIPTORS
DESCRIPTORS: ACHING;SORE;TENDER
DESCRIPTORS: ACHING;SORE;TENDER

## 2023-09-04 NOTE — H&P
Department of Internal Medicine  History and Physical    PCP: Jose G Brody DO  Admitting Physician: Dr. oH Doty  Consultants:   Date of Service: 9/3/2023    CHIEF COMPLAINT:  left leg and ankle pain    HISTORY OF PRESENT ILLNESS:    Patient is a 78-year-old male who presented to the ED from [facility] rehab due to pain while ambulating. Patient describes pain at the knee and below including the ankle with ambulation. He was recently admitted to the hospital here with similar complaints and discharged to [facility] for rehab. While there he did rehab almost daily and was practicing his ambulation with assistance. However he had excruciating pain while ambulating. As such he presented back to the ED for further evaluation management. He does have significant swelling in his leg which appears to be stable. He has leg is tender to touch./Palpation.   He denies any fever or chills    PAST MEDICAL Hx:  Past Medical History:   Diagnosis Date    Arthritis     Dislocated shoulder, right     GERD (gastroesophageal reflux disease) 01/04/2015    Hyperlipidemia     Hypoglycemia     Hyponatremia     Liver disease     Mixed hyperlipidemia 01/04/2015    New onset seizure ([address]) 04/17/2019       PAST SURGICAL Hx:   Past Surgical History:   Procedure Laterality Date    COLON SURGERY      COLONOSCOPY      INCISIONAL HERNIA REPAIR N/A 10/13/2016    INGUINAL HERNIA REPAIR Left 02/19/2013    laparoscopic left inguinal hernia repair    NERVE BLOCK Left 06/03/2021    LEFT S1 TRANSFORAMINAL EPIDURAL STEROID INJECTION(REQUESTS LAST APPOINMENT) performed by DO Beatris at [facility] Left 07/08/2021    LEFT L5-S1 TRANSFORAMINAL EPIDURAL STEROID INJECTION(WANTS LATER) performed by DO Beatris at [address] [place] [address]  2015    Whipple procedure    AR LAPS COLECTOMY ABDL W/PROCTECTOMY W/ILEOSTOMY N/A 08/20/2018    DIAGNOSTIC LAPAROSCOPY POSS LAPAROTOMY POSS BOWEL RESECTION

## 2023-09-04 NOTE — PLAN OF CARE
Problem: Discharge Planning  Goal: Discharge to home or other facility with appropriate resources  Outcome: Progressing  Flowsheets (Taken 9/3/2023 1832 by Ori Montesinos RN)  Discharge to home or other facility with appropriate resources: Identify barriers to discharge with patient and caregiver     Problem: Safety - Adult  Goal: Free from fall injury  Outcome: Progressing     Problem: ABCDS Injury Assessment  Goal: Absence of physical injury  Outcome: Progressing

## 2023-09-04 NOTE — PROGRESS NOTES
this time the risks outweigh any potential benefit that would be obtained from surgical treatment. Questions regarding this were addressed at bedside. Patient was satisfied with this plan. Continue physical therapy and protocol: NWB - LLE in knee immobilizer, will transition to a hinged knee brace set at 0-30 where he will remain nonweightbearing  Deep venous thrombosis prophylaxis - aspirin 81 mg 2 times daily at discharge, early mobilization  PT/OT appreciated  Pain Control: IV and PO, wean as able  Patient may be discharged from orthopedic surgery perspective. He will follow-up with us in clinic in 2 weeks for repeat imaging and evaluation.

## 2023-09-04 NOTE — PROGRESS NOTES
Patient information faxed to [address] per their request. Hinge knee brace to be delivered to patient room either later today or tomorrow.

## 2023-09-04 NOTE — DISCHARGE INSTRUCTIONS
Your information:  Name: Narda Weiss  : 1950    Your instructions:  Discharge to [facility] surgery discharge instructions  Nonweightbearing to left lower extremity, maintain hinged knee brace, this should be set for 0-30 degrees of motion  Ice the extremity and elevate as able to reduce swelling  Take pain medications as needed, wean as able  Take aspirin 81 mg 2 times daily for prevention of blood clots  Follow-up in the orthopedic clinic in 2 weeks for repeat imaging and evaluation. At that time anticipate we will increase the range of motion to your knee brace. Signs and symptoms to watch out for:  Call 911 anytime you think you may need emergency care. For example, call if:    You passed out (lost consciousness). You have severe trouble breathing. You have sudden chest pain and shortness of breath, or you cough up blood. Call your doctor now or seek immediate medical care if:    You have new or worse pain. Your foot is cool or pale or changes color. You have tingling, weakness, or numbness in your toes. Your brace or splint feels too tight. You have signs of a blood clot in your leg (called a deep vein thrombosis), such as:  Pain in your calf, back of the knee, thigh, or groin. Swelling in the leg or groin. A color change on the leg or groin. The skin may be reddish or purplish, depending on your usual skin color. Watch closely for changes in your health, and be sure to contact your doctor if:    You have a problem with your splint or brace. You do not get better as expected. You have problems with medicine.      What to do after you leave the hospital:    Recommended diet: regular diet    Recommended activity: Non weight bearing to the left leg        The following personal items were collected during your admission and were returned to you:    Belongings  Dental Appliances: None  Vision - Corrective Lenses: Eyeglasses  Hearing Aid:

## 2023-09-04 NOTE — PLAN OF CARE
Problem: Discharge Planning  Goal: Discharge to home or other facility with appropriate resources  9/4/2023 1001 by Briseida Avalos RN  Outcome: Progressing  9/4/2023 0303 by Fabi Gomez RN  Outcome: Progressing  Flowsheets (Taken 9/3/2023 1832 by Remigio Blas RN)  Discharge to home or other facility with appropriate resources: Identify barriers to discharge with patient and caregiver     Problem: Safety - Adult  Goal: Free from fall injury  9/4/2023 1001 by Briseida Avalos RN  Outcome: Progressing  9/4/2023 0303 by Fabi Gomez RN  Outcome: Progressing     Problem: ABCDS Injury Assessment  Goal: Absence of physical injury  9/4/2023 1001 by Briseida Avalos RN  Outcome: Progressing  9/4/2023 0303 by Fabi Gomez RN  Outcome: Progressing

## 2023-09-04 NOTE — PROGRESS NOTES
Internal Medicine Progress Note    [facility]    Kris Dietz. Jen Deluna., F.MARIO.ISIDRO.O.I. Gabe Reese D.O., TRAYOALBERTA. Shashi Rea D.O. Genny Santacruz, MSN, APRN, NP-C  Florestine Gilford. Abbe Marcelo, MSN, APRN-CNP     Primary Care Physician: Rufina Mckeon DO   Admitting Physician:  Luca Samson DO  Admission date and time: 9/3/2023  9:43 AM    Room:  [facility]-  Admitting diagnosis: Tibial plateau fracture, left, closed, initial encounter [S82.142A]    Patient Name: Keshia Saeed  MRN: [id number]    Date of Service: 9/4/2023     Subjective:  Soham Marquez is a 75 y.o. male who was seen and examined today,9/4/2023, at the bedside. Soham Marquez presented back to the hospital yesterday from acute rehabilitation with worsening and intractable knee pain with inability to bear weight. Tibial plateau fracture has been identified and the orthopedic team has provided consultation. Plans are for definitive orthopedic repair tomorrow. Review of System:   Constitutional:   Denies fever or chills, weight loss or gain, fatigue or malaise. HEENT:   Denies ear pain, sore throat, sinus or eye problems. Cardiovascular:   Denies any chest pain, irregular heartbeats, or palpitations. Respiratory:   Denies shortness of breath, coughing, sputum production, hemoptysis, or wheezing. Gastrointestinal:   Denies nausea, vomiting, diarrhea, or constipation. Denies any abdominal pain. Genitourinary:    Denies any urgency, frequency, hematuria. Voiding  without difficulty. Extremities:   Left lower extremity swelling and pain to palpation with bruising identified. Neurology:    Denies any headache or focal neurological deficits, Denies generalized weakness or memory difficulty. Psch:   Denies being anxious or depressed. Musculoskeletal:    Denies  myalgias, joint complaints or back pain. Integumentary:   Denies any rashes, ulcers, or excoriations. Denies bruising.   Hematologic/Lymphatic:  Denies underwent cardiovascular work-up during his last hospitalization in the setting of elevated troponin. Echocardiogram is up-to-date as well. We will monitor chronic comorbidities and provide appropriate pain control. I anticipate the need to return to [facility] following surgical intervention. We will ask the discharge planning team to begin the discharge planning process. Continue current therapy. See orders for further plan of care. More than 50% of my time was spent at the bedside counseling/coordinating care with the patient and/or family with face to face contact. This time was spent reviewing notes and laboratory data as well as instructing and counseling the patient. Time I spent with the family or surrogate(s) is included only if the patient was incapable of providing the necessary information or participating in medical decisions. I also discussed the differential diagnosis and all of the proposed management plans with the patient and individuals accompanying the patient. I am readily available for any further decision-making and intervention.        Sosa Baron DO, LUCIANO.C.O.I.  9/4/2023  6:41 AM

## 2023-09-05 VITALS
BODY MASS INDEX: 23.05 KG/M2 | RESPIRATION RATE: 20 BRPM | WEIGHT: 135 LBS | TEMPERATURE: 98.1 F | SYSTOLIC BLOOD PRESSURE: 119 MMHG | DIASTOLIC BLOOD PRESSURE: 77 MMHG | OXYGEN SATURATION: 100 % | HEIGHT: 64 IN | HEART RATE: 79 BPM

## 2023-09-05 LAB
ALBUMIN SERPL-MCNC: 3.2 G/DL (ref 3.5–5.2)
ALP SERPL-CCNC: 65 U/L (ref 40–129)
ALT SERPL-CCNC: 24 U/L (ref 0–40)
ANION GAP SERPL CALCULATED.3IONS-SCNC: 10 MMOL/L (ref 7–16)
AST SERPL-CCNC: 33 U/L (ref 0–39)
BASOPHILS # BLD: 0.03 K/UL (ref 0–0.2)
BASOPHILS NFR BLD: 1 % (ref 0–2)
BILIRUB SERPL-MCNC: 0.6 MG/DL (ref 0–1.2)
BUN SERPL-MCNC: 15 MG/DL (ref 6–23)
CALCIUM SERPL-MCNC: 9 MG/DL (ref 8.6–10.2)
CHLORIDE SERPL-SCNC: 93 MMOL/L (ref 98–107)
CO2 SERPL-SCNC: 23 MMOL/L (ref 22–29)
CREAT SERPL-MCNC: 1 MG/DL (ref 0.7–1.2)
CRP SERPL HS-MCNC: 60 MG/L (ref 0–5)
EOSINOPHIL # BLD: 0.17 K/UL (ref 0.05–0.5)
EOSINOPHILS RELATIVE PERCENT: 3 % (ref 0–6)
ERYTHROCYTE [DISTWIDTH] IN BLOOD BY AUTOMATED COUNT: 14.4 % (ref 11.5–15)
GFR SERPL CREATININE-BSD FRML MDRD: >60 ML/MIN/1.73M2
GLUCOSE SERPL-MCNC: 96 MG/DL (ref 74–99)
HCT VFR BLD AUTO: 25.8 % (ref 37–54)
HGB BLD-MCNC: 8.9 G/DL (ref 12.5–16.5)
IMM GRANULOCYTES # BLD AUTO: 0.03 K/UL (ref 0–0.58)
IMM GRANULOCYTES NFR BLD: 1 % (ref 0–5)
LYMPHOCYTES NFR BLD: 1.32 K/UL (ref 1.5–4)
LYMPHOCYTES RELATIVE PERCENT: 21 % (ref 20–42)
MAGNESIUM SERPL-MCNC: 1.6 MG/DL (ref 1.6–2.6)
MCH RBC QN AUTO: 32.8 PG (ref 26–35)
MCHC RBC AUTO-ENTMCNC: 34.5 G/DL (ref 32–34.5)
MCV RBC AUTO: 95.2 FL (ref 80–99.9)
MONOCYTES NFR BLD: 0.83 K/UL (ref 0.1–0.95)
MONOCYTES NFR BLD: 13 % (ref 2–12)
NEUTROPHILS NFR BLD: 62 % (ref 43–80)
NEUTS SEG NFR BLD: 3.89 K/UL (ref 1.8–7.3)
PHOSPHATE SERPL-MCNC: 3.4 MG/DL (ref 2.5–4.5)
PLATELET # BLD AUTO: 263 K/UL (ref 130–450)
PMV BLD AUTO: 8.4 FL (ref 7–12)
POTASSIUM SERPL-SCNC: 4.4 MMOL/L (ref 3.5–5)
PROT SERPL-MCNC: 5.9 G/DL (ref 6.4–8.3)
RBC # BLD AUTO: 2.71 M/UL (ref 3.8–5.8)
SODIUM SERPL-SCNC: 126 MMOL/L (ref 132–146)
WBC OTHER # BLD: 6.3 K/UL (ref 4.5–11.5)

## 2023-09-05 PROCEDURE — 84100 ASSAY OF PHOSPHORUS: CPT

## 2023-09-05 PROCEDURE — 97535 SELF CARE MNGMENT TRAINING: CPT

## 2023-09-05 PROCEDURE — 6370000000 HC RX 637 (ALT 250 FOR IP): Performed by: INTERNAL MEDICINE

## 2023-09-05 PROCEDURE — 85025 COMPLETE CBC W/AUTO DIFF WBC: CPT

## 2023-09-05 PROCEDURE — 83735 ASSAY OF MAGNESIUM: CPT

## 2023-09-05 PROCEDURE — 97530 THERAPEUTIC ACTIVITIES: CPT

## 2023-09-05 PROCEDURE — 36415 COLL VENOUS BLD VENIPUNCTURE: CPT

## 2023-09-05 PROCEDURE — 97161 PT EVAL LOW COMPLEX 20 MIN: CPT | Performed by: PHYSICAL THERAPIST

## 2023-09-05 PROCEDURE — 80053 COMPREHEN METABOLIC PANEL: CPT

## 2023-09-05 PROCEDURE — 97165 OT EVAL LOW COMPLEX 30 MIN: CPT

## 2023-09-05 PROCEDURE — 97530 THERAPEUTIC ACTIVITIES: CPT | Performed by: PHYSICAL THERAPIST

## 2023-09-05 RX ORDER — LORAZEPAM 0.5 MG/1
0.5 TABLET ORAL NIGHTLY PRN
Qty: 5 TABLET | Refills: 0 | Status: SHIPPED | OUTPATIENT
Start: 2023-09-05 | End: 2023-09-10

## 2023-09-05 RX ADMIN — Medication 1 G: at 16:41

## 2023-09-05 RX ADMIN — EZETIMIBE 10 MG: 10 TABLET ORAL at 16:41

## 2023-09-05 RX ADMIN — ACETAMINOPHEN 650 MG: 325 TABLET ORAL at 08:34

## 2023-09-05 RX ADMIN — METOPROLOL SUCCINATE 25 MG: 25 TABLET, EXTENDED RELEASE ORAL at 08:36

## 2023-09-05 RX ADMIN — PANTOPRAZOLE SODIUM 40 MG: 40 TABLET, DELAYED RELEASE ORAL at 09:00

## 2023-09-05 RX ADMIN — ONDANSETRON 4 MG: 4 TABLET, ORALLY DISINTEGRATING ORAL at 10:33

## 2023-09-05 RX ADMIN — ACETAMINOPHEN 650 MG: 325 TABLET ORAL at 15:37

## 2023-09-05 RX ADMIN — LISINOPRIL 10 MG: 10 TABLET ORAL at 08:36

## 2023-09-05 ASSESSMENT — PAIN DESCRIPTION - LOCATION
LOCATION: LEG
LOCATION: LEG

## 2023-09-05 ASSESSMENT — PAIN - FUNCTIONAL ASSESSMENT
PAIN_FUNCTIONAL_ASSESSMENT: PREVENTS OR INTERFERES SOME ACTIVE ACTIVITIES AND ADLS
PAIN_FUNCTIONAL_ASSESSMENT: PREVENTS OR INTERFERES SOME ACTIVE ACTIVITIES AND ADLS

## 2023-09-05 ASSESSMENT — PAIN SCALES - GENERAL
PAINLEVEL_OUTOF10: 2
PAINLEVEL_OUTOF10: 0
PAINLEVEL_OUTOF10: 2
PAINLEVEL_OUTOF10: 1
PAINLEVEL_OUTOF10: 4
PAINLEVEL_OUTOF10: 1

## 2023-09-05 ASSESSMENT — PAIN DESCRIPTION - DESCRIPTORS
DESCRIPTORS: ACHING;DULL;CRAMPING
DESCRIPTORS: ACHING;DULL

## 2023-09-05 ASSESSMENT — PAIN SCALES - WONG BAKER
WONGBAKER_NUMERICALRESPONSE: 0

## 2023-09-05 ASSESSMENT — LIFESTYLE VARIABLES
HOW MANY STANDARD DRINKS CONTAINING ALCOHOL DO YOU HAVE ON A TYPICAL DAY: 1 OR 2
HOW OFTEN DO YOU HAVE A DRINK CONTAINING ALCOHOL: 2-4 TIMES A MONTH

## 2023-09-05 ASSESSMENT — PAIN DESCRIPTION - ORIENTATION
ORIENTATION: LEFT
ORIENTATION: LEFT

## 2023-09-05 NOTE — DISCHARGE SUMMARY
and vomtiing, hx of pancreaitic disease Additional Contrast?->None Decision Support Exception - unselect if not a suspected or confirmed emergency medical condition->Emergency Medical Condition (MA) FINDINGS: Lower Chest:  Visualized portion of the lower chest demonstrates no acute abnormality. There is thickening of the distal esophageal wall. Organs: The liver, spleen adrenals, and kidneys are grossly unremarkable. The gallbladder is surgically absent. The pancreatic head is surgically absent. GI/Bowel: There is no evidence of bowel obstruction. No evidence of abnormal bowel wall thickening or distension. The appendix is normal.  There are scattered diverticula. Pelvis: The urinary bladder is distended. The prostate is unremarkable. Peritoneum/Retroperitoneum: No evidence of ascites or free air. No evidence of lymphadenopathy. Aorta is normal in caliber. Bones/Soft Tissues:  No acute abnormality of the visualized osseous structures. Postsurgical changes of L4-S1 posterior fixation. There is grade 1 anterolisthesis of L5 on S1, unchanged from prior examination. No acute abdominopelvic abnormality. Thickening of the distal esophageal wall, which could suggest esophagitis. US DUP LOWER EXTREMITY LEFT REBEL    Result Date: 9/3/2023  EXAMINATION: DUPLEX VENOUS ULTRASOUND OF THE LEFT LOWER EXTREMITY 9/3/2023 10:37 am TECHNIQUE: Duplex ultrasound using B-mode/gray scaled imaging and Doppler spectral analysis and color flow was obtained of the deep venous structures of the left lower extremity. COMPARISON: 08/25/2023 HISTORY: ORDERING SYSTEM PROVIDED HISTORY: ro dvt TECHNOLOGIST PROVIDED HISTORY: [facility] Imaging Performing Department->[facility] Ultrasound Reason for exam:->ro dvt What reading provider will be dictating this exam?->CRC FINDINGS: The visualized veins of the left lower extremity are patent and free of echogenic thrombus.  The veins demonstrate good compressibility with normal color flow study and to PREVENT ANY MISSED DOSES  Associated Diagnoses: Chronic right shoulder pain      pantoprazole (PROTONIX) 40 MG tablet Take 1 tablet by mouth daily  Qty: 90 tablet, Refills: 1    Associated Diagnoses: Hypoglycemia      ezetimibe (ZETIA) 10 MG tablet Take 1 tablet by mouth daily  Qty: 90 tablet, Refills: 3    Associated Diagnoses: Mixed hyperlipidemia      ondansetron (ZOFRAN-ODT) 4 MG disintegrating tablet Take 1 tablet by mouth 3 times daily as needed for Nausea or Vomiting  Qty: 21 tablet, Refills: 0    Associated Diagnoses: Diarrhea of presumed infectious origin      therapeutic multivitamin-minerals (THERAGRAN-M) tablet Take 1 tablet by mouth daily             FOLLOW UP/INSTRUCTIONS:  This patient is instructed to follow-up with his primary care physician. Patient is instructed to follow-up with the consults listed above as directed by them. he is instructed to resume home medications and take new medications as indicated in the list above. If the patient has a recurrence of symptoms, he is instructed to go to the ED. Preparing for this patient's discharge, including paperwork, orders, instructions, and meeting with patient did require > 40 minutes.     Kya Oneil DO   9/5/2023  12:10 PM

## 2023-09-05 NOTE — PROGRESS NOTES
Physical Therapy  Physical Therapy Initial Evaluation/Plan of Care    Room #:  0313/0313-02  Patient Name: Shelby Pepe  YOB: 1950  MRN: [id number]    Date of Service: 9/5/2023     Tentative placement recommendation: Acute Rehab  Equipment recommendation: To be determined      Evaluating Physical Therapist: Sean Carvajal PT, DPT #[id number]      Specific Provider Orders/Date/Referring Provider :     09/04/23 0800    PT eval and treat  Start:  09/04/23 0800,   End:  09/04/23 0800,   ONE TIME,   Standing Count:  1 Occurrences,   R         Sosa Baron DO Acknowledge New     Admitting Diagnosis:   Tibial plateau fracture, left, closed, initial encounter [S82.142A]      Surgery: none  Visit Diagnoses         Codes    Anxiety     F41.9            Patient Active Problem List   Diagnosis    GERD (gastroesophageal reflux disease)    Mixed hyperlipidemia    Lumbago    Arthritis    Polyp of colon    Hyperlipidemia    Hernia, incisional    Hypoglycemia    Small bowel obstruction ([address])    Humerus fracture    Lumbar radiculitis    DDD (degenerative disc disease), lumbosacral    H/O Whipple procedure    History of lumbosacral spine surgery    ALFRED (acute kidney injury) ([address])    Moderate protein-calorie malnutrition ([address])    Rhabdomyolysis    Falls frequently    Tibial plateau fracture        ASSESSMENT of Current Deficits Patient exhibits decreased strength, balance, and endurance impairing functional mobility, transfers, gait , gait distance, and tolerance to activity. Pt required Johnna for bed mobility and transfer and was able to perform transfer and side step along the bed while largely remaining NWB during session. Pt tolerance for function limited by 8/10 dizziness sitting up EOB during session with increased time needed for all mobility.         PHYSICAL THERAPY  PLAN OF CARE       Physical therapy plan of care is established based on physician order,  patient diagnosis and clinical assessment    Current

## 2023-09-05 NOTE — ACP (ADVANCE CARE PLANNING)
Advance Care Planning   Healthcare Decision Maker:    Primary Decision Maker: Nupurrichi Matilda - [phone]    Secondary Decision Maker: NEHEMIAH CORREA [facility] - [phone]    Click here to complete Healthcare Decision Makers including selection of the Healthcare Decision Maker Relationship (ie \"Primary\"). Today we documented Decision Maker(s) consistent with Legal Next of Kin hierarchy.

## 2023-09-05 NOTE — PROGRESS NOTES
[facility]  [address] [place]. [place]        Date:2023                                                  Patient Name: Shelby Pepe    MRN: [id number]    : 1950    Room: [address] [place] [address]      Evaluating OT: Marta Pena OTR/L; [id number]     Referring Provider and Specific Provider Orders/Date:      23 08  OT eval and treat  Start:  23 0800,   End:  23 08,   ONE TIME,   Standing Count:  1 Occurrences,   R         Sosa Baron DO      Placement Recommendation: Acute Rehab        Diagnosis:   1. Tibial plateau fracture    2.  Anxiety         Surgery: none       Pertinent Medical History:       Past Medical History:   Diagnosis Date    Arthritis     Dislocated shoulder, right     GERD (gastroesophageal reflux disease) 2015    Hyperlipidemia     Hypoglycemia     Hyponatremia     Liver disease     Mixed hyperlipidemia 2015    New onset seizure ([address]) 2019         Past Surgical History:   Procedure Laterality Date    COLON SURGERY      COLONOSCOPY      INCISIONAL HERNIA REPAIR N/A 10/13/2016    INGUINAL HERNIA REPAIR Left 2013    laparoscopic left inguinal hernia repair    NERVE BLOCK Left 2021    LEFT S1 TRANSFORAMINAL EPIDURAL STEROID INJECTION(REQUESTS LAST APPOINMENT) performed by Lola Enriquez DO at [facility] Left 2021    LEFT L5-S1 TRANSFORAMINAL EPIDURAL STEROID INJECTION(WANTS LATER) performed by Lola Enriquez DO at [address] [place] [address]      Whipple procedure    IA LAPS COLECTOMY ABDL W/PROCTECTOMY W/ILEOSTOMY N/A 2018    DIAGNOSTIC LAPAROSCOPY POSS LAPAROTOMY POSS BOWEL RESECTION performed by Josephine Fernandez MD at [address] N/A 2018    LAPAROSCOPIC REVISION OF GASTROJEJUNOSTOMY; INTRAOPERATIVE EGD performed by Josephine Fernandez MD

## 2023-09-05 NOTE — PROGRESS NOTES
Physician Progress Note      PATIENT:               Maryanne Bolden  CSN #:                  [id number]  :                       1950  ADMIT DATE:       9/3/2023 9:43 AM  DISCH DATE:  Loree Cruz  PROVIDER #:        Stacey PAZ DO          QUERY TEXT:      Pt admitted with Closed left bicondylar tibial plateau fracture. Pt noted to   have osteoporosis in CT Knee left WO contrast in 9/3. If possible, please   document in progress notes and discharge summary if you are evaluating and/or   treating any of the following: The medical record reflects the following:    Risk Factors: Advance age, Osteopenia,    Clinical Indicators: Orthopedic surgery consult,  \" left knee pain after   sustaining a fall 2023; Acute minimally depressed fracture of the   lateral tibial plateau with small knee joint effusion resulting in the   inability to ambulate; Plans are for definitive orthopedic repair tomorrow\". Treatment:  Celebrex, Orthopedic Surgery consult, Plan ORIF    Thank you,  Lizbeth García RN, CCDS, [facility]  Certified Clinical Documentation   [phone]  you can also reach me by perfect serve. Options provided:  -- l Closed left bicondylar tibial plateau fracture  due to osteopenia   following fall which would not usually break a normal, healthy bone  -- Osteoporotic Closed left bicondylar tibial plateau fracture following fall   which would not usually break a normal, healthy bone  -- Traumatic Closed left bicondylar tibial plateau fracture  -- Other - I will add my own diagnosis  -- Disagree - Not applicable / Not valid  -- Disagree - Clinically unable to determine / Unknown  -- Refer to Clinical Documentation Reviewer    PROVIDER RESPONSE TEXT:    This patient has a Closed left bicondylar tibial plateau fracture due to   osteopenia following fall which would not usually break a normal, healthy   bone.     Query created by: Danielle Schwarz on 2023 9:55

## 2023-09-05 NOTE — PLAN OF CARE
Problem: Discharge Planning  Goal: Discharge to home or other facility with appropriate resources  9/5/2023 1533 by Mateo Hermosillo RN  Outcome: Adequate for Discharge     Problem: Safety - Adult  Goal: Free from fall injury  9/5/2023 1533 by Mateo Hermosillo RN  Outcome: Adequate for Discharge     Problem: ABCDS Injury Assessment  Goal: Absence of physical injury  9/5/2023 1533 by Mateo Hermosillo RN  Outcome: Adequate for Discharge     Problem: Skin/Tissue Integrity  Goal: Absence of new skin breakdown  9/5/2023 1533 by Mateo Hermosillo RN  Outcome: Adequate for Discharge

## 2023-09-05 NOTE — CARE COORDINATION
9/5/2023: SS Note/Discharge plan: READMIT DONE:  Baudilio Rosales admissions for [facility] confirming pt's return for today at 3:[facility]pm [facility], pt informed and notified his wife of his transfer, ROMMEL completed, Nursing notified. Electronically signed by VICKIE Sher on 9/5/2023 at 12:50 PM

## 2023-09-05 NOTE — CARE COORDINATION
Case Management Assessment  Initial Evaluation    Date/Time of Evaluation: 9/5/2023 11:20 AM  Assessment Completed by: VICKIE Lobato    If patient is discharged prior to next notation, then this note serves as note for discharge by case management. Patient Name: Joce Hampton                   YOB: 1950  Diagnosis: Tibial plateau fracture, left, closed, initial encounter Vanessa Landry                   Date / Time: 9/3/2023  9:43 AM    Patient Admission Status: Inpatient   Readmission Risk (Low < 19, Mod (19-27), High > 27): Readmission Risk Score: 21.3    Current PCP: Bacilio Medina, DO  PCP verified by CM? Yes    Chart Reviewed: Yes      History Provided by: Patient  Patient Orientation: Alert and Oriented    Patient Cognition: Alert    Hospitalization in the last 30 days (Readmission):  Yes  Readmission Assessment  Number of Days since last admission?: 1-7 days  Previous Disposition: Acute Rehab  Who is being Interviewed: Patient  What was the patient's/caregiver's perception as to why they think they needed to return back to the hospital?: Other (Comment) (worsening pain when working with therapy at [facility])  Did you visit your Primary Care Physician after you left the hospital, before you returned this time?: No  Why weren't you able to visit your PCP?: Did not have an appointment  Did you see a specialist, such as Cardiac, Pulmonary, Orthopedic Physician, etc. after you left the hospital?: No  Who advised the patient to return to the hospital?: Acute Rehab  Does the patient report anything that got in the way of taking their medications?: No  In our efforts to provide the best possible care to you and others like you, can you think of anything that we could have done to help you after you left the hospital the first time, so that you might not have needed to return so soon?: Other (Comment) (No)   If yes, Readmission Assessment in  Navigator will be completed.     Advance Directives: Code Status: Full Code   Patient's Primary Decision Maker is:      Primary Decision MakeShalini Wang - Spouse - [phone]    Secondary Decision Maker: KEISHA FANG Child - [phone]    Discharge Planning:    Patient lives with: Spouse/Significant Other Type of Home: House  Primary Care Giver: Self  Patient Support Systems include: Spouse/Significant Other   Current Financial resources:    Current community resources:    Current services prior to admission: [address]            Current DME:              Type of Home Care services:  None    ADLS  Prior functional level: Assistance with the following:, Mobility, Bathing, Dressing, Toileting  Current functional level: Assistance with the following:, Mobility, Bathing, Dressing, Toileting    PT AM-PAC:   /24  OT AM-PAC:   /24    Family can provide assistance at DC: Yes  Would you like Case Management to discuss the discharge plan with any other family members/significant others, and if so, who? No  Plans to Return to Present Housing: Yes  Other Identified Issues/Barriers to RETURNING to current housing: Acute rehab return  Potential Assistance needed at discharge: N/A            Potential DME:    Patient expects to discharge to: Acute rehab  Plan for transportation at discharge:      Financial    Payor: Fani Linda / Plan: MEDICARE PART A AND B / Product Type: *No Product type* /     Potential assistance Purchasing Medications: No  Meds-to-Beds request: Yes      [address] [facility], [address] [facility] [phone] - F [phone]  [place] [address]  Phone: [phone] Fax: [phone]      Notes:    Factors facilitating achievement of predicted outcomes:  Motivated, Cooperative, and Pleasant    Barriers to discharge: Pain, Decreased endurance, and Lower extremity weakness    Additional Case Management Notes:   9/5/2023: SS Note/Discharge plan: READMIT DONE:  SS Consult for \"d/c

## 2023-09-05 NOTE — PROGRESS NOTES
OCCUPATIONAL THERAPY BEDSIDE TREATMENT NOTE  [facility]  [address] [place]. [place]    Date:2023  Patient Name: Janie Alfred \"Apollo\"  MRN: [id number]  : 1950  Room: [address] [place] [address]     Evaluating OT: Melvin Iversonter OTR/L; [id number]      Referring Provider and Specific Provider Orders/Date:      23   OT eval and treat  Start:  23 08,   End:  23 08,   ONE TIME,   Standing Count:  1 Occurrences,   R         Leo Plaza DO       Placement Recommendation: Acute Rehab         Diagnosis:   1. Tibial plateau fracture    2.  Anxiety         Surgery: none        Pertinent Medical History:       Past Medical History        Past Medical History:   Diagnosis Date    Arthritis      Dislocated shoulder, right      GERD (gastroesophageal reflux disease) 2015    Hyperlipidemia      Hypoglycemia      Hyponatremia      Liver disease      Mixed hyperlipidemia 2015    New onset seizure ([address]) 2019            Past Surgical History         Past Surgical History:   Procedure Laterality Date    COLON SURGERY        COLONOSCOPY        INCISIONAL HERNIA REPAIR N/A 10/13/2016    INGUINAL HERNIA REPAIR Left 2013     laparoscopic left inguinal hernia repair    NERVE BLOCK Left 2021     LEFT S1 TRANSFORAMINAL EPIDURAL STEROID INJECTION(REQUESTS LAST APPOINMENT) performed by Jacklyn Spencer DO at [facility] Left 2021     LEFT L5-S1 TRANSFORAMINAL EPIDURAL STEROID INJECTION(WANTS LATER) performed by Jacklyn Spencer DO at [address] [place] [address]        Whipple procedure    OR LAPS COLECTOMY ABDL W/PROCTECTOMY W/ILEOSTOMY N/A 2018     DIAGNOSTIC LAPAROSCOPY POSS LAPAROTOMY POSS BOWEL RESECTION performed by Aydin Solares MD at [address] N/A 2018     LAPAROSCOPIC REVISION OF GASTROJEJUNOSTOMY; INTRAOPERATIVE Sitting:     Static: good     Dynamic: fair   Standing: fair minus with wheeled walker   Sitting:     Static: good     Dynamic: good   Standing: good  with wheeled walker   Activity Tolerance Fair  fair  good    Visual/  Perceptual Glasses: no                       Hand Dominance: right        AROM (PROM) Strength Additional Info:  Goal:   RUE  WFL during transfers and ADL tasks 4/5 good  and wfl FMC/dexterity noted during ADL tasks    Improve overall RUE strength  for participation in functional tasks   LUE WFL during transfers and ADL tasks 4/5 good  and wfl FMC/dexterity noted during ADL tasks    Improve overall LUE strength  for participation in functional tasks      Hearing: [facility]   Sensation:  No c/o numbness or tingling  Tone: WFL   Edema: yes - L knee/LLE; pt declined ice, however LLE elevated on RTB     Comments: Patient cleared by nursing staff. Upon arrival pt seated EOB with nsg. Pt agreeable to OT tx session. Pt educated with regards to bed mobility, hand placement, safety awareness, static sitting balance,  standing balance, transfer training, functional mobility with R heel toe shuffle and NWB in LLE,  ADL retraining,  grooming tasks, LE/UE dressing, toileting/hygiene, positioning, ECT's. At end of session pt supine in bed with HOB partially elevated  with all needs including call light within reach. Overall, pt demonstrated decreased independence and safety during completion of ADL/functional transfers/mobility tasks. Pt would benefit from continued skilled OT to increase safety and independence with completion of ADL/IADL tasks for functional independence and quality of life. Pt required cues and education as noted above for safe facilitation and completion of tasks. Therapist provided skilled monitoring of patient's response during treatment session. Prior to and at the end of session, environmental modifications completed for patients safety and efficiency of treatment session.

## 2023-09-09 LAB
MICROORGANISM SPEC CULT: NORMAL
MICROORGANISM SPEC CULT: NORMAL
SERVICE CMNT-IMP: NORMAL
SERVICE CMNT-IMP: NORMAL
SPECIMEN DESCRIPTION: NORMAL
SPECIMEN DESCRIPTION: NORMAL

## 2023-09-17 NOTE — PROGRESS NOTES
Physician Progress Note      PATIENT:               Mehdi Dick  CSN #:                  [id number]  :                       1950  ADMIT DATE:       9/3/2023 9:43 AM  DISCH DATE:        2023 5:13 PM  RESPONDING  PROVIDER #:        Fabi Pena DO          QUERY TEXT:    Patient admitted with lower leg fracture on the left following a fall, noted   to have Sodium levels were 128, 126. If possible, please document in progress   notes and discharge summary if you are evaluating and/or treating any of the   following: The medical record reflects the following:  Risk Factors: lower leg fracture on the left following a fall  Clinical Indicators: Sodium levels were 128, 126 and patient was treated with   salt tablets  Treatment: sodium chloride 1 g Oral Daily before dinner, Labs    Thank you, Coty Stearns RN, CDS ([phone])  Options provided:  -- Hyponatremia  -- Na not clinically significant  -- Other - I will add my own diagnosis  -- Disagree - Not applicable / Not valid  -- Disagree - Clinically unable to determine / Unknown  -- Refer to Clinical Documentation Reviewer    PROVIDER RESPONSE TEXT:    This patient has hyponatremia.     Query created by: Coty Stearns on 2023 12:43 PM      Electronically signed by:  Fabi Pena DO 2023 5:43 AM

## 2023-10-02 DIAGNOSIS — M25.562 LEFT KNEE PAIN, UNSPECIFIED CHRONICITY: Primary | ICD-10-CM

## 2023-10-04 ENCOUNTER — OFFICE VISIT (OUTPATIENT)
Dept: ORTHOPEDIC SURGERY | Age: 73
End: 2023-10-04
Payer: MEDICARE

## 2023-10-04 VITALS — BODY MASS INDEX: 23.05 KG/M2 | HEIGHT: 64 IN | TEMPERATURE: 98 F | WEIGHT: 135 LBS

## 2023-10-04 DIAGNOSIS — S82.142A TIBIAL PLATEAU FRACTURE, LEFT, CLOSED, INITIAL ENCOUNTER: Primary | ICD-10-CM

## 2023-10-04 PROCEDURE — G8427 DOCREV CUR MEDS BY ELIG CLIN: HCPCS | Performed by: ORTHOPAEDIC SURGERY

## 2023-10-04 PROCEDURE — G8484 FLU IMMUNIZE NO ADMIN: HCPCS | Performed by: ORTHOPAEDIC SURGERY

## 2023-10-04 PROCEDURE — 3017F COLORECTAL CA SCREEN DOC REV: CPT | Performed by: ORTHOPAEDIC SURGERY

## 2023-10-04 PROCEDURE — 99213 OFFICE O/P EST LOW 20 MIN: CPT | Performed by: ORTHOPAEDIC SURGERY

## 2023-10-04 PROCEDURE — G8420 CALC BMI NORM PARAMETERS: HCPCS | Performed by: ORTHOPAEDIC SURGERY

## 2023-10-04 PROCEDURE — 1123F ACP DISCUSS/DSCN MKR DOCD: CPT | Performed by: ORTHOPAEDIC SURGERY

## 2023-10-04 PROCEDURE — 1036F TOBACCO NON-USER: CPT | Performed by: ORTHOPAEDIC SURGERY

## 2023-10-04 PROCEDURE — 1111F DSCHRG MED/CURRENT MED MERGE: CPT | Performed by: ORTHOPAEDIC SURGERY

## 2023-10-04 NOTE — PROGRESS NOTES
Chief Complaint:   Chief Complaint   Patient presents with    Knee Pain     Left Knee, had a fall down DOI 08/25/2023, was seen at [facility] ER and has TROM on. Trying to be non weightbearing. Gerald Channel follows up about 6 weeks post injury to his left tibial plateau. He is doing okay. He is not having much discomfort. He is wearing the brace. He does complain about the limited range of motion parameters. Mr. Clarisa Madrigal is accompanied today by his wife and his daughter. Allergies; medications; past medical, surgical, family, and social history; and problem list have been reviewed today and updated as indicated in this encounter seen below. Exam: Skin condition gross neurovascular functions good in the left lower extremity. There is minimal tenderness to palpation around the proximal left tibia. Collaterals are grossly stable. I did take him through a passive range of motion up to about 85 degrees. He was initially apprehensive but then loosened up and moved well. Extension is to 0 degrees. Radiographs: Imaging of the left knee showed no change in position or alignment of bony fragments. The joint space remains the same. ASSESSMENT:    Radhames Fournier was seen today for knee pain. Diagnoses and all orders for this visit:    Tibial plateau fracture        PLAN: Continue with use of the brace and limited weightbearing left lower extremity. I did change the parameters to 0 to 60 degrees of flexion. If he has any feelings of instability with his activities he was instructed how to lock the knee brace    Return in about 4 weeks (around 11/1/2023) for L knee X.        Current Outpatient Medications   Medication Sig Dispense Refill    atorvastatin (LIPITOR) 40 MG tablet Take 1 tablet by mouth nightly 90 tablet 1    acetaminophen (TYLENOL) 325 MG tablet Take 2 tablets by mouth every 6 hours as needed for Pain      sodium chloride 1 g tablet Take 1 tablet by mouth daily (before dinner)

## 2023-10-31 DIAGNOSIS — S82.142A TIBIAL PLATEAU FRACTURE, LEFT, CLOSED, INITIAL ENCOUNTER: Primary | ICD-10-CM

## 2023-11-01 ENCOUNTER — OFFICE VISIT (OUTPATIENT)
Dept: ORTHOPEDIC SURGERY | Age: 73
End: 2023-11-01
Payer: MEDICARE

## 2023-11-01 VITALS — WEIGHT: 135 LBS | BODY MASS INDEX: 23.92 KG/M2 | HEIGHT: 63 IN | TEMPERATURE: 98 F

## 2023-11-01 DIAGNOSIS — S82.142A TIBIAL PLATEAU FRACTURE, LEFT, CLOSED, INITIAL ENCOUNTER: Primary | ICD-10-CM

## 2023-11-01 PROCEDURE — 1036F TOBACCO NON-USER: CPT | Performed by: ORTHOPAEDIC SURGERY

## 2023-11-01 PROCEDURE — 99213 OFFICE O/P EST LOW 20 MIN: CPT | Performed by: ORTHOPAEDIC SURGERY

## 2023-11-01 PROCEDURE — G8420 CALC BMI NORM PARAMETERS: HCPCS | Performed by: ORTHOPAEDIC SURGERY

## 2023-11-01 PROCEDURE — 1123F ACP DISCUSS/DSCN MKR DOCD: CPT | Performed by: ORTHOPAEDIC SURGERY

## 2023-11-01 PROCEDURE — 3017F COLORECTAL CA SCREEN DOC REV: CPT | Performed by: ORTHOPAEDIC SURGERY

## 2023-11-01 PROCEDURE — G8484 FLU IMMUNIZE NO ADMIN: HCPCS | Performed by: ORTHOPAEDIC SURGERY

## 2023-11-01 PROCEDURE — G8427 DOCREV CUR MEDS BY ELIG CLIN: HCPCS | Performed by: ORTHOPAEDIC SURGERY

## 2023-11-01 NOTE — PROGRESS NOTES
Transportation     Lack of Transportation (Medical): No     Lack of Transportation (Non-Medical): No   Physical Activity: Insufficiently Active (8/23/2022)    Exercise Vital Sign     Days of Exercise per Week: 3 days     Minutes of Exercise per Session: 20 min   Stress: No Stress Concern Present (5/13/2019)    [facility]     Feeling of Stress : Only a little   Social Connections: Moderately Integrated (5/13/2019)    Social Connection and Isolation Panel [NHANES]     Frequency of Communication with Friends and Family: Three times a week     Frequency of Social Gatherings with Friends and Family: Once a week     Attends [employer] Services: 1 to 4 times per year     Active Member of [employer] or Organizations: No     Attends Club or Organization Meetings: Never     Marital Status:    Intimate Partner Violence: Not At Risk (5/13/2019)    Humiliation, Afraid, Rape, and Kick questionnaire     Fear of Current or Ex-Partner: No     Emotionally Abused: No     Physically Abused: No     Sexually Abused: No       Review of Systems  As follows except as previously noted in HPI:  Constitutional: Negative for chills, diaphoresis, fatigue, fever and unexpected weight change. Respiratory: Negative for cough, shortness of breath and wheezing. Cardiovascular: Negative for chest pain and palpitations. Neurological: Negative for dizziness, syncope, cephalgia. GI / : negative  Musculoskeletal: see HPI       Objective:   Physical Exam   Constitutional: Oriented to person, place, and time. and appears well-developed and well-nourished. :   Head: Normocephalic and atraumatic. Eyes: EOM are normal.   Neck: Neck supple. Cardiovascular: Normal rate and regular rhythm. Pulmonary/Chest: Effort normal. No stridor. No respiratory distress, no wheezes. Abdominal:  No abnormal distension. Neurological: Alert and oriented to person, place, and time.    Skin:

## 2023-11-29 ENCOUNTER — OFFICE VISIT (OUTPATIENT)
Dept: ORTHOPEDIC SURGERY | Age: 73
End: 2023-11-29
Payer: MEDICARE

## 2023-11-29 VITALS — WEIGHT: 135 LBS | HEIGHT: 62 IN | BODY MASS INDEX: 24.84 KG/M2 | TEMPERATURE: 98 F

## 2023-11-29 DIAGNOSIS — S82.142A TIBIAL PLATEAU FRACTURE, LEFT, CLOSED, INITIAL ENCOUNTER: Primary | ICD-10-CM

## 2023-11-29 PROCEDURE — G8420 CALC BMI NORM PARAMETERS: HCPCS | Performed by: ORTHOPAEDIC SURGERY

## 2023-11-29 PROCEDURE — 1036F TOBACCO NON-USER: CPT | Performed by: ORTHOPAEDIC SURGERY

## 2023-11-29 PROCEDURE — 3017F COLORECTAL CA SCREEN DOC REV: CPT | Performed by: ORTHOPAEDIC SURGERY

## 2023-11-29 PROCEDURE — G8427 DOCREV CUR MEDS BY ELIG CLIN: HCPCS | Performed by: ORTHOPAEDIC SURGERY

## 2023-11-29 PROCEDURE — 99213 OFFICE O/P EST LOW 20 MIN: CPT | Performed by: ORTHOPAEDIC SURGERY

## 2023-11-29 PROCEDURE — 1123F ACP DISCUSS/DSCN MKR DOCD: CPT | Performed by: ORTHOPAEDIC SURGERY

## 2023-11-29 PROCEDURE — G8484 FLU IMMUNIZE NO ADMIN: HCPCS | Performed by: ORTHOPAEDIC SURGERY

## 2023-11-29 NOTE — PROGRESS NOTES
Chief Complaint:   Chief Complaint   Patient presents with    Knee Pain     F/u left tibial plateau fx       [facility] [place] follows 3 months post left tibial plateau fracture. Is doing well. He is not having any pain. He says getting up he feels a little bit wobbly. He still using the brace with the hinges 3 to move. Allergies; medications; past medical, surgical, family, and social history; and problem list have been reviewed today and updated as indicated in this encounter seen below. Exam: There is no tenderness palpation in the left tibial plateau. The joint is grossly stable. His range of motion is 0 to over 90 degrees of flexion with no discomfort. Radiographs: None    ASSESSMENT:    Ashley Grayson was seen today for knee pain. Diagnoses and all orders for this visit:    Tibial plateau fracture  -     [facility] - Physical Therapy, [address] [place] [address]        PLAN: Will refer Mr. Frank Hernandez for outpatient physical therapy. He may wean himself from the left leg brace. He should definitely continue with his walker to maintain stability and prevent falls. Return in about 4 weeks (around 12/27/2023).        Current Outpatient Medications   Medication Sig Dispense Refill    Omega-3 Fatty Acids (FISH OIL) 1000 MG capsule Take 1 capsule by mouth in the morning, at noon, in the evening, and at bedtime 360 capsule 1    ezetimibe (ZETIA) 10 MG tablet Take 1 tablet by mouth daily 90 tablet 3    diclofenac sodium (VOLTAREN) 1 % GEL Apply 2 g topically 4 times daily 150 g 1    atorvastatin (LIPITOR) 40 MG tablet Take 1 tablet by mouth nightly 90 tablet 1    acetaminophen (TYLENOL) 325 MG tablet Take 2 tablets by mouth every 6 hours as needed for Pain      sodium chloride 1 g tablet Take 1 tablet by mouth daily (before dinner)      celecoxib (CELEBREX) 200 MG capsule TAKE 1 CAPSULE BY MOUTH DAILY 90 capsule 1    pantoprazole (PROTONIX) 40 MG tablet Take 1 tablet by mouth daily 90 tablet 1    ondansetron (ZOFRAN-ODT) 4

## 2023-12-04 ENCOUNTER — EVALUATION (OUTPATIENT)
Dept: PHYSICAL THERAPY | Age: 73
End: 2023-12-04
Payer: MEDICARE

## 2023-12-04 DIAGNOSIS — S82.142A TIBIAL PLATEAU FRACTURE, LEFT, CLOSED, INITIAL ENCOUNTER: Primary | ICD-10-CM

## 2023-12-04 PROCEDURE — 97112 NEUROMUSCULAR REEDUCATION: CPT | Performed by: PHYSICAL THERAPIST

## 2023-12-04 PROCEDURE — 97162 PT EVAL MOD COMPLEX 30 MIN: CPT | Performed by: PHYSICAL THERAPIST

## 2023-12-04 NOTE — PROGRESS NOTES
[facility] OUTPATIENT REHABILITATION  PHYSICAL THERAPY INITIAL EVALUATION         Date:  2023   Patient: Myah Cain  :   MRN: [id number]  Referring Provider: Tarik Marquez DO  [address] [place] [address]     Medical Diagnosis:      Diagnosis Orders   1. Tibial plateau fracture, left, closed, initial encounter            Physician Order: Eval and Treat      SUBJECTIVE:     Onset date: 23    Onset: Sudden     History / Mechanism of Injury: Arthea Barter down steps 23; x-rays were negative. He went to [facility] for PT. While there, pain worsened. He was returned to hospital; tibial plateau fracture identified by Dr. Polly Keller. Patient opted for non-operative treatment which was NWB in brace. Weightbearing has progressed from NWB to partial (50%) to full. He was told he could wean from brace at last Ortho appt (23). He stopped using it that day with no ill effects. Chief complaint: decreased ROM, weakness, inability / limited ability to use leg, difficulty walking, difficulty with stairs, limited ability to complete ADLs (bathing, toileting , transferring , walking), limited ability to lift/carry/handle material, limited ability to complete home/outdoor chores/tasks, decreased endurance, decreased balance, fear of falling      Pain:  Pain 0/10      Imaging results: No results found.     Past Medical History  Past Medical History:   Diagnosis Date    Arthritis     Dislocated shoulder, right     GERD (gastroesophageal reflux disease) 2015    Hyperlipidemia     Hypoglycemia     Hyponatremia     Liver disease     Mixed hyperlipidemia 2015    New onset seizure ([address]) 2019     Past Surgical History:   Procedure Laterality Date    COLON SURGERY      COLONOSCOPY      INCISIONAL HERNIA REPAIR N/A 10/13/2016    INGUINAL HERNIA REPAIR Left 2013    laparoscopic left inguinal hernia repair    NERVE BLOCK Left 2021    LEFT

## 2024-01-03 ENCOUNTER — TREATMENT (OUTPATIENT)
Dept: PHYSICAL THERAPY | Age: 74
End: 2024-01-03
Payer: MEDICARE

## 2024-01-03 ENCOUNTER — OFFICE VISIT (OUTPATIENT)
Dept: ORTHOPEDIC SURGERY | Age: 74
End: 2024-01-03
Payer: COMMERCIAL

## 2024-01-03 VITALS — WEIGHT: 135 LBS | HEIGHT: 62 IN | TEMPERATURE: 98 F | BODY MASS INDEX: 24.84 KG/M2

## 2024-01-03 DIAGNOSIS — S82.142A TIBIAL PLATEAU FRACTURE, LEFT, CLOSED, INITIAL ENCOUNTER: Primary | ICD-10-CM

## 2024-01-03 PROCEDURE — 1123F ACP DISCUSS/DSCN MKR DOCD: CPT | Performed by: ORTHOPAEDIC SURGERY

## 2024-01-03 PROCEDURE — 97110 THERAPEUTIC EXERCISES: CPT

## 2024-01-03 PROCEDURE — 99213 OFFICE O/P EST LOW 20 MIN: CPT | Performed by: ORTHOPAEDIC SURGERY

## 2024-01-03 NOTE — PROGRESS NOTES
Physical Therapy Daily Treatment Note    Date: 1/3/2024  Patient Name: Mark Owusu  : 1950   MRN: [id number]  DOInjury: 23  DOSx: --  Referring Provider: CHAS Santiago DO   [facility] [address]     Medical Diagnosis:      Diagnosis Orders   1. Tibial plateau fracture, left, closed, initial encounter            Patient is 3 months post tibial plateau fracture .  Patient has minimal limitations in both flexion and extension as well as mild edema along with impaired strength, function, gait.  Treatment will start with methods to control swelling as well as AROM.  Stretching will be used as needed.  Progression to strengthening, functional training, gait device advancement (patient would like to get rid of walker), and balance / proprioception exercises will occur once motion and edema are under control.         Access Code: [email or web address]  URL: [email or web address]  Date: 2023  Prepared by: Yesenia Peterson    Program Notes  Proper Elevation-- Elevate limb on a stable stack of blankets / pillows so leg is higher than heart.-- Do this for 30 minutes, 2-3 times a day-- You may apply ice for the first 15 minutes, then remove it while you continue your elevation-- Be sure to lie flat in bed or on a couch with a comfortable pillow under your head.  Lying back in a recliner is not helpful.-- You may prop your head up if you cannot tolerate lying completely flat, but only as high as necessary for comfort.    Exercises  - Supine Heel Slide  - 2 x daily - 3 sets - 10 reps  - Supine Quad Set  - 2 x daily - 3 sets - 10 reps - 5 sec hold  - Supine Straight Leg Raises  - 2 x daily - 3 sets - 10 reps  - Seated Long Arc Quad  - 2 x daily - 3 sets - 10 reps  - Standing march with counter support option  - 2 x daily - 1-2 sets - 20 reps  - Heel Raises with Counter Support  - 2 x daily - 2-3 sets - 10 reps    X = TO BE PERFORMED NEXT VISIT  > = PROGRESS TO THIS    S: Pt reports general

## 2024-01-03 NOTE — PROGRESS NOTES
Chief Complaint:   Chief Complaint   Patient presents with    Knee Pain     Left Tibial plateau fx follow up. Knee is doing well. Has some balance problems at times. Ambulating with walker now. He has stopped wearing a brace now. Not having any pain at this time.        Mark Owusu follows about 4 months post injury to his left tibia.  He is doing pretty well.  He is not complaining of any pain in the tibia.  He continues to use a walker because he has a little bit of balance issues averse to falling again.      Allergies; medications; past medical, surgical, family, and social history; and problem list have been reviewed today and updated as indicated in this encounter seen below.    Exam: There is no tenderness to palpation around the proximal left tibia.  Knee range of motion is full with no instability and no pain with stress examination.  There is no swelling or discoloration.    Radiographs: None    ASSESSMENT:    Mark was seen today for knee pain.    Diagnoses and all orders for this visit:    Tibial plateau fracture        PLAN: Activity as tolerated.  He should continue to use ambulatory aids as necessary for stability and to prevent falls.    Return if symptoms worsen or fail to improve.       Current Outpatient Medications   Medication Sig Dispense Refill    Omega-3 Fatty Acids (FISH OIL) 1000 MG capsule Take 1 capsule by mouth in the morning, at noon, in the evening, and at bedtime 360 capsule 1    ezetimibe (ZETIA) 10 MG tablet Take 1 tablet by mouth daily 90 tablet 3    diclofenac sodium (VOLTAREN) 1 % GEL Apply 2 g topically 4 times daily 150 g 1    atorvastatin (LIPITOR) 40 MG tablet Take 1 tablet by mouth nightly 90 tablet 1    acetaminophen (TYLENOL) 325 MG tablet Take 2 tablets by mouth every 6 hours as needed for Pain      sodium chloride 1 g tablet Take 1 tablet by mouth daily (before dinner)      celecoxib (CELEBREX) 200 MG capsule TAKE 1 CAPSULE BY MOUTH DAILY 90 capsule 1    pantoprazole

## 2024-01-05 ENCOUNTER — TREATMENT (OUTPATIENT)
Dept: PHYSICAL THERAPY | Age: 74
End: 2024-01-05

## 2024-01-05 DIAGNOSIS — S82.142A TIBIAL PLATEAU FRACTURE, LEFT, CLOSED, INITIAL ENCOUNTER: Primary | ICD-10-CM

## 2024-01-05 NOTE — PROGRESS NOTES
Physical Therapy Daily Treatment Note    Date: 2024  Patient Name: Mark Owusu  : 1950   MRN: [id number]  DOInjury: 23  DOSx: --  Referring Provider: CHAS Santiago DO   [facility] [place] [address]     Medical Diagnosis:      Diagnosis Orders   1. Tibial plateau fracture, left, closed, initial encounter              Patient is 3 months post tibial plateau fracture .  Patient has minimal limitations in both flexion and extension as well as mild edema along with impaired strength, function, gait.  Treatment will start with methods to control swelling as well as AROM.  Stretching will be used as needed.  Progression to strengthening, functional training, gait device advancement (patient would like to get rid of walker), and balance / proprioception exercises will occur once motion and edema are under control.         Access Code: [email or web address]  URL: [email or web address]  Date: 2023  Prepared by: Yesenia Peterson    Program Notes  Proper Elevation-- Elevate limb on a stable stack of blankets / pillows so leg is higher than heart.-- Do this for 30 minutes, 2-3 times a day-- You may apply ice for the first 15 minutes, then remove it while you continue your elevation-- Be sure to lie flat in bed or on a couch with a comfortable pillow under your head.  Lying back in a recliner is not helpful.-- You may prop your head up if you cannot tolerate lying completely flat, but only as high as necessary for comfort.    Exercises  - Supine Heel Slide  - 2 x daily - 3 sets - 10 reps  - Supine Quad Set  - 2 x daily - 3 sets - 10 reps - 5 sec hold  - Supine Straight Leg Raises  - 2 x daily - 3 sets - 10 reps  - Seated Long Arc Quad  - 2 x daily - 3 sets - 10 reps  - Standing march with counter support option  - 2 x daily - 1-2 sets - 20 reps  - Heel Raises with Counter Support  - 2 x daily - 2-3 sets - 10 reps    X = TO BE PERFORMED NEXT VISIT  > = PROGRESS TO THIS    S: Pt reports having back

## 2024-01-08 ENCOUNTER — TELEPHONE (OUTPATIENT)
Dept: PHYSICAL THERAPY | Age: 74
End: 2024-01-08

## 2024-01-12 ENCOUNTER — TREATMENT (OUTPATIENT)
Dept: PHYSICAL THERAPY | Age: 74
End: 2024-01-12

## 2024-01-12 DIAGNOSIS — S82.142A TIBIAL PLATEAU FRACTURE, LEFT, CLOSED, INITIAL ENCOUNTER: Primary | ICD-10-CM

## 2024-01-12 NOTE — PROGRESS NOTES
Physical Therapy Daily Treatment Note    Date: 2024  Patient Name: Mark Owusu  : 1950   MRN: [id number]  DOInjury: 23  DOSx: --  Referring Provider: CHAS Santiago DO   [facility] [place] [address]     Medical Diagnosis:      Diagnosis Orders   1. Tibial plateau fracture, left, closed, initial encounter              Patient is 3 months post tibial plateau fracture .  Patient has minimal limitations in both flexion and extension as well as mild edema along with impaired strength, function, gait.  Treatment will start with methods to control swelling as well as AROM.  Stretching will be used as needed.  Progression to strengthening, functional training, gait device advancement (patient would like to get rid of walker), and balance / proprioception exercises will occur once motion and edema are under control.         Access Code: [email or web address]  URL: [email or web address]  Date: 2023  Prepared by: Yesenia Peterson    Program Notes  Proper Elevation-- Elevate limb on a stable stack of blankets / pillows so leg is higher than heart.-- Do this for 30 minutes, 2-3 times a day-- You may apply ice for the first 15 minutes, then remove it while you continue your elevation-- Be sure to lie flat in bed or on a couch with a comfortable pillow under your head.  Lying back in a recliner is not helpful.-- You may prop your head up if you cannot tolerate lying completely flat, but only as high as necessary for comfort.    Exercises  - Supine Heel Slide  - 2 x daily - 3 sets - 10 reps  - Supine Quad Set  - 2 x daily - 3 sets - 10 reps - 5 sec hold  - Supine Straight Leg Raises  - 2 x daily - 3 sets - 10 reps  - Seated Long Arc Quad  - 2 x daily - 3 sets - 10 reps  - Standing march with counter support option  - 2 x daily - 1-2 sets - 20 reps  - Heel Raises with Counter Support  - 2 x daily - 2-3 sets - 10 reps    X = TO BE PERFORMED NEXT VISIT  > = PROGRESS TO THIS    S: Pt reports having

## 2024-01-15 ENCOUNTER — TREATMENT (OUTPATIENT)
Dept: PHYSICAL THERAPY | Age: 74
End: 2024-01-15
Payer: MEDICARE

## 2024-01-15 DIAGNOSIS — S82.142A TIBIAL PLATEAU FRACTURE, LEFT, CLOSED, INITIAL ENCOUNTER: Primary | ICD-10-CM

## 2024-01-15 PROCEDURE — 97112 NEUROMUSCULAR REEDUCATION: CPT

## 2024-01-15 NOTE — PROGRESS NOTES
Physical Therapy Daily Treatment Note    Date: 1/15/2024  Patient Name: Mark Owusu  : 1950   MRN: [id number]  DOInjury: 23  DOSx: --  Referring Provider: CHAS Santiago DO   [facility] [place] [address]     Medical Diagnosis:      Diagnosis Orders   1. Tibial plateau fracture, left, closed, initial encounter              Patient is 3 months post tibial plateau fracture .  Patient has minimal limitations in both flexion and extension as well as mild edema along with impaired strength, function, gait.  Treatment will start with methods to control swelling as well as AROM.  Stretching will be used as needed.  Progression to strengthening, functional training, gait device advancement (patient would like to get rid of walker), and balance / proprioception exercises will occur once motion and edema are under control.         Access Code: [email or web address]  URL: [email or web address]  Date: 2023  Prepared by: Yesenia Peterson    Program Notes  Proper Elevation-- Elevate limb on a stable stack of blankets / pillows so leg is higher than heart.-- Do this for 30 minutes, 2-3 times a day-- You may apply ice for the first 15 minutes, then remove it while you continue your elevation-- Be sure to lie flat in bed or on a couch with a comfortable pillow under your head.  Lying back in a recliner is not helpful.-- You may prop your head up if you cannot tolerate lying completely flat, but only as high as necessary for comfort.    Exercises  - Supine Heel Slide  - 2 x daily - 3 sets - 10 reps  - Supine Quad Set  - 2 x daily - 3 sets - 10 reps - 5 sec hold  - Supine Straight Leg Raises  - 2 x daily - 3 sets - 10 reps  - Seated Long Arc Quad  - 2 x daily - 3 sets - 10 reps  - Standing march with counter support option  - 2 x daily - 1-2 sets - 20 reps  - Heel Raises with Counter Support  - 2 x daily - 2-3 sets - 10 reps    X = TO BE PERFORMED NEXT VISIT  > = PROGRESS TO THIS    S: Pt reports feeling

## 2024-01-19 ENCOUNTER — TREATMENT (OUTPATIENT)
Dept: PHYSICAL THERAPY | Age: 74
End: 2024-01-19

## 2024-01-19 DIAGNOSIS — S82.142A TIBIAL PLATEAU FRACTURE, LEFT, CLOSED, INITIAL ENCOUNTER: Primary | ICD-10-CM

## 2024-01-19 NOTE — PROGRESS NOTES
Physical Therapy Daily Treatment Note    Date: 2024  Patient Name: Mark Owusu  : 1950   MRN: [id number]  DOInjury: 23  DOSx: --  Referring Provider: CHAS Santiago DO   [facility] [place] [address]     Medical Diagnosis:      Diagnosis Orders   1. Tibial plateau fracture, left, closed, initial encounter              Patient is 3 months post tibial plateau fracture .  Patient has minimal limitations in both flexion and extension as well as mild edema along with impaired strength, function, gait.  Treatment will start with methods to control swelling as well as AROM.  Stretching will be used as needed.  Progression to strengthening, functional training, gait device advancement (patient would like to get rid of walker), and balance / proprioception exercises will occur once motion and edema are under control.         Access Code: [email or web address]  URL: [email or web address]  Date: 2023  Prepared by: Yesenia Peterson    Program Notes  Proper Elevation-- Elevate limb on a stable stack of blankets / pillows so leg is higher than heart.-- Do this for 30 minutes, 2-3 times a day-- You may apply ice for the first 15 minutes, then remove it while you continue your elevation-- Be sure to lie flat in bed or on a couch with a comfortable pillow under your head.  Lying back in a recliner is not helpful.-- You may prop your head up if you cannot tolerate lying completely flat, but only as high as necessary for comfort.    Exercises  - Supine Heel Slide  - 2 x daily - 3 sets - 10 reps  - Supine Quad Set  - 2 x daily - 3 sets - 10 reps - 5 sec hold  - Supine Straight Leg Raises  - 2 x daily - 3 sets - 10 reps  - Seated Long Arc Quad  - 2 x daily - 3 sets - 10 reps  - Standing march with counter support option  - 2 x daily - 1-2 sets - 20 reps  - Heel Raises with Counter Support  - 2 x daily - 2-3 sets - 10 reps    X = TO BE PERFORMED NEXT VISIT  > = PROGRESS TO THIS    S: Pt reports feeling

## 2024-01-26 ENCOUNTER — TREATMENT (OUTPATIENT)
Dept: PHYSICAL THERAPY | Age: 74
End: 2024-01-26

## 2024-01-26 DIAGNOSIS — S82.142A TIBIAL PLATEAU FRACTURE, LEFT, CLOSED, INITIAL ENCOUNTER: Primary | ICD-10-CM

## 2024-01-26 NOTE — PROGRESS NOTES
Physical Therapy Daily Treatment Note    Date: 2024  Patient Name: Mark Owusu  : 1950   MRN: [id number]  DOInjury: 23  DOSx: --  Referring Provider: CHAS Santiago DO   [facility] [place] [address]     Medical Diagnosis:      Diagnosis Orders   1. Tibial plateau fracture, left, closed, initial encounter              Patient is 3 months post tibial plateau fracture .  Patient has minimal limitations in both flexion and extension as well as mild edema along with impaired strength, function, gait.  Treatment will start with methods to control swelling as well as AROM.  Stretching will be used as needed.  Progression to strengthening, functional training, gait device advancement (patient would like to get rid of walker), and balance / proprioception exercises will occur once motion and edema are under control.         Access Code: [email or web address]  URL: [email or web address]  Date: 2023  Prepared by: Yesenia Peterson    Program Notes  Proper Elevation-- Elevate limb on a stable stack of blankets / pillows so leg is higher than heart.-- Do this for 30 minutes, 2-3 times a day-- You may apply ice for the first 15 minutes, then remove it while you continue your elevation-- Be sure to lie flat in bed or on a couch with a comfortable pillow under your head.  Lying back in a recliner is not helpful.-- You may prop your head up if you cannot tolerate lying completely flat, but only as high as necessary for comfort.    Exercises  - Supine Heel Slide  - 2 x daily - 3 sets - 10 reps  - Supine Quad Set  - 2 x daily - 3 sets - 10 reps - 5 sec hold  - Supine Straight Leg Raises  - 2 x daily - 3 sets - 10 reps  - Seated Long Arc Quad  - 2 x daily - 3 sets - 10 reps  - Standing march with counter support option  - 2 x daily - 1-2 sets - 20 reps  - Heel Raises with Counter Support  - 2 x daily - 2-3 sets - 10 reps    X = TO BE PERFORMED NEXT VISIT  > = PROGRESS TO THIS    S: Pt reports feeling

## 2024-01-29 ENCOUNTER — TREATMENT (OUTPATIENT)
Dept: PHYSICAL THERAPY | Age: 74
End: 2024-01-29
Payer: MEDICARE

## 2024-01-29 DIAGNOSIS — S82.142A TIBIAL PLATEAU FRACTURE, LEFT, CLOSED, INITIAL ENCOUNTER: Primary | ICD-10-CM

## 2024-01-29 PROCEDURE — 97112 NEUROMUSCULAR REEDUCATION: CPT

## 2024-01-29 NOTE — PROGRESS NOTES
Physical Therapy Daily Treatment Note    Date: 2024  Patient Name: Mark Owusu  : 1950   MRN: [id number]  DOInjury: 23  DOSx: --  Referring Provider: CHAS Santiago DO   [facility] [place] [address]     Medical Diagnosis:      Diagnosis Orders   1. Tibial plateau fracture, left, closed, initial encounter                Patient is 3 months post tibial plateau fracture .  Patient has minimal limitations in both flexion and extension as well as mild edema along with impaired strength, function, gait.  Treatment will start with methods to control swelling as well as AROM.  Stretching will be used as needed.  Progression to strengthening, functional training, gait device advancement (patient would like to get rid of walker), and balance / proprioception exercises will occur once motion and edema are under control.         Access Code: [email or web address]  URL: [email or web address]  Date: 2023  Prepared by: Yesenia Peterson    Program Notes  Proper Elevation-- Elevate limb on a stable stack of blankets / pillows so leg is higher than heart.-- Do this for 30 minutes, 2-3 times a day-- You may apply ice for the first 15 minutes, then remove it while you continue your elevation-- Be sure to lie flat in bed or on a couch with a comfortable pillow under your head.  Lying back in a recliner is not helpful.-- You may prop your head up if you cannot tolerate lying completely flat, but only as high as necessary for comfort.    Exercises  - Supine Heel Slide  - 2 x daily - 3 sets - 10 reps  - Supine Quad Set  - 2 x daily - 3 sets - 10 reps - 5 sec hold  - Supine Straight Leg Raises  - 2 x daily - 3 sets - 10 reps  - Seated Long Arc Quad  - 2 x daily - 3 sets - 10 reps  - Standing march with counter support option  - 2 x daily - 1-2 sets - 20 reps  - Heel Raises with Counter Support  - 2 x daily - 2-3 sets - 10 reps    X = TO BE PERFORMED NEXT VISIT  > = PROGRESS TO THIS    S: Pt reports feeling

## 2024-02-02 ENCOUNTER — TREATMENT (OUTPATIENT)
Dept: PHYSICAL THERAPY | Age: 74
End: 2024-02-02

## 2024-02-02 DIAGNOSIS — S82.142A TIBIAL PLATEAU FRACTURE, LEFT, CLOSED, INITIAL ENCOUNTER: Primary | ICD-10-CM

## 2024-02-02 NOTE — PROGRESS NOTES
Physical Therapy Daily Treatment Note    Date: 2024  Patient Name: Mark Owusu  : 1950   MRN: [id number]  DOInjury: 23  DOSx: --  Referring Provider: CHAS Santiago DO   [facility] [place] [address]     Medical Diagnosis:      Diagnosis Orders   1. Tibial plateau fracture, left, closed, initial encounter                Patient is 3 months post tibial plateau fracture .  Patient has minimal limitations in both flexion and extension as well as mild edema along with impaired strength, function, gait.  Treatment will start with methods to control swelling as well as AROM.  Stretching will be used as needed.  Progression to strengthening, functional training, gait device advancement (patient would like to get rid of walker), and balance / proprioception exercises will occur once motion and edema are under control.         Access Code: [email or web address]  URL: [email or web address]  Date: 2023  Prepared by: Yesenia Peterson    Program Notes  Proper Elevation-- Elevate limb on a stable stack of blankets / pillows so leg is higher than heart.-- Do this for 30 minutes, 2-3 times a day-- You may apply ice for the first 15 minutes, then remove it while you continue your elevation-- Be sure to lie flat in bed or on a couch with a comfortable pillow under your head.  Lying back in a recliner is not helpful.-- You may prop your head up if you cannot tolerate lying completely flat, but only as high as necessary for comfort.    Exercises  - Supine Heel Slide  - 2 x daily - 3 sets - 10 reps  - Supine Quad Set  - 2 x daily - 3 sets - 10 reps - 5 sec hold  - Supine Straight Leg Raises  - 2 x daily - 3 sets - 10 reps  - Seated Long Arc Quad  - 2 x daily - 3 sets - 10 reps  - Standing march with counter support option  - 2 x daily - 1-2 sets - 20 reps  - Heel Raises with Counter Support  - 2 x daily - 2-3 sets - 10 reps    X = TO BE PERFORMED NEXT VISIT  > = PROGRESS TO THIS    S: Pt reports feeling

## 2024-02-05 ENCOUNTER — TREATMENT (OUTPATIENT)
Dept: PHYSICAL THERAPY | Age: 74
End: 2024-02-05
Payer: MEDICARE

## 2024-02-05 DIAGNOSIS — S82.142A TIBIAL PLATEAU FRACTURE, LEFT, CLOSED, INITIAL ENCOUNTER: Primary | ICD-10-CM

## 2024-02-05 PROCEDURE — 97112 NEUROMUSCULAR REEDUCATION: CPT

## 2024-02-05 NOTE — PROGRESS NOTES
Physical Therapy Daily Treatment Note    Date: 2024  Patient Name: Mark Owusu  : 1950   MRN: [id number]  DOInjury: 23  DOSx: --  Referring Provider: CHAS Santiago DO   [facility] [address]     Medical Diagnosis:      Diagnosis Orders   1. Tibial plateau fracture, left, closed, initial encounter                Patient is 3 months post tibial plateau fracture .  Patient has minimal limitations in both flexion and extension as well as mild edema along with impaired strength, function, gait.  Treatment will start with methods to control swelling as well as AROM.  Stretching will be used as needed.  Progression to strengthening, functional training, gait device advancement (patient would like to get rid of walker), and balance / proprioception exercises will occur once motion and edema are under control.         Access Code: [email or web address]  URL: [email or web address]  Date: 2023  Prepared by: Yesenia Peterson    Program Notes  Proper Elevation-- Elevate limb on a stable stack of blankets / pillows so leg is higher than heart.-- Do this for 30 minutes, 2-3 times a day-- You may apply ice for the first 15 minutes, then remove it while you continue your elevation-- Be sure to lie flat in bed or on a couch with a comfortable pillow under your head.  Lying back in a recliner is not helpful.-- You may prop your head up if you cannot tolerate lying completely flat, but only as high as necessary for comfort.    Exercises  - Supine Heel Slide  - 2 x daily - 3 sets - 10 reps  - Supine Quad Set  - 2 x daily - 3 sets - 10 reps - 5 sec hold  - Supine Straight Leg Raises  - 2 x daily - 3 sets - 10 reps  - Seated Long Arc Quad  - 2 x daily - 3 sets - 10 reps  - Standing march with counter support option  - 2 x daily - 1-2 sets - 20 reps  - Heel Raises with Counter Support  - 2 x daily - 2-3 sets - 10 reps    X = TO BE PERFORMED NEXT VISIT  > = PROGRESS TO THIS    S: Pt reports feeling

## 2024-02-09 ENCOUNTER — TREATMENT (OUTPATIENT)
Dept: PHYSICAL THERAPY | Age: 74
End: 2024-02-09

## 2024-02-09 DIAGNOSIS — S82.142A TIBIAL PLATEAU FRACTURE, LEFT, CLOSED, INITIAL ENCOUNTER: Primary | ICD-10-CM

## 2024-02-09 NOTE — PROGRESS NOTES
Physical Therapy Daily Treatment Note    Date: 2024  Patient Name: Mark Owusu  : 1950   MRN: [id number]  DOInjury: 23  DOSx: --  Referring Provider: CHAS Santiago DO   [facility] [place] [address]     Medical Diagnosis:      Diagnosis Orders   1. Tibial plateau fracture, left, closed, initial encounter                Patient is 3 months post tibial plateau fracture .  Patient has minimal limitations in both flexion and extension as well as mild edema along with impaired strength, function, gait.  Treatment will start with methods to control swelling as well as AROM.  Stretching will be used as needed.  Progression to strengthening, functional training, gait device advancement (patient would like to get rid of walker), and balance / proprioception exercises will occur once motion and edema are under control.         Access Code: [email or web address]  URL: [email or web address]  Date: 2023  Prepared by: Yesenia Peterson    Program Notes  Proper Elevation-- Elevate limb on a stable stack of blankets / pillows so leg is higher than heart.-- Do this for 30 minutes, 2-3 times a day-- You may apply ice for the first 15 minutes, then remove it while you continue your elevation-- Be sure to lie flat in bed or on a couch with a comfortable pillow under your head.  Lying back in a recliner is not helpful.-- You may prop your head up if you cannot tolerate lying completely flat, but only as high as necessary for comfort.    Exercises  - Supine Heel Slide  - 2 x daily - 3 sets - 10 reps  - Supine Quad Set  - 2 x daily - 3 sets - 10 reps - 5 sec hold  - Supine Straight Leg Raises  - 2 x daily - 3 sets - 10 reps  - Seated Long Arc Quad  - 2 x daily - 3 sets - 10 reps  - Standing march with counter support option  - 2 x daily - 1-2 sets - 20 reps  - Heel Raises with Counter Support  - 2 x daily - 2-3 sets - 10 reps    X = TO BE PERFORMED NEXT VISIT  > = PROGRESS TO THIS    S: Pt reports feeling

## 2024-03-18 ENCOUNTER — INITIAL CONSULT (OUTPATIENT)
Dept: SURGERY | Age: 74
End: 2024-03-18
Payer: MEDICARE

## 2024-03-18 VITALS
HEIGHT: 64 IN | TEMPERATURE: 98 F | HEART RATE: 97 BPM | DIASTOLIC BLOOD PRESSURE: 91 MMHG | WEIGHT: 137 LBS | SYSTOLIC BLOOD PRESSURE: 160 MMHG | BODY MASS INDEX: 23.39 KG/M2

## 2024-03-18 DIAGNOSIS — R10.13 EPIGASTRIC PAIN: Primary | ICD-10-CM

## 2024-03-18 DIAGNOSIS — R11.0 NAUSEA: ICD-10-CM

## 2024-03-18 DIAGNOSIS — Z98.84 HISTORY OF ROUX-EN-Y GASTRIC BYPASS: ICD-10-CM

## 2024-03-18 DIAGNOSIS — R10.13 EPIGASTRIC PAIN: ICD-10-CM

## 2024-03-18 LAB
ANION GAP SERPL CALCULATED.3IONS-SCNC: 13 MMOL/L (ref 7–16)
BUN BLDV-MCNC: 10 MG/DL (ref 6–23)
CALCIUM SERPL-MCNC: 9.3 MG/DL (ref 8.6–10.2)
CHLORIDE BLD-SCNC: 95 MMOL/L (ref 98–107)
CO2: 24 MMOL/L (ref 22–29)
CREAT SERPL-MCNC: 1.1 MG/DL (ref 0.7–1.2)
GFR SERPL CREATININE-BSD FRML MDRD: >60 ML/MIN/1.73M2
GLUCOSE BLD-MCNC: 109 MG/DL (ref 74–99)
POTASSIUM SERPL-SCNC: 4 MMOL/L (ref 3.5–5)
SODIUM BLD-SCNC: 132 MMOL/L (ref 132–146)

## 2024-03-18 PROCEDURE — G8484 FLU IMMUNIZE NO ADMIN: HCPCS | Performed by: SURGERY

## 2024-03-18 PROCEDURE — G8428 CUR MEDS NOT DOCUMENT: HCPCS | Performed by: SURGERY

## 2024-03-18 PROCEDURE — 99214 OFFICE O/P EST MOD 30 MIN: CPT | Performed by: SURGERY

## 2024-03-18 PROCEDURE — 1036F TOBACCO NON-USER: CPT | Performed by: SURGERY

## 2024-03-18 PROCEDURE — 1123F ACP DISCUSS/DSCN MKR DOCD: CPT | Performed by: SURGERY

## 2024-03-18 PROCEDURE — G8420 CALC BMI NORM PARAMETERS: HCPCS | Performed by: SURGERY

## 2024-03-18 PROCEDURE — 3017F COLORECTAL CA SCREEN DOC REV: CPT | Performed by: SURGERY

## 2024-03-18 NOTE — PROGRESS NOTES
chills, fatigue or malaise  ENT ROS: negative for - hearing change, nasal congestion or nasal discharge  Allergy and Immunology ROS: negative for - hives, itchy/watery eyes or nasal congestion  Hematological and Lymphatic ROS: negative for - blood clots, blood transfusions, bruising or fatigue  Endocrine ROS: negative for - malaise/lethargy, mood swings, palpitations or polydipsia/polyuria  Respiratory ROS: negative for - sputum changes, stridor, tachypnea or wheezing  Cardiovascular ROS: negative for - irregular heartbeat, loss of consciousness, murmur or orthopnea  Gastrointestinal ROS: negative for - constipation, diarrhea, gas/bloating, heartburn or hematemesis  Genito-Urinary ROS: negative for -  genital discharge, genital ulcers or hematuria  Musculoskeletal ROS: negative for - gait disturbance, muscle pain or muscular weakness    Physical exam:   There were no vitals taken for this visit.  General appearance:  NAD  Head: NCAT, PERRLA, EOMI, red conjunctiva  Neck: supple, no masses  Lungs: CTAB, equal chest rise bilateral  Heart: Reg rate  Abdomen: soft, nontender, nondistended  Skin; no lesions  Gu: no cva tenderness  Extremities: extremities normal, atraumatic, no cyanosis or edema      Radiology:  CT abdomen/pelvis:   Pending     Assessment:  74 y.o. male with epigastric pain and nausea with hx of RYGB and normal EGD 8-12 months    Plan:  Cbc, cmp, coag and CT scan and if normal will do EGD    Rodney Newberry MD  3:44 PM  3/18/2024

## 2024-04-02 ENCOUNTER — TELEPHONE (OUTPATIENT)
Dept: SURGERY | Age: 74
End: 2024-04-02

## 2024-04-02 NOTE — TELEPHONE ENCOUNTER
Patient had CT Scan done.  Report printed and will be reviewed with Dr. Newberry upon his return.  Patient informed that I would call him after I review with Dr. Newberry.  Electronically signed by Chloe Kwan on 4/2/24 at 1:06 PM EDT

## 2024-04-22 ENCOUNTER — TELEPHONE (OUTPATIENT)
Dept: SURGERY | Age: 74
End: 2024-04-22

## 2024-04-22 NOTE — TELEPHONE ENCOUNTER
Call placed to schedule EGD.  Patient not available, message left.   Electronically signed by Chloe Kwan on 4/22/24 at 8:53 AM EDT

## 2024-05-09 PROBLEM — E44.0 MODERATE PROTEIN-CALORIE MALNUTRITION (HCC): Status: RESOLVED | Noted: 2023-03-17 | Resolved: 2024-05-09

## 2024-05-10 ENCOUNTER — HOSPITAL ENCOUNTER (EMERGENCY)
Age: 74
Discharge: HOME | End: 2024-05-10
Attending: EMERGENCY MEDICINE
Payer: MEDICARE

## 2024-05-10 ENCOUNTER — APPOINTMENT (OUTPATIENT)
Dept: GENERAL RADIOLOGY | Age: 74
End: 2024-05-10
Payer: MEDICARE

## 2024-05-10 VITALS
DIASTOLIC BLOOD PRESSURE: 68 MMHG | RESPIRATION RATE: 18 BRPM | SYSTOLIC BLOOD PRESSURE: 133 MMHG | TEMPERATURE: 97.4 F | HEART RATE: 81 BPM | OXYGEN SATURATION: 97 %

## 2024-05-10 DIAGNOSIS — R55 VASOVAGAL SYNCOPE: Primary | ICD-10-CM

## 2024-05-10 LAB
ANION GAP SERPL CALCULATED.3IONS-SCNC: 13 MMOL/L (ref 7–16)
BASOPHILS # BLD: 0 K/UL (ref 0–0.2)
BASOPHILS NFR BLD: 0 % (ref 0–2)
BUN SERPL-MCNC: 12 MG/DL (ref 6–23)
CALCIUM SERPL-MCNC: 9.2 MG/DL (ref 8.6–10.2)
CHLORIDE SERPL-SCNC: 96 MMOL/L (ref 98–107)
CO2 SERPL-SCNC: 24 MMOL/L (ref 22–29)
CREAT SERPL-MCNC: 1.2 MG/DL (ref 0.7–1.2)
EOSINOPHIL # BLD: 0 K/UL (ref 0.05–0.5)
EOSINOPHILS RELATIVE PERCENT: 0 % (ref 0–6)
ERYTHROCYTE [DISTWIDTH] IN BLOOD BY AUTOMATED COUNT: 13.3 % (ref 11.5–15)
GFR, ESTIMATED: 66 ML/MIN/1.73M2
GLUCOSE SERPL-MCNC: 109 MG/DL (ref 74–99)
HCT VFR BLD AUTO: 35.9 % (ref 37–54)
HGB BLD-MCNC: 12.6 G/DL (ref 12.5–16.5)
LYMPHOCYTES NFR BLD: 0.15 K/UL (ref 1.5–4)
LYMPHOCYTES RELATIVE PERCENT: 2 % (ref 20–42)
MAGNESIUM SERPL-MCNC: 1.9 MG/DL (ref 1.6–2.6)
MCH RBC QN AUTO: 31.4 PG (ref 26–35)
MCHC RBC AUTO-ENTMCNC: 35.1 G/DL (ref 32–34.5)
MCV RBC AUTO: 89.5 FL (ref 80–99.9)
MONOCYTES NFR BLD: 0.45 K/UL (ref 0.1–0.95)
MONOCYTES NFR BLD: 5 % (ref 2–12)
NEUTROPHILS NFR BLD: 93 % (ref 43–80)
NEUTS SEG NFR BLD: 8 K/UL (ref 1.8–7.3)
PLATELET # BLD AUTO: 162 K/UL (ref 130–450)
PMV BLD AUTO: 8.6 FL (ref 7–12)
POTASSIUM SERPL-SCNC: 3.9 MMOL/L (ref 3.5–5)
RBC # BLD AUTO: 4.01 M/UL (ref 3.8–5.8)
RBC # BLD: ABNORMAL 10*6/UL
SODIUM SERPL-SCNC: 133 MMOL/L (ref 132–146)
TROPONIN I SERPL HS-MCNC: 23 NG/L (ref 0–11)
TROPONIN I SERPL HS-MCNC: 25 NG/L (ref 0–11)
WBC OTHER # BLD: 8.6 K/UL (ref 4.5–11.5)

## 2024-05-10 PROCEDURE — 84484 ASSAY OF TROPONIN QUANT: CPT

## 2024-05-10 PROCEDURE — 85025 COMPLETE CBC W/AUTO DIFF WBC: CPT

## 2024-05-10 PROCEDURE — 2580000003 HC RX 258

## 2024-05-10 PROCEDURE — 96374 THER/PROPH/DIAG INJ IV PUSH: CPT

## 2024-05-10 PROCEDURE — 83735 ASSAY OF MAGNESIUM: CPT

## 2024-05-10 PROCEDURE — 93005 ELECTROCARDIOGRAM TRACING: CPT

## 2024-05-10 PROCEDURE — 6360000002 HC RX W HCPCS

## 2024-05-10 PROCEDURE — 99285 EMERGENCY DEPT VISIT HI MDM: CPT

## 2024-05-10 PROCEDURE — 71045 X-RAY EXAM CHEST 1 VIEW: CPT

## 2024-05-10 PROCEDURE — 80048 BASIC METABOLIC PNL TOTAL CA: CPT

## 2024-05-10 RX ORDER — PROCHLORPERAZINE EDISYLATE 5 MG/ML
10 INJECTION INTRAMUSCULAR; INTRAVENOUS ONCE
Status: COMPLETED | OUTPATIENT
Start: 2024-05-10 | End: 2024-05-10

## 2024-05-10 RX ORDER — 0.9 % SODIUM CHLORIDE 0.9 %
1000 INTRAVENOUS SOLUTION INTRAVENOUS ONCE
Status: COMPLETED | OUTPATIENT
Start: 2024-05-10 | End: 2024-05-10

## 2024-05-10 RX ADMIN — PROCHLORPERAZINE EDISYLATE 10 MG: 5 INJECTION INTRAMUSCULAR; INTRAVENOUS at 19:26

## 2024-05-10 RX ADMIN — SODIUM CHLORIDE 1000 ML: 9 INJECTION, SOLUTION INTRAVENOUS at 19:24

## 2024-05-10 ASSESSMENT — LIFESTYLE VARIABLES
HOW OFTEN DO YOU HAVE A DRINK CONTAINING ALCOHOL: NEVER
HOW MANY STANDARD DRINKS CONTAINING ALCOHOL DO YOU HAVE ON A TYPICAL DAY: PATIENT DOES NOT DRINK

## 2024-05-10 ASSESSMENT — PAIN - FUNCTIONAL ASSESSMENT: PAIN_FUNCTIONAL_ASSESSMENT: NONE - DENIES PAIN

## 2024-05-10 NOTE — ED PROVIDER NOTES
Result Value Ref Range    Sodium 133 132 - 146 mmol/L    Potassium 3.9 3.5 - 5.0 mmol/L    Chloride 96 (L) 98 - 107 mmol/L    CO2 24 22 - 29 mmol/L    Anion Gap 13 7 - 16 mmol/L    Glucose 109 (H) 74 - 99 mg/dL    BUN 12 6 - 23 mg/dL    Creatinine 1.2 0.70 - 1.20 mg/dL    Est, Glom Filt Rate 66 >60 mL/min/1.73m2    Calcium 9.2 8.6 - 10.2 mg/dL   Magnesium   Result Value Ref Range    Magnesium 1.9 1.6 - 2.6 mg/dL   Troponin   Result Value Ref Range    Troponin, High Sensitivity 23 (H) 0 - 11 ng/L   EKG 12 Lead   Result Value Ref Range    Ventricular Rate 77 BPM    Atrial Rate 77 BPM    P-R Interval 154 ms    QRS Duration 90 ms    Q-T Interval 402 ms    QTc Calculation (Bazett) 454 ms    P Axis 74 degrees    R Axis 20 degrees    T Axis 23 degrees       Radiology:  XR CHEST 1 VIEW   Final Result   No acute process.             ------------------------- NURSING NOTES AND VITALS REVIEWED ---------------------------  Date / Time Roomed:  5/10/2024  4:59 PM  ED Bed Assignment:  [facility]/H1    The nursing notes within the ED encounter and vital signs as below have been reviewed.   /68   Pulse 81   Temp 97.4 °F (36.3 °C)   Resp 18   SpO2 97%   Oxygen Saturation Interpretation: Normal      ------------------------------------------ PROGRESS NOTES ------------------------------------------  I have spoken with the patient and discussed today’s results, in addition to providing specific details for the plan of care and counseling regarding the diagnosis and prognosis.  Their questions are answered at this time and they are agreeable with the plan. I discussed at length with them reasons for immediate return here for re evaluation. They will followup with primary care by calling their office tomorrow.      --------------------------------- ADDITIONAL PROVIDER NOTES ---------------------------------  At this time the patient is without objective evidence of an acute process requiring hospitalization or inpatient

## 2024-05-11 LAB
EKG ATRIAL RATE: 77 BPM
EKG P AXIS: 74 DEGREES
EKG P-R INTERVAL: 154 MS
EKG Q-T INTERVAL: 402 MS
EKG QRS DURATION: 90 MS
EKG QTC CALCULATION (BAZETT): 454 MS
EKG R AXIS: 20 DEGREES
EKG T AXIS: 23 DEGREES
EKG VENTRICULAR RATE: 77 BPM

## 2024-05-11 PROCEDURE — 93010 ELECTROCARDIOGRAM REPORT: CPT | Performed by: INTERNAL MEDICINE

## 2024-05-29 ENCOUNTER — TELEPHONE (OUTPATIENT)
Dept: PHYSICAL THERAPY | Age: 74
End: 2024-05-29

## 2024-05-29 NOTE — TELEPHONE ENCOUNTER
Date: 2024       Patient Name: Mark Owusu  : 1950      MRN: [id number]    Patient canceled PT evaluation  appointment scheduled for today  at 1600 due to illness.    Chriss Tan, PT

## 2024-06-07 ENCOUNTER — EVALUATION (OUTPATIENT)
Dept: PHYSICAL THERAPY | Age: 74
End: 2024-06-07
Payer: MEDICARE

## 2024-06-07 DIAGNOSIS — M19.90 ARTHRITIS: Primary | ICD-10-CM

## 2024-06-07 DIAGNOSIS — R53.1 WEAKNESS: ICD-10-CM

## 2024-06-07 DIAGNOSIS — Z91.81 AT RISK FOR FALLS: ICD-10-CM

## 2024-06-07 DIAGNOSIS — M51.37 DDD (DEGENERATIVE DISC DISEASE), LUMBOSACRAL: ICD-10-CM

## 2024-06-07 PROCEDURE — 97163 PT EVAL HIGH COMPLEX 45 MIN: CPT | Performed by: PHYSICAL THERAPIST

## 2024-06-07 NOTE — PROGRESS NOTES
Physical Therapy Daily Treatment Note    Date: 2024  Patient Name: Mark Owusu  : 1950   MRN: [id number]  DOInjury: chronic  DOSx: --  Referring Provider: Avelino Briones DO  [address] [place] [address]     Medical Diagnosis:      Diagnosis Orders   1. Arthritis        2. DDD (degenerative disc disease), lumbosacral        3. Weakness        4. At risk for falls          Apollo demonstrates weakness, altered gait, loss of balance, and decreased endurance. PT will consist of strengthening, gait training, balance training. Functional training will be important as well to work on access to the 2nd floor of his home.       Functional Mobility/Tests:  Test     Basic Transfer Fair.   Sit/Stand See 30-Sec. Chair Stand Test below   Squat depth and control is limited.   Double stance, feet together, eyes open Good.   Double stance, feet together, eyes closed Good.   360 degree turns Fair.   Step stool touches Unable.      TUG Test:  _19_  Seconds.  Test date: 2024   Notes: Uses no device. Short, frequent steps with low step height.  Moves toward left on return, self corrected.      An older adult who takes ?12 seconds to complete the TUG is at high risk for falling.      30-Sec. Chair Stand Test:  Number:  _3_  Test date: 2024     Notes:  Two false starts.  Lost form and control quickly.  Test stopped.      X = TO BE PERFORMED NEXT VISIT  > = PROGRESS TO THIS    S: See michael  O:    Time  7705-8052       Visit  - Repeat outcome measure at mid point and end.    Pain    Pain with prolonged weightbearing     Exercise  Goal of strengthening will be to improve gait mechanics and to climb stairs with confidence.       Step-ups - FWD  >  TA   Step-ups - BWD  >  TA   Step up and over reciprocally  >  TA   Leg Press 1-leg X  L and R  TE   CR - Eccentric X  TE   Knee Extension Machine X  TE    The next 2 are to improve gait mechanics     Marching gait >  NR   Side stepping >  NR               A: 
to this patient's plan of care or recovery include:      Domestic Concerns:  [x] No  [] Yes:    Long Term goals (4-6 weeks)  TUG: 15 seconds.  30-Sec. Chair Stand Test: 6 reps  Increase Strength to WFL   Demonstrate improved balance during gait transitions (moving from sit to stand, [id number], changing directions, etc)  Able to perform / complete the following functions / tasks: ADLs, IADLs, climb stairs to access 2nd floor, demonstrate improved gait with larger step length and height   Independent with Home Exercise Programs     Rehab Potential: [x] Good  [] Fair  [] Poor    PLAN       Treatment Plan:  instruction in home exercise program   therapeutic exercise   therapeutic activity   neuromuscular re-education   balance training   proprioceptive training     The following CPT codes are likely to be used in the care of this patient:   39439 PT Evaluation: High Complexity   55632 PT Re-Evaluation   59408 Therapeutic Exercise   40298 Neuromuscular Re-Education   91315 Therapeutic Activities     Suggested Professional Referral: [x] No  [] Yes:     Patient Education:  [x] Plans / Goals, Risks / Benefits discussed  [x] Home exercise program  Method of Education: [x] Verbal  [x] Demo  [x] Written  Comprehension of Education:  [x] Verbalizes understanding.  [x] Demonstrates understanding.  [] Needs Review.  [] Demonstrates / verbalizes understanding of HEP / Ed previously given.    Frequency:  2 days per week for 4-6 weeks    Patient understands diagnosis/prognosis and consents to treatment, plan and goals: [x] Yes    [] No     Thank you for the opportunity to work with your patient.  If you have questions or comments, please contact me at [phone]; fax: [phone].    Electronically signed by: Chriss Tan PT         Please sign Physician's Certification and return to:  [facility] SPECIALTY CARE [facility] PHYSICAL THERAPY  [address] [place] [address]  Dept: [phone]  Dept

## 2024-06-10 ENCOUNTER — TREATMENT (OUTPATIENT)
Dept: PHYSICAL THERAPY | Age: 74
End: 2024-06-10
Payer: MEDICARE

## 2024-06-10 DIAGNOSIS — R53.1 WEAKNESS: ICD-10-CM

## 2024-06-10 DIAGNOSIS — M51.37 DDD (DEGENERATIVE DISC DISEASE), LUMBOSACRAL: ICD-10-CM

## 2024-06-10 DIAGNOSIS — M19.90 ARTHRITIS: Primary | ICD-10-CM

## 2024-06-10 DIAGNOSIS — Z91.81 AT RISK FOR FALLS: ICD-10-CM

## 2024-06-10 PROCEDURE — 97110 THERAPEUTIC EXERCISES: CPT

## 2024-06-14 ENCOUNTER — TREATMENT (OUTPATIENT)
Dept: PHYSICAL THERAPY | Age: 74
End: 2024-06-14
Payer: MEDICARE

## 2024-06-14 DIAGNOSIS — M51.37 DDD (DEGENERATIVE DISC DISEASE), LUMBOSACRAL: ICD-10-CM

## 2024-06-14 DIAGNOSIS — R53.1 WEAKNESS: ICD-10-CM

## 2024-06-14 DIAGNOSIS — M19.90 ARTHRITIS: Primary | ICD-10-CM

## 2024-06-14 DIAGNOSIS — Z91.81 AT RISK FOR FALLS: ICD-10-CM

## 2024-06-14 PROCEDURE — 97110 THERAPEUTIC EXERCISES: CPT

## 2024-06-14 NOTE — PROGRESS NOTES
Unable to perform eccentrically; will return to when able TE   Knee Extension Machine 3 x 10  TE    The next 2 are to improve gait mechanics     Marching gait >  NR   Side stepping >  NR               A: Patient tolerated well.  P: Continue with rehab plan. Patient prefers print out of scheduled appointments.   Carla Melendez PTA    Treatment Charges: Mins Units   Initial Evaluation     Re-Evaluation     Ther Exercise         TE 30 2   Manual Therapy     MT     Ther Activities        TA     Gait Training          GT     Neuro Re-education NR     Modalities     Non-Billable Service Time 10 0   Other     Total Time/Units 40 2

## 2024-06-17 ENCOUNTER — TREATMENT (OUTPATIENT)
Dept: PHYSICAL THERAPY | Age: 74
End: 2024-06-17
Payer: MEDICARE

## 2024-06-17 DIAGNOSIS — M19.90 ARTHRITIS: Primary | ICD-10-CM

## 2024-06-17 DIAGNOSIS — R53.1 WEAKNESS: ICD-10-CM

## 2024-06-17 DIAGNOSIS — M51.37 DDD (DEGENERATIVE DISC DISEASE), LUMBOSACRAL: ICD-10-CM

## 2024-06-17 DIAGNOSIS — Z91.81 AT RISK FOR FALLS: ICD-10-CM

## 2024-06-17 PROCEDURE — 97110 THERAPEUTIC EXERCISES: CPT

## 2024-06-17 NOTE — PROGRESS NOTES
Unable to perform eccentrically; will return to when able TE   Knee Extension Machine 3 x 10  TE    The next 2 are to improve gait mechanics     Marching gait >  NR   Side stepping >  NR               A: Patient tolerated well.  P: Continue with rehab plan. Patient prefers print out of scheduled appointments.   Carla Melendez PTA    Treatment Charges: Mins Units   Initial Evaluation     Re-Evaluation     Ther Exercise         TE 40 3   Manual Therapy     MT     Ther Activities        TA     Gait Training          GT     Neuro Re-education NR     Modalities     Non-Billable Service Time     Other     Total Time/Units 40 3

## 2024-06-21 ENCOUNTER — TREATMENT (OUTPATIENT)
Dept: PHYSICAL THERAPY | Age: 74
End: 2024-06-21

## 2024-06-21 DIAGNOSIS — M51.37 DDD (DEGENERATIVE DISC DISEASE), LUMBOSACRAL: ICD-10-CM

## 2024-06-21 DIAGNOSIS — M19.90 ARTHRITIS: Primary | ICD-10-CM

## 2024-06-21 DIAGNOSIS — Z91.81 AT RISK FOR FALLS: ICD-10-CM

## 2024-06-21 DIAGNOSIS — R53.1 WEAKNESS: ICD-10-CM

## 2024-06-21 NOTE — PROGRESS NOTES
Physical Therapy Daily Treatment Note    Date: 2024  Patient Name: Mark Owusu  : 1950   MRN: [id number]  DOInjury: chronic  DOSx: --    Referring Provider:   Avelino Briones DO  [address] [place] [address]         Medical Diagnosis:    Diagnosis Orders   1. Arthritis        2. DDD (degenerative disc disease), lumbosacral        3. Weakness        4. At risk for falls          Apollo demonstrates weakness, altered gait, loss of balance, and decreased endurance. PT will consist of strengthening, gait training, balance training. Functional training will be important as well to work on access to the 2nd floor of his home.     Functional Mobility/Tests:  Test     Basic Transfer Fair.   Sit/Stand See 30-Sec. Chair Stand Test below   Squat depth and control is limited.   Double stance, feet together, eyes open Good.   Double stance, feet together, eyes closed Good.   360 degree turns Fair.   Step stool touches Unable.      TUG Test:  _19_  Seconds.  Test date: 2024   Notes: Uses no device. Short, frequent steps with low step height.  Moves toward left on return, self corrected.      An older adult who takes ?12 seconds to complete the TUG is at high risk for falling.      30-Sec. Chair Stand Test:  Number:  _3_  Test date: 2024     Notes:  Two false starts.  Lost form and control quickly.  Test stopped.      X = TO BE PERFORMED NEXT VISIT  > = PROGRESS TO THIS    S: Patient reports feeling good today. He is anxious to improve strength for stair climbing.   O:    Time  3813-0718      Visit  - Repeat outcome measure at mid point and end.    Pain    Pain with prolonged weightbearing     Exercise  Goal of strengthening will be to improve gait mechanics and to climb stairs with confidence.       Step-ups - FWD  6\" x 10 reps each  new Light hand hold - no hand hold TA   Step-ups - BWD  >  TA   Step up and over reciprocally  >  TA   Nustep L5 x 10 min     Leg Press 1-leg 5# 2 x 15 reps  L

## 2024-06-24 ENCOUNTER — TREATMENT (OUTPATIENT)
Dept: PHYSICAL THERAPY | Age: 74
End: 2024-06-24
Payer: MEDICARE

## 2024-06-24 DIAGNOSIS — R53.1 WEAKNESS: ICD-10-CM

## 2024-06-24 DIAGNOSIS — M51.37 DDD (DEGENERATIVE DISC DISEASE), LUMBOSACRAL: ICD-10-CM

## 2024-06-24 DIAGNOSIS — Z91.81 AT RISK FOR FALLS: ICD-10-CM

## 2024-06-24 DIAGNOSIS — M19.90 ARTHRITIS: Primary | ICD-10-CM

## 2024-06-24 PROCEDURE — 97110 THERAPEUTIC EXERCISES: CPT

## 2024-06-24 PROCEDURE — 97530 THERAPEUTIC ACTIVITIES: CPT

## 2024-06-24 NOTE — PROGRESS NOTES
Physical Therapy Daily Treatment Note    Date: 2024  Patient Name: Mark Owusu  : 1950   MRN: [id number]  DOInjury: chronic  DOSx: --    Referring Provider:   Avelino Briones DO  [address] [place] [address]         Medical Diagnosis:    Diagnosis Orders   1. Arthritis        2. DDD (degenerative disc disease), lumbosacral        3. Weakness        4. At risk for falls            Apollo demonstrates weakness, altered gait, loss of balance, and decreased endurance. PT will consist of strengthening, gait training, balance training. Functional training will be important as well to work on access to the 2nd floor of his home.     Functional Mobility/Tests:  Test     Basic Transfer Fair.   Sit/Stand See 30-Sec. Chair Stand Test below   Squat depth and control is limited.   Double stance, feet together, eyes open Good.   Double stance, feet together, eyes closed Good.   360 degree turns Fair.   Step stool touches Unable.      TUG Test:  _19_  Seconds.  Test date: 2024   Notes: Uses no device. Short, frequent steps with low step height.  Moves toward left on return, self corrected.      An older adult who takes ?12 seconds to complete the TUG is at high risk for falling.      30-Sec. Chair Stand Test:  Number:  _3_  Test date: 2024     Notes:  Two false starts.  Lost form and control quickly.  Test stopped.      X = TO BE PERFORMED NEXT VISIT  > = PROGRESS TO THIS    S: Patient reports no new changes, feeling okay.  O:    Time  5208-7015      Visit   Repeat outcome measure at mid point and end.    Pain    Pain with prolonged weightbearing     Exercise  Goal of strengthening will be to improve gait mechanics and to climb stairs with confidence.       Step-ups - FWD  6\" x 20 reps each Light hand hold - no hand hold TA   Step-ups - BWD  >  TA   Step up and over reciprocally  >  TA   Nustep L5 x 10 min     Leg Press 1-leg 5# 2 x 15 reps  L and R  TE   CR - 2 x 15 Unable to perform

## 2024-06-28 ENCOUNTER — TREATMENT (OUTPATIENT)
Dept: PHYSICAL THERAPY | Age: 74
End: 2024-06-28
Payer: MEDICARE

## 2024-06-28 DIAGNOSIS — M51.37 DDD (DEGENERATIVE DISC DISEASE), LUMBOSACRAL: ICD-10-CM

## 2024-06-28 DIAGNOSIS — M19.90 ARTHRITIS: Primary | ICD-10-CM

## 2024-06-28 DIAGNOSIS — Z91.81 AT RISK FOR FALLS: ICD-10-CM

## 2024-06-28 DIAGNOSIS — R53.1 WEAKNESS: ICD-10-CM

## 2024-06-28 PROCEDURE — 97110 THERAPEUTIC EXERCISES: CPT

## 2024-06-28 PROCEDURE — 97112 NEUROMUSCULAR REEDUCATION: CPT

## 2024-06-28 PROCEDURE — 97530 THERAPEUTIC ACTIVITIES: CPT

## 2024-06-28 NOTE — PROGRESS NOTES
Physical Therapy Daily Treatment Note    Date: 2024  Patient Name: Mark Owusu  : 1950   MRN: [id number]  DOInjury: chronic  DOSx: --    Referring Provider:   Avelino Briones  [place] [address]      Medical Diagnosis:    Diagnosis Orders   1. Arthritis        2. DDD (degenerative disc disease), lumbosacral        3. Weakness        4. At risk for falls            Apollo demonstrates weakness, altered gait, loss of balance, and decreased endurance. PT will consist of strengthening, gait training, balance training. Functional training will be important as well to work on access to the 2nd floor of his home.     Functional Mobility/Tests:  Test     Basic Transfer Fair.   Sit/Stand See 30-Sec. Chair Stand Test below   Squat depth and control is limited.   Double stance, feet together, eyes open Good.   Double stance, feet together, eyes closed Good.   360 degree turns Fair.   Step stool touches Unable.      TUG Test:  _19_  Seconds.  Test date: 2024   Notes: Uses no device. Short, frequent steps with low step height.  Moves toward left on return, self corrected.      An older adult who takes ?12 seconds to complete the TUG is at high risk for falling.      30-Sec. Chair Stand Test:  Number:  _3_  Test date: 2024     Notes:  Two false starts.  Lost form and control quickly.  Test stopped.      X = TO BE PERFORMED NEXT VISIT  > = PROGRESS TO THIS    S: Patient reports feeling good today. He is anxious to improve strength for stair climbing.   O:    Time  8875-5430      Visit  - Repeat outcome measure at mid point and end.    Pain    Pain with prolonged weightbearing     Exercise  Goal of strengthening will be to improve gait mechanics and to climb stairs with confidence.       Step-ups - FWD  7\" x 15 reps each   Light hand hold  TA   Step-ups - BWD  >  TA   Step up and over reciprocally  >  TA   Nustep L5 x 10 min     Leg Press 1-leg 20# 2 x 10 reps  L and R  TE   CR -

## 2024-07-02 ENCOUNTER — TREATMENT (OUTPATIENT)
Dept: PHYSICAL THERAPY | Age: 74
End: 2024-07-02
Payer: MEDICARE

## 2024-07-02 DIAGNOSIS — R53.1 WEAKNESS: ICD-10-CM

## 2024-07-02 DIAGNOSIS — M19.90 ARTHRITIS: Primary | ICD-10-CM

## 2024-07-02 DIAGNOSIS — M51.37 DDD (DEGENERATIVE DISC DISEASE), LUMBOSACRAL: ICD-10-CM

## 2024-07-02 DIAGNOSIS — Z91.81 AT RISK FOR FALLS: ICD-10-CM

## 2024-07-02 PROCEDURE — 97110 THERAPEUTIC EXERCISES: CPT

## 2024-07-02 PROCEDURE — 97530 THERAPEUTIC ACTIVITIES: CPT

## 2024-07-02 NOTE — PROGRESS NOTES
Physical Therapy Daily Treatment Note    Date: 2024  Patient Name: Mark Owusu  : 1950   MRN: [id number]  DOInjury: chronic  DOSx: --    Referring Provider:   Avelino Briones DO  [address] [place] [address]      Medical Diagnosis:    Diagnosis Orders   1. Arthritis        2. DDD (degenerative disc disease), lumbosacral        3. Weakness        4. At risk for falls            Apollo demonstrates weakness, altered gait, loss of balance, and decreased endurance. PT will consist of strengthening, gait training, balance training. Functional training will be important as well to work on access to the 2nd floor of his home.     Functional Mobility/Tests:  Test     Basic Transfer Fair.   Sit/Stand See 30-Sec. Chair Stand Test below   Squat depth and control is limited.   Double stance, feet together, eyes open Good.   Double stance, feet together, eyes closed Good.   360 degree turns Fair.   Step stool touches Unable.      TUG Test:  _19_  Seconds.  Test date: 2024   Notes: Uses no device. Short, frequent steps with low step height.  Moves toward left on return, self corrected.      An older adult who takes ?12 seconds to complete the TUG is at high risk for falling.      30-Sec. Chair Stand Test:  Number:  _3_  Test date: 2024     Notes:  Two false starts.  Lost form and control quickly.  Test stopped.      X = TO BE PERFORMED NEXT VISIT  > = PROGRESS TO THIS    S: Patient reports feeling good today. He is anxious to improve strength for stair climbing.   O:    Time  8152-2281      Visit   Repeat outcome measure at mid point and end.    Pain    Pain with prolonged weightbearing     Exercise  Goal of strengthening will be to improve gait mechanics and to climb stairs with confidence.       Step-ups - FWD  7\" x 15 reps each   Light hand hold  TA   Step-ups - BWD  >  TA   Step up and over reciprocally  >  TA   Nustep L5 x 10 min     Leg Press 1-leg 20# 2 x 15 reps  L and R  TE   CR -

## 2024-07-09 ENCOUNTER — TREATMENT (OUTPATIENT)
Dept: PHYSICAL THERAPY | Age: 74
End: 2024-07-09
Payer: MEDICARE

## 2024-07-09 DIAGNOSIS — R53.1 WEAKNESS: ICD-10-CM

## 2024-07-09 DIAGNOSIS — M51.37 DDD (DEGENERATIVE DISC DISEASE), LUMBOSACRAL: ICD-10-CM

## 2024-07-09 DIAGNOSIS — M19.90 ARTHRITIS: Primary | ICD-10-CM

## 2024-07-09 DIAGNOSIS — Z91.81 AT RISK FOR FALLS: ICD-10-CM

## 2024-07-09 PROCEDURE — 97530 THERAPEUTIC ACTIVITIES: CPT

## 2024-07-09 PROCEDURE — 97110 THERAPEUTIC EXERCISES: CPT

## 2024-07-09 NOTE — PROGRESS NOTES
Physical Therapy Daily Treatment Note    Date: 2024  Patient Name: Mark Owusu  : 1950   MRN: [id number]  DOInjury: chronic  DOSx: --    Referring Provider:   Avelino Briones DO  [address] [place] [address]      Medical Diagnosis:    Diagnosis Orders   1. Arthritis        2. DDD (degenerative disc disease), lumbosacral        3. Weakness        4. At risk for falls            Apollo demonstrates weakness, altered gait, loss of balance, and decreased endurance. PT will consist of strengthening, gait training, balance training. Functional training will be important as well to work on access to the 2nd floor of his home.     Functional Mobility/Tests:  Test     Basic Transfer Fair.   Sit/Stand See 30-Sec. Chair Stand Test below   Squat depth and control is limited.   Double stance, feet together, eyes open Good.   Double stance, feet together, eyes closed Good.   360 degree turns Fair.   Step stool touches Unable.      TUG Test:  _19_  Seconds.  Test date: 2024   Notes: Uses no device. Short, frequent steps with low step height.  Moves toward left on return, self corrected.      An older adult who takes ?12 seconds to complete the TUG is at high risk for falling.      30-Sec. Chair Stand Test:  Number:  _3_  Test date: 2024     Notes:  Two false starts.  Lost form and control quickly.  Test stopped.      X = TO BE PERFORMED NEXT VISIT  > = PROGRESS TO THIS    S: Patient reports feeling good today. He is anxious to improve strength for stair climbing.   O:    Time  3179-3510      Visit  - Repeat outcome measure at mid point and end.    Pain    Pain with prolonged weightbearing     Exercise  Goal of strengthening will be to improve gait mechanics and to climb stairs with confidence.       Step-ups - FWD  7\" x 15 reps each   Light hand hold  TA   Step-ups - BWD  >  TA   Step up and over reciprocally  >  TA   Nustep L5 x 10 min     Leg Press 1-leg 20# 2 x 15 reps  L and R  TE   CR -

## 2024-07-12 ENCOUNTER — TREATMENT (OUTPATIENT)
Dept: PHYSICAL THERAPY | Age: 74
End: 2024-07-12
Payer: MEDICARE

## 2024-07-12 DIAGNOSIS — M19.90 ARTHRITIS: Primary | ICD-10-CM

## 2024-07-12 DIAGNOSIS — R53.1 WEAKNESS: ICD-10-CM

## 2024-07-12 DIAGNOSIS — Z91.81 AT RISK FOR FALLS: ICD-10-CM

## 2024-07-12 DIAGNOSIS — M51.37 DDD (DEGENERATIVE DISC DISEASE), LUMBOSACRAL: ICD-10-CM

## 2024-07-12 PROCEDURE — 97110 THERAPEUTIC EXERCISES: CPT

## 2024-07-12 PROCEDURE — 97530 THERAPEUTIC ACTIVITIES: CPT

## 2024-07-16 ENCOUNTER — TREATMENT (OUTPATIENT)
Dept: PHYSICAL THERAPY | Age: 74
End: 2024-07-16
Payer: MEDICARE

## 2024-07-16 DIAGNOSIS — R53.1 WEAKNESS: ICD-10-CM

## 2024-07-16 DIAGNOSIS — Z91.81 AT RISK FOR FALLS: ICD-10-CM

## 2024-07-16 DIAGNOSIS — M19.90 ARTHRITIS: Primary | ICD-10-CM

## 2024-07-16 DIAGNOSIS — M51.37 DDD (DEGENERATIVE DISC DISEASE), LUMBOSACRAL: ICD-10-CM

## 2024-07-16 PROCEDURE — 97110 THERAPEUTIC EXERCISES: CPT

## 2024-07-16 PROCEDURE — 97530 THERAPEUTIC ACTIVITIES: CPT

## 2024-07-16 NOTE — PROGRESS NOTES
Physical Therapy Daily Treatment Note    Date: 2024  Patient Name: Mark Owusu  : 1950   MRN: [id number]  DOInjury: chronic  DOSx: --    Referring Provider:   Avelino Briones  [place] [address]      Medical Diagnosis:    Diagnosis Orders   1. Arthritis        2. DDD (degenerative disc disease), lumbosacral        3. Weakness        4. At risk for falls            Apollo demonstrates weakness, altered gait, loss of balance, and decreased endurance. PT will consist of strengthening, gait training, balance training. Functional training will be important as well to work on access to the 2nd floor of his home.     Functional Mobility/Tests:  Test     Basic Transfer Fair.   Sit/Stand See 30-Sec. Chair Stand Test below   Squat depth and control is limited.   Double stance, feet together, eyes open Good.   Double stance, feet together, eyes closed Good.   360 degree turns Fair.   Step stool touches Unable.      TUG Test:  _19_  Seconds.  Test date: 2024   Notes: Uses no device. Short, frequent steps with low step height.  Moves toward left on return, self corrected.      An older adult who takes ?12 seconds to complete the TUG is at high risk for falling.      30-Sec. Chair Stand Test:  Number:  _3_  Test date: 2024     Notes:  Two false starts.  Lost form and control quickly.  Test stopped.      X = TO BE PERFORMED NEXT VISIT  > = PROGRESS TO THIS    S: Patient reports no changes. He feels he is struggling more mentally than physically with doing staircase at home due to fear of falling.  O:    Time  2107-7024      Visit  10/8-12 Repeat outcome measure at mid point and end.    Pain    Pain with prolonged weightbearing     Exercise  Goal of strengthening will be to improve gait mechanics and to climb stairs with confidence.       Step-ups - FWD  7\" x 15 reps each   Light hand hold  TA   Step-ups - BWD  >  TA   Step up and over reciprocally  >  TA   Nustep L5 x 10 min     Leg

## 2024-07-19 ENCOUNTER — TREATMENT (OUTPATIENT)
Dept: PHYSICAL THERAPY | Age: 74
End: 2024-07-19

## 2024-07-19 DIAGNOSIS — R53.1 WEAKNESS: ICD-10-CM

## 2024-07-19 DIAGNOSIS — Z91.81 AT RISK FOR FALLS: ICD-10-CM

## 2024-07-19 DIAGNOSIS — M19.90 ARTHRITIS: Primary | ICD-10-CM

## 2024-07-19 DIAGNOSIS — M51.37 DDD (DEGENERATIVE DISC DISEASE), LUMBOSACRAL: ICD-10-CM

## 2024-07-19 NOTE — PROGRESS NOTES
Physical Therapy Daily Treatment Note    Date: 2024  Patient Name: Mark Owusu  : 1950   MRN: [id number]  DOInjury: chronic  DOSx: --    Referring Provider:   Avelino Briones  [place] [address]      Medical Diagnosis:    Diagnosis Orders   1. Arthritis        2. DDD (degenerative disc disease), lumbosacral        3. Weakness        4. At risk for falls            Apollo demonstrates weakness, altered gait, loss of balance, and decreased endurance. PT will consist of strengthening, gait training, balance training. Functional training will be important as well to work on access to the 2nd floor of his home.     Functional Mobility/Tests:  Test     Basic Transfer Fair.   Sit/Stand See 30-Sec. Chair Stand Test below   Squat depth and control is limited.   Double stance, feet together, eyes open Good.   Double stance, feet together, eyes closed Good.   360 degree turns Fair.   Step stool touches Unable.      TUG Test:  _19_  Seconds.  Test date: 2024   Notes: Uses no device. Short, frequent steps with low step height.  Moves toward left on return, self corrected.      An older adult who takes ?12 seconds to complete the TUG is at high risk for falling.      30-Sec. Chair Stand Test:  Number:  _3_  Test date: 2024     Notes:  Two false starts.  Lost form and control quickly.  Test stopped.      X = TO BE PERFORMED NEXT VISIT  > = PROGRESS TO THIS    S: Patient reports no changes. He feels he is struggling more mentally than physically with doing staircase at home due to fear of falling.  O:    Time  5547-0948      Visit   Repeat outcome measure at mid point and end.    Pain    Pain with prolonged weightbearing     Exercise  Goal of strengthening will be to improve gait mechanics and to climb stairs with confidence.       Step-ups - FWD  7\" x 15 reps each   Light hand hold  TA   Step-ups - BWD  >  TA   Step up and over reciprocally  >  TA   Nustep L5 x 10 min     Leg

## 2024-07-26 ENCOUNTER — TREATMENT (OUTPATIENT)
Dept: PHYSICAL THERAPY | Age: 74
End: 2024-07-26
Payer: MEDICARE

## 2024-07-26 DIAGNOSIS — Z91.81 AT RISK FOR FALLS: Primary | ICD-10-CM

## 2024-07-26 DIAGNOSIS — R53.1 WEAKNESS: ICD-10-CM

## 2024-07-26 DIAGNOSIS — M19.90 ARTHRITIS: ICD-10-CM

## 2024-07-26 DIAGNOSIS — M51.37 DDD (DEGENERATIVE DISC DISEASE), LUMBOSACRAL: ICD-10-CM

## 2024-07-26 PROCEDURE — 97530 THERAPEUTIC ACTIVITIES: CPT

## 2024-07-26 PROCEDURE — 97110 THERAPEUTIC EXERCISES: CPT

## 2024-07-26 NOTE — PROGRESS NOTES
Press 1-leg 20# 2 x 15 reps  L and R  TE   CR - 2 x 10 Unable to perform eccentrically; will return to when able TE   Knee Extension Machine 10# 3 x 10  TE    The next 2 are to improve gait mechanics     Marching gait 30 ft x 2  Slight wall touch for balance; SBA for safety NR   Side stepping >  NR               A: Patient tolerated well. Large, functional, dynamic, global movements used to build strength, balance, endurance, and flexibility and to improve physical performance. Feedback and cues necessary for developing neuromuscular control.  Movement education and guided movement interventions such as verbal and tactile cues used to improve performance and control.  P: Today was patient's last session and they are to continue with HEP.  Carla Melendez, PTA    Treatment Charges: Mins Units   Initial Evaluation     Re-Evaluation     Ther Exercise         TE 15 1   Manual Therapy     MT     Ther Activities        TA 25 2   Gait Training          GT     Neuro Re-education NR     Modalities     Non-Billable Service Time     Other     Total Time/Units 40 3

## 2025-01-09 ENCOUNTER — TELEPHONE (OUTPATIENT)
Dept: PHYSICAL THERAPY | Age: 75
End: 2025-01-09

## 2025-03-05 ENCOUNTER — TELEPHONE (OUTPATIENT)
Dept: ADMINISTRATIVE | Age: 75
End: 2025-03-05

## 2025-03-05 NOTE — TELEPHONE ENCOUNTER
Never seen in the office so he is a new patient. You can go ahead and schedule with first available physician, thanks.

## 2025-04-03 ENCOUNTER — TELEPHONE (OUTPATIENT)
Dept: CARDIOLOGY CLINIC | Age: 75
End: 2025-04-03

## 2025-04-09 ENCOUNTER — HOSPITAL ENCOUNTER (OUTPATIENT)
Age: 75
Discharge: HOME | End: 2025-04-09
Payer: MEDICARE

## 2025-04-09 ENCOUNTER — HOSPITAL ENCOUNTER (OUTPATIENT)
Dept: CT IMAGING | Age: 75
Discharge: HOME | End: 2025-04-09
Payer: MEDICARE

## 2025-04-09 DIAGNOSIS — Z01.818 PRE-PROCEDURAL EXAMINATION: ICD-10-CM

## 2025-04-09 DIAGNOSIS — R93.89 ABNORMAL FINDING ON IMAGING: ICD-10-CM

## 2025-04-09 DIAGNOSIS — L98.9 LESION OF NECK: ICD-10-CM

## 2025-04-09 LAB
ANION GAP SERPL CALCULATED.3IONS-SCNC: 14 MMOL/L (ref 7–16)
BUN SERPL-MCNC: 12 MG/DL (ref 6–23)
CALCIUM SERPL-MCNC: 9.2 MG/DL (ref 8.6–10.2)
CHLORIDE SERPL-SCNC: 96 MMOL/L (ref 98–107)
CO2 SERPL-SCNC: 21 MMOL/L (ref 22–29)
CREAT SERPL-MCNC: 1.2 MG/DL (ref 0.7–1.2)
GFR, ESTIMATED: 65 ML/MIN/1.73M2
GLUCOSE SERPL-MCNC: 86 MG/DL (ref 74–99)
POTASSIUM SERPL-SCNC: 4.4 MMOL/L (ref 3.5–5)
SODIUM SERPL-SCNC: 131 MMOL/L (ref 132–146)

## 2025-04-09 PROCEDURE — 80048 BASIC METABOLIC PNL TOTAL CA: CPT

## 2025-04-09 PROCEDURE — 36415 COLL VENOUS BLD VENIPUNCTURE: CPT

## 2025-04-09 PROCEDURE — 70491 CT SOFT TISSUE NECK W/DYE: CPT

## 2025-04-09 PROCEDURE — 6360000004 HC RX CONTRAST MEDICATION: Performed by: RADIOLOGY

## 2025-04-09 RX ORDER — IOPAMIDOL 755 MG/ML
75 INJECTION, SOLUTION INTRAVASCULAR
Status: COMPLETED | OUTPATIENT
Start: 2025-04-09 | End: 2025-04-09

## 2025-04-09 RX ADMIN — IOPAMIDOL 75 ML: 755 INJECTION, SOLUTION INTRAVENOUS at 16:16

## 2025-04-10 ENCOUNTER — TELEPHONE (OUTPATIENT)
Dept: ENT CLINIC | Age: 75
End: 2025-04-10

## 2025-04-17 ENCOUNTER — TELEPHONE (OUTPATIENT)
Dept: ORTHOPEDIC SURGERY | Age: 75
End: 2025-04-17

## 2025-04-17 NOTE — TELEPHONE ENCOUNTER
Pt est with Dr. Santiago, he is requesting to switch to Dr. Carmona for rt shoulder pain Is it okay to switch providers?

## 2025-04-18 NOTE — TELEPHONE ENCOUNTER
Patient is calling back. He seen Dr. Santiago for his tibia last time and now he needs to be seen for his right shoulder. Would he still not be able to transfer to Dr. Carmona?

## 2025-04-23 ENCOUNTER — OFFICE VISIT (OUTPATIENT)
Dept: ENT CLINIC | Age: 75
End: 2025-04-23
Payer: MEDICARE

## 2025-04-23 VITALS
DIASTOLIC BLOOD PRESSURE: 69 MMHG | BODY MASS INDEX: 23.9 KG/M2 | WEIGHT: 140 LBS | SYSTOLIC BLOOD PRESSURE: 115 MMHG | HEIGHT: 64 IN | TEMPERATURE: 98.2 F | HEART RATE: 101 BPM | OXYGEN SATURATION: 96 %

## 2025-04-23 DIAGNOSIS — R22.1 NECK MASS: Primary | ICD-10-CM

## 2025-04-23 DIAGNOSIS — C44.92 SQUAMOUS CELL CARCINOMA OF SKIN: ICD-10-CM

## 2025-04-23 DIAGNOSIS — M54.2 NECK PAIN: ICD-10-CM

## 2025-04-23 PROCEDURE — 31575 DIAGNOSTIC LARYNGOSCOPY: CPT | Performed by: OTOLARYNGOLOGY

## 2025-04-23 PROCEDURE — 1123F ACP DISCUSS/DSCN MKR DOCD: CPT | Performed by: OTOLARYNGOLOGY

## 2025-04-23 PROCEDURE — G8427 DOCREV CUR MEDS BY ELIG CLIN: HCPCS | Performed by: OTOLARYNGOLOGY

## 2025-04-23 PROCEDURE — 1036F TOBACCO NON-USER: CPT | Performed by: OTOLARYNGOLOGY

## 2025-04-23 PROCEDURE — G8420 CALC BMI NORM PARAMETERS: HCPCS | Performed by: OTOLARYNGOLOGY

## 2025-04-23 PROCEDURE — 99204 OFFICE O/P NEW MOD 45 MIN: CPT | Performed by: OTOLARYNGOLOGY

## 2025-04-23 PROCEDURE — 1159F MED LIST DOCD IN RCRD: CPT | Performed by: OTOLARYNGOLOGY

## 2025-04-23 ASSESSMENT — ENCOUNTER SYMPTOMS
STRIDOR: 0
SINUS PAIN: 0
CHOKING: 0
WHEEZING: 0
COUGH: 0
SORE THROAT: 0
RHINORRHEA: 0
SHORTNESS OF BREATH: 0
SINUS PRESSURE: 0

## 2025-04-23 NOTE — PROGRESS NOTES
Department of Otolaryngology  Office Consult Note  4/23/25          Subjective:        Chief Complaint:  had concerns including New Patient (NEW PATIENT - NP neck mass, Abnormal finding on imaging).     Patient ID: Mark Owusu is a 75 y.o. male.    HPI: Patient presents as  new patient for right neck mass. 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  Has had US, CT scan and MRI that are essentially nondiagnostic due to shoulder surgery artifact.  No obvious skin lesions associated with this area.  Denies trauma to the area.  Denies dysphagia, dyspnea, fever, chills, weight changes.      Review of Systems   Constitutional: Negative.    HENT:  Negative for congestion, ear discharge, ear pain, hearing loss, rhinorrhea, sinus pressure, sinus pain, sore throat and tinnitus.    Respiratory:  Negative for cough, choking, shortness of breath, wheezing and stridor.    Cardiovascular:  Negative for chest pain.   Skin:  Negative for rash.   Allergic/Immunologic: Negative for environmental allergies and immunocompromised state.   Neurological:  Negative for dizziness, weakness, light-headedness and headaches.   Psychiatric/Behavioral:  Negative for confusion.    All other systems reviewed and are negative.        Past Medical History:   Diagnosis Date    Arthritis     Dislocated shoulder, right     GERD (gastroesophageal reflux disease) 01/04/2015    Hyperlipidemia     Hypoglycemia     Hyponatremia     Liver disease     Mixed hyperlipidemia 01/04/2015    New onset seizure (HCC) 04/17/2019    Skin cancer 2024    Squamous    Syncope and collapse      Past Surgical History:   Procedure

## 2025-04-24 ENCOUNTER — TELEPHONE (OUTPATIENT)
Dept: ENT CLINIC | Age: 75
End: 2025-04-24

## 2025-04-24 NOTE — TELEPHONE ENCOUNTER
Patients Wife called stating that they were not able to get scheduled for Biopsy until May 15th,     Last Appointment   4/23/2025  Next Appointment  4/30/2025    Patient is scheduled to follow up 4/30/2025  Was the FNA a STAT?    Should I move his follow up or see if IR can do FNA sooner    Plz advise.

## 2025-05-14 ENCOUNTER — HOSPITAL ENCOUNTER (OUTPATIENT)
Dept: ULTRASOUND IMAGING | Age: 75
Discharge: HOME | End: 2025-05-14
Payer: MEDICARE

## 2025-05-14 DIAGNOSIS — R22.1 NECK MASS: ICD-10-CM

## 2025-05-14 PROCEDURE — 76942 ECHO GUIDE FOR BIOPSY: CPT

## 2025-05-20 LAB
NON-GYN CYTOLOGY REPORT: NORMAL
SURGICAL PATHOLOGY REPORT: NORMAL

## 2025-05-21 ENCOUNTER — TELEPHONE (OUTPATIENT)
Dept: CARDIOLOGY CLINIC | Age: 75
End: 2025-05-21

## 2025-05-23 ENCOUNTER — TELEPHONE (OUTPATIENT)
Dept: ENT CLINIC | Age: 75
End: 2025-05-23

## 2025-05-23 NOTE — TELEPHONE ENCOUNTER
Patients wife called office stating after 8 days from biopsy patient has a malignant tumor. Wife request a call back with oncologist.

## 2025-05-27 ENCOUNTER — TELEPHONE (OUTPATIENT)
Dept: CARDIOLOGY CLINIC | Age: 75
End: 2025-05-27

## 2025-05-27 NOTE — TELEPHONE ENCOUNTER
[facility] for dermatology to fax over the pathology results of neck mass bx.  Informed office on VM that patient is to be seen 5/28/25 in ENT

## 2025-05-27 NOTE — TELEPHONE ENCOUNTER
Pharmacy never received script for Reglan  It was ordered as IV administration not tabs for pharmacy  Unsure what dose was wanted for tabs, please re-send to Rigoberto Hernandez

## 2025-05-28 ENCOUNTER — OFFICE VISIT (OUTPATIENT)
Dept: ENT CLINIC | Age: 75
End: 2025-05-28
Payer: MEDICARE

## 2025-05-28 VITALS
HEIGHT: 64 IN | SYSTOLIC BLOOD PRESSURE: 112 MMHG | OXYGEN SATURATION: 92 % | BODY MASS INDEX: 24.03 KG/M2 | HEART RATE: 76 BPM | DIASTOLIC BLOOD PRESSURE: 70 MMHG | TEMPERATURE: 98 F

## 2025-05-28 DIAGNOSIS — M54.2 NECK PAIN: ICD-10-CM

## 2025-05-28 DIAGNOSIS — R22.1 NECK MASS: ICD-10-CM

## 2025-05-28 DIAGNOSIS — C44.92 SQUAMOUS CELL CARCINOMA OF SKIN: Primary | ICD-10-CM

## 2025-05-28 PROCEDURE — G8420 CALC BMI NORM PARAMETERS: HCPCS | Performed by: OTOLARYNGOLOGY

## 2025-05-28 PROCEDURE — 1123F ACP DISCUSS/DSCN MKR DOCD: CPT | Performed by: OTOLARYNGOLOGY

## 2025-05-28 PROCEDURE — 1036F TOBACCO NON-USER: CPT | Performed by: OTOLARYNGOLOGY

## 2025-05-28 PROCEDURE — 1159F MED LIST DOCD IN RCRD: CPT | Performed by: OTOLARYNGOLOGY

## 2025-05-28 PROCEDURE — 99213 OFFICE O/P EST LOW 20 MIN: CPT | Performed by: OTOLARYNGOLOGY

## 2025-05-28 PROCEDURE — G8427 DOCREV CUR MEDS BY ELIG CLIN: HCPCS | Performed by: OTOLARYNGOLOGY

## 2025-05-28 RX ORDER — METOCLOPRAMIDE 10 MG/1
10 TABLET ORAL
Qty: 120 TABLET | Refills: 3 | Status: SHIPPED | OUTPATIENT
Start: 2025-05-28

## 2025-05-28 ASSESSMENT — ENCOUNTER SYMPTOMS
CHOKING: 0
WHEEZING: 0
COUGH: 0
RHINORRHEA: 0
SHORTNESS OF BREATH: 0
SINUS PAIN: 0
SORE THROAT: 0
STRIDOR: 0
SINUS PRESSURE: 0

## 2025-05-28 NOTE — PROGRESS NOTES
Dr. Ashley for surgical planning         This note may be dictated with vocal recognition software. All grammatical or semantic errors should be considered accordingly.

## 2025-05-29 ENCOUNTER — TELEPHONE (OUTPATIENT)
Dept: ENT CLINIC | Age: 75
End: 2025-05-29

## 2025-05-29 NOTE — TELEPHONE ENCOUNTER
Called and spoke with Gonzalez in radiology scheduling patient is scheduled for PET scan Friday May 30,2025 at [facility] arrival time 11:00am for a Noon injection and 1:00pm scan. Patient has the instructions for the PET scan. Called patient to advise PET scan is scheduled for 5/30/25 arrival time at [facility] 11:00am. [facility] will authorize scan.

## 2025-05-29 NOTE — TELEPHONE ENCOUNTER
Patients wife called into the office to confirm PET scan May 30,2025 advised following the instructions for PET scan that were provided at last visit along with address to facility. Patients wife confirmed appointment.

## 2025-05-30 ENCOUNTER — HOSPITAL ENCOUNTER (OUTPATIENT)
Dept: PET IMAGING | Age: 75
Discharge: HOME | End: 2025-05-30
Attending: OTOLARYNGOLOGY
Payer: MEDICARE

## 2025-05-30 ENCOUNTER — HOSPITAL ENCOUNTER (OUTPATIENT)
Dept: PET IMAGING | Age: 75
End: 2025-05-30
Attending: OTOLARYNGOLOGY
Payer: MEDICARE

## 2025-05-30 DIAGNOSIS — C44.92 SQUAMOUS CELL CARCINOMA OF SKIN: ICD-10-CM

## 2025-05-30 LAB — GLUCOSE BLD-MCNC: 77 MG/DL (ref 74–99)

## 2025-05-30 PROCEDURE — 78815 PET IMAGE W/CT SKULL-THIGH: CPT

## 2025-05-30 PROCEDURE — A9609 HC RX DIAGNOSTIC RADIOPHARMACEUTICAL: HCPCS | Performed by: RADIOLOGY

## 2025-05-30 PROCEDURE — 82962 GLUCOSE BLOOD TEST: CPT

## 2025-05-30 PROCEDURE — 3430000000 HC RX DIAGNOSTIC RADIOPHARMACEUTICAL: Performed by: RADIOLOGY

## 2025-05-30 RX ORDER — FLUDEOXYGLUCOSE F 18 200 MCI/ML
15 INJECTION, SOLUTION INTRAVENOUS
Status: COMPLETED | OUTPATIENT
Start: 2025-05-30 | End: 2025-05-30

## 2025-05-30 RX ADMIN — FLUDEOXYGLUCOSE F 18 15 MILLICURIE: 200 INJECTION, SOLUTION INTRAVENOUS at 11:51

## 2025-06-02 ENCOUNTER — PREP FOR PROCEDURE (OUTPATIENT)
Dept: ENT CLINIC | Age: 75
End: 2025-06-02

## 2025-06-02 ENCOUNTER — TELEPHONE (OUTPATIENT)
Dept: ENT CLINIC | Age: 75
End: 2025-06-02

## 2025-06-02 DIAGNOSIS — C09.0 MALIGNANT NEOPLASM OF TONSILLAR FOSSA (HCC): ICD-10-CM

## 2025-06-02 DIAGNOSIS — R22.1 NECK MASS: Primary | ICD-10-CM

## 2025-06-02 DIAGNOSIS — C44.92 SQUAMOUS CELL CARCINOMA OF SKIN: ICD-10-CM

## 2025-06-02 DIAGNOSIS — C09.9 MALIGNANT NEOPLASM OF TONSIL (HCC): ICD-10-CM

## 2025-06-02 NOTE — TELEPHONE ENCOUNTER
Pt wife called very nervous want the results for Pt's PET scan completed 05/30/2025. Wife, Keyon would like a return call.

## 2025-06-02 NOTE — TELEPHONE ENCOUNTER
Had in-depth discussion with patient and his wife about the PET/CT results with positive uptake in the right tonsil.  Discussed this could be a site for primary tumor versus skin cancer contributing to right neck SCC p16(-).  Discussed recommended for panendoscopy with biopsy of the right tonsil.  Discussed risk benefits alternative of this.  Patient and wife agreeable to proceed.  Will schedule for panendoscopy with biopsy with frozen section next week.    I recommend:    Panendoscopy with biopsy of right tonsil with frozen section   Surgical risks include:     -- Tearing of tissues is the most common problem. This is most common in esophagoscopy using a rigid scope. Tearing of the mucosa of the esophagus or hypopharynx can cause mediastinitus or a severe infection in the chest. Injury to the vocal cords can occur with laryngosopy as can injury to the lungs during bronchoscopy. Lung injury can cause air to leak into the chest cavity. If severe it can fill the chest cavity and compress the lungs producing a tension pneumothorax. Insertion of a chest tube would be emergently required to treat the patient.   - injury to lips, teeth or tongue

## 2025-06-03 ENCOUNTER — TELEPHONE (OUTPATIENT)
Dept: CARDIOLOGY CLINIC | Age: 75
End: 2025-06-03

## 2025-06-03 ENCOUNTER — TELEPHONE (OUTPATIENT)
Dept: ENT CLINIC | Age: 75
End: 2025-06-03

## 2025-06-03 ENCOUNTER — CASE MANAGEMENT (OUTPATIENT)
Dept: ENT CLINIC | Age: 75
End: 2025-06-03

## 2025-06-03 DIAGNOSIS — R22.1 NECK MASS: Primary | ICD-10-CM

## 2025-06-03 NOTE — TELEPHONE ENCOUNTER
Pt wife called office. She is confused on which appointments were going to be cancelled till after biopsy. She is asking if they should still go to appt 6/6 with oncology.    Please advise.    Electronically signed by Victorina Cotton on 6/3/2025 at 6:57 AM

## 2025-06-03 NOTE — TELEPHONE ENCOUNTER
Patient's wife aware to keep appt for 6/6 with DR Wolfe.  PAT is cancelling labs for 6/5 since patient will be having labs drawn at Dr Wolfe's 6/6/25.  Patient already had EKG with Dr Camara

## 2025-06-03 NOTE — TELEPHONE ENCOUNTER
Patient need cardiac clearance for Panendoscopy with biopsy. Date of procedure 6/10/2025. If patient needs to hold any medication prior to procedure. Last seen in office 5/22/2025.     Please advise   [phone]

## 2025-06-03 NOTE — PROGRESS NOTES
[facility] HEAD AND NECK TUMOR BOARD NOTE:     Mark Owusu Is a 75 y.o. male who was presented by Dr. Jacobo at [facility] Head & Neck Tumor Board on 6/3/2025 which included representatives from all Head & Neck disciplines (Medical oncology/Radiation oncology/Otolaryngology/Radiology/Pathology).     History and Physical in Brief:  Patient is a 74 yo M who presents as  new patient for right neck mass. 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  No obvious skin lesions associated with this area.  Denies dysphagia, dyspnea, fever, chills, weight changes.  Social: former tobacco, social alcohol     15 mm hard palpable nonmobile lesion at the inferior aspect of the SCM clavicular head, tender to palpation, feels adherent to the SCM tendon but separate from the muscle. There are other similar superficial palpable nodules just inferior to the clavicle at the superior aspect of the previous surgical incision. NPL scope negative    Imaging:?   PET- 5/30  IMPRESSION:  1. Focal hypermetabolism appears to correspond to the right palatine tonsil.  Recommend correlation with history and physical exam.  Squamous cell  carcinoma may be suspected given history, although acute inflammation in the  setting of tonsillitis could appear similarly.  2. 2nd focus of hypermetabolism corresponding to probable level BREONNA lymph  node in the right neck.  Given a history of squamous cell carcinoma of the  skin, this may potentially represent a skin lesion although a lymph node is  much more likely given CT appearance.  Evaluation is limited due to artifact  from right shoulder

## 2025-06-04 NOTE — TELEPHONE ENCOUNTER
Patient is at acceptable risk for perioperative cardiovascular event for the planned procedure ( ), patient may proceed without any further cardiac testing.  Please feel free to call for any further information    How is he doing?,  Any more passing out spells

## 2025-06-06 ENCOUNTER — HOSPITAL ENCOUNTER (OUTPATIENT)
Dept: INFUSION THERAPY | Age: 75
Discharge: HOME | End: 2025-06-06

## 2025-06-06 ENCOUNTER — OFFICE VISIT (OUTPATIENT)
Age: 75
End: 2025-06-06
Payer: MEDICARE

## 2025-06-06 ENCOUNTER — TELEPHONE (OUTPATIENT)
Dept: CASE MANAGEMENT | Age: 75
End: 2025-06-06

## 2025-06-06 VITALS
SYSTOLIC BLOOD PRESSURE: 112 MMHG | DIASTOLIC BLOOD PRESSURE: 72 MMHG | HEART RATE: 76 BPM | BODY MASS INDEX: 24.59 KG/M2 | TEMPERATURE: 98.2 F | HEIGHT: 62 IN | WEIGHT: 133.6 LBS | OXYGEN SATURATION: 97 %

## 2025-06-06 DIAGNOSIS — G89.3 CANCER ASSOCIATED PAIN: ICD-10-CM

## 2025-06-06 DIAGNOSIS — C09.9 MALIGNANT NEOPLASM OF TONSIL (HCC): Primary | ICD-10-CM

## 2025-06-06 PROCEDURE — 1123F ACP DISCUSS/DSCN MKR DOCD: CPT | Performed by: STUDENT IN AN ORGANIZED HEALTH CARE EDUCATION/TRAINING PROGRAM

## 2025-06-06 PROCEDURE — G8420 CALC BMI NORM PARAMETERS: HCPCS | Performed by: STUDENT IN AN ORGANIZED HEALTH CARE EDUCATION/TRAINING PROGRAM

## 2025-06-06 PROCEDURE — 99204 OFFICE O/P NEW MOD 45 MIN: CPT | Performed by: STUDENT IN AN ORGANIZED HEALTH CARE EDUCATION/TRAINING PROGRAM

## 2025-06-06 PROCEDURE — 1125F AMNT PAIN NOTED PAIN PRSNT: CPT | Performed by: STUDENT IN AN ORGANIZED HEALTH CARE EDUCATION/TRAINING PROGRAM

## 2025-06-06 PROCEDURE — G8427 DOCREV CUR MEDS BY ELIG CLIN: HCPCS | Performed by: STUDENT IN AN ORGANIZED HEALTH CARE EDUCATION/TRAINING PROGRAM

## 2025-06-06 PROCEDURE — 1159F MED LIST DOCD IN RCRD: CPT | Performed by: STUDENT IN AN ORGANIZED HEALTH CARE EDUCATION/TRAINING PROGRAM

## 2025-06-06 PROCEDURE — 1036F TOBACCO NON-USER: CPT | Performed by: STUDENT IN AN ORGANIZED HEALTH CARE EDUCATION/TRAINING PROGRAM

## 2025-06-06 PROCEDURE — 99205 OFFICE O/P NEW HI 60 MIN: CPT | Performed by: STUDENT IN AN ORGANIZED HEALTH CARE EDUCATION/TRAINING PROGRAM

## 2025-06-06 NOTE — PROGRESS NOTES
[facility]                                                                                                                    PRE OP INSTRUCTIONS FOR  Mark Owusu        Date: 6/6/2025    Date of surgery: 6/10/25   Arrival Time: Hospital will call you between 5pm and 7pm with your final arrival time for surgery. Go to front of hospital and check in at information desk.    Nothing by mouth (NPO) as instructed. May have clear liquids up to 2 hours prior to surgery. Nothing solid after midnight. Examples: water, apple juice, black coffee, plain tea    Take the following medications with a small sip of water on the morning of Surgery: Metoprolol     Diabetics may take half the evening dose of insulin but none after midnight.  If you feel symptomatic or have low blood sugar morning of surgery drink 1-2 ounces of apple juice only. If you take a weekly insulin injection _______________, stop 7 days prior to surgery. If you take _______________, stop 3-4 days prior to surgery.    Aspirin, Ibuprofen, Advil, Naproxen, other Anti-inflammatory products should be stopped before surgery as directed by your surgeon, cardiologist, or primary care Doctor. Herbal supplements and Vitamin E should be stopped five days prior.  May take Tylenol unless instructed otherwise by your surgeon.    Check with your Doctor regarding stopping Plavix, Coumadin, Lovenox, Eliquis, Effient, or other blood thinners such as, pradaxa, lixiana, xaralto and savaysa.    Do not smoke, chew tobacco, vape, or use illicit drugs and do not drink any alcoholic beverages 24 hours prior to surgery.    You may brush your teeth the morning of surgery.      You MUST make arrangements for a responsible adult, 18 and over, to take you home after your surgery. You will not be allowed to leave alone or drive yourself home.  You will need someone stay with you the first 24 hrs. Your surgery will be cancelled if you do not have a ride home or  you have any further questions.   Pre Admit Testing           [phone]     [facility] [phone]

## 2025-06-06 NOTE — PROGRESS NOTES
[facility] MEDICAL ONCOLOGY  [address] [place] [id number]  Dept: [phone]  Loc: [phone]    Yamini Tracy MD   ·   MD Felicity Kennedy APRN  ·  LINDSEY Carey      [place] [facility] [address] [address]  P: [phone]  F: [phone] [place] [facility] [place]  [address] [place] [address]. [place] [address]  P: [phone]  F: [phone]  [place] [facility] in [place]  [address] [place] [address]  P: [phone]  F: [phone]                 Hematology/Oncology   Clinic Consultation Note              Patient: Mark Owusu,  75 y.o. male    Date of Encounter: 06/06/2025     Referring Provider:  Estelita Jacobo DO    Reason for Visit:   Squamous cell carcinoma involving the head and neck        PCP:  Rukhsana Olivia APRN - CNP      Chief Complaint   Patient presents with    New Patient     Squamous cell carcinoma of skin         06/06/2025  History of Present Illness  The patient is a 75-year-old male who presents for potential head and neck cancer versus skin cancer.    He reports a recurrence of skin cancer, initially presenting as a small mass on his righ upper chest. He has been experiencing numbness on the right side of his face, ear, neck, back, and cheek since last year. Despite the absence of visible skin abnormalities, he noticed the mass due to associated pain. The mass was initially palpable and caused significant pain, particularly at night, before becoming numb. He sought medical attention from Dr. Quiles, who referred him to Dr. Lipscomb in [facility]. The dermatologist suggested surgical removal but was unable to proceed due to time constraints. He continued to experience soreness and returned for a follow-up in 01/2025. He was then advised to consult his family doctor, Dr. Olivia, who initiated a series of tests. A biopsy of a lymph node revealed a malignant

## 2025-06-06 NOTE — PROGRESS NOTES
Mark Owusu  1950 75 y.o.      Referring Physician:     PCP: Rukhsana Olivia APRN - CNP    Vitals:    25 1420   BP: 94/63   Pulse: 76   Temp: 98.2 °F (36.8 °C)   SpO2: 97%        Wt Readings from Last 3 Encounters:   25 60.6 kg (133 lb 9.6 oz)   25 63.5 kg (140 lb)   25 64 kg (141 lb)        Body mass index is 24.44 kg/m².          Chief Complaint:   Chief Complaint   Patient presents with    New Patient     Squamous cell carcinoma of skin          Cancer Staging   No matching staging information was found for the patient.      Prior Radiation Therapy? NO    Concurrent Chemo/radiation? NO    Prior Chemotherapy? NO    Prior Hormonal Therapy? NO    Head and Neck Cancer? No, patient does NOT have HN cancer.      LMP: na    Age at first Menses: na    : na    Para: na          Current Outpatient Medications:     metoclopramide (REGLAN) 10 MG tablet, Take 1 tablet by mouth 3 times daily (with meals), Disp: 120 tablet, Rfl: 3    traMADol (ULTRAM) 50 MG tablet, Take 1 tablet by mouth every 8 hours as needed., Disp: , Rfl:     metoprolol tartrate (LOPRESSOR) 25 MG tablet, Take 0.5 tablets by mouth 2 times daily, Disp: 180 tablet, Rfl: 3    omega-3 acid ethyl esters (LOVAZA) 1 g capsule, Take 4 capsules by mouth daily, Disp: 360 capsule, Rfl: 0    ezetimibe (ZETIA) 10 MG tablet, Take 1 tablet by mouth daily, Disp: 90 tablet, Rfl: 1    triamcinolone (KENALOG) 0.1 % cream, Apply topically 2 times daily., Disp: 45 g, Rfl: 2    pantoprazole (PROTONIX) 40 MG tablet, Take 1 tablet by mouth daily, Disp: 90 tablet, Rfl: 1    diclofenac (VOLTAREN) 50 MG EC tablet, Take 1 tablet by mouth 3 times daily (with meals), Disp: 270 tablet, Rfl: 1    atorvastatin (LIPITOR) 40 MG tablet, Take 1 tablet by mouth nightly, Disp: 90 tablet, Rfl: 1    ondansetron (ZOFRAN-ODT) 4 MG disintegrating tablet, Take 1 tablet by mouth 3 times daily as needed for Nausea or Vomiting, Disp: 21 tablet, Rfl: 2    sodium

## 2025-06-06 NOTE — TELEPHONE ENCOUNTER
Met with patient, his wife and daughter during the initial consult with Dr. Wolfe as they discussed a plan of care for squamous cell carcinoma. Introduced nurse navigator role, gave contact information and informed of other supportive services in clinic: financial navigator and nutrition. Patient denied issues with living arrangements, transportation, insurance and prescription coverage. He reported current appetite as poor over the past 4 months, with ~7 unplanned weight loss over the past 1 1/2 months. He doesn't care for the taste of the protein shakes he's tried but was agreeable taking Ensure Complete samples today. He stated he drinks Pedialyte. Provided coupon, recipe booklet, handout of recipes for high calorie drinks using Boost/Ensure and high calorie food options and a folder prepared by the clinic dietitian. Informed the clinic dietitian will be consulted for additional support, patient was agreeable. He is scheduled for an panendoscopy with Dr. Jacobo on 06/10/2025 and a consult with Dr. Kaplan afterwards the same day. Informed patient's wife Dr. Kaplan's office will be contacted to inform of the surgery and to contact them to discuss if the consult will need re-scheduled. Patient will return to see Dr. Wolfe on 06/27/2025 to discuss plan of care. He and his family denied further needs today, encouraged to call should any arise, they verbalized understanding.Amee Roberts RN, ADN, BSE, OCN Patient Nurse Navigator

## 2025-06-09 ENCOUNTER — HOSPITAL ENCOUNTER (OUTPATIENT)
Dept: INFUSION THERAPY | Age: 75
Discharge: HOME | End: 2025-06-09
Payer: MEDICARE

## 2025-06-09 DIAGNOSIS — C09.9 MALIGNANT NEOPLASM OF TONSIL (HCC): ICD-10-CM

## 2025-06-09 LAB
ALBUMIN SERPL-MCNC: 4 G/DL (ref 3.5–5.2)
ALP SERPL-CCNC: 87 U/L (ref 40–129)
ALT SERPL-CCNC: 21 U/L (ref 0–40)
ANION GAP SERPL CALCULATED.3IONS-SCNC: 13 MMOL/L (ref 7–16)
AST SERPL-CCNC: 32 U/L (ref 0–39)
BASOPHILS # BLD: 0.04 K/UL (ref 0–0.2)
BASOPHILS NFR BLD: 0 % (ref 0–2)
BILIRUB SERPL-MCNC: 0.4 MG/DL (ref 0–1.2)
BUN SERPL-MCNC: 13 MG/DL (ref 6–23)
CALCIUM SERPL-MCNC: 9.1 MG/DL (ref 8.6–10.2)
CHLORIDE SERPL-SCNC: 89 MMOL/L (ref 98–107)
CO2 SERPL-SCNC: 25 MMOL/L (ref 22–29)
CREAT SERPL-MCNC: 1.3 MG/DL (ref 0.7–1.2)
EOSINOPHIL # BLD: 0.16 K/UL (ref 0.05–0.5)
EOSINOPHILS RELATIVE PERCENT: 2 % (ref 0–6)
ERYTHROCYTE [DISTWIDTH] IN BLOOD BY AUTOMATED COUNT: 15.8 % (ref 11.5–15)
GFR, ESTIMATED: 57 ML/MIN/1.73M2
GLUCOSE SERPL-MCNC: 202 MG/DL (ref 74–99)
HCT VFR BLD AUTO: 38.4 % (ref 37–54)
HGB BLD-MCNC: 12.9 G/DL (ref 12.5–16.5)
IMM GRANULOCYTES # BLD AUTO: <0.03 K/UL (ref 0–0.58)
IMM GRANULOCYTES NFR BLD: 0 % (ref 0–5)
LYMPHOCYTES NFR BLD: 1.5 K/UL (ref 1.5–4)
LYMPHOCYTES RELATIVE PERCENT: 17 % (ref 20–42)
MCH RBC QN AUTO: 30.6 PG (ref 26–35)
MCHC RBC AUTO-ENTMCNC: 33.6 G/DL (ref 32–34.5)
MCV RBC AUTO: 91 FL (ref 80–99.9)
MONOCYTES NFR BLD: 0.94 K/UL (ref 0.1–0.95)
MONOCYTES NFR BLD: 11 % (ref 2–12)
NEUTROPHILS NFR BLD: 70 % (ref 43–80)
NEUTS SEG NFR BLD: 6.25 K/UL (ref 1.8–7.3)
PLATELET # BLD AUTO: 224 K/UL (ref 130–450)
PMV BLD AUTO: 8.7 FL (ref 7–12)
POTASSIUM SERPL-SCNC: 4.3 MMOL/L (ref 3.5–5)
PROT SERPL-MCNC: 6.8 G/DL (ref 6.4–8.3)
RBC # BLD AUTO: 4.22 M/UL (ref 3.8–5.8)
SODIUM SERPL-SCNC: 127 MMOL/L (ref 132–146)
WBC OTHER # BLD: 8.9 K/UL (ref 4.5–11.5)

## 2025-06-09 PROCEDURE — 36415 COLL VENOUS BLD VENIPUNCTURE: CPT

## 2025-06-09 PROCEDURE — 85025 COMPLETE CBC W/AUTO DIFF WBC: CPT

## 2025-06-09 PROCEDURE — 80053 COMPREHEN METABOLIC PANEL: CPT

## 2025-06-09 NOTE — H&P
Department of Otolaryngology  Office Consult Note           Subjective:         Chief Complaint:  had concerns including Follow-up ( FOLLOW UP - 2 wk FNA results(in epic)).      Patient ID: Mark Owusu is a 75 y.o. male.     HPI: Patient presents as  follow-up for right neck mass biopsy results.  Patient doing well from biopsy.  Mass has been stable and continuing to cause pain.        History:   Patient has hx of reportedly  squamous cell skin cancer right upper chest s/p excision by Dr Anuj blackwelltolograchel and over the past few months has had progressive increase in size of right neck mass associated pain to palpation and with shooting pain down the right neck into the right upper arm with numbness and tinngling and to the right ear, progressively worsening for the past 3-4 months.  Has had US, CT scan and MRI that are essentially nondiagnostic due to shoulder surgery artifact.  No obvious skin lesions associated with this area.  Denies trauma to the area.  Denies dysphagia, dyspnea, fever, chills, weight changes.        Review of Systems   Constitutional: Negative.    HENT:  Negative for congestion, ear discharge, ear pain, hearing loss, rhinorrhea, sinus pressure, sinus pain, sore throat and tinnitus.    Respiratory:  Negative for cough, choking, shortness of breath, wheezing and stridor.    Cardiovascular:  Negative for chest pain.   Skin:  Negative for rash.   Allergic/Immunologic: Negative for environmental allergies and immunocompromised state.   Neurological:  Negative for dizziness, weakness, light-headedness and headaches.   Psychiatric/Behavioral:  Negative for confusion.    All other systems reviewed and are negative.           Past Medical History        Past Medical History:   Diagnosis Date    Arthritis      Dislocated shoulder, right      GERD (gastroesophageal reflux disease) 01/04/2015    Hyperlipidemia      Hypoglycemia      Hyponatremia      Liver disease      Mixed hyperlipidemia 01/04/2015     New onset seizure (HCC) 04/17/2019    Skin cancer 2024     Squamous    Syncope and collapse           Past Surgical History         Past Surgical History:   Procedure Laterality Date    COLON SURGERY        COLONOSCOPY        INCISIONAL HERNIA REPAIR N/A 10/13/2016    INGUINAL HERNIA REPAIR Left 02/19/2013     laparoscopic left inguinal hernia repair    NERVE BLOCK Left 06/03/2021     LEFT S1 TRANSFORAMINAL EPIDURAL STEROID INJECTION(REQUESTS LAST APPOINMENT) performed by Debra Guerrero DO at [facility] OR    NERVE BLOCK Left 07/08/2021     LEFT L5-S1 TRANSFORAMINAL EPIDURAL STEROID INJECTION(WANTS LATER) performed by Debra Guerrero DO at [facility] OR    OTHER SURGICAL HISTORY Right       Skin cancer removed from right chest 11/22/2024    PANCREAS SURGERY   2015     Whipple procedure    NY LAPS COLECTOMY ABDL W/PROCTECTOMY W/ILEOSTOMY N/A 08/20/2018     DIAGNOSTIC LAPAROSCOPY POSS LAPAROTOMY POSS BOWEL RESECTION performed by Rodney Newberry MD at [facility] OR    NY LAPS ENTERECT RESCJ 1 SMALL INTEST RESCJ & MANI N/A 09/06/2018     LAPAROSCOPIC REVISION OF GASTROJEJUNOSTOMY; INTRAOPERATIVE EGD performed by Rodney Newberry MD at [facility] OR    REVISION OF TOTAL SHOULDER Right 04/26/2019     RIGHT REVERSE TOTAL SHOULDER ARTHROPLASTY performed by Andrew Kaufman MD at [facility] OR    SHOULDER SURGERY         right cyst removed 1965    SKIN BIOPSY        SKIN CANCER EXCISION   11/2024    UPPER GASTROINTESTINAL ENDOSCOPY        UPPER GASTROINTESTINAL ENDOSCOPY N/A 09/05/2018     EGD BIOPSY performed by Rodney Newberry MD at [facility] ENDOSCOPY    US BIOPSY LYMPH NODE   5/14/2025     US BIOPSY LYMPH NODE 5/14/2025 [facility] ULTRASOUND           Current Medication      Current Outpatient Medications:     metoclopramide (REGLAN) 10 MG tablet, Take 1 tablet by mouth 3 times daily (with meals), Disp: 120 tablet, Rfl: 3    traMADol (ULTRAM) 50 MG tablet, Take 1 tablet by mouth every 8 hours as needed., Disp: , Rfl:

## 2025-06-10 ENCOUNTER — ANESTHESIA EVENT (OUTPATIENT)
Dept: OPERATING ROOM | Age: 75
End: 2025-06-10
Payer: MEDICARE

## 2025-06-10 ENCOUNTER — TELEPHONE (OUTPATIENT)
Dept: ENT CLINIC | Age: 75
End: 2025-06-10

## 2025-06-10 ENCOUNTER — ANESTHESIA (OUTPATIENT)
Dept: OPERATING ROOM | Age: 75
End: 2025-06-10
Payer: MEDICARE

## 2025-06-10 ENCOUNTER — HOSPITAL ENCOUNTER (OUTPATIENT)
Age: 75
Setting detail: OUTPATIENT SURGERY
Discharge: HOME | End: 2025-06-10
Attending: OTOLARYNGOLOGY | Admitting: OTOLARYNGOLOGY
Payer: MEDICARE

## 2025-06-10 VITALS
HEIGHT: 62 IN | SYSTOLIC BLOOD PRESSURE: 156 MMHG | WEIGHT: 140 LBS | TEMPERATURE: 97.8 F | HEART RATE: 86 BPM | RESPIRATION RATE: 13 BRPM | OXYGEN SATURATION: 93 % | BODY MASS INDEX: 25.76 KG/M2 | DIASTOLIC BLOOD PRESSURE: 98 MMHG

## 2025-06-10 DIAGNOSIS — R11.0 NAUSEA: ICD-10-CM

## 2025-06-10 DIAGNOSIS — C09.9 MALIGNANT NEOPLASM OF TONSIL (HCC): Primary | ICD-10-CM

## 2025-06-10 DIAGNOSIS — C09.9 MALIGNANT NEOPLASM OF TONSIL (HCC): ICD-10-CM

## 2025-06-10 DIAGNOSIS — C09.9 TONSIL CANCER (HCC): Primary | ICD-10-CM

## 2025-06-10 PROCEDURE — 3600000003 HC SURGERY LEVEL 3 BASE: Performed by: OTOLARYNGOLOGY

## 2025-06-10 PROCEDURE — 88342 IMHCHEM/IMCYTCHM 1ST ANTB: CPT

## 2025-06-10 PROCEDURE — 3700000001 HC ADD 15 MINUTES (ANESTHESIA): Performed by: OTOLARYNGOLOGY

## 2025-06-10 PROCEDURE — 3600000013 HC SURGERY LEVEL 3 ADDTL 15MIN: Performed by: OTOLARYNGOLOGY

## 2025-06-10 PROCEDURE — 2500000003 HC RX 250 WO HCPCS: Performed by: NURSE ANESTHETIST, CERTIFIED REGISTERED

## 2025-06-10 PROCEDURE — 31536 LARYNGOSCOPY W/BX & OP SCOPE: CPT | Performed by: OTOLARYNGOLOGY

## 2025-06-10 PROCEDURE — 2720000010 HC SURG SUPPLY STERILE: Performed by: OTOLARYNGOLOGY

## 2025-06-10 PROCEDURE — 6360000002 HC RX W HCPCS: Performed by: ANESTHESIOLOGY

## 2025-06-10 PROCEDURE — 7100000000 HC PACU RECOVERY - FIRST 15 MIN: Performed by: OTOLARYNGOLOGY

## 2025-06-10 PROCEDURE — 7100000010 HC PHASE II RECOVERY - FIRST 15 MIN: Performed by: OTOLARYNGOLOGY

## 2025-06-10 PROCEDURE — 7100000011 HC PHASE II RECOVERY - ADDTL 15 MIN: Performed by: OTOLARYNGOLOGY

## 2025-06-10 PROCEDURE — 2709999900 HC NON-CHARGEABLE SUPPLY: Performed by: OTOLARYNGOLOGY

## 2025-06-10 PROCEDURE — 88360 TUMOR IMMUNOHISTOCHEM/MANUAL: CPT

## 2025-06-10 PROCEDURE — 3700000000 HC ANESTHESIA ATTENDED CARE: Performed by: OTOLARYNGOLOGY

## 2025-06-10 PROCEDURE — 2580000003 HC RX 258: Performed by: ANESTHESIOLOGY

## 2025-06-10 PROCEDURE — 88305 TISSUE EXAM BY PATHOLOGIST: CPT

## 2025-06-10 PROCEDURE — 2580000003 HC RX 258

## 2025-06-10 PROCEDURE — 7100000001 HC PACU RECOVERY - ADDTL 15 MIN: Performed by: OTOLARYNGOLOGY

## 2025-06-10 PROCEDURE — 6360000002 HC RX W HCPCS: Performed by: NURSE ANESTHETIST, CERTIFIED REGISTERED

## 2025-06-10 RX ORDER — FENTANYL CITRATE 50 UG/ML
INJECTION, SOLUTION INTRAMUSCULAR; INTRAVENOUS
Status: DISCONTINUED | OUTPATIENT
Start: 2025-06-10 | End: 2025-06-10 | Stop reason: SDUPTHER

## 2025-06-10 RX ORDER — PHENYLEPHRINE HCL IN 0.9% NACL 1 MG/10 ML
SYRINGE (ML) INTRAVENOUS
Status: DISCONTINUED | OUTPATIENT
Start: 2025-06-10 | End: 2025-06-10 | Stop reason: SDUPTHER

## 2025-06-10 RX ORDER — ROCURONIUM BROMIDE 10 MG/ML
INJECTION, SOLUTION INTRAVENOUS
Status: DISCONTINUED | OUTPATIENT
Start: 2025-06-10 | End: 2025-06-10 | Stop reason: SDUPTHER

## 2025-06-10 RX ORDER — SODIUM CHLORIDE, SODIUM LACTATE, POTASSIUM CHLORIDE, CALCIUM CHLORIDE 600; 310; 30; 20 MG/100ML; MG/100ML; MG/100ML; MG/100ML
INJECTION, SOLUTION INTRAVENOUS CONTINUOUS
Status: DISCONTINUED | OUTPATIENT
Start: 2025-06-10 | End: 2025-06-10 | Stop reason: HOSPADM

## 2025-06-10 RX ORDER — ONDANSETRON 2 MG/ML
INJECTION INTRAMUSCULAR; INTRAVENOUS
Status: DISCONTINUED | OUTPATIENT
Start: 2025-06-10 | End: 2025-06-10 | Stop reason: SDUPTHER

## 2025-06-10 RX ORDER — LABETALOL HYDROCHLORIDE 5 MG/ML
10 INJECTION, SOLUTION INTRAVENOUS
Status: DISCONTINUED | OUTPATIENT
Start: 2025-06-10 | End: 2025-06-10 | Stop reason: HOSPADM

## 2025-06-10 RX ORDER — HYDRALAZINE HYDROCHLORIDE 20 MG/ML
10 INJECTION INTRAMUSCULAR; INTRAVENOUS
Status: DISCONTINUED | OUTPATIENT
Start: 2025-06-10 | End: 2025-06-10 | Stop reason: HOSPADM

## 2025-06-10 RX ORDER — ONDANSETRON 4 MG/1
4 TABLET, ORALLY DISINTEGRATING ORAL 3 TIMES DAILY PRN
Qty: 21 TABLET | Refills: 2 | Status: SHIPPED | OUTPATIENT
Start: 2025-06-10

## 2025-06-10 RX ORDER — SODIUM CHLORIDE 0.9 % (FLUSH) 0.9 %
5-40 SYRINGE (ML) INJECTION PRN
Status: DISCONTINUED | OUTPATIENT
Start: 2025-06-10 | End: 2025-06-10 | Stop reason: HOSPADM

## 2025-06-10 RX ORDER — DIPHENHYDRAMINE HYDROCHLORIDE 50 MG/ML
12.5 INJECTION, SOLUTION INTRAMUSCULAR; INTRAVENOUS
Status: DISCONTINUED | OUTPATIENT
Start: 2025-06-10 | End: 2025-06-10 | Stop reason: HOSPADM

## 2025-06-10 RX ORDER — FENTANYL CITRATE 0.05 MG/ML
25 INJECTION, SOLUTION INTRAMUSCULAR; INTRAVENOUS EVERY 5 MIN PRN
Status: DISCONTINUED | OUTPATIENT
Start: 2025-06-10 | End: 2025-06-10 | Stop reason: HOSPADM

## 2025-06-10 RX ORDER — SODIUM CHLORIDE 9 MG/ML
INJECTION, SOLUTION INTRAVENOUS PRN
Status: DISCONTINUED | OUTPATIENT
Start: 2025-06-10 | End: 2025-06-10 | Stop reason: HOSPADM

## 2025-06-10 RX ORDER — MIDAZOLAM HYDROCHLORIDE 1 MG/ML
2 INJECTION, SOLUTION INTRAMUSCULAR; INTRAVENOUS
Status: DISCONTINUED | OUTPATIENT
Start: 2025-06-10 | End: 2025-06-10 | Stop reason: HOSPADM

## 2025-06-10 RX ORDER — DEXAMETHASONE SODIUM PHOSPHATE 10 MG/ML
INJECTION, SOLUTION INTRAMUSCULAR; INTRAVENOUS
Status: DISCONTINUED | OUTPATIENT
Start: 2025-06-10 | End: 2025-06-10 | Stop reason: SDUPTHER

## 2025-06-10 RX ORDER — PROPOFOL 10 MG/ML
INJECTION, EMULSION INTRAVENOUS
Status: DISCONTINUED | OUTPATIENT
Start: 2025-06-10 | End: 2025-06-10 | Stop reason: SDUPTHER

## 2025-06-10 RX ORDER — HYDROCODONE BITARTRATE AND ACETAMINOPHEN 5; 325 MG/1; MG/1
1 TABLET ORAL EVERY 4 HOURS PRN
Qty: 12 TABLET | Refills: 0 | Status: SHIPPED | OUTPATIENT
Start: 2025-06-10 | End: 2025-06-13

## 2025-06-10 RX ORDER — KETOROLAC TROMETHAMINE 15 MG/ML
15 INJECTION, SOLUTION INTRAMUSCULAR; INTRAVENOUS
Status: DISCONTINUED | OUTPATIENT
Start: 2025-06-10 | End: 2025-06-10 | Stop reason: HOSPADM

## 2025-06-10 RX ORDER — PROCHLORPERAZINE EDISYLATE 5 MG/ML
5 INJECTION INTRAMUSCULAR; INTRAVENOUS
Status: COMPLETED | OUTPATIENT
Start: 2025-06-10 | End: 2025-06-10

## 2025-06-10 RX ORDER — NALOXONE HYDROCHLORIDE 0.4 MG/ML
INJECTION, SOLUTION INTRAMUSCULAR; INTRAVENOUS; SUBCUTANEOUS PRN
Status: DISCONTINUED | OUTPATIENT
Start: 2025-06-10 | End: 2025-06-10 | Stop reason: HOSPADM

## 2025-06-10 RX ORDER — SODIUM CHLORIDE 0.9 % (FLUSH) 0.9 %
5-40 SYRINGE (ML) INJECTION EVERY 12 HOURS SCHEDULED
Status: DISCONTINUED | OUTPATIENT
Start: 2025-06-10 | End: 2025-06-10 | Stop reason: HOSPADM

## 2025-06-10 RX ORDER — LIDOCAINE HYDROCHLORIDE 20 MG/ML
INJECTION, SOLUTION INTRAVENOUS
Status: DISCONTINUED | OUTPATIENT
Start: 2025-06-10 | End: 2025-06-10 | Stop reason: SDUPTHER

## 2025-06-10 RX ORDER — NEOSTIGMINE METHYLSULFATE 1 MG/ML
INJECTION, SOLUTION INTRAVENOUS
Status: DISCONTINUED | OUTPATIENT
Start: 2025-06-10 | End: 2025-06-10 | Stop reason: SDUPTHER

## 2025-06-10 RX ORDER — DROPERIDOL 2.5 MG/ML
0.62 INJECTION, SOLUTION INTRAMUSCULAR; INTRAVENOUS
Status: DISCONTINUED | OUTPATIENT
Start: 2025-06-10 | End: 2025-06-10 | Stop reason: HOSPADM

## 2025-06-10 RX ORDER — SENNA AND DOCUSATE SODIUM 50; 8.6 MG/1; MG/1
1 TABLET, FILM COATED ORAL DAILY
Qty: 30 TABLET | Refills: 0 | Status: SHIPPED | OUTPATIENT
Start: 2025-06-10 | End: 2025-07-10

## 2025-06-10 RX ORDER — IPRATROPIUM BROMIDE AND ALBUTEROL SULFATE 2.5; .5 MG/3ML; MG/3ML
1 SOLUTION RESPIRATORY (INHALATION)
Status: DISCONTINUED | OUTPATIENT
Start: 2025-06-10 | End: 2025-06-10 | Stop reason: HOSPADM

## 2025-06-10 RX ORDER — GLYCOPYRROLATE 0.2 MG/ML
INJECTION INTRAMUSCULAR; INTRAVENOUS
Status: DISCONTINUED | OUTPATIENT
Start: 2025-06-10 | End: 2025-06-10 | Stop reason: SDUPTHER

## 2025-06-10 RX ADMIN — ROCURONIUM BROMIDE 35 MG: 10 INJECTION, SOLUTION INTRAVENOUS at 11:24

## 2025-06-10 RX ADMIN — SODIUM CHLORIDE, POTASSIUM CHLORIDE, SODIUM LACTATE AND CALCIUM CHLORIDE: 600; 310; 30; 20 INJECTION, SOLUTION INTRAVENOUS at 11:12

## 2025-06-10 RX ADMIN — PROPOFOL 120 MG: 10 INJECTION, EMULSION INTRAVENOUS at 11:24

## 2025-06-10 RX ADMIN — Medication 3 MG: at 11:58

## 2025-06-10 RX ADMIN — ONDANSETRON 4 MG: 2 INJECTION, SOLUTION INTRAMUSCULAR; INTRAVENOUS at 11:32

## 2025-06-10 RX ADMIN — Medication 200 MCG: at 11:50

## 2025-06-10 RX ADMIN — SODIUM CHLORIDE, POTASSIUM CHLORIDE, SODIUM LACTATE AND CALCIUM CHLORIDE: 600; 310; 30; 20 INJECTION, SOLUTION INTRAVENOUS at 11:19

## 2025-06-10 RX ADMIN — LABETALOL HYDROCHLORIDE 10 MG: 5 INJECTION, SOLUTION INTRAVENOUS at 14:12

## 2025-06-10 RX ADMIN — HYDROMORPHONE HYDROCHLORIDE 0.5 MG: 1 INJECTION, SOLUTION INTRAMUSCULAR; INTRAVENOUS; SUBCUTANEOUS at 13:19

## 2025-06-10 RX ADMIN — FENTANYL CITRATE 25 MCG: 50 INJECTION INTRAMUSCULAR; INTRAVENOUS at 13:43

## 2025-06-10 RX ADMIN — Medication 100 MCG: at 11:45

## 2025-06-10 RX ADMIN — LIDOCAINE HYDROCHLORIDE 100 MG: 20 INJECTION, SOLUTION INTRAVENOUS at 11:24

## 2025-06-10 RX ADMIN — PROCHLORPERAZINE EDISYLATE 5 MG: 5 INJECTION INTRAMUSCULAR; INTRAVENOUS at 14:40

## 2025-06-10 RX ADMIN — FENTANYL CITRATE 100 MCG: 50 INJECTION, SOLUTION INTRAMUSCULAR; INTRAVENOUS at 11:24

## 2025-06-10 RX ADMIN — HYDROMORPHONE HYDROCHLORIDE 0.5 MG: 1 INJECTION, SOLUTION INTRAMUSCULAR; INTRAVENOUS; SUBCUTANEOUS at 12:55

## 2025-06-10 RX ADMIN — DEXAMETHASONE SODIUM PHOSPHATE 10 MG: 10 INJECTION, SOLUTION INTRAMUSCULAR; INTRAVENOUS at 11:24

## 2025-06-10 RX ADMIN — GLYCOPYRROLATE 0.6 MG: 0.2 INJECTION INTRAMUSCULAR; INTRAVENOUS at 11:58

## 2025-06-10 ASSESSMENT — PAIN SCALES - GENERAL
PAINLEVEL_OUTOF10: 9
PAINLEVEL_OUTOF10: 0
PAINLEVEL_OUTOF10: 8
PAINLEVEL_OUTOF10: 1
PAINLEVEL_OUTOF10: 8

## 2025-06-10 ASSESSMENT — PAIN DESCRIPTION - LOCATION
LOCATION: THROAT

## 2025-06-10 ASSESSMENT — PAIN DESCRIPTION - DESCRIPTORS
DESCRIPTORS: SORE

## 2025-06-10 ASSESSMENT — PAIN DESCRIPTION - ORIENTATION: ORIENTATION: INNER

## 2025-06-10 ASSESSMENT — LIFESTYLE VARIABLES: SMOKING_STATUS: 0

## 2025-06-10 ASSESSMENT — PAIN - FUNCTIONAL ASSESSMENT: PAIN_FUNCTIONAL_ASSESSMENT: 0-10

## 2025-06-10 NOTE — ANESTHESIA PRE PROCEDURE
Department of Anesthesiology  Preprocedure Note       Name:  Mark Owusu   Age:  75 y.o.  :  1950                                          MRN:  [id number]         Date:  6/10/2025      Surgeon: Surgeon(s):  Estelita Jacobo DO    Procedure: **FROZEN SECTION** PANENDOSCOPY WITH BIOPSY FROZEN SECTION LARYNGOSCOPY (Right: Head)    Medications prior to admission:   Prior to Admission medications    Medication Sig Start Date End Date Taking? Authorizing Provider   metoclopramide (REGLAN) 10 MG tablet Take 1 tablet by mouth 3 times daily (with meals) 25   Estelita Jacobo DO   traMADol (ULTRAM) 50 MG tablet Take 1 tablet by mouth every 8 hours as needed. 25   ProviderHank MD   metoprolol tartrate (LOPRESSOR) 25 MG tablet Take 0.5 tablets by mouth 2 times daily 25   Desean Camara MD   omega-3 acid ethyl esters (LOVAZA) 1 g capsule Take 4 capsules by mouth daily 25   Britton Shen DO   ezetimibe (ZETIA) 10 MG tablet Take 1 tablet by mouth daily 25   Rukhsana Olivia APRN - CNP   triamcinolone (KENALOG) 0.1 % cream Apply topically 2 times daily. 3/24/25   Rukhsana Olivia APRN - CNP   pantoprazole (PROTONIX) 40 MG tablet Take 1 tablet by mouth daily 25   Rukhsana Olivia APRN - CNP   diclofenac (VOLTAREN) 50 MG EC tablet Take 1 tablet by mouth 3 times daily (with meals) 25   Rukhsana Olivia APRN - CNP   atorvastatin (LIPITOR) 40 MG tablet Take 1 tablet by mouth nightly 25   Rukhsana Olivia APRN - CNP   tamsulosin (FLOMAX) 0.4 MG capsule TAKE 1 CAPSULE BY MOUTH DAILY  Patient not taking: Reported on 2025   Avelino Briones DO   solifenacin (VESICARE) 10 MG tablet Take 1 tablet by mouth at bedtime  Patient not taking: Reported on 2025  Rukhsana Olivia APRN - CNP   ondansetron (ZOFRAN) 8 MG tablet Take 1 tablet by mouth every 8 hours as needed for Nausea or Vomiting  Patient not taking: Reported on 2025   Rukhsana Olivia, APRN - CNP

## 2025-06-10 NOTE — H&P
H&P reviewed, no changes. No issues. Questions and concerns were answered to the patient's satisfaction. Will proceed with procedure        Electronically signed by Estelita Jacobo DO on 6/10/25 at 10:50 AM EDT

## 2025-06-10 NOTE — OP NOTE
Operative Note      Patient: Mark Owusu  YOB: 1950  MRN: [id number]    Date of Procedure: 6/10/2025    Pre-Op Diagnosis Codes:      * Malignant neoplasm of tonsil (HCC) [C09.9]    Post-Op Diagnosis: Same       Procedure(s):  Direct laryngoscopy with biopsy with telescope    Surgeon(s):  Estelita Jacobo DO    Assistant:   First Assistant: Melody Alvarado RN    Anesthesia: General    Estimated Blood Loss (mL): Minimal    Complications: None    Specimens:   ID Type Source Tests Collected by Time Destination   A : right supiror base of tongue Tissue Tissue SURGICAL PATHOLOGY Odalis Eldonailyn MARTIN,  6/10/2025 1134    B : right anterior tonsiler pillar Tissue Tissue SURGICAL PATHOLOGY Kyseymour Eldonailyn MARTIN,  6/10/2025 1138    C : right superior base of tounge Tissue Tissue SURGICAL PATHOLOGY Odalis Eldonailyn MARTIN,  6/10/2025 1143    D : right pallentine tonsil Tissue Tissue SURGICAL PATHOLOGY Odalis Eldonailyn MARTIN,  6/10/2025 1154        Implants:  * No implants in log *      Drains: * No LDAs found *    Findings:  Infection Present At Time Of Surgery (PATOS) (choose all levels that have infection present):  No infection present  Other Findings: Erythema with minimal ulcerative changes over the right anterior pillar, palatine tonsil and superior tongue base, roughly 15 mm palpable hard nodularity at the inferior aspect of the right tonsil, superior aspect of the tongue base/glossotonsillar sulcus, no exophytic component to the tonsil or mass.  Right palatine tonsillar tissue was friable.  Middle and inferior right tongue base soft, left tonsil and tongue base soft, no other supraglottic glottic or subglottic masses or lesions noted.    Detailed Description of Procedure:   The patient was brought back to the operating room and placed supine on the table.  SCDs were placed and functioning.  The patient was handed to anesthesia for proper endotracheal intubation.  The patient was then turned 90 degrees counterclockwise from the  anesthesia unit.  The patient was prepped and draped in a sterile fashion.  A tooth guard was placed over contact patient's upper dentition.  A Dedo laryngoscope was then placed in the patient's oral cavity with findings noted above.  Biopsies were taken of the right anterior tonsillar pillar, right middle and inferior palintine tonsil, right superior tongue base/glossotonsillar sulcus.  The glossotonsillar sulcus/superior tongue base biopsy was sent for frozen pathology and resulted squamous cell carcinoma.  The 12 degree telescope was then placed through the Dedo and the examination was performed under telescopic guidance.  Pictures taken.  Hemostasis was achieved with epinephrine soaked pledget.  Minimal bleeding seen.  The patient was handed back to anesthesia for proper awakening.  The patient Toller procedure without complication.          Electronically signed by Estelita Jacobo DO on 6/10/2025 at 12:14 PM

## 2025-06-10 NOTE — PROGRESS NOTES
INTRAOPERATIVE CONSULTATION (with FROZEN SECTION)    Right superior base of tongue, biopsy: Positive for squamous cell carcinoma.    Terrance Baldwin MD

## 2025-06-10 NOTE — DISCHARGE INSTRUCTIONS
ENT Post-Op Instructions    Follow up with Dr. Jacobo  in 1 week(s). CALL OFFICE TO SCHEDULE/CONFIRM APPOINTMENT    Please follow the instructions below:    DIET INSTRUCTIONS:  Regular diet. Start with light meals today and increase as tolerated.    ACTIVITY INSTRUCTIONS:  Increase activity as tolerated    Elevate Head of bed   No heavy lifting or strenuous activity until your cleared at your post-operative appointment   No driving while taking pain medication  You may spit up blood-tinged secretions, this is normal.  If it becomes actively bleeding please contact the office immediately or present to ED for evaluation.       MEDICATION INSTRUCTIONS:  Take medication as prescribed.  When taking pain medications, you may experience dizziness or drowsiness.  Do not drink alcohol or drive when taking these medications.  Alternate between Tylenol and ibuprofen every 3 hours for pain control.  Use Norco in place of Tylenol for breakthrough pain.    You may experience constipation while taking narcotic pain medication - You may take over the counter stool softeners: docuscate (Colace) or sennosides S (Senokot - S).     Call physician for any of the following or for questions/concerns:   Fever over 101° F    Redness, swelling, hardness or warmth at the wound site (s)  Unrelieved nausea/vomiting    Foul smelling or cloudy drainage at the wound site (s)   Unrelieved pain or increase in pain     Increase in shortness of breath

## 2025-06-10 NOTE — TELEPHONE ENCOUNTER
----- Message from Dr. Estelita Jacobo, DO sent at 6/10/2025 12:24 PM EDT -----  Hello!    Can we make sure he gets referred to Dr Regina gonzalez  ENT head and neck asap for tonsil ca  Thank you!!!

## 2025-06-10 NOTE — ANESTHESIA POSTPROCEDURE EVALUATION
Department of Anesthesiology  Postprocedure Note    Patient: Mark Owusu  MRN: [id number]  YOB: 1950  Date of evaluation: 6/10/2025    Procedure Summary       Date: 06/10/25 Room / Location: [address]    Anesthesia Start: 1117 Anesthesia Stop: 1209    Procedure: **FROZEN SECTION** PANENDOSCOPY WITH BIOPSY FROZEN SECTION LARYNGOSCOPY (Right: Head) Diagnosis:       Malignant neoplasm of tonsil (HCC)      (Malignant neoplasm of tonsil (HCC) [C09.9])    Surgeons: Estelita Jacobo DO Responsible Provider: Maik Armenta MD    Anesthesia Type: General ASA Status: 3            Anesthesia Type: General    Jayce Phase I: Jayce Score: 10    Jayce Phase II:      Anesthesia Post Evaluation    Patient location during evaluation: PACU  Patient participation: complete - patient participated  Level of consciousness: awake  Airway patency: patent  Nausea & Vomiting: no nausea and no vomiting  Cardiovascular status: hemodynamically stable  Respiratory status: acceptable  Hydration status: euvolemic  Pain management: adequate        No notable events documented.

## 2025-06-13 ENCOUNTER — TELEPHONE (OUTPATIENT)
Age: 75
End: 2025-06-13

## 2025-06-13 NOTE — TELEPHONE ENCOUNTER
Called to [facility] but spoke with his wife Keyon, as he was taking a nap. Call was for referral for Palliative Care. Explained Palliative Care and location of clinic on Wednesdays at [facility]. Keyon shares [facility]'s past medical history. Keyon feels [facility] is closing up and not wanting to talk about cancer. Gets frustrated about appointments. Keyon wants to wait until treatment plan is set . Clay set for 7/16/25, for now. Contact number provided.

## 2025-06-17 ENCOUNTER — OFFICE VISIT (OUTPATIENT)
Dept: ENT CLINIC | Age: 75
End: 2025-06-17

## 2025-06-17 VITALS
OXYGEN SATURATION: 96 % | SYSTOLIC BLOOD PRESSURE: 140 MMHG | HEART RATE: 89 BPM | DIASTOLIC BLOOD PRESSURE: 84 MMHG | TEMPERATURE: 97.3 F

## 2025-06-17 DIAGNOSIS — C09.9 TONSIL CANCER (HCC): Primary | ICD-10-CM

## 2025-06-17 PROCEDURE — 99024 POSTOP FOLLOW-UP VISIT: CPT | Performed by: OTOLARYNGOLOGY

## 2025-06-17 RX ORDER — HYDROCODONE BITARTRATE AND ACETAMINOPHEN 5; 325 MG/1; MG/1
1 TABLET ORAL EVERY 4 HOURS PRN
Qty: 12 TABLET | Refills: 0 | Status: SHIPPED | OUTPATIENT
Start: 2025-06-17 | End: 2025-06-20

## 2025-06-17 ASSESSMENT — ENCOUNTER SYMPTOMS
WHEEZING: 0
COUGH: 0
SORE THROAT: 0
RHINORRHEA: 0
STRIDOR: 0
SHORTNESS OF BREATH: 0
CHOKING: 0
SINUS PRESSURE: 0
SINUS PAIN: 0

## 2025-06-17 NOTE — PROGRESS NOTES
Department of Otolaryngology  Office Consult Note        Subjective:        Chief Complaint:  had concerns including Post-Op Check (1 week post op Panendo with bx patient states having a lot of throat pain ).     Patient ID: Mark Owusu is a 75 y.o. male.    HPI: Patient presents as  post-op for panendoscopy with biopsu  Complains of right shoulder pain and mild sore throat otherwise doing ok.        History:   Patient has hx of reportedly  squamous cell skin cancer right upper chest s/p excision by Dr Anuj hunteramtolograchel and over the past few months has had progressive increase in size of right neck mass associated pain to palpation and with shooting pain down the right neck into the right upper arm with numbness and tinngling and to the right ear, progressively worsening for the past 3-4 months.  Has had US, CT scan and MRI that are essentially nondiagnostic due to shoulder surgery artifact.  No obvious skin lesions associated with this area.  Denies trauma to the area.  Denies dysphagia, dyspnea, fever, chills, weight changes.      Review of Systems   Constitutional: Negative.    HENT:  Negative for congestion, ear discharge, ear pain, hearing loss, rhinorrhea, sinus pressure, sinus pain, sore throat and tinnitus.    Respiratory:  Negative for cough, choking, shortness of breath, wheezing and stridor.    Cardiovascular:  Negative for chest pain.   Skin:  Negative for rash.   Allergic/Immunologic: Negative for environmental allergies and immunocompromised state.   Neurological:  Negative for dizziness, weakness, light-headedness and headaches.   Psychiatric/Behavioral:  Negative for confusion.    All other systems reviewed and are negative.        Past Medical History:   Diagnosis Date    Arthritis     Constipation     Diarrhea     Dislocated shoulder, right     GERD (gastroesophageal reflux disease) 01/04/2015    Hearing loss     Hyperlipidemia     Hypoglycemia     Hyponatremia     Liver disease     Mixed

## 2025-06-18 LAB — SURGICAL PATHOLOGY REPORT: NORMAL

## 2025-06-19 ENCOUNTER — HOSPITAL ENCOUNTER (OUTPATIENT)
Dept: RADIATION ONCOLOGY | Age: 75
Discharge: HOME | End: 2025-06-19
Payer: MEDICARE

## 2025-06-19 ENCOUNTER — TELEPHONE (OUTPATIENT)
Dept: CASE MANAGEMENT | Age: 75
End: 2025-06-19

## 2025-06-19 VITALS
HEART RATE: 91 BPM | TEMPERATURE: 97.2 F | WEIGHT: 129.7 LBS | BODY MASS INDEX: 23.72 KG/M2 | SYSTOLIC BLOOD PRESSURE: 154 MMHG | DIASTOLIC BLOOD PRESSURE: 95 MMHG | OXYGEN SATURATION: 97 % | RESPIRATION RATE: 18 BRPM

## 2025-06-19 PROBLEM — C09.0 MALIGNANT NEOPLASM OF TONSILLAR FOSSA (HCC): Status: ACTIVE | Noted: 2025-06-19

## 2025-06-19 PROCEDURE — 99205 OFFICE O/P NEW HI 60 MIN: CPT

## 2025-06-19 SDOH — ECONOMIC STABILITY: HOUSING INSECURITY: PLEASE ASSESS YOUR PATIENT'S LEVEL OF DISTRESS CONCERNING HOUSING (SCALE FROM 1-10): 0

## 2025-06-19 ASSESSMENT — PAIN SCALES - GENERAL: PAINLEVEL_OUTOF10: 5

## 2025-06-19 ASSESSMENT — PAIN DESCRIPTION - LOCATION: LOCATION: THROAT

## 2025-06-19 NOTE — TELEPHONE ENCOUNTER
Met with patient and wife during his initial consultation with Dr Kaplan for his Head and Neck cancer diagnosis. Introduced myself and reviewed the Nurse Navigator role with patients receiving treatment at our center. He follows with Dr Jacobo for ENT and was referred to our office to discuss Radiation Therapy treatments. He follows with Dr Wolfe for Medical Oncology and ADAM Lubin Navigator at that office. He is also scheduled for consult with ENT at  with Dr Medrano on 7/1/25 to discuss Surgical options. Patient was friendly and receptive. They deny any questions or needs at this time. He follows with his own dentist. He was provided with the Dental Clearance form to be completed and sent back to our office. Next steps include Surgical ENT consult, Dental Clearance and treatments pending Dr Kaplan's recommendations and follow up care. Provided patient with literature  on [facility] Radiation Therapy for Head and Neck Cancer, [facility] Radiation Side Effects for Head and Neck Cancer, [facility] Head and Neck Cancer Resources, [facility] Oral Care for Head and Neck Cancer and Dental Recommendations handout. Reviewed resources available including Social Work and Dietician. Provided with my contact information and encouraged  patient to call me with questions or concerns. Verbalizes understanding. Patient appreciative of visit. Will continue to follow.

## 2025-06-19 NOTE — PROGRESS NOTES
Endoscopy Procedure Note    Pre-operative Diagnosis: right neck mass, pain and tobacco abuse    Post-operative Diagnosis: SCC right Tonsil/BOT    Indications: Hoarseness, dysphagia or aspiration - not able to be clearly evaluated by indirect laryngoscopy    Anesthesia: Lidocaine 2% and Afrin    Endoscopy Type:  nasal endoscopy, nasopharyngoscopy, laryngoscopy    Procedure Details   With the patient sitting upright in the examining chair informed consent was obtained.  The bilateral side(s) of the nose were topically anesthetized with spray.  After waiting an appropriate period of time for anesthesia/ vasoconstriction to become effective, the 0-degree  flexible laryngoscope was passed through the left side(s) of the nose, and the nose, nasopharynx, oropharynx, hypopharynx and larynx were examined.    Examination was performed during quiet respiration and with phonation.  I personally performed the procedure.  The following findings were noted.    Findings:  Mucosa:  without erythema or discharge   Nasal septum:  normal   Turbinates:  normal   Eustachian tubes:  normal   Mucous stranding:  present   Lesions:  Present, mass involving right lateral base of tongue, right Tonsil.   Larynx Supraglottis, false and true vocal cord were normal.       Condition:  Stable    Complications:  None    Pictures:                                  
Mark Owusu  6/19/2025  Ht Readings from Last 1 Encounters:   06/10/25 1.575 m (5' 2\")     Wt Readings from Last 10 Encounters:   06/19/25 58.8 kg (129 lb 11.2 oz)   06/10/25 63.5 kg (140 lb)   06/06/25 60.6 kg (133 lb 9.6 oz)   04/23/25 63.5 kg (140 lb)   03/24/25 64 kg (141 lb)   12/04/24 64.9 kg (143 lb)   11/12/24 64 kg (141 lb)   05/09/24 63.5 kg (140 lb)   03/18/24 62.1 kg (137 lb)   02/27/24 62.1 kg (137 lb)     Body mass index is 23.72 kg/m².    Assessment: Met w/ pt and wife for introduction during initial consult for new head and neck CA. Reviewed role of oncology dietitian in pt's care. Pt notes #, however decreased to ~137# about 1 year ago per report. Of note, majority of wts per EMR are stated. Pt reports wt loss 2/2 decreased appetite and early satiety. He reports occasional dysphagia, mostly w/ bread-based products. He has started drinking Ensure and Premier Protein once daily. Reviewed ONS options, encouraging high calorie/high protein varieties. Provided w/ samples of Ensure Complete for trial. Pt reports he is consuming 2 meals/day w/ wife noting that he is sleeping most of the day. Educated on need for more consistent nutritional intake. Pt being seen by  ENT for surgical eval, unsure of POC at this time. Reviewed nutrition related side effects of radiation therapy to H&N region as well as expectations moving forward. Pt and wife appreciative of time spent, deny any specific needs at this time. Will follow for POC.    Weight change:  3% wt loss x2 weeks  Appetite: fair  Nutritional Side Effects: dysphagia, early satiety  Calculated Needs: 30-32 kcal/kg CBW =  kcal, 1.3-1.5 gm/kg CBW = 80-90 gm pro, 1 ml/kcal =  ml fluids  Malnutrition Status: At risk  Nutrition Diagnosis: Inadequate Oral Intake related to Catabolic Illness as evidenced by Diet history of poor intake  and Weight loss     Pre-Hab Eligible?: Yes    Recommendations: Pt to consume at least 3 meals/day + ONS 
tablet 1    acetaminophen (TYLENOL) 325 MG tablet Take 2 tablets by mouth every 6 hours as needed for Pain      sodium chloride 1 g tablet Take 1 tablet by mouth in the morning and at bedtime      therapeutic multivitamin-minerals (THERAGRAN-M) tablet Take 1 tablet by mouth daily       No current facility-administered medications for this encounter.       FAMILY HISTORY:  Family History   Problem Relation Age of Onset    High Cholesterol Mother     Stroke Mother     No Known Problems Father     No Known Problems Sister     Diabetes Brother     Mental Illness Brother     Stroke Brother     No Known Problems Maternal Aunt     No Known Problems Maternal Uncle     No Known Problems Paternal Aunt     No Known Problems Paternal Uncle     No Known Problems Maternal Grandmother     No Known Problems Maternal Grandfather     No Known Problems Paternal Grandmother     No Known Problems Paternal Grandfather     No Known Problems Maternal Cousin     No Known Problems Paternal Cousin     Cancer Daughter 33        SCC skin    High Cholesterol Son     No Known Problems Grandchild        SOCIAL HISTORY:       reports that he quit smoking about 14 years ago. His smoking use included cigarettes. He has been exposed to tobacco smoke. He has never used smokeless tobacco..   reports current alcohol use..   reports no history of drug use.    REVIEW OF SYSTEMS:  Obtained from the patient, chart review and nursing assessment.  General ROS: positive for  - fatigue  Psychological ROS: positive for - anxiety  Ophthalmic ROS: negative  ENT ROS: positive for -non-specific neck pain  negative for - epistaxis, headaches, nasal discharge, nasal polyps, oral lesions, sneezing, tinnitus, vertigo, visual changes, or vocal changes  Allergy and Immunology ROS: negative  Hematological and Lymphatic ROS: positive for - swollen lymph nodes  negative for - bleeding problems, blood clots, blood transfusions, jaundice, night sweats, or pallor  Endocrine ROS: 
with coupons  Route to the dietitian via [facility]          OT ASSESSMENT FOR REFERRAL    Are you having difficulty performing daily routine tasks due to fatigue or weakness (ie: bathing/showering, dressing, housework, meal prep, work, , etc): No     Do you have any arm flexibility/ROM restrictions, swelling or pain that limit activity: No     Any changes in memory, attention/focus that impact daily activities: No     Do you avoid participation in leisure/social activity due to weakness, fatigue or pain: No     ARE ANY OF THE ABOVE ARE ANSWERED YES: No          PT ASSESSMENT FOR REFERRAL    Have you had any recent falls in the past 2 months: No     Do you have difficulty going up/down stairs: No     Are you having difficulty walking: Yes     Do you often hold onto furniture/environmental supports or feel off balance when you are walking: Yes     Do you need to take rest breaks when you are walking: Yes     Any pain on a scale of 1-10 that limits your mobility: No 5/10    ARE ANY OF THE ABOVE ARE ANSWERED YES: Yes - but NO PT referral request sent due to patient refusal.           LYMPHEDEMA SCREENING ASSESSMENT FOR PATIENTS WITH BREAST CANCER    The patient reports the following signs/symptoms of lymphedema: None    Please ask the provider to assess patient for lymphedema for any reported signs or symptoms so a referral to Lymphedema Therapy can be considered.          PELVIC FLOOR DYSFUNCTION SCREENING ASSESSMENT    - Do you have any pelvic pain? No     - Do you have any fecal constipation or fecal incontinence? No     - Do you have any urinary incontinence or difficulty starting to urinate? No     ARE ANY OF THE ABOVE ARE ANSWERED YES: No          PREHAB AUDIOLOGY REFERRAL    - Is patient planned to receive Cisplatin? No. This patient is not planned to start Cisplatin.    - Is patient planned to receive radiation therapy that may be directed toward auditory canals or nerves? No. Patient is not planned to

## 2025-06-27 ENCOUNTER — TELEPHONE (OUTPATIENT)
Age: 75
End: 2025-06-27

## 2025-06-27 ENCOUNTER — OFFICE VISIT (OUTPATIENT)
Age: 75
End: 2025-06-27
Payer: MEDICARE

## 2025-06-27 VITALS
WEIGHT: 131.2 LBS | BODY MASS INDEX: 24.14 KG/M2 | HEART RATE: 71 BPM | DIASTOLIC BLOOD PRESSURE: 86 MMHG | SYSTOLIC BLOOD PRESSURE: 137 MMHG | OXYGEN SATURATION: 95 % | HEIGHT: 62 IN | TEMPERATURE: 97.7 F

## 2025-06-27 DIAGNOSIS — C09.9 MALIGNANT NEOPLASM OF TONSIL (HCC): Primary | ICD-10-CM

## 2025-06-27 PROCEDURE — 1159F MED LIST DOCD IN RCRD: CPT | Performed by: STUDENT IN AN ORGANIZED HEALTH CARE EDUCATION/TRAINING PROGRAM

## 2025-06-27 PROCEDURE — 1123F ACP DISCUSS/DSCN MKR DOCD: CPT | Performed by: STUDENT IN AN ORGANIZED HEALTH CARE EDUCATION/TRAINING PROGRAM

## 2025-06-27 PROCEDURE — 99212 OFFICE O/P EST SF 10 MIN: CPT | Performed by: STUDENT IN AN ORGANIZED HEALTH CARE EDUCATION/TRAINING PROGRAM

## 2025-06-27 PROCEDURE — G8420 CALC BMI NORM PARAMETERS: HCPCS | Performed by: STUDENT IN AN ORGANIZED HEALTH CARE EDUCATION/TRAINING PROGRAM

## 2025-06-27 PROCEDURE — 1036F TOBACCO NON-USER: CPT | Performed by: STUDENT IN AN ORGANIZED HEALTH CARE EDUCATION/TRAINING PROGRAM

## 2025-06-27 PROCEDURE — 99213 OFFICE O/P EST LOW 20 MIN: CPT | Performed by: STUDENT IN AN ORGANIZED HEALTH CARE EDUCATION/TRAINING PROGRAM

## 2025-06-27 PROCEDURE — G8427 DOCREV CUR MEDS BY ELIG CLIN: HCPCS | Performed by: STUDENT IN AN ORGANIZED HEALTH CARE EDUCATION/TRAINING PROGRAM

## 2025-06-27 PROCEDURE — 1125F AMNT PAIN NOTED PAIN PRSNT: CPT | Performed by: STUDENT IN AN ORGANIZED HEALTH CARE EDUCATION/TRAINING PROGRAM

## 2025-06-27 NOTE — TELEPHONE ENCOUNTER
Met with patient and wife Keyon during OV today re: positive distress screen. 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     Introduced self and role of oncology social work. Reviewed distress screen dated 25:       2025   Distress Tool Screening    Please select the number that describes how much distress you have experienced in the PAST WEEK: 10 (Extreme Distress)    Please select the number that describes how much distress you have experienced TODAY: 10 (Extreme Distress)    Work, concerns about job: 0 (None)    Finances, bills, insurance: 0 (None)    Housin (None)    Transportation: 0 (None)    Availability of caregivers: 0 (None)    Sadness/Depression: 0 (None)    Anxiety/Worry/Fear: 4    Concerns about appearance: 0 (None)    Connection to a higher power: 0 (None)    Sense of meaning and purpose: 0 (None)    Support from my spiritual community: 0 (None)    Nausea: 5    Eating/Loss of appetite: 3    Pain: 0 (None)    Fatigue: 7      Practical:  No immediate barriers to care identified at this time. Patient lives at home with wife Keyon - has all needed DME (WC, cane, hospital bed, BSC and urinal). No current HH but has had in the past s/p hip repair after a fall a few years ago. Wife transports, however, states she also cares for her 104 yo mother - wife inquired about transport options in the event she needs to care for her mother. Discussed and provided a list of transport resources for [facility], specifically [employer]. Advised that [employer] funs are available, however,  will not be able to assist patient in and out of car. Other options may be [employer], TCT or WC Van - info provided.      Emotional:  Patient expressed no significant concerns re: depression/anxiety.  Wife supportive. Dtr lives in [place]. Encouraged patient to reach out to this provider if OP MH services

## 2025-06-28 NOTE — PROGRESS NOTES
[facility] MEDICAL ONCOLOGY  [address] [place] [address]  Dept: [phone]  Loc: [phone]    Yamini Tracy MD   ·   MD Felicity Kennedy APRN  ·  LINDSEY Carey      [place] [facility] in [address] [address]  P: [phone]  F: [phone] [place] [facility] in [place]  [address] [place] [address]  P: [phone]  F: [phone]  [place] [facility] in [place]  [address] [place] [address]  P: [phone]  F: [phone]                 Hematology/Oncology   Clinic Progress Note              Patient: Mark Owusu,  75 y.o. male    Date of Encounter: 06/27/2025     Referring Provider:  Estelita Jacobo DO    Reason for Visit:   Squamous cell carcinoma involving the head and neck        PCP:  Rukhsana Olivia APRN - CNP      Chief Complaint   Patient presents with    Malignant neoplasm of tonsil    Follow-up           SUBJECTIVE:  Pt doing ok. Appears tired.   Completed panendo recently.   Here to discuss next steps in care.           ONCOLOGIC HISTORY:          Biopsy-5/14/2025   Due to increased size and pain from the right neck mass, biopsy was done, with pathology results as below:       -- Diagnosis --   Right neck mass, core biopsy: Invasive moderately differentiated   squamous cell carcinoma, involving fibrous tissue     Comment: The carcinoma is positive for p40 immunostain.  P16   immunostain (surrogate HPV marker) is negative in tumor cells.   Intradepartmental consultation is obtained.       Balta Cancino MD   **Electronically Signed Out**   zw/5/19/2025      Clinical Information   Procedure:  Core biopsy, right neck mass.     Pre-Operative Diagnosis   Neck mass.     Post Operative Diagnosis   Neck mass.     Source of Specimen   A: RIGHT NECK MASS CORES 1-3      Panendoscopy - 6/10/25          -- Diagnosis --  A.  Right superior base of tongue, biopsy:

## 2025-07-01 ENCOUNTER — OFFICE VISIT (OUTPATIENT)
Dept: OTOLARYNGOLOGY | Facility: HOSPITAL | Age: 75
End: 2025-07-01
Payer: MEDICARE

## 2025-07-01 VITALS — HEIGHT: 61 IN | WEIGHT: 131 LBS | BODY MASS INDEX: 24.73 KG/M2

## 2025-07-01 DIAGNOSIS — C44.92 SCCA (SQUAMOUS CELL CARCINOMA) OF SKIN: ICD-10-CM

## 2025-07-01 DIAGNOSIS — C09.9: Primary | ICD-10-CM

## 2025-07-01 PROCEDURE — 99215 OFFICE O/P EST HI 40 MIN: CPT | Performed by: STUDENT IN AN ORGANIZED HEALTH CARE EDUCATION/TRAINING PROGRAM

## 2025-07-01 PROCEDURE — 1126F AMNT PAIN NOTED NONE PRSNT: CPT | Performed by: STUDENT IN AN ORGANIZED HEALTH CARE EDUCATION/TRAINING PROGRAM

## 2025-07-01 PROCEDURE — 99205 OFFICE O/P NEW HI 60 MIN: CPT | Performed by: STUDENT IN AN ORGANIZED HEALTH CARE EDUCATION/TRAINING PROGRAM

## 2025-07-01 PROCEDURE — 1159F MED LIST DOCD IN RCRD: CPT | Performed by: STUDENT IN AN ORGANIZED HEALTH CARE EDUCATION/TRAINING PROGRAM

## 2025-07-01 RX ORDER — ACETAMINOPHEN 325 MG/1
650 TABLET ORAL EVERY 6 HOURS PRN
COMMUNITY

## 2025-07-01 RX ORDER — DICLOFENAC SODIUM 50 MG/1
50 TABLET, DELAYED RELEASE ORAL
COMMUNITY
Start: 2025-01-30

## 2025-07-01 RX ORDER — EZETIMIBE 10 MG/1
10 TABLET ORAL DAILY
COMMUNITY
Start: 2025-04-17

## 2025-07-01 RX ORDER — PANTOPRAZOLE SODIUM 40 MG/1
40 TABLET, DELAYED RELEASE ORAL DAILY
COMMUNITY
Start: 2025-02-17

## 2025-07-01 RX ORDER — ATORVASTATIN CALCIUM 40 MG/1
40 TABLET, FILM COATED ORAL NIGHTLY
COMMUNITY
Start: 2025-01-28

## 2025-07-01 RX ORDER — TRIAMCINOLONE ACETONIDE 1 MG/G
CREAM TOPICAL 2 TIMES DAILY
COMMUNITY
Start: 2025-03-24

## 2025-07-01 RX ORDER — SODIUM CHLORIDE 1000 MG
1 TABLET, SOLUBLE MISCELLANEOUS 2 TIMES DAILY
COMMUNITY
Start: 2024-12-26

## 2025-07-01 RX ORDER — SOLIFENACIN SUCCINATE 10 MG/1
10 TABLET, FILM COATED ORAL NIGHTLY
COMMUNITY
Start: 2024-12-04 | End: 2025-12-04

## 2025-07-01 RX ORDER — BIOTIN 1 MG
1 TABLET ORAL
COMMUNITY

## 2025-07-01 RX ORDER — ONDANSETRON 4 MG/1
4 TABLET, ORALLY DISINTEGRATING ORAL 3 TIMES DAILY PRN
COMMUNITY
Start: 2025-06-10

## 2025-07-01 RX ORDER — LIDOCAINE 50 MG/G
PATCH TOPICAL
COMMUNITY
Start: 2025-04-01

## 2025-07-01 RX ORDER — MULTIVITAMIN
1 TABLET ORAL
COMMUNITY

## 2025-07-01 RX ORDER — OMEGA-3-ACID ETHYL ESTERS 1 G/1
4 CAPSULE, LIQUID FILLED ORAL DAILY
COMMUNITY
Start: 2025-05-05

## 2025-07-01 RX ORDER — METOPROLOL TARTRATE 25 MG/1
12.5 TABLET, FILM COATED ORAL 2 TIMES DAILY
COMMUNITY
Start: 2025-05-22

## 2025-07-01 RX ORDER — DICLOFENAC SODIUM 10 MG/G
GEL TOPICAL
COMMUNITY
Start: 2024-07-10

## 2025-07-01 RX ORDER — AMOXICILLIN 250 MG
1 CAPSULE ORAL DAILY
COMMUNITY
Start: 2025-06-10 | End: 2025-07-10

## 2025-07-01 RX ORDER — TRAMADOL HYDROCHLORIDE 50 MG/1
50 TABLET, FILM COATED ORAL EVERY 8 HOURS PRN
COMMUNITY
Start: 2025-05-20

## 2025-07-01 ASSESSMENT — PAIN SCALES - GENERAL: PAINLEVEL_OUTOF10: 0-NO PAIN

## 2025-07-01 NOTE — PROGRESS NOTES
"HEAD AND NECK SURGERY CONSULT  [facility] [place]    Referring Provider: Krish Coyne    HPI  I had the pleasure of seeing Jose Alberto Machado as a consultation today for evaluation of SCC of R base of tongue and tonsil. Patient is a 75 y.o. male with history of multiple chest SCCa s/p resection, whipple for IPMN, and revision total shoulder. He presents with his wife who assists with history and treatment planning.     Patient initially was treated for right upper chest SCCa with Dr. Khan (dermatology). For cancer staging and management, he got a PET/CT with R tonsil uptake and right clavicular uptake. He also reports a 3-4 month history of R neck numbness radiating to ear and progressive pain with opening his mouth. No dysphagia, no weight loss, 131 lbs today. No breathing difficulties or voice changes. He was evaluated by Dr. Coyne (ENT) and underwent panendoscopy on 6/10 that demonstrated invasive p16- SCC of the right tonsil. Patient was then referred here for further management.     He has seen Dr Mcgill (med onc) and Dr Hyatt (rad onc) locally.    Complex prior right shoulder surgery, baseline limited range of motion and pain. There is not a personal or family history of blood clots, easy bleeding or bruising, or anesthesia concerns. He lives at home in [place] with his wife who is with him today.    Tobacco use: The patient quit 30 years ago and had a 10-pack year history  Alcohol Use: The patient infrequency drinks alcohol ~ 3 drinks/week    Physical Examination  Vitals:  Ht (!) 1.549 m (5' 1\")   Wt 59.4 kg (131 lb)   BMI 24.75 kg/m²   General: Examination reveals a thin patient in no apparent distress but anxious. Intermittently closing eyes.  Skin: Raised dermal lesions over right clavicle, these appear to extend deep and potentially fixed to the bone  Voice:  The patient has no audible dysphonia or dysarthria  Respiratory: No stridor or airway distress.    Oral Cavity:  The patient is able to open the mouth " "widely without trismus.  The floor of mouth and oral tongue are soft, and no mucosal abnormalities are noted on the lips or within the oral cavity.  The oral tongue is fully mobile and midline on protrusion.  The patient's teeth are normal  Oropharynx: erythematous, exophytic lesion in the R tonsil, does not appear to involve palate, firm to palpation, tender. Left tonsil appears grossly normal. No palpable mass on base of tongue  Salivary glands: There are no palpable masses of the parotid or submandibular glands.   Neck: The neck is soft and symmetric.  There is no palpable adenopathy.  Extremities: Right shoulder has significant deformity with limited ROM.      DATA REVIEWED:  Labs:  No results found for: \"WBC\", \"HGB\", \"HCT\", \"PLT\", \"NEUTROABS\"  Lab Results   Component Value Date    HGBA1C 5.6 11/14/2024        Radiology: I personally reviewed the PET/CT from 5/30/25 which demonstrated focal uptake of the right palatine tonsil and focal uptake of a R level IV LN. His prior CT from 4/9/25 only notable for right clavicular lesion, limited by significant artifact from should joint    Discussions with other providers:   I discussed this case with Dr. Coyne at length, including my concern that this represents two primaries, one a cutaneous SCC over the right clavicle and the second a p16- SCC of the right tonsil     Review of prior medical records: I reviewed the patient's medical records which included a clinic note from Dr. Coyne dated 6/17/25 in which she detailed treatment course, assessment of tonsil lesion, and coordination with oncology teams.     Pathology: I reviewed the pathology report from the biopsy on 6/10/25 which demonstrated invasive p16- SCC of the R tonsil     ASSESSMENT and PLAN:    P16- Right tonsil SCC  Cutaneous SCC right chest  - Discussed history, physical exam and imaging findings with patient at length  - I am concerned that this represents two separate primary tumors, as I see dermal " involvement over the right clavicle in the region of previously biopsied cutaneous SCC. This seems quite fixed, and although CT from a few months ago does not show obvious bony involvement, I am concerned that it may need to be resected off of the clavicle.   - Discussed at length that the tonsil SCC would not be related to the cutaneous SCC. For the tonsil cancer, I do think it would be surgically resectable with TORS. Surgery would be transoral robotic assisted right radical tonsillectomy with right neck dissection. Discussed risks and benefits at length including but not limited to pain, bleeding, infection, scar, need for further procedures, damage to surrounding structures, change in voice, breathing and swallow, risks of anesthesia, and death. Also discussed need for temporary feeding tube and admission to the hospital  - For the cutaneous SCC, would recommend wide local excision with involvement of one of our thoracic or ortho partners given involvement of the clavicle  - Also discussed mutlidisciplinary nature of head and neck care. Reviewed the role of radiation and chemotherapy in his treatment. He could undergo concurrent chemoradiation targeting the tonsil and the neck. Could consider cemiplimab for the cutaneous SCC, although the order of treatment would ultimately be up to his medical oncologist  - After lengthy discussion, patient and his wife are not interested in surgical resection and would like to pursue treatment locally with med onc ([facility]) and rad onc ([place]). I have called Dr Coyne to discuss all of this with her, and will attempt to get ahold of the rest of his treatment team as well. Questions answered with patient and wife, and I am available if plans change or further questions.    Arlene Villa MD

## 2025-07-02 ENCOUNTER — SCHEDULED TELEPHONE ENCOUNTER (OUTPATIENT)
Age: 75
End: 2025-07-02
Payer: MEDICARE

## 2025-07-02 DIAGNOSIS — C09.9 MALIGNANT NEOPLASM OF TONSIL (HCC): Primary | ICD-10-CM

## 2025-07-02 PROCEDURE — 1123F ACP DISCUSS/DSCN MKR DOCD: CPT | Performed by: STUDENT IN AN ORGANIZED HEALTH CARE EDUCATION/TRAINING PROGRAM

## 2025-07-02 PROCEDURE — 99213 OFFICE O/P EST LOW 20 MIN: CPT | Performed by: STUDENT IN AN ORGANIZED HEALTH CARE EDUCATION/TRAINING PROGRAM

## 2025-07-02 NOTE — PROGRESS NOTES
[facility] MEDICAL ONCOLOGY  [address] [facility] [address]  Dept: [phone]  Loc: [phone]    Yamini Tracy MD   ·   MD Felicity Kennedy APRN  ·  LINDSEY Carey      [facility] in [place]  [address] [place] [address]  P: [phone]  F: [phone] [place] [facility] in [place]  [address] [place] [address]  P: [phone]  F: [phone]  [facility] in [place]  [address] [place] [address]  P: [phone]  F: [phone]                 Hematology/Oncology   Clinic Progress Note              Patient: Mark Owusu,  75 y.o. male    Date of Encounter: 07/02/2025     Referring Provider:  Estelita Jacobo DO    Reason for Visit:   Squamous cell carcinoma involving the head and neck        PCP:  Rukhsana Olivia APRN - CNP      Chief Complaint   Patient presents with    Results           SUBJECTIVE:  Patient overall feels okay.  Telephone visit today.  Wife is on the line.  Here to discuss next steps.  He was able to meet Dr. Medrano at .          ONCOLOGIC HISTORY:          Biopsy-5/14/2025   Due to increased size and pain from the right neck mass, biopsy was done, with pathology results as below:       -- Diagnosis --   Right neck mass, core biopsy: Invasive moderately differentiated   squamous cell carcinoma, involving fibrous tissue     Comment: The carcinoma is positive for p40 immunostain.  P16   immunostain (surrogate HPV marker) is negative in tumor cells.   Intradepartmental consultation is obtained.       aBlta Cancino MD   **Electronically Signed Out**   zw/5/19/2025      Clinical Information   Procedure:  Core biopsy, right neck mass.     Pre-Operative Diagnosis   Neck mass.     Post Operative Diagnosis   Neck mass.     Source of Specimen   A: RIGHT NECK MASS CORES 1-3      Panendoscopy - 6/10/25          -- Diagnosis --  A.  Right superior base of tongue, biopsy:  Not applicable

## 2025-07-03 ENCOUNTER — TELEPHONE (OUTPATIENT)
Age: 75
End: 2025-07-03

## 2025-07-08 ENCOUNTER — TELEPHONE (OUTPATIENT)
Age: 75
End: 2025-07-08

## 2025-07-08 ENCOUNTER — HOSPITAL ENCOUNTER (OUTPATIENT)
Dept: RADIATION ONCOLOGY | Age: 75
Discharge: HOME | End: 2025-07-08
Payer: MEDICARE

## 2025-07-08 PROCEDURE — 6360000004 HC RX CONTRAST MEDICATION: Performed by: SPECIALIST

## 2025-07-08 PROCEDURE — 77334 RADIATION TREATMENT AID(S): CPT | Performed by: SPECIALIST

## 2025-07-08 RX ORDER — IOPAMIDOL 755 MG/ML
100 INJECTION, SOLUTION INTRAVASCULAR
Status: COMPLETED | OUTPATIENT
Start: 2025-07-08 | End: 2025-07-08

## 2025-07-08 RX ADMIN — IOPAMIDOL 100 ML: 755 INJECTION, SOLUTION INTRAVENOUS at 15:00

## 2025-07-08 NOTE — TELEPHONE ENCOUNTER
Received a call from Dr. Medrano with  ENT.  It remains a concern that patient may have 2 separate primaries.  Surgery was offered, although it was my impression that it was the patient's and wife's understanding that surgery was not recommended.  Tentative plan at this time is for systemic therapy with concurrent radiation.  Will touch base with oncologic team regarding these updates.

## 2025-07-09 ENCOUNTER — TELEPHONE (OUTPATIENT)
Age: 75
End: 2025-07-09

## 2025-07-09 NOTE — TELEPHONE ENCOUNTER
Patient Mark' daughter Bart called wanting to schedule new patient appointment. Tried calling back and unable to LVM

## 2025-07-14 DIAGNOSIS — C09.9 MALIGNANT NEOPLASM OF TONSIL (HCC): Primary | ICD-10-CM

## 2025-07-16 ENCOUNTER — HOSPITAL ENCOUNTER (OUTPATIENT)
Dept: INFUSION THERAPY | Age: 75
Discharge: HOME | End: 2025-07-16
Payer: MEDICARE

## 2025-07-16 ENCOUNTER — HOSPITAL ENCOUNTER (OUTPATIENT)
Dept: RADIATION ONCOLOGY | Age: 75
Discharge: HOME | End: 2025-07-16
Payer: MEDICARE

## 2025-07-16 DIAGNOSIS — C09.9 MALIGNANT NEOPLASM OF TONSIL (HCC): Primary | ICD-10-CM

## 2025-07-16 LAB
ALBUMIN SERPL-MCNC: 3.6 G/DL (ref 3.5–5.2)
ALP SERPL-CCNC: 123 U/L (ref 40–129)
ALT SERPL-CCNC: 23 U/L (ref 0–50)
ANION GAP SERPL CALCULATED.3IONS-SCNC: 12 MMOL/L (ref 7–16)
AST SERPL-CCNC: 35 U/L (ref 0–50)
BASOPHILS # BLD: 0.04 K/UL (ref 0–0.2)
BASOPHILS NFR BLD: 1 % (ref 0–2)
BILIRUB SERPL-MCNC: 0.5 MG/DL (ref 0–1.2)
BUN SERPL-MCNC: 13 MG/DL (ref 8–23)
CALCIUM SERPL-MCNC: 8.9 MG/DL (ref 8.8–10.2)
CHLORIDE SERPL-SCNC: 91 MMOL/L (ref 98–107)
CO2 SERPL-SCNC: 24 MMOL/L (ref 22–29)
CREAT SERPL-MCNC: 1.1 MG/DL (ref 0.7–1.2)
EOSINOPHIL # BLD: 0.1 K/UL (ref 0.05–0.5)
EOSINOPHILS RELATIVE PERCENT: 1 % (ref 0–6)
ERYTHROCYTE [DISTWIDTH] IN BLOOD BY AUTOMATED COUNT: 13.9 % (ref 11.5–15)
GFR, ESTIMATED: 71 ML/MIN/1.73M2
GLUCOSE SERPL-MCNC: 130 MG/DL (ref 74–99)
HBV SURFACE AB SERPL IA-ACNC: <3.5 MIU/ML (ref 0–9.99)
HBV SURFACE AG SERPL QL IA: NONREACTIVE
HCT VFR BLD AUTO: 37.8 % (ref 37–54)
HGB BLD-MCNC: 13.3 G/DL (ref 12.5–16.5)
IMM GRANULOCYTES # BLD AUTO: 0.05 K/UL (ref 0–0.58)
IMM GRANULOCYTES NFR BLD: 1 % (ref 0–5)
LYMPHOCYTES NFR BLD: 1.04 K/UL (ref 1.5–4)
LYMPHOCYTES RELATIVE PERCENT: 13 % (ref 20–42)
MAGNESIUM SERPL-MCNC: 1.6 MG/DL (ref 1.6–2.4)
MCH RBC QN AUTO: 31.1 PG (ref 26–35)
MCHC RBC AUTO-ENTMCNC: 35.2 G/DL (ref 32–34.5)
MCV RBC AUTO: 88.5 FL (ref 80–99.9)
MONOCYTES NFR BLD: 0.93 K/UL (ref 0.1–0.95)
MONOCYTES NFR BLD: 12 % (ref 2–12)
NEUTROPHILS NFR BLD: 72 % (ref 43–80)
NEUTS SEG NFR BLD: 5.64 K/UL (ref 1.8–7.3)
PLATELET # BLD AUTO: 283 K/UL (ref 130–450)
PMV BLD AUTO: 8.4 FL (ref 7–12)
POTASSIUM SERPL-SCNC: 3.8 MMOL/L (ref 3.5–5.1)
PROT SERPL-MCNC: 6.6 G/DL (ref 6.4–8.3)
RBC # BLD AUTO: 4.27 M/UL (ref 3.8–5.8)
SODIUM SERPL-SCNC: 126 MMOL/L (ref 136–145)
WBC OTHER # BLD: 7.8 K/UL (ref 4.5–11.5)

## 2025-07-16 PROCEDURE — 87340 HEPATITIS B SURFACE AG IA: CPT

## 2025-07-16 PROCEDURE — 36415 COLL VENOUS BLD VENIPUNCTURE: CPT

## 2025-07-16 PROCEDURE — 86317 IMMUNOASSAY INFECTIOUS AGENT: CPT

## 2025-07-16 PROCEDURE — 99214 OFFICE O/P EST MOD 30 MIN: CPT

## 2025-07-16 PROCEDURE — 83735 ASSAY OF MAGNESIUM: CPT

## 2025-07-16 PROCEDURE — 85025 COMPLETE CBC W/AUTO DIFF WBC: CPT

## 2025-07-16 PROCEDURE — 80053 COMPREHEN METABOLIC PANEL: CPT

## 2025-07-16 PROCEDURE — 86704 HEP B CORE ANTIBODY TOTAL: CPT

## 2025-07-16 NOTE — PROGRESS NOTES
Patient arrived to unit for chemo teaching accompanied by wife and daughter. This nurse provided patient with a chemo video and educational packet. After patient viewed the video, This nurse discussed a typical day on the infusion unit and what his treatment day would be like. After chemo sheet was discussed. Patient was given opportunity to ask questions and advised to write down any questions he may have and bring them to the next visit. Labs were drawn at this time. Patient verbalized understanding.

## 2025-07-16 NOTE — PROGRESS NOTES
Patient was seen today for chemotherapy education for Cetuximab + Radiation for head and neck cancer. Patient was accompanied by his wife and daughter. Mechanism of action, schedule, administration, and potential side effects of the prescribed therapy were discussed in detail. Some of the side effects discussed include, but are not limited to, skin rash, mucositis, electrolyte imbalance, infusion reactions, and nail changes. Patient demonstrated understanding throughout the education session. A packet containing the information discussed was provided to the patient.    Medication list was reviewed for potential drug interactions. Allergies reviewed.    All questions asked were answered or deferred to the oncology team. Patient encouraged to reach out to their treatment team with any other additional questions or concerns that they may have.    Juan Antonio Ruby, PharmD, [facility]  Clinical Pharmacist, Hematology/Oncology  [facility]

## 2025-07-17 ENCOUNTER — TELEPHONE (OUTPATIENT)
Age: 75
End: 2025-07-17

## 2025-07-17 ENCOUNTER — OFFICE VISIT (OUTPATIENT)
Age: 75
End: 2025-07-17
Payer: MEDICARE

## 2025-07-17 ENCOUNTER — HOSPITAL ENCOUNTER (OUTPATIENT)
Dept: INFUSION THERAPY | Age: 75
Discharge: HOME | End: 2025-07-17
Payer: MEDICARE

## 2025-07-17 VITALS
RESPIRATION RATE: 16 BRPM | HEART RATE: 82 BPM | SYSTOLIC BLOOD PRESSURE: 133 MMHG | OXYGEN SATURATION: 96 % | DIASTOLIC BLOOD PRESSURE: 81 MMHG | TEMPERATURE: 97.9 F

## 2025-07-17 VITALS
WEIGHT: 120 LBS | HEART RATE: 77 BPM | DIASTOLIC BLOOD PRESSURE: 72 MMHG | SYSTOLIC BLOOD PRESSURE: 111 MMHG | OXYGEN SATURATION: 96 % | BODY MASS INDEX: 22.08 KG/M2 | HEIGHT: 62 IN | TEMPERATURE: 97.4 F

## 2025-07-17 DIAGNOSIS — C09.9 MALIGNANT NEOPLASM OF TONSIL (HCC): Primary | ICD-10-CM

## 2025-07-17 DIAGNOSIS — R63.0 DECREASED APPETITE: ICD-10-CM

## 2025-07-17 PROCEDURE — 96413 CHEMO IV INFUSION 1 HR: CPT

## 2025-07-17 PROCEDURE — 1126F AMNT PAIN NOTED NONE PRSNT: CPT | Performed by: STUDENT IN AN ORGANIZED HEALTH CARE EDUCATION/TRAINING PROGRAM

## 2025-07-17 PROCEDURE — 2580000003 HC RX 258: Performed by: STUDENT IN AN ORGANIZED HEALTH CARE EDUCATION/TRAINING PROGRAM

## 2025-07-17 PROCEDURE — G8420 CALC BMI NORM PARAMETERS: HCPCS | Performed by: STUDENT IN AN ORGANIZED HEALTH CARE EDUCATION/TRAINING PROGRAM

## 2025-07-17 PROCEDURE — 1159F MED LIST DOCD IN RCRD: CPT | Performed by: STUDENT IN AN ORGANIZED HEALTH CARE EDUCATION/TRAINING PROGRAM

## 2025-07-17 PROCEDURE — 99215 OFFICE O/P EST HI 40 MIN: CPT | Performed by: STUDENT IN AN ORGANIZED HEALTH CARE EDUCATION/TRAINING PROGRAM

## 2025-07-17 PROCEDURE — 2500000003 HC RX 250 WO HCPCS: Performed by: STUDENT IN AN ORGANIZED HEALTH CARE EDUCATION/TRAINING PROGRAM

## 2025-07-17 PROCEDURE — 1123F ACP DISCUSS/DSCN MKR DOCD: CPT | Performed by: STUDENT IN AN ORGANIZED HEALTH CARE EDUCATION/TRAINING PROGRAM

## 2025-07-17 PROCEDURE — 1036F TOBACCO NON-USER: CPT | Performed by: STUDENT IN AN ORGANIZED HEALTH CARE EDUCATION/TRAINING PROGRAM

## 2025-07-17 PROCEDURE — 96415 CHEMO IV INFUSION ADDL HR: CPT

## 2025-07-17 PROCEDURE — 99214 OFFICE O/P EST MOD 30 MIN: CPT | Performed by: STUDENT IN AN ORGANIZED HEALTH CARE EDUCATION/TRAINING PROGRAM

## 2025-07-17 PROCEDURE — G8427 DOCREV CUR MEDS BY ELIG CLIN: HCPCS | Performed by: STUDENT IN AN ORGANIZED HEALTH CARE EDUCATION/TRAINING PROGRAM

## 2025-07-17 PROCEDURE — 96375 TX/PRO/DX INJ NEW DRUG ADDON: CPT

## 2025-07-17 PROCEDURE — 6360000002 HC RX W HCPCS: Performed by: STUDENT IN AN ORGANIZED HEALTH CARE EDUCATION/TRAINING PROGRAM

## 2025-07-17 RX ORDER — DIPHENHYDRAMINE HYDROCHLORIDE 50 MG/ML
25 INJECTION, SOLUTION INTRAMUSCULAR; INTRAVENOUS ONCE
OUTPATIENT
Start: 2025-07-31 | End: 2025-07-31

## 2025-07-17 RX ORDER — ACETAMINOPHEN 325 MG/1
650 TABLET ORAL
OUTPATIENT
Start: 2025-08-07

## 2025-07-17 RX ORDER — FAMOTIDINE 10 MG/ML
20 INJECTION, SOLUTION INTRAVENOUS
OUTPATIENT
Start: 2025-08-07

## 2025-07-17 RX ORDER — SODIUM CHLORIDE 0.9 % (FLUSH) 0.9 %
5-40 SYRINGE (ML) INJECTION PRN
OUTPATIENT
Start: 2025-07-24

## 2025-07-17 RX ORDER — DIPHENHYDRAMINE HYDROCHLORIDE 50 MG/ML
25 INJECTION, SOLUTION INTRAMUSCULAR; INTRAVENOUS ONCE
OUTPATIENT
Start: 2025-07-24 | End: 2025-07-24

## 2025-07-17 RX ORDER — SODIUM CHLORIDE 9 MG/ML
INJECTION, SOLUTION INTRAVENOUS PRN
OUTPATIENT
Start: 2025-07-31

## 2025-07-17 RX ORDER — EPINEPHRINE 1 MG/ML
0.3 INJECTION, SOLUTION, CONCENTRATE INTRAVENOUS PRN
OUTPATIENT
Start: 2025-08-07

## 2025-07-17 RX ORDER — FAMOTIDINE 10 MG/ML
20 INJECTION, SOLUTION INTRAVENOUS
OUTPATIENT
Start: 2025-07-24

## 2025-07-17 RX ORDER — DIPHENHYDRAMINE HYDROCHLORIDE 50 MG/ML
50 INJECTION, SOLUTION INTRAMUSCULAR; INTRAVENOUS
OUTPATIENT
Start: 2025-07-31

## 2025-07-17 RX ORDER — SODIUM CHLORIDE 9 MG/ML
5-250 INJECTION, SOLUTION INTRAVENOUS PRN
OUTPATIENT
Start: 2025-08-07

## 2025-07-17 RX ORDER — MIRTAZAPINE 30 MG/1
30 TABLET, FILM COATED ORAL NIGHTLY
Qty: 90 TABLET | Refills: 1 | Status: SHIPPED | OUTPATIENT
Start: 2025-07-17

## 2025-07-17 RX ORDER — FAMOTIDINE 10 MG/ML
20 INJECTION, SOLUTION INTRAVENOUS ONCE
OUTPATIENT
Start: 2025-07-24 | End: 2025-07-24

## 2025-07-17 RX ORDER — ALBUTEROL SULFATE 90 UG/1
4 INHALANT RESPIRATORY (INHALATION) PRN
OUTPATIENT
Start: 2025-08-07

## 2025-07-17 RX ORDER — DIPHENHYDRAMINE HYDROCHLORIDE 50 MG/ML
50 INJECTION, SOLUTION INTRAMUSCULAR; INTRAVENOUS
OUTPATIENT
Start: 2025-08-07

## 2025-07-17 RX ORDER — HEPARIN SODIUM (PORCINE) LOCK FLUSH IV SOLN 100 UNIT/ML 100 UNIT/ML
500 SOLUTION INTRAVENOUS PRN
Status: CANCELLED | OUTPATIENT
Start: 2025-07-17

## 2025-07-17 RX ORDER — EPINEPHRINE 1 MG/ML
0.3 INJECTION, SOLUTION, CONCENTRATE INTRAVENOUS PRN
OUTPATIENT
Start: 2025-07-24

## 2025-07-17 RX ORDER — EPINEPHRINE 1 MG/ML
0.3 INJECTION, SOLUTION, CONCENTRATE INTRAVENOUS PRN
OUTPATIENT
Start: 2025-07-31

## 2025-07-17 RX ORDER — HEPARIN SODIUM (PORCINE) LOCK FLUSH IV SOLN 100 UNIT/ML 100 UNIT/ML
500 SOLUTION INTRAVENOUS PRN
OUTPATIENT
Start: 2025-08-07

## 2025-07-17 RX ORDER — SODIUM CHLORIDE 0.9 % (FLUSH) 0.9 %
5-40 SYRINGE (ML) INJECTION PRN
OUTPATIENT
Start: 2025-07-31

## 2025-07-17 RX ORDER — ALBUTEROL SULFATE 90 UG/1
4 INHALANT RESPIRATORY (INHALATION) PRN
OUTPATIENT
Start: 2025-07-24

## 2025-07-17 RX ORDER — SODIUM CHLORIDE 9 MG/ML
5-250 INJECTION, SOLUTION INTRAVENOUS PRN
Status: CANCELLED | OUTPATIENT
Start: 2025-07-17

## 2025-07-17 RX ORDER — MEPERIDINE HYDROCHLORIDE 50 MG/ML
12.5 INJECTION INTRAMUSCULAR; INTRAVENOUS; SUBCUTANEOUS PRN
OUTPATIENT
Start: 2025-08-07

## 2025-07-17 RX ORDER — SODIUM CHLORIDE 9 MG/ML
5-250 INJECTION, SOLUTION INTRAVENOUS PRN
OUTPATIENT
Start: 2025-07-31

## 2025-07-17 RX ORDER — SODIUM CHLORIDE 9 MG/ML
5-250 INJECTION, SOLUTION INTRAVENOUS PRN
Status: DISCONTINUED | OUTPATIENT
Start: 2025-07-17 | End: 2025-07-18 | Stop reason: HOSPADM

## 2025-07-17 RX ORDER — DIPHENHYDRAMINE HYDROCHLORIDE 50 MG/ML
25 INJECTION, SOLUTION INTRAMUSCULAR; INTRAVENOUS ONCE
Status: COMPLETED | OUTPATIENT
Start: 2025-07-17 | End: 2025-07-17

## 2025-07-17 RX ORDER — HYDROCORTISONE SODIUM SUCCINATE 100 MG/2ML
100 INJECTION INTRAMUSCULAR; INTRAVENOUS
OUTPATIENT
Start: 2025-07-24

## 2025-07-17 RX ORDER — HYDROCORTISONE SODIUM SUCCINATE 100 MG/2ML
100 INJECTION INTRAMUSCULAR; INTRAVENOUS
Status: CANCELLED | OUTPATIENT
Start: 2025-07-17

## 2025-07-17 RX ORDER — MEPERIDINE HYDROCHLORIDE 50 MG/ML
12.5 INJECTION INTRAMUSCULAR; INTRAVENOUS; SUBCUTANEOUS PRN
OUTPATIENT
Start: 2025-07-24

## 2025-07-17 RX ORDER — FAMOTIDINE 10 MG/ML
20 INJECTION, SOLUTION INTRAVENOUS
OUTPATIENT
Start: 2025-07-31

## 2025-07-17 RX ORDER — ALBUTEROL SULFATE 90 UG/1
4 INHALANT RESPIRATORY (INHALATION) PRN
OUTPATIENT
Start: 2025-07-31

## 2025-07-17 RX ORDER — DIPHENHYDRAMINE HYDROCHLORIDE 50 MG/ML
50 INJECTION, SOLUTION INTRAMUSCULAR; INTRAVENOUS
OUTPATIENT
Start: 2025-07-24

## 2025-07-17 RX ORDER — ONDANSETRON 2 MG/ML
8 INJECTION INTRAMUSCULAR; INTRAVENOUS
OUTPATIENT
Start: 2025-07-31

## 2025-07-17 RX ORDER — DIPHENHYDRAMINE HYDROCHLORIDE 50 MG/ML
25 INJECTION, SOLUTION INTRAMUSCULAR; INTRAVENOUS ONCE
OUTPATIENT
Start: 2025-08-07 | End: 2025-08-07

## 2025-07-17 RX ORDER — SODIUM CHLORIDE 9 MG/ML
INJECTION, SOLUTION INTRAVENOUS PRN
OUTPATIENT
Start: 2025-07-24

## 2025-07-17 RX ORDER — ACETAMINOPHEN 325 MG/1
650 TABLET ORAL
Status: CANCELLED | OUTPATIENT
Start: 2025-07-17

## 2025-07-17 RX ORDER — HEPARIN SODIUM (PORCINE) LOCK FLUSH IV SOLN 100 UNIT/ML 100 UNIT/ML
500 SOLUTION INTRAVENOUS PRN
OUTPATIENT
Start: 2025-07-24

## 2025-07-17 RX ORDER — DEXAMETHASONE SODIUM PHOSPHATE 10 MG/ML
10 INJECTION, SOLUTION INTRAMUSCULAR; INTRAVENOUS ONCE
Status: COMPLETED | OUTPATIENT
Start: 2025-07-17 | End: 2025-07-17

## 2025-07-17 RX ORDER — ONDANSETRON 2 MG/ML
8 INJECTION INTRAMUSCULAR; INTRAVENOUS
OUTPATIENT
Start: 2025-07-24

## 2025-07-17 RX ORDER — SODIUM CHLORIDE 0.9 % (FLUSH) 0.9 %
5-40 SYRINGE (ML) INJECTION PRN
OUTPATIENT
Start: 2025-08-07

## 2025-07-17 RX ORDER — HYDROCORTISONE SODIUM SUCCINATE 100 MG/2ML
100 INJECTION INTRAMUSCULAR; INTRAVENOUS
OUTPATIENT
Start: 2025-08-07

## 2025-07-17 RX ORDER — ONDANSETRON 2 MG/ML
8 INJECTION INTRAMUSCULAR; INTRAVENOUS
OUTPATIENT
Start: 2025-08-07

## 2025-07-17 RX ORDER — EPINEPHRINE 1 MG/ML
0.3 INJECTION, SOLUTION, CONCENTRATE INTRAVENOUS PRN
Status: CANCELLED | OUTPATIENT
Start: 2025-07-17

## 2025-07-17 RX ORDER — FAMOTIDINE 10 MG/ML
20 INJECTION, SOLUTION INTRAVENOUS ONCE
Status: CANCELLED | OUTPATIENT
Start: 2025-07-17 | End: 2025-07-17

## 2025-07-17 RX ORDER — MEPERIDINE HYDROCHLORIDE 50 MG/ML
12.5 INJECTION INTRAMUSCULAR; INTRAVENOUS; SUBCUTANEOUS PRN
OUTPATIENT
Start: 2025-07-31

## 2025-07-17 RX ORDER — ACETAMINOPHEN 325 MG/1
650 TABLET ORAL
OUTPATIENT
Start: 2025-07-24

## 2025-07-17 RX ORDER — HEPARIN SODIUM (PORCINE) LOCK FLUSH IV SOLN 100 UNIT/ML 100 UNIT/ML
500 SOLUTION INTRAVENOUS PRN
OUTPATIENT
Start: 2025-07-31

## 2025-07-17 RX ORDER — SODIUM CHLORIDE 0.9 % (FLUSH) 0.9 %
5-40 SYRINGE (ML) INJECTION PRN
Status: CANCELLED | OUTPATIENT
Start: 2025-07-17

## 2025-07-17 RX ORDER — DIPHENHYDRAMINE HYDROCHLORIDE 50 MG/ML
50 INJECTION, SOLUTION INTRAMUSCULAR; INTRAVENOUS
Status: CANCELLED | OUTPATIENT
Start: 2025-07-17

## 2025-07-17 RX ORDER — SODIUM CHLORIDE 0.9 % (FLUSH) 0.9 %
5-40 SYRINGE (ML) INJECTION PRN
Status: DISCONTINUED | OUTPATIENT
Start: 2025-07-17 | End: 2025-07-18 | Stop reason: HOSPADM

## 2025-07-17 RX ORDER — ONDANSETRON 2 MG/ML
8 INJECTION INTRAMUSCULAR; INTRAVENOUS
Status: CANCELLED | OUTPATIENT
Start: 2025-07-17

## 2025-07-17 RX ORDER — DIPHENHYDRAMINE HYDROCHLORIDE 50 MG/ML
25 INJECTION, SOLUTION INTRAMUSCULAR; INTRAVENOUS ONCE
Status: CANCELLED | OUTPATIENT
Start: 2025-07-17 | End: 2025-07-17

## 2025-07-17 RX ORDER — ACETAMINOPHEN 325 MG/1
650 TABLET ORAL
OUTPATIENT
Start: 2025-07-31

## 2025-07-17 RX ORDER — SODIUM CHLORIDE 9 MG/ML
INJECTION, SOLUTION INTRAVENOUS PRN
Status: CANCELLED | OUTPATIENT
Start: 2025-07-17

## 2025-07-17 RX ORDER — FAMOTIDINE 10 MG/ML
20 INJECTION, SOLUTION INTRAVENOUS ONCE
OUTPATIENT
Start: 2025-07-31 | End: 2025-07-31

## 2025-07-17 RX ORDER — HYDROCORTISONE SODIUM SUCCINATE 100 MG/2ML
100 INJECTION INTRAMUSCULAR; INTRAVENOUS
OUTPATIENT
Start: 2025-07-31

## 2025-07-17 RX ORDER — MEPERIDINE HYDROCHLORIDE 50 MG/ML
12.5 INJECTION INTRAMUSCULAR; INTRAVENOUS; SUBCUTANEOUS PRN
Status: CANCELLED | OUTPATIENT
Start: 2025-07-17

## 2025-07-17 RX ORDER — FAMOTIDINE 10 MG/ML
20 INJECTION, SOLUTION INTRAVENOUS ONCE
OUTPATIENT
Start: 2025-08-07 | End: 2025-08-07

## 2025-07-17 RX ORDER — SODIUM CHLORIDE 9 MG/ML
5-250 INJECTION, SOLUTION INTRAVENOUS PRN
OUTPATIENT
Start: 2025-07-24

## 2025-07-17 RX ORDER — SODIUM CHLORIDE 9 MG/ML
INJECTION, SOLUTION INTRAVENOUS PRN
OUTPATIENT
Start: 2025-08-07

## 2025-07-17 RX ORDER — ALBUTEROL SULFATE 90 UG/1
4 INHALANT RESPIRATORY (INHALATION) PRN
Status: CANCELLED | OUTPATIENT
Start: 2025-07-17

## 2025-07-17 RX ORDER — FAMOTIDINE 10 MG/ML
20 INJECTION, SOLUTION INTRAVENOUS
Status: CANCELLED | OUTPATIENT
Start: 2025-07-17

## 2025-07-17 RX ADMIN — SODIUM CHLORIDE, PRESERVATIVE FREE 20 MG: 5 INJECTION INTRAVENOUS at 10:42

## 2025-07-17 RX ADMIN — SODIUM CHLORIDE, PRESERVATIVE FREE 10 ML: 5 INJECTION INTRAVENOUS at 10:34

## 2025-07-17 RX ADMIN — DEXAMETHASONE SODIUM PHOSPHATE 10 MG: 10 INJECTION, SOLUTION INTRAMUSCULAR; INTRAVENOUS at 10:34

## 2025-07-17 RX ADMIN — DIPHENHYDRAMINE HYDROCHLORIDE 25 MG: 50 INJECTION INTRAMUSCULAR; INTRAVENOUS at 10:37

## 2025-07-17 RX ADMIN — SODIUM CHLORIDE 200 ML/HR: 0.9 INJECTION, SOLUTION INTRAVENOUS at 10:34

## 2025-07-17 RX ADMIN — SODIUM CHLORIDE, PRESERVATIVE FREE 10 ML: 5 INJECTION INTRAVENOUS at 10:37

## 2025-07-17 RX ADMIN — CETUXIMAB 600 MG: 2 SOLUTION INTRAVENOUS at 11:29

## 2025-07-17 ASSESSMENT — PAIN SCALES - GENERAL: PAINLEVEL_OUTOF10: 2

## 2025-07-17 ASSESSMENT — PAIN DESCRIPTION - FREQUENCY: FREQUENCY: INTERMITTENT

## 2025-07-17 ASSESSMENT — PAIN DESCRIPTION - PAIN TYPE: TYPE: ACUTE PAIN

## 2025-07-17 ASSESSMENT — PAIN DESCRIPTION - ORIENTATION: ORIENTATION: RIGHT

## 2025-07-17 ASSESSMENT — PAIN DESCRIPTION - ONSET: ONSET: GRADUAL

## 2025-07-17 ASSESSMENT — PAIN DESCRIPTION - LOCATION: LOCATION: SHOULDER

## 2025-07-17 ASSESSMENT — PAIN DESCRIPTION - DESCRIPTORS: DESCRIPTORS: ACHING;DISCOMFORT

## 2025-07-17 NOTE — TELEPHONE ENCOUNTER
Met with patient during his first treatment. Introduced myself and explained my role with patients receiving treatment at our center. Patient was friendly and receptive. Patient given opportunity to ask questions. Provided my contact information and encouraged patient to call with any needs.

## 2025-07-17 NOTE — PROGRESS NOTES
[facility]  [address] [facility] [address]  Dept: [phone]  Loc: [phone]    Yamini Tracy MD   ·   MD Felicity Kennedy APRN  ·  LINDSEY Carey      [place] [facility] in [address] [address]  P: [phone]  F: [phone] [place] [facility] in [place]  [address] [place] [address]  P: [phone]  F: [phone]  [facility] in [place]  [address] [place] [address]  P: [phone]  F: [phone]                 Hematology/Oncology   Clinic Progress Note              Patient: Mark Owusu,  75 y.o. male    Date of Encounter: 07/17/2025     Referring Provider:  Estelita Jacobo DO    Reason for Visit:   Squamous cell carcinoma involving the head and neck        PCP:  Rukhsana Olivia APRN - CNP      Chief Complaint   Patient presents with    Follow-up           SUBJECTIVE:  Patient comes in to start loading dose of cetuximab.  Wife and daughter were present.  They report over the last week he has declined, which started after receiving contrast for his simulation.  They noted increased nausea, decreased appetite and diarrhea.  Denies of any chest pain.  Diarrhea has resolved.  But patient still has some nausea.  Has not been eating very well.            ONCOLOGIC HISTORY:          Biopsy-5/14/2025   Due to increased size and pain from the right neck mass, biopsy was done, with pathology results as below:       -- Diagnosis --   Right neck mass, core biopsy: Invasive moderately differentiated   squamous cell carcinoma, involving fibrous tissue     Comment: The carcinoma is positive for p40 immunostain.  P16   immunostain (surrogate HPV marker) is negative in tumor cells.   Intradepartmental consultation is obtained.       Balta Cancino MD   **Electronically Signed Out**   zw/5/19/2025      Clinical Information   Procedure:  Core biopsy, right neck mass.

## 2025-07-17 NOTE — PROGRESS NOTES
Mark Owusu  7/17/2025  Ht Readings from Last 1 Encounters:   07/17/25 1.575 m (5' 2\")     Wt Readings from Last 10 Encounters:   07/17/25 54.4 kg (120 lb)   06/27/25 59.5 kg (131 lb 3.2 oz)   06/19/25 58.8 kg (129 lb 11.2 oz)   06/10/25 63.5 kg (140 lb)   06/06/25 60.6 kg (133 lb 9.6 oz)   04/23/25 63.5 kg (140 lb)   03/24/25 64 kg (141 lb)   12/04/24 64.9 kg (143 lb)   11/12/24 64 kg (141 lb)   05/09/24 63.5 kg (140 lb)     BMI: 21.95    Assessment: Met w/ pt and wife, Keyon, during initial chemotherapy. Pt has lost 9# since initial oncology nutrition assessment last month. Appetite has been failing, now ordered Remeron. Pt drinking protein shakes. Informed by infusion nurse that pt has questions about PEG placement. Keyon reports she and pt have discussed this and would like to avoid PEG, though their daughter had requested information. Educated on PEG placement. Provided w/ several resources to optimize nutritional intake. Reviewed nutrition expectations moving forward. Pt drowsy during infusion. Will follow as treatment progresses.    Weight change: -9# x1 month  Appetite: poor  Nutritional Side Effects: anorexia  Calculated Needs: 32-35 kcal/kg CBW =  kcal, 1.3-1.5 gm/kg CBW = 70-80 gm pro, 1 ml/kcal =  ml fluids  Malnutrition Status: Severe  Nutrition Diagnosis: Severe Malnutrition related to Catabolic Illness as evidenced by Severe Fat Loss , Severe Muscle loss , and Weight loss of 7.0% x1 month    Pre-Hab Eligible?: Yes    Recommendations: Pt to consume 6 small meals/day + Ensure Complete at least BID    Danae Fofana, MS, RD, LD

## 2025-07-18 LAB — HBV CORE AB SER QL: NONREACTIVE

## 2025-07-21 ENCOUNTER — TELEPHONE (OUTPATIENT)
Age: 75
End: 2025-07-21

## 2025-07-21 NOTE — TELEPHONE ENCOUNTER
Returned call to pt wife Keyon regarding his scheduled appt with Palliative care on 7/23. Keyon stated pt is unable to make it out for appt as he has medical oncology and chemo tx the following day and he cannot handle appts 2 days in a row. Keyon asked if we can coordinate an appt at [facility] for him. Keyon asked to coordinate an appt when pt starts radiation therapy. No schedule or time for radiation at this time, Keyon will call back to our office when she has a radiation schedule and let us know when to coordinate pt appt.       Amanda LORENZ,[facility]  Palliative Medicine   Outpatient office: [phone]

## 2025-07-22 ENCOUNTER — APPOINTMENT (OUTPATIENT)
Dept: CT IMAGING | Age: 75
End: 2025-07-22
Attending: STUDENT IN AN ORGANIZED HEALTH CARE EDUCATION/TRAINING PROGRAM
Payer: MEDICARE

## 2025-07-22 ENCOUNTER — HOSPITAL ENCOUNTER (INPATIENT)
Age: 75
LOS: 3 days | Discharge: NURSING, REHAB OR LONG-TERM CARE | End: 2025-07-25
Attending: STUDENT IN AN ORGANIZED HEALTH CARE EDUCATION/TRAINING PROGRAM | Admitting: FAMILY MEDICINE
Payer: MEDICARE

## 2025-07-22 DIAGNOSIS — E83.42 HYPOMAGNESEMIA: ICD-10-CM

## 2025-07-22 DIAGNOSIS — R41.82 ALTERED MENTAL STATUS, UNSPECIFIED ALTERED MENTAL STATUS TYPE: Primary | ICD-10-CM

## 2025-07-22 LAB
ALBUMIN SERPL-MCNC: 3.2 G/DL (ref 3.5–5.2)
ALP SERPL-CCNC: 103 U/L (ref 40–129)
ALT SERPL-CCNC: 42 U/L (ref 0–50)
AMMONIA PLAS-SCNC: <10 UMOL/L (ref 16–60)
ANION GAP SERPL CALCULATED.3IONS-SCNC: 11 MMOL/L (ref 7–16)
AST SERPL-CCNC: 68 U/L (ref 0–50)
BACTERIA URNS QL MICRO: ABNORMAL
BASOPHILS # BLD: 0.03 K/UL (ref 0–0.2)
BASOPHILS NFR BLD: 0 % (ref 0–2)
BILIRUB SERPL-MCNC: 0.4 MG/DL (ref 0–1.2)
BILIRUB UR QL STRIP: NEGATIVE
BUN SERPL-MCNC: 15 MG/DL (ref 8–23)
CALCIUM SERPL-MCNC: 9 MG/DL (ref 8.8–10.2)
CHLORIDE SERPL-SCNC: 99 MMOL/L (ref 98–107)
CLARITY UR: CLEAR
CO2 SERPL-SCNC: 24 MMOL/L (ref 22–29)
COLOR UR: YELLOW
CREAT SERPL-MCNC: 1.2 MG/DL (ref 0.7–1.2)
EOSINOPHIL # BLD: 0.07 K/UL (ref 0.05–0.5)
EOSINOPHILS RELATIVE PERCENT: 1 % (ref 0–6)
EPI CELLS #/AREA URNS HPF: ABNORMAL /HPF
ERYTHROCYTE [DISTWIDTH] IN BLOOD BY AUTOMATED COUNT: 14.5 % (ref 11.5–15)
GFR, ESTIMATED: 61 ML/MIN/1.73M2
GLUCOSE SERPL-MCNC: 90 MG/DL (ref 74–99)
GLUCOSE UR STRIP-MCNC: NEGATIVE MG/DL
HCT VFR BLD AUTO: 39.6 % (ref 37–54)
HGB BLD-MCNC: 13.4 G/DL (ref 12.5–16.5)
HGB UR QL STRIP.AUTO: NEGATIVE
IMM GRANULOCYTES # BLD AUTO: <0.03 K/UL (ref 0–0.58)
IMM GRANULOCYTES NFR BLD: 0 % (ref 0–5)
KETONES UR STRIP-MCNC: ABNORMAL MG/DL
LACTATE BLDV-SCNC: 1.3 MMOL/L (ref 0.5–2.2)
LEUKOCYTE ESTERASE UR QL STRIP: NEGATIVE
LYMPHOCYTES NFR BLD: 0.96 K/UL (ref 1.5–4)
LYMPHOCYTES RELATIVE PERCENT: 14 % (ref 20–42)
MCH RBC QN AUTO: 31.3 PG (ref 26–35)
MCHC RBC AUTO-ENTMCNC: 33.8 G/DL (ref 32–34.5)
MCV RBC AUTO: 92.5 FL (ref 80–99.9)
MONOCYTES NFR BLD: 0.79 K/UL (ref 0.1–0.95)
MONOCYTES NFR BLD: 12 % (ref 2–12)
NEUTROPHILS NFR BLD: 73 % (ref 43–80)
NEUTS SEG NFR BLD: 4.97 K/UL (ref 1.8–7.3)
NITRITE UR QL STRIP: NEGATIVE
PH UR STRIP: 6 [PH] (ref 5–8)
PLATELET # BLD AUTO: 275 K/UL (ref 130–450)
PMV BLD AUTO: 8.8 FL (ref 7–12)
POTASSIUM SERPL-SCNC: 4.5 MMOL/L (ref 3.5–5.1)
PROT SERPL-MCNC: 6.4 G/DL (ref 6.4–8.3)
PROT UR STRIP-MCNC: ABNORMAL MG/DL
RBC # BLD AUTO: 4.28 M/UL (ref 3.8–5.8)
RBC #/AREA URNS HPF: ABNORMAL /HPF
SODIUM SERPL-SCNC: 133 MMOL/L (ref 136–145)
SP GR UR STRIP: 1.02 (ref 1–1.03)
TROPONIN I SERPL HS-MCNC: 22 NG/L (ref 0–22)
TROPONIN I SERPL HS-MCNC: 24 NG/L (ref 0–22)
UROBILINOGEN UR STRIP-ACNC: 1 EU/DL (ref 0–1)
WBC #/AREA URNS HPF: ABNORMAL /HPF
WBC OTHER # BLD: 6.8 K/UL (ref 4.5–11.5)

## 2025-07-22 PROCEDURE — 87086 URINE CULTURE/COLONY COUNT: CPT

## 2025-07-22 PROCEDURE — 93005 ELECTROCARDIOGRAM TRACING: CPT | Performed by: STUDENT IN AN ORGANIZED HEALTH CARE EDUCATION/TRAINING PROGRAM

## 2025-07-22 PROCEDURE — 70450 CT HEAD/BRAIN W/O DYE: CPT

## 2025-07-22 PROCEDURE — 84484 ASSAY OF TROPONIN QUANT: CPT

## 2025-07-22 PROCEDURE — 6370000000 HC RX 637 (ALT 250 FOR IP): Performed by: NURSE PRACTITIONER

## 2025-07-22 PROCEDURE — 80053 COMPREHEN METABOLIC PANEL: CPT

## 2025-07-22 PROCEDURE — 82140 ASSAY OF AMMONIA: CPT

## 2025-07-22 PROCEDURE — 85025 COMPLETE CBC W/AUTO DIFF WBC: CPT

## 2025-07-22 PROCEDURE — 2140000000 HC CCU INTERMEDIATE R&B

## 2025-07-22 PROCEDURE — 2500000003 HC RX 250 WO HCPCS: Performed by: NURSE PRACTITIONER

## 2025-07-22 PROCEDURE — 74176 CT ABD & PELVIS W/O CONTRAST: CPT

## 2025-07-22 PROCEDURE — 99223 1ST HOSP IP/OBS HIGH 75: CPT | Performed by: NURSE PRACTITIONER

## 2025-07-22 PROCEDURE — 99285 EMERGENCY DEPT VISIT HI MDM: CPT

## 2025-07-22 PROCEDURE — 83605 ASSAY OF LACTIC ACID: CPT

## 2025-07-22 PROCEDURE — 81001 URINALYSIS AUTO W/SCOPE: CPT

## 2025-07-22 RX ORDER — MIRTAZAPINE 15 MG/1
30 TABLET, FILM COATED ORAL NIGHTLY
Status: DISCONTINUED | OUTPATIENT
Start: 2025-07-23 | End: 2025-07-25 | Stop reason: HOSPADM

## 2025-07-22 RX ORDER — METOCLOPRAMIDE 10 MG/1
10 TABLET ORAL
Status: DISCONTINUED | OUTPATIENT
Start: 2025-07-23 | End: 2025-07-25 | Stop reason: HOSPADM

## 2025-07-22 RX ORDER — ONDANSETRON 4 MG/1
4 TABLET, ORALLY DISINTEGRATING ORAL EVERY 8 HOURS PRN
Status: DISCONTINUED | OUTPATIENT
Start: 2025-07-22 | End: 2025-07-25 | Stop reason: HOSPADM

## 2025-07-22 RX ORDER — TRAMADOL HYDROCHLORIDE 50 MG/1
50 TABLET ORAL EVERY 8 HOURS PRN
Status: DISCONTINUED | OUTPATIENT
Start: 2025-07-22 | End: 2025-07-25 | Stop reason: HOSPADM

## 2025-07-22 RX ORDER — SODIUM CHLORIDE 0.9 % (FLUSH) 0.9 %
5-40 SYRINGE (ML) INJECTION PRN
Status: DISCONTINUED | OUTPATIENT
Start: 2025-07-22 | End: 2025-07-25 | Stop reason: HOSPADM

## 2025-07-22 RX ORDER — POTASSIUM CHLORIDE 1500 MG/1
40 TABLET, EXTENDED RELEASE ORAL PRN
Status: DISCONTINUED | OUTPATIENT
Start: 2025-07-22 | End: 2025-07-25 | Stop reason: HOSPADM

## 2025-07-22 RX ORDER — ONDANSETRON 2 MG/ML
4 INJECTION INTRAMUSCULAR; INTRAVENOUS EVERY 6 HOURS PRN
Status: DISCONTINUED | OUTPATIENT
Start: 2025-07-22 | End: 2025-07-25 | Stop reason: HOSPADM

## 2025-07-22 RX ORDER — ATORVASTATIN CALCIUM 40 MG/1
40 TABLET, FILM COATED ORAL NIGHTLY
Status: DISCONTINUED | OUTPATIENT
Start: 2025-07-23 | End: 2025-07-25 | Stop reason: HOSPADM

## 2025-07-22 RX ORDER — POTASSIUM CHLORIDE 7.45 MG/ML
10 INJECTION INTRAVENOUS PRN
Status: DISCONTINUED | OUTPATIENT
Start: 2025-07-22 | End: 2025-07-25 | Stop reason: HOSPADM

## 2025-07-22 RX ORDER — ACETAMINOPHEN 650 MG/1
650 SUPPOSITORY RECTAL EVERY 6 HOURS PRN
Status: DISCONTINUED | OUTPATIENT
Start: 2025-07-22 | End: 2025-07-25 | Stop reason: HOSPADM

## 2025-07-22 RX ORDER — MAGNESIUM SULFATE IN WATER 40 MG/ML
2000 INJECTION, SOLUTION INTRAVENOUS PRN
Status: DISCONTINUED | OUTPATIENT
Start: 2025-07-22 | End: 2025-07-25 | Stop reason: HOSPADM

## 2025-07-22 RX ORDER — EZETIMIBE 10 MG/1
10 TABLET ORAL DAILY
Status: DISCONTINUED | OUTPATIENT
Start: 2025-07-23 | End: 2025-07-25 | Stop reason: HOSPADM

## 2025-07-22 RX ORDER — SODIUM CHLORIDE 1 G/1
1 TABLET ORAL 2 TIMES DAILY
Status: DISCONTINUED | OUTPATIENT
Start: 2025-07-23 | End: 2025-07-25 | Stop reason: HOSPADM

## 2025-07-22 RX ORDER — TAMSULOSIN HYDROCHLORIDE 0.4 MG/1
0.4 CAPSULE ORAL DAILY
Status: DISCONTINUED | OUTPATIENT
Start: 2025-07-23 | End: 2025-07-25 | Stop reason: HOSPADM

## 2025-07-22 RX ORDER — PANTOPRAZOLE SODIUM 40 MG/1
40 TABLET, DELAYED RELEASE ORAL DAILY
Status: DISCONTINUED | OUTPATIENT
Start: 2025-07-23 | End: 2025-07-25 | Stop reason: HOSPADM

## 2025-07-22 RX ORDER — POLYETHYLENE GLYCOL 3350 17 G/17G
17 POWDER, FOR SOLUTION ORAL DAILY PRN
Status: DISCONTINUED | OUTPATIENT
Start: 2025-07-22 | End: 2025-07-25 | Stop reason: HOSPADM

## 2025-07-22 RX ORDER — METOPROLOL TARTRATE 25 MG/1
12.5 TABLET, FILM COATED ORAL 2 TIMES DAILY
Status: DISCONTINUED | OUTPATIENT
Start: 2025-07-23 | End: 2025-07-25 | Stop reason: HOSPADM

## 2025-07-22 RX ORDER — ACETAMINOPHEN 325 MG/1
650 TABLET ORAL EVERY 6 HOURS PRN
Status: DISCONTINUED | OUTPATIENT
Start: 2025-07-22 | End: 2025-07-25 | Stop reason: HOSPADM

## 2025-07-22 RX ORDER — SODIUM CHLORIDE 0.9 % (FLUSH) 0.9 %
5-40 SYRINGE (ML) INJECTION EVERY 12 HOURS SCHEDULED
Status: DISCONTINUED | OUTPATIENT
Start: 2025-07-22 | End: 2025-07-25 | Stop reason: HOSPADM

## 2025-07-22 RX ORDER — ENOXAPARIN SODIUM 100 MG/ML
40 INJECTION SUBCUTANEOUS DAILY
Status: DISCONTINUED | OUTPATIENT
Start: 2025-07-22 | End: 2025-07-25 | Stop reason: HOSPADM

## 2025-07-22 RX ORDER — SODIUM CHLORIDE 9 MG/ML
INJECTION, SOLUTION INTRAVENOUS PRN
Status: DISCONTINUED | OUTPATIENT
Start: 2025-07-22 | End: 2025-07-25 | Stop reason: HOSPADM

## 2025-07-22 RX ADMIN — ATORVASTATIN CALCIUM 40 MG: 40 TABLET, FILM COATED ORAL at 23:58

## 2025-07-22 RX ADMIN — METOPROLOL TARTRATE 12.5 MG: 25 TABLET, FILM COATED ORAL at 23:58

## 2025-07-22 RX ADMIN — TRAMADOL HYDROCHLORIDE 50 MG: 50 TABLET, COATED ORAL at 23:58

## 2025-07-22 RX ADMIN — SODIUM CHLORIDE, PRESERVATIVE FREE 10 ML: 5 INJECTION INTRAVENOUS at 23:16

## 2025-07-22 RX ADMIN — SODIUM CHLORIDE 1 G: 1 TABLET ORAL at 23:58

## 2025-07-22 RX ADMIN — MIRTAZAPINE 30 MG: 15 TABLET, FILM COATED ORAL at 23:58

## 2025-07-22 ASSESSMENT — LIFESTYLE VARIABLES
HOW MANY STANDARD DRINKS CONTAINING ALCOHOL DO YOU HAVE ON A TYPICAL DAY: PATIENT DOES NOT DRINK
HOW OFTEN DO YOU HAVE A DRINK CONTAINING ALCOHOL: NEVER

## 2025-07-22 ASSESSMENT — ENCOUNTER SYMPTOMS
DIARRHEA: 0
ABDOMINAL PAIN: 0
PHOTOPHOBIA: 0
CHEST TIGHTNESS: 0
SHORTNESS OF BREATH: 0
COUGH: 0
VOMITING: 0
NAUSEA: 0
BACK PAIN: 0
ABDOMINAL DISTENTION: 0

## 2025-07-22 ASSESSMENT — PAIN - FUNCTIONAL ASSESSMENT: PAIN_FUNCTIONAL_ASSESSMENT: NONE - DENIES PAIN
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
## 2025-07-23 ENCOUNTER — APPOINTMENT (OUTPATIENT)
Dept: MRI IMAGING | Age: 75
End: 2025-07-23
Payer: MEDICARE

## 2025-07-23 ENCOUNTER — HOSPITAL ENCOUNTER (OUTPATIENT)
Dept: INFUSION THERAPY | Age: 75
Discharge: HOME | End: 2025-07-23

## 2025-07-23 PROBLEM — Z51.5 PALLIATIVE CARE ENCOUNTER: Status: ACTIVE | Noted: 2025-07-23

## 2025-07-23 PROBLEM — Z71.89 GOALS OF CARE, COUNSELING/DISCUSSION: Status: ACTIVE | Noted: 2025-07-23

## 2025-07-23 PROBLEM — F05 DELIRIUM DUE TO ANOTHER MEDICAL CONDITION: Status: ACTIVE | Noted: 2025-07-23

## 2025-07-23 LAB
ANION GAP SERPL CALCULATED.3IONS-SCNC: 11 MMOL/L (ref 7–16)
BASOPHILS # BLD: 0.05 K/UL (ref 0–0.2)
BASOPHILS NFR BLD: 0 % (ref 0–2)
BUN SERPL-MCNC: 16 MG/DL (ref 8–23)
CALCIUM SERPL-MCNC: 9 MG/DL (ref 8.8–10.2)
CHLORIDE SERPL-SCNC: 100 MMOL/L (ref 98–107)
CO2 SERPL-SCNC: 24 MMOL/L (ref 22–29)
CREAT SERPL-MCNC: 1.1 MG/DL (ref 0.7–1.2)
EOSINOPHIL # BLD: 0.11 K/UL (ref 0.05–0.5)
EOSINOPHILS RELATIVE PERCENT: 1 % (ref 0–6)
ERYTHROCYTE [DISTWIDTH] IN BLOOD BY AUTOMATED COUNT: 14.2 % (ref 11.5–15)
GFR, ESTIMATED: 71 ML/MIN/1.73M2
GLUCOSE SERPL-MCNC: 115 MG/DL (ref 74–99)
HCT VFR BLD AUTO: 41.7 % (ref 37–54)
HGB BLD-MCNC: 13.9 G/DL (ref 12.5–16.5)
IMM GRANULOCYTES # BLD AUTO: 0.04 K/UL (ref 0–0.58)
IMM GRANULOCYTES NFR BLD: 0 % (ref 0–5)
LYMPHOCYTES NFR BLD: 1.46 K/UL (ref 1.5–4)
LYMPHOCYTES RELATIVE PERCENT: 13 % (ref 20–42)
MCH RBC QN AUTO: 30.8 PG (ref 26–35)
MCHC RBC AUTO-ENTMCNC: 33.3 G/DL (ref 32–34.5)
MCV RBC AUTO: 92.5 FL (ref 80–99.9)
MONOCYTES NFR BLD: 1.09 K/UL (ref 0.1–0.95)
MONOCYTES NFR BLD: 10 % (ref 2–12)
NEUTROPHILS NFR BLD: 75 % (ref 43–80)
NEUTS SEG NFR BLD: 8.41 K/UL (ref 1.8–7.3)
PLATELET # BLD AUTO: 256 K/UL (ref 130–450)
PMV BLD AUTO: 8.5 FL (ref 7–12)
POTASSIUM SERPL-SCNC: 3.8 MMOL/L (ref 3.5–5.1)
RBC # BLD AUTO: 4.51 M/UL (ref 3.8–5.8)
SODIUM SERPL-SCNC: 135 MMOL/L (ref 136–145)
WBC OTHER # BLD: 11.2 K/UL (ref 4.5–11.5)

## 2025-07-23 PROCEDURE — A9579 GAD-BASE MR CONTRAST NOS,1ML: HCPCS | Performed by: RADIOLOGY

## 2025-07-23 PROCEDURE — 99221 1ST HOSP IP/OBS SF/LOW 40: CPT | Performed by: PSYCHIATRY & NEUROLOGY

## 2025-07-23 PROCEDURE — 85025 COMPLETE CBC W/AUTO DIFF WBC: CPT

## 2025-07-23 PROCEDURE — 6370000000 HC RX 637 (ALT 250 FOR IP): Performed by: INTERNAL MEDICINE

## 2025-07-23 PROCEDURE — 36415 COLL VENOUS BLD VENIPUNCTURE: CPT

## 2025-07-23 PROCEDURE — 99222 1ST HOSP IP/OBS MODERATE 55: CPT | Performed by: PHYSICIAN ASSISTANT

## 2025-07-23 PROCEDURE — 2580000003 HC RX 258: Performed by: INTERNAL MEDICINE

## 2025-07-23 PROCEDURE — 2140000000 HC CCU INTERMEDIATE R&B

## 2025-07-23 PROCEDURE — 97165 OT EVAL LOW COMPLEX 30 MIN: CPT

## 2025-07-23 PROCEDURE — 2500000003 HC RX 250 WO HCPCS: Performed by: NURSE PRACTITIONER

## 2025-07-23 PROCEDURE — 99232 SBSQ HOSP IP/OBS MODERATE 35: CPT | Performed by: INTERNAL MEDICINE

## 2025-07-23 PROCEDURE — 6360000004 HC RX CONTRAST MEDICATION: Performed by: RADIOLOGY

## 2025-07-23 PROCEDURE — 97535 SELF CARE MNGMENT TRAINING: CPT

## 2025-07-23 PROCEDURE — 99223 1ST HOSP IP/OBS HIGH 75: CPT | Performed by: INTERNAL MEDICINE

## 2025-07-23 PROCEDURE — 6370000000 HC RX 637 (ALT 250 FOR IP): Performed by: PSYCHIATRY & NEUROLOGY

## 2025-07-23 PROCEDURE — 51798 US URINE CAPACITY MEASURE: CPT

## 2025-07-23 PROCEDURE — 70553 MRI BRAIN STEM W/O & W/DYE: CPT

## 2025-07-23 PROCEDURE — 97161 PT EVAL LOW COMPLEX 20 MIN: CPT

## 2025-07-23 PROCEDURE — 6370000000 HC RX 637 (ALT 250 FOR IP): Performed by: NURSE PRACTITIONER

## 2025-07-23 PROCEDURE — 80048 BASIC METABOLIC PNL TOTAL CA: CPT

## 2025-07-23 PROCEDURE — 97530 THERAPEUTIC ACTIVITIES: CPT

## 2025-07-23 PROCEDURE — 6360000002 HC RX W HCPCS: Performed by: NURSE PRACTITIONER

## 2025-07-23 RX ORDER — GADOTERIDOL 279.3 MG/ML
11 INJECTION INTRAVENOUS
Status: COMPLETED | OUTPATIENT
Start: 2025-07-23 | End: 2025-07-23

## 2025-07-23 RX ORDER — DIVALPROEX SODIUM 125 MG/1
125 CAPSULE, COATED PELLETS ORAL EVERY 12 HOURS SCHEDULED
Status: DISCONTINUED | OUTPATIENT
Start: 2025-07-23 | End: 2025-07-25

## 2025-07-23 RX ORDER — LOPERAMIDE HYDROCHLORIDE 2 MG/1
2 CAPSULE ORAL 4 TIMES DAILY PRN
Status: DISCONTINUED | OUTPATIENT
Start: 2025-07-23 | End: 2025-07-25 | Stop reason: HOSPADM

## 2025-07-23 RX ORDER — SODIUM CHLORIDE 9 MG/ML
INJECTION, SOLUTION INTRAVENOUS CONTINUOUS
Status: DISCONTINUED | OUTPATIENT
Start: 2025-07-23 | End: 2025-07-25

## 2025-07-23 RX ADMIN — LOPERAMIDE HYDROCHLORIDE 2 MG: 2 CAPSULE ORAL at 10:41

## 2025-07-23 RX ADMIN — ENOXAPARIN SODIUM 40 MG: 100 INJECTION SUBCUTANEOUS at 09:46

## 2025-07-23 RX ADMIN — GADOTERIDOL 11 ML: 279.3 INJECTION, SOLUTION INTRAVENOUS at 17:34

## 2025-07-23 RX ADMIN — SODIUM CHLORIDE 1 G: 1 TABLET ORAL at 09:49

## 2025-07-23 RX ADMIN — EZETIMIBE 10 MG: 10 TABLET ORAL at 09:49

## 2025-07-23 RX ADMIN — SODIUM CHLORIDE, PRESERVATIVE FREE 10 ML: 5 INJECTION INTRAVENOUS at 09:49

## 2025-07-23 RX ADMIN — SODIUM CHLORIDE, PRESERVATIVE FREE 10 ML: 5 INJECTION INTRAVENOUS at 20:26

## 2025-07-23 RX ADMIN — PANTOPRAZOLE SODIUM 40 MG: 40 TABLET, DELAYED RELEASE ORAL at 09:46

## 2025-07-23 RX ADMIN — SODIUM CHLORIDE 1 G: 1 TABLET ORAL at 20:26

## 2025-07-23 RX ADMIN — METOCLOPRAMIDE 10 MG: 10 TABLET ORAL at 09:49

## 2025-07-23 RX ADMIN — TRAMADOL HYDROCHLORIDE 50 MG: 50 TABLET, COATED ORAL at 15:57

## 2025-07-23 RX ADMIN — ATORVASTATIN CALCIUM 40 MG: 40 TABLET, FILM COATED ORAL at 20:26

## 2025-07-23 RX ADMIN — DIVALPROEX SODIUM 125 MG: 125 CAPSULE, COATED PELLETS ORAL at 20:26

## 2025-07-23 RX ADMIN — METOPROLOL TARTRATE 12.5 MG: 25 TABLET, FILM COATED ORAL at 09:46

## 2025-07-23 RX ADMIN — METOCLOPRAMIDE 10 MG: 10 TABLET ORAL at 12:25

## 2025-07-23 RX ADMIN — SODIUM CHLORIDE: 0.9 INJECTION, SOLUTION INTRAVENOUS at 10:45

## 2025-07-23 RX ADMIN — TAMSULOSIN HYDROCHLORIDE 0.4 MG: 0.4 CAPSULE ORAL at 09:46

## 2025-07-23 RX ADMIN — Medication 3 MG: at 18:46

## 2025-07-23 RX ADMIN — MIRTAZAPINE 30 MG: 15 TABLET, FILM COATED ORAL at 20:26

## 2025-07-23 RX ADMIN — METOCLOPRAMIDE 10 MG: 10 TABLET ORAL at 18:10

## 2025-07-23 RX ADMIN — METOPROLOL TARTRATE 12.5 MG: 25 TABLET, FILM COATED ORAL at 20:26

## 2025-07-23 RX ADMIN — LOPERAMIDE HYDROCHLORIDE 2 MG: 2 CAPSULE ORAL at 18:13

## 2025-07-23 ASSESSMENT — PAIN SCALES - GENERAL
PAINLEVEL_OUTOF10: 0
PAINLEVEL_OUTOF10: 0
PAINLEVEL_OUTOF10: 7
PAINLEVEL_OUTOF10: 5

## 2025-07-23 ASSESSMENT — PAIN DESCRIPTION - LOCATION: LOCATION: SHOULDER;KNEE

## 2025-07-23 ASSESSMENT — PAIN DESCRIPTION - DESCRIPTORS: DESCRIPTORS: ACHING;DISCOMFORT;SORE;TENDER

## 2025-07-23 ASSESSMENT — PAIN DESCRIPTION - ORIENTATION: ORIENTATION: RIGHT

## 2025-07-23 NOTE — PROGRESS NOTES
Physical Therapy  Physical Therapy Initial Assessment     Name: Mark Owusu  : 1950  MRN: [id number]      Date of Service: 2025    Evaluating PT:  Wagner Mckeon, PT, DPT [id number]    Room #:  6509/6509-A  Diagnosis:  AMS (altered mental status) [R41.82]  PMHx/PSHx:    Past Medical History:   Diagnosis Date    Arthritis     Constipation     Diarrhea     Dislocated shoulder, right     GERD (gastroesophageal reflux disease) 2015    Hearing loss     Hyperlipidemia     Hypoglycemia     Hyponatremia     Liver disease     Mixed hyperlipidemia 2015    New onset seizure (HCC) 2019    Renal failure     history of renal failure 4x per patient's family.    Skin cancer     Squamous    Sleep apnea     Syncope and collapse     Urinary incontinence      Procedure/Surgery:  none  Precautions:  Falls, alarms, sitter, cognition  Equipment Needs:  TBD    SUBJECTIVE:    Pt lives with wife in a 2 story home with ramped entry.  First floor setup.      Pt ambulated with SPC PTA.    OBJECTIVE:   Initial Evaluation  Date: 25 Treatment Short Term/ Long Term   Goals   AM-PAC 6 Clicks      Was pt agreeable to Eval/treatment? Yes     Does pt have pain? Chronic back and B shoulder pain     Bed Mobility  Rolling: NT  Supine to sit: Johnna with HOB elevated  Sit to supine: NT  Scooting: MaxA  SBA   Transfers Sit to stand: ModA bed; Johnna chair  Stand to sit: ModA  Stand pivot: Johnna with WW  SBA with AAD   Ambulation   20 feet x 2 reps with Johnna  with WW  >150 feet with SBA with AAD   Stair negotiation: ascended and descended NT  >4 steps with 1 rail SBA   ROM BUE:  WFL  BLE:  WFL     Strength BUE:  WFL  BLE:  4/5  Increase by 1/3 MMT grade   Balance Sitting EOB:  SBA  Dynamic Standing:  ModA no device; Johnna with WW  Sitting EOB:  Independent  Dynamic Standing:  SBA with AAD     Pt is A & O x 1 self  Sensation:  no reported paresthesias  Edema:  none    Therapeutic Exercises:  sit to stand x 3  reps    Patient education  Pt educated on safety    Patient response to education:   Pt verbalized understanding Pt demonstrated skill Pt requires further education in this area   partial partial yes     ASSESSMENT:    Conditions Requiring Skilled Therapeutic Intervention:    [x]Decreased strength     []Decreased ROM  [x]Decreased functional mobility  [x]Decreased balance   [x]Decreased endurance   [x]Decreased posture  []Decreased sensation  [x]Decreased coordination   []Decreased vision  [x]Decreased safety awareness   []Increased pain       Comments:  Pt was in bed upon arrival, agreeable to initial evaluation.  Sitter present.  Attempted to transfer to chair with HHA initially but pt was fearful of falling and exhibited a posterior lean.  WW given for further mobility.  Pt ambulated in room to bathroom with decreased speed and flexed posture.  Difficulty urinating and pt was ultimately unable to void.  Pt was left in a chair with all needs met and call light in reach.  Educated pt on safety and need for assistance when getting out of chair; pt understood and agreed to use call light.  Chair alarm on and RN aware.    Treatment:  Patient practiced and was instructed in the following treatment:    Bed mobility training - pt given verbal and tactile cues to facilitate proper sequencing and safety during supine>sit as well as provided with physical assistance.  Sitting EOB for >5 minutes for upright tolerance, postural awareness and BLE ROM  Transfer training - pt was given verbal and tactile cues to facilitate proper hand placement, technique and safety during sit to stand, stand to sit and stand pivot transfers as well as provided with physical assistance.   Gait training- pt was given verbal and tactile cues to facilitate safety, balance and use of WW during ambulation as well as provided with physical assistance.    Pt's/ family goals   1. None stated    Prognosis is good for reaching above PT goals.    Patient

## 2025-07-23 NOTE — PROGRESS NOTES
Occupational Therapy  OCCUPATIONAL THERAPY INITIAL EVALUATION    [facility]  [address], [place]      Date:2025                                                Patient Name: Mark Owusu  MRN: [id number]  : 1950  Room: [address] [place], [place] [address]-A    Evaluating OT: Kasi Moser OTR/L #[id number]     Referring Provider: Rabia Benson APRN - CNP   Specific Provider Orders/Date: OT eval and treat 25    Diagnosis: AMS (altered mental status) [R41.82]   Pt admitted to hospital with AMS and agitation.      Pertinent Medical History:  has a past medical history of Arthritis, Constipation, Diarrhea, Dislocated shoulder, right, GERD (gastroesophageal reflux disease), Hearing loss, Hyperlipidemia, Hypoglycemia, Hyponatremia, Liver disease, Mixed hyperlipidemia, New onset seizure (HCC), Renal failure, Skin cancer, Sleep apnea, Syncope and collapse, and Urinary incontinence.       Precautions:  Fall Risk, , bed alarm, cognition     Assessment of current deficits    [x] Functional mobility  [x]ADLs  [x] Strength               []Cognition    [x] Functional transfers   [x] IADLs         [x] Safety Awareness   [x]Endurance    [] Fine Coordination              [x] Balance      [] Vision/perception   []Sensation     []Gross Motor Coordination  [] ROM  [] Delirium                   [] Motor Control     OT PLAN OF CARE   OT POC based on physician orders, patient diagnosis and results of clinical assessment    Frequency/Duration 1-5 days/wk for 2 weeks PRN   Specific OT Treatment Interventions to include:   * Instruction/training on adapted ADL techniques and AE recommendations to increase functional independence within precautions       * Training on energy conservation strategies, correct breathing pattern and techniques to improve independence/tolerance for self-care routine  * Functional transfer/mobility training/DME recommendations for increased  placement, posture, body mechanics, energy conservation techniques and safety..  Therapist facilitated self-care retraining: UB/LB self-care tasks (robe and socks), toileting tasks in bathroom and grooming tasks while cuing on sequencing, task initiation, modified techniques, posture, safety and energy conservation techniques.. Skilled monitoring of HR, O2 sats and pts response to treatment.        Rehab Potential:  Good for established goals     Patient / Family Goal: Regain [place]     Patient and/or family were instructed on functional diagnosis, prognosis/goals and OT plan of care. Demonstrated fair understanding.     Eval Complexity: Low    Time In: 820  Time Out: 845  Total Treatment Time: 10 minutes    Min Units   OT Eval Low 97165  x  1   OT Eval Medium 72819      OT Eval High 16808      OT Re-Eval 77288       Therapeutic Ex 48256       Therapeutic Activities 31010  2     ADL/Self Care 20225  8  1   Orthotic Management 67989       Manual 99776     Neuro Re-Ed 92380       Non-Billable Time          Evaluation Time additionally includes thorough review of current medical information, gathering information on past medical history/social history and prior level of function, interpretation of standardized testing/informal observation of tasks, assessment of data and development of plan of care and goals.          Kasi Moser OTR/L #[id number]
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
## 2025-07-23 NOTE — PROGRESS NOTES
Consult received however patient is known to Dr. Wolfe with [facility]. Nursing communication order placed and consultation switched. Please contact [facility] for heme/onc consult. Thank you.    Lana PORTILLO- CNP  [facility]

## 2025-07-23 NOTE — ACP ([place] CARE PLANNING)
[place] Care Planning     Palliative Team Advance Care Planning (ACP) Conversation    Date of Conversation: 07/23/25    Individuals present for the conversation: Patient and Legal healthcare agent named below wife Keyon     ACP documents on file prior to discussion:  -Power of  for Healthcare  -Living Will    Previously completed document/s not on file: Not discussed.    Healthcare Decision Maker:    Primary Decision Maker: Keyon Owusu - Spouse - [phone]    Secondary Decision Maker: Jesus Manuel Hansenjose - Child - [phone]    Supplemental (Other) Decision Maker: Melvin Owusu - Child - [phone]     Conversation Summary:  Verified HCPOA and Living Will which are on file. Keyon would like pt to consider appointing their son in law as an alternate. Pt is currently unable to complete a new HCPOA, his wife will reach out if he is able to do so in the future.     Resuscitation Status:   Code Status: Full Code     Documentation Completed:  -No new documents completed.    I spent 20 minutes with the patient and/or surrogate decision maker discussing the patient's wishes and goals.      VICKIE Jamison

## 2025-07-23 NOTE — ED NOTES
Patient had small bowel movement in disposable brief. Patient cleaned and changed into hospital gown. Linen change completed. Patient positioned comfortably in bed. Patient satisfied at this time.

## 2025-07-23 NOTE — CONSULTS
Reason for consult: Agitation    75 y.o. year old male  who  has a past medical history of Arthritis, Hyperlipidemia, Hypoglycemia, Hyponatremia, Liver disease,    malignant neoplasm of tonsil, recurrence of skin cancer.  He is on chemo.  Patient was brought to the ER by his family for altered mental status and weakness.  Psychiatry was consulted due to agitation.  According to the report wife reported that he has been more agitated at home.  Went to see the patient today he is lying in bed and he was very calm cooperative and not agitated at all and he was very pleasant.  He was alert and oriented to self and his age.  However he was unable to tell me the date.  He said that it was August and he was largely unable to come up with the date or the year.  He however knew that he was in the hospital but unable to tell me which hospital.  He was confused during my encounter with him.  He denies depression, anxiety auditory visual hallucinations paranoia suicidal ideations homicidal ideations.  Currently on Remeron 30 mg at night for appetite.    Past psychiatric history: Denies prior suicide attempts or suicidal behaviors denies being on any other psychotropic medications except for the Remeron which is used for appetite stimulation with his chemo.  No prior psychiatric hospitalization    Legal history: denies     Substance abuse history:denies     Family history:denies     Psychosocial history: Lives with wife who is supportive daughter is also supportive and he is retired.    Mental Status Examination:      Appearance: clean, age appropriate  Behavior: calm, cooperative, fair eye contact  Mood: \"OK\"  Affect: Congruent   Thought process: linear  Thougth content: Denies suicidal ideation, homicidal ideations, paranoia  Perceptual distrubance: Denies Auditory, visual hallucinations  Insight: Fair  Judgment: Fair  Cognition: alert and oriented x1      A/P: Delirium due to medical condition      Patient does not meet

## 2025-07-23 NOTE — FLOWSHEET NOTE
Pt arrived to unit with following belongings:   07/22/25 4129   Belongings   Dental Appliances None   Vision - Corrective Lenses Eyeglasses;At bedside   Hearing Aid Bilateral hearing aids;At home   Clothing Footwear;Pants;Shirt;Undergarments;At bedside   Jewelry None   Electronic Devices None   Weapons (Notify Protective Services/Security) None   Home Medications None   Valuables Given To Patient   Provide Name(s) of Who Valuable(s) Were Given To patient
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
## 2025-07-23 NOTE — PROGRESS NOTES
Notified Dr KANNAN Dozier re: patient is still continuing to have liquid stools. spouse said he had a contrast from a catScan approx less than 2 weeks ago, she said that is when he started tohave loose stools.

## 2025-07-23 NOTE — PROGRESS NOTES
4 Eyes Skin Assessment     NAME:  Mark Owusu  YOB: 1950  MEDICAL RECORD NUMBER:  [id number]    The patient is being assessed for  Admission    I agree that at least one RN has performed a thorough Head to Toe Skin Assessment on the patient. ALL assessment sites listed below have been assessed.      Areas assessed by both nurses:    Head, Face, Ears, Shoulders, Back, Chest, Arms, Elbows, Hands, Sacrum. Buttock, Coccyx, Ischium, Legs. Feet and Heels, and Under Medical Devices         Does the Patient have a Wound? No noted wound(s)       Jeferson Prevention initiated by RN: Yes  Wound Care Orders initiated by RN: No    For hospital-acquired stage 1 & 2 and ALL Stage 3,4, Unstageable, DTI, NWPT, and Complex wounds: place order “IP Wound Care/Ostomy Nurse Eval and Treat” by RN under : No    New Ostomies, if present place, Ostomy referral order under : No     Nurse 1 eSignature: Electronically signed by Eleonora Spring RN on 7/22/25 at 11:22 PM EDT    **SHARE this note so that the co-signing nurse can place an eSignature**    Nurse 2 eSignature: Electronically signed by Aquiles Ríos RN on 7/23/25 at 12:14 AM EDT
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
## 2025-07-23 NOTE — CARE COORDINATION
7/23/25  Spoke with patient and his wife, Keyon regarding transition of care.  Patient noted to have significant cognitive impairment. He was admitted for altered mental status and placement.  Patient is pending an MRI of the brain and had a psych consult. Psych recommending Remeron, melatonin, depakote with possible neuro consult. PT/OT evaluations reveal Am-PAC of 15/24 for OT and 14/24 for PT. Patient lives with his wife in a 2 story house but maintains on the 1st floor for the past 2 years.  Patients DME includes a Wheelchair, bedside commode, walker, hospital bed.  PCP is YONATAN Olivia and RX preference is [facility] in [place]. Spoke with family about a LOU stay with referrals to 1. [facility], 2. [facility], 3. [facility] all of which are pending. [facility] if accepting [facility] is an assigned beneficiary of the ACO. ([facility] and [facility] stay is eligible) SW/CM will await accepting facility for discharge planning.    Electronically signed by VICKIE Tim on 7/23/2025 at 3:43 PM     Case Management Assessment  Initial Evaluation    Date/Time of Evaluation: 7/23/2025 3:43 PM  Assessment Completed by: VICKIE Tim    If patient is discharged prior to next notation, then this note serves as note for discharge by case management.    Patient Name: Mark Owusu                   YOB: 1950  Diagnosis: AMS (altered mental status) [R41.82]                   Date / Time: 7/22/2025  2:15 PM    Patient Admission Status: Inpatient   Readmission Risk (Low < 19, Mod (19-27), High > 27): Readmission Risk Score: 15.4    Current PCP: Rukhsana Olivia APRN - CNP  PCP verified by CM? Yes    Chart Reviewed: Yes      History Provided by: Significant Other, Medical Record  Patient Orientation: Alert and Oriented, Person    Patient Cognition: Short Term Memory Deficit    Hospitalization in the last 30 days (Readmission):  No    If yes, Readmission Assessment in CM

## 2025-07-23 NOTE — PROGRESS NOTES
Spiritual Health History and Assessment/Progress Note  [facility]    (P) Initial Encounter,  ,  ,      Name: Mark Owusu MRN: [id number]    Age: 75 y.o.     Sex: male   Language: English   Scientology: None   AMS (altered mental status)     Date: 7/23/2025                           Spiritual Assessment began in [facility]        Referral/Consult From: (P) Rounding   Encounter Overview/Reason: (P) Initial Encounter  Service Provided For: (P) Patient    Tiarra, Belief, Meaning:   Patient identifies as spiritual and is connected with a tiarra tradition or spiritual practice  Family/Friends No family/friends present      Importance and Influence:  Patient has spiritual/personal beliefs that influence decisions regarding their health  Family/Friends No family/friends present    Community:  Patient is connected with a spiritual community and feels well-supported. Support system includes: Spouse/Partner and Children  Family/Friends No family/friends present    Assessment and Plan of Care:     Patient Interventions include: Facilitated expression of thoughts and feelings, Explored spiritual coping/struggle/distress, Affirmed coping skills/support systems, and Provided sacramental/Uatsdin ritual  Family/Friends Interventions include: No family/friends present    Patient Plan of Care: Spiritual Care available upon further referral  Family/Friends Plan of Care: No family/friends present    Electronically signed by HERMILO MONTELONGO on 7/23/2025 at 5:51 PM

## 2025-07-24 ENCOUNTER — HOSPITAL ENCOUNTER (OUTPATIENT)
Dept: INFUSION THERAPY | Age: 75
Discharge: HOME | End: 2025-07-24

## 2025-07-24 LAB
ANION GAP SERPL CALCULATED.3IONS-SCNC: 15 MMOL/L (ref 7–16)
BASOPHILS # BLD: 0.03 K/UL (ref 0–0.2)
BASOPHILS NFR BLD: 0 % (ref 0–2)
BUN SERPL-MCNC: 14 MG/DL (ref 8–23)
CALCIUM SERPL-MCNC: 9.3 MG/DL (ref 8.8–10.2)
CHLORIDE SERPL-SCNC: 105 MMOL/L (ref 98–107)
CO2 SERPL-SCNC: 20 MMOL/L (ref 22–29)
CREAT SERPL-MCNC: 1.1 MG/DL (ref 0.7–1.2)
EOSINOPHIL # BLD: 0.19 K/UL (ref 0.05–0.5)
EOSINOPHILS RELATIVE PERCENT: 3 % (ref 0–6)
ERYTHROCYTE [DISTWIDTH] IN BLOOD BY AUTOMATED COUNT: 14.5 % (ref 11.5–15)
GFR, ESTIMATED: 74 ML/MIN/1.73M2
GLUCOSE SERPL-MCNC: 111 MG/DL (ref 74–99)
HCT VFR BLD AUTO: 42.4 % (ref 37–54)
HGB BLD-MCNC: 14 G/DL (ref 12.5–16.5)
IMM GRANULOCYTES # BLD AUTO: <0.03 K/UL (ref 0–0.58)
IMM GRANULOCYTES NFR BLD: 0 % (ref 0–5)
LYMPHOCYTES NFR BLD: 1.41 K/UL (ref 1.5–4)
LYMPHOCYTES RELATIVE PERCENT: 21 % (ref 20–42)
MCH RBC QN AUTO: 31 PG (ref 26–35)
MCHC RBC AUTO-ENTMCNC: 33 G/DL (ref 32–34.5)
MCV RBC AUTO: 93.8 FL (ref 80–99.9)
MICROORGANISM SPEC CULT: ABNORMAL
MICROORGANISM SPEC CULT: ABNORMAL
MONOCYTES NFR BLD: 0.62 K/UL (ref 0.1–0.95)
MONOCYTES NFR BLD: 9 % (ref 2–12)
NEUTROPHILS NFR BLD: 67 % (ref 43–80)
NEUTS SEG NFR BLD: 4.51 K/UL (ref 1.8–7.3)
PLATELET # BLD AUTO: 269 K/UL (ref 130–450)
PMV BLD AUTO: 8.8 FL (ref 7–12)
POTASSIUM SERPL-SCNC: 3.8 MMOL/L (ref 3.5–5.1)
RBC # BLD AUTO: 4.52 M/UL (ref 3.8–5.8)
SERVICE CMNT-IMP: ABNORMAL
SODIUM SERPL-SCNC: 140 MMOL/L (ref 136–145)
SPECIMEN DESCRIPTION: ABNORMAL
WBC OTHER # BLD: 6.8 K/UL (ref 4.5–11.5)

## 2025-07-24 PROCEDURE — 97530 THERAPEUTIC ACTIVITIES: CPT

## 2025-07-24 PROCEDURE — 6370000000 HC RX 637 (ALT 250 FOR IP): Performed by: PSYCHIATRY & NEUROLOGY

## 2025-07-24 PROCEDURE — 6370000000 HC RX 637 (ALT 250 FOR IP): Performed by: NURSE PRACTITIONER

## 2025-07-24 PROCEDURE — 97535 SELF CARE MNGMENT TRAINING: CPT

## 2025-07-24 PROCEDURE — 99232 SBSQ HOSP IP/OBS MODERATE 35: CPT | Performed by: INTERNAL MEDICINE

## 2025-07-24 PROCEDURE — 85025 COMPLETE CBC W/AUTO DIFF WBC: CPT

## 2025-07-24 PROCEDURE — 36415 COLL VENOUS BLD VENIPUNCTURE: CPT

## 2025-07-24 PROCEDURE — 2580000003 HC RX 258: Performed by: INTERNAL MEDICINE

## 2025-07-24 PROCEDURE — 2500000003 HC RX 250 WO HCPCS: Performed by: NURSE PRACTITIONER

## 2025-07-24 PROCEDURE — 99232 SBSQ HOSP IP/OBS MODERATE 35: CPT | Performed by: STUDENT IN AN ORGANIZED HEALTH CARE EDUCATION/TRAINING PROGRAM

## 2025-07-24 PROCEDURE — 99231 SBSQ HOSP IP/OBS SF/LOW 25: CPT | Performed by: PHYSICIAN ASSISTANT

## 2025-07-24 PROCEDURE — 80048 BASIC METABOLIC PNL TOTAL CA: CPT

## 2025-07-24 PROCEDURE — 6360000002 HC RX W HCPCS: Performed by: NURSE PRACTITIONER

## 2025-07-24 PROCEDURE — 2140000000 HC CCU INTERMEDIATE R&B

## 2025-07-24 RX ADMIN — PANTOPRAZOLE SODIUM 40 MG: 40 TABLET, DELAYED RELEASE ORAL at 09:53

## 2025-07-24 RX ADMIN — SODIUM CHLORIDE, PRESERVATIVE FREE 10 ML: 5 INJECTION INTRAVENOUS at 09:59

## 2025-07-24 RX ADMIN — SODIUM CHLORIDE 1 G: 1 TABLET ORAL at 09:52

## 2025-07-24 RX ADMIN — ENOXAPARIN SODIUM 40 MG: 100 INJECTION SUBCUTANEOUS at 09:53

## 2025-07-24 RX ADMIN — Medication 3 MG: at 18:24

## 2025-07-24 RX ADMIN — DIVALPROEX SODIUM 125 MG: 125 CAPSULE, COATED PELLETS ORAL at 20:38

## 2025-07-24 RX ADMIN — TAMSULOSIN HYDROCHLORIDE 0.4 MG: 0.4 CAPSULE ORAL at 09:52

## 2025-07-24 RX ADMIN — ATORVASTATIN CALCIUM 40 MG: 40 TABLET, FILM COATED ORAL at 20:38

## 2025-07-24 RX ADMIN — EZETIMIBE 10 MG: 10 TABLET ORAL at 09:52

## 2025-07-24 RX ADMIN — SODIUM CHLORIDE: 0.9 INJECTION, SOLUTION INTRAVENOUS at 11:48

## 2025-07-24 RX ADMIN — METOPROLOL TARTRATE 12.5 MG: 25 TABLET, FILM COATED ORAL at 09:52

## 2025-07-24 RX ADMIN — SODIUM CHLORIDE 1 G: 1 TABLET ORAL at 20:38

## 2025-07-24 RX ADMIN — METOPROLOL TARTRATE 12.5 MG: 25 TABLET, FILM COATED ORAL at 20:38

## 2025-07-24 RX ADMIN — MIRTAZAPINE 30 MG: 15 TABLET, FILM COATED ORAL at 20:38

## 2025-07-24 RX ADMIN — DIVALPROEX SODIUM 125 MG: 125 CAPSULE, COATED PELLETS ORAL at 09:52

## 2025-07-24 RX ADMIN — METOCLOPRAMIDE 10 MG: 10 TABLET ORAL at 12:48

## 2025-07-24 RX ADMIN — METOCLOPRAMIDE 10 MG: 10 TABLET ORAL at 18:24

## 2025-07-24 RX ADMIN — METOCLOPRAMIDE 10 MG: 10 TABLET ORAL at 09:52

## 2025-07-24 NOTE — PLAN OF CARE
Problem: Safety - Adult  Goal: Free from fall injury  7/24/2025 1725 by Siobhan Zamudio RN  Outcome: Progressing  7/24/2025 0406 by Aquiles Ríos RN  Outcome: Progressing     Problem: Discharge Planning  Goal: Discharge to home or other facility with appropriate resources  7/24/2025 1725 by Siobhan Zamudio RN  Outcome: Progressing  7/24/2025 0406 by Aquiles Ríos RN  Outcome: Progressing     Problem: Skin/Tissue Integrity  Goal: Skin integrity remains intact  Description: 1.  Monitor for areas of redness and/or skin breakdown  2.  Assess vascular access sites hourly  3.  Every 4-6 hours minimum:  Change oxygen saturation probe site  4.  Every 4-6 hours:  If on nasal continuous positive airway pressure, respiratory therapy assess nares and determine need for appliance change or resting period  7/24/2025 1725 by Siobhan Zamudio RN  Outcome: Progressing  7/24/2025 0406 by Aquiles Ríos RN  Outcome: Progressing     Problem: ABCDS Injury Assessment  Goal: Absence of physical injury  Outcome: Progressing

## 2025-07-24 NOTE — PLAN OF CARE
Problem: Safety - Adult  Goal: Free from fall injury  7/24/2025 0406 by Aquiles Ríos RN  Outcome: Progressing  7/23/2025 1437 by Siobhan Zamudio RN  Outcome: Progressing     Problem: Discharge Planning  Goal: Discharge to home or other facility with appropriate resources  7/24/2025 0406 by Aquiles Ríos RN  Outcome: Progressing  7/23/2025 1437 by Siobhan Zamudio RN  Outcome: Progressing     Problem: Skin/Tissue Integrity  Goal: Skin integrity remains intact  Description: 1.  Monitor for areas of redness and/or skin breakdown  2.  Assess vascular access sites hourly  3.  Every 4-6 hours minimum:  Change oxygen saturation probe site  4.  Every 4-6 hours:  If on nasal continuous positive airway pressure, respiratory therapy assess nares and determine need for appliance change or resting period  7/24/2025 0406 by Aquiles Ríos, RN  Outcome: Progressing  7/23/2025 1437 by Siobhan Zamudio RN  Outcome: Progressing     Problem: ABCDS Injury Assessment  Goal: Absence of physical injury  7/23/2025 1437 by Siobhan Zamudio RN  Outcome: Progressing

## 2025-07-24 NOTE — PROGRESS NOTES
Physical Therapy  Physical Therapy Treatment     Name: Mark Owusu  : 1950  MRN: [id number]      Date of Service: 2025    Evaluating PT:  Wagner Mckeon, PT, DPT [id number]    Room #:  6509/6509-A  Diagnosis:  AMS (altered mental status) [R41.82]  PMHx/PSHx:    Past Medical History:   Diagnosis Date    Arthritis     Constipation     Diarrhea     Dislocated shoulder, right     GERD (gastroesophageal reflux disease) 2015    Hearing loss     Hyperlipidemia     Hypoglycemia     Hyponatremia     Liver disease     Mixed hyperlipidemia 2015    New onset seizure (HCC) 2019    Renal failure     history of renal failure 4x per patient's family.    Skin cancer     Squamous    Sleep apnea     Syncope and collapse     Urinary incontinence      Procedure/Surgery:  none  Precautions:  Falls, alarms, sitter, cognition  Equipment Needs:  TBD    SUBJECTIVE:    Pt lives with wife in a 2 story home with ramped entry.  First floor setup.      Pt ambulated with SPC PTA.    OBJECTIVE:   Initial Evaluation  Date: 25 Treatment Date: 25 Short Term/ Long Term   Goals   AM-PAC 6 Clicks     Was pt agreeable to Eval/treatment? Yes yes    Does pt have pain? Chronic back and B shoulder pain No c/o pain     Bed Mobility  Rolling: NT  Supine to sit: Johnna with HOB elevated  Sit to supine: NT  Scooting: MaxA Rolling: NT  Supine to sit: Johnna with HOB elevated  Sit to supine: NT  Scooting: Johnna SBA   Transfers Sit to stand: ModA bed; Johnna chair  Stand to sit: ModA  Stand pivot: Johnna with WW Sit to stand: ModA   Stand to sit: ModA  Stand pivot: ModA HHA SBA with AAD   Ambulation   20 feet x 2 reps with Johnna  with WW A few steps to bedside chair with HHA ModA >150 feet with SBA with AAD   Stair negotiation: ascended and descended NT NT >4 steps with 1 rail SBA   ROM BUE:  WFL  BLE:  WFL     Strength BUE:  WFL  BLE:  4/5  Increase by 1/3 MMT grade   Balance Sitting EOB:  SBA  Dynamic Standing:  ModA no

## 2025-07-24 NOTE — PROGRESS NOTES
[facility] Quality Flow/Interdisciplinary Rounds Progress Note        Quality Flow Rounds held on July 24, 2025    Disciplines Attending:  Bedside Nurse, , , and Nursing Unit Leadership    Mark Owusu was admitted on 7/22/2025  2:15 PM    Anticipated Discharge Date:       Disposition:    Jeferson Score:  Jeferson Scale Score: 12    BS RISK OF UNPLANNED READMISSION 2.0             15.4 Total Score        Discussed patient goal for the day, patient clinical progression, and barriers to discharge.  The following Goal(s) of the Day/Commitment(s) have been identified:  Diagnostics - Report Results and Labs - Report Results      Aurora Newberry RN  July 24, 2025
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
## 2025-07-24 NOTE — CARE COORDINATION
7/24/25  Transition of Care update.  Patient admitted for AMS.  MRI of the brain complete and negative. Labs reviewed and stable. Patient has been pleasant and cooperative. Patient continues on a Dysphagia diet. PT/OT evaluations reveal Am-PAC of 15/24 for OT and 14/24 for PT. Wife is requesting a LOU stay. New Referrals were made to 1. [facility], 2, [facility] and 3. [facility] all of which are pending. NURIS/RODY to follow for discharge planning once accepting facility is confirmed.    3:00pm  Spoke with Liaison for [facility].  [facility] does not have a bed avail but [facility] does.  Liaison spoke with family who is requesting referral be placed to [facility]. Referral placed and pending.     Electronically signed by VICKIE Tim on 7/24/2025 at 1:23 PM

## 2025-07-24 NOTE — PROGRESS NOTES
Occupational Therapy  OT BEDSIDE TREATMENT NOTE   [facility]  [address], [place]      Date:2025  Patient Name: Mark Owusu  MRN: [id number]  : 1950  Room: [address] [place], [place] [address]-A     Evaluating OT: Kasi Moser OTR/L #[id number]      Referring Provider: Rabia Benson APRN - CNP   Specific Provider Orders/Date: OT eval and treat 25     Diagnosis: AMS (altered mental status) [R41.82]   Pt admitted to hospital with AMS and agitation.       Pertinent Medical History:  has a past medical history of Arthritis, Constipation, Diarrhea, Dislocated shoulder, right, GERD (gastroesophageal reflux disease), Hearing loss, Hyperlipidemia, Hypoglycemia, Hyponatremia, Liver disease, Mixed hyperlipidemia, New onset seizure (HCC), Renal failure, Skin cancer, Sleep apnea, Syncope and collapse, and Urinary incontinence.         Precautions:  Fall Risk, , bed alarm, cognition      Assessment of current deficits    [x] Functional mobility          [x]ADLs           [x] Strength                  []Cognition    [x] Functional transfers        [x] IADLs         [x] Safety Awareness   [x]Endurance    [] Fine Coordination                        [x] Balance      [] Vision/perception   []Sensation      []Gross Motor Coordination            [] ROM           [] Delirium                   [] Motor Control      OT PLAN OF CARE   OT POC based on physician orders, patient diagnosis and results of clinical assessment     Frequency/Duration 1-5 days/wk for 2 weeks PRN   Specific OT Treatment Interventions to include:   * Instruction/training on adapted ADL techniques and AE recommendations to increase functional independence within precautions       * Training on energy conservation strategies, correct breathing pattern and techniques to improve independence/tolerance for self-care routine  * Functional transfer/mobility training/DME recommendations for  Supervision    LB Dressing Moderate Assist      Mod A  To dof/don socks sitting in chair due to limited reach, deferred use of AE due to cognitive deficits Supervision    Bathing Moderate Assist Mod A  For UB/LB sponge bathing sitting in chair and standing, vc for sequencing, intermittent rest breaks due to fatigue Supervision    Toileting Dependent      All aspects in bathroom  Max A  For posterior hygiene standing  Supervision    Bed Mobility  Supine to sit: Minimal Assist   Sit to supine: Minimal Assist  Supine to sit: Minimal Assist   Sit to supine: NT Supine to sit: Supervision   Sit to supine: Supervision    Functional Transfers Moderate Assist        Moderate Assist   To/from EOB and arm chair  Supervision    Functional Mobility Moderate Assist      Ambulated with w/w (+ unsteadiness) Mod A  Using ww to take approx 3 steps to bedside chair  Supervision    Balance Sitting:     Static:  SBA    Dynamic:SBA  Standing: mod A Sitting:     Static:  SBA    Dynamic:SBA  Standing: mod A      Activity Tolerance F-     Pt denies pain this session Fair   BP supine 158/97  BP sitting in chair after transfer 148/98 F+   Visual/  Perceptual wfl                         Comments:  Cleared by RN to see pt. Upon arrival patient supine in bed and agreeable to OT session. At end of session, patient sitting up in chair with call light and phone within reach, all lines and tubes intact, chair alarm on.  Extensive tme required for rest breaks and recovery. Overall patient demonstrated decreased independence and safety during completion of ADL and transitional movements    Treatment: includes above grid, therapist facilitated ADL tasks, bed mobility, functional transfers/mobility to address safety awareness, implementation of fall prevention strategies, & safety awareness throughout ADLs. Pt would benefit from continued skilled OT to increase safety and independence with completion of ADL/IADL tasks for functional independence and

## 2025-07-25 VITALS
WEIGHT: 114.64 LBS | SYSTOLIC BLOOD PRESSURE: 121 MMHG | HEIGHT: 62 IN | BODY MASS INDEX: 21.1 KG/M2 | HEART RATE: 87 BPM | RESPIRATION RATE: 22 BRPM | DIASTOLIC BLOOD PRESSURE: 81 MMHG | OXYGEN SATURATION: 96 % | TEMPERATURE: 98.1 F

## 2025-07-25 LAB
ANION GAP SERPL CALCULATED.3IONS-SCNC: 11 MMOL/L (ref 7–16)
BASOPHILS # BLD: 0.03 K/UL (ref 0–0.2)
BASOPHILS NFR BLD: 1 % (ref 0–2)
BUN SERPL-MCNC: 10 MG/DL (ref 8–23)
CALCIUM SERPL-MCNC: 8.5 MG/DL (ref 8.8–10.2)
CHLORIDE SERPL-SCNC: 107 MMOL/L (ref 98–107)
CO2 SERPL-SCNC: 20 MMOL/L (ref 22–29)
CREAT SERPL-MCNC: 0.9 MG/DL (ref 0.7–1.2)
EKG ATRIAL RATE: 83 BPM
EKG P AXIS: 57 DEGREES
EKG P-R INTERVAL: 144 MS
EKG Q-T INTERVAL: 388 MS
EKG QRS DURATION: 84 MS
EKG QTC CALCULATION (BAZETT): 455 MS
EKG R AXIS: -72 DEGREES
EKG T AXIS: 63 DEGREES
EKG VENTRICULAR RATE: 83 BPM
EOSINOPHIL # BLD: 0.11 K/UL (ref 0.05–0.5)
EOSINOPHILS RELATIVE PERCENT: 2 % (ref 0–6)
ERYTHROCYTE [DISTWIDTH] IN BLOOD BY AUTOMATED COUNT: 14.2 % (ref 11.5–15)
GFR, ESTIMATED: 89 ML/MIN/1.73M2
GLUCOSE SERPL-MCNC: 104 MG/DL (ref 74–99)
HCT VFR BLD AUTO: 35.8 % (ref 37–54)
HGB BLD-MCNC: 11.7 G/DL (ref 12.5–16.5)
IMM GRANULOCYTES # BLD AUTO: <0.03 K/UL (ref 0–0.58)
IMM GRANULOCYTES NFR BLD: 0 % (ref 0–5)
LYMPHOCYTES NFR BLD: 1.19 K/UL (ref 1.5–4)
LYMPHOCYTES RELATIVE PERCENT: 19 % (ref 20–42)
MAGNESIUM SERPL-MCNC: 1.4 MG/DL (ref 1.6–2.4)
MCH RBC QN AUTO: 30.6 PG (ref 26–35)
MCHC RBC AUTO-ENTMCNC: 32.7 G/DL (ref 32–34.5)
MCV RBC AUTO: 93.7 FL (ref 80–99.9)
MONOCYTES NFR BLD: 0.67 K/UL (ref 0.1–0.95)
MONOCYTES NFR BLD: 11 % (ref 2–12)
NEUTROPHILS NFR BLD: 68 % (ref 43–80)
NEUTS SEG NFR BLD: 4.34 K/UL (ref 1.8–7.3)
PLATELET # BLD AUTO: 230 K/UL (ref 130–450)
PMV BLD AUTO: 8.9 FL (ref 7–12)
POTASSIUM SERPL-SCNC: 3.4 MMOL/L (ref 3.5–5.1)
RBC # BLD AUTO: 3.82 M/UL (ref 3.8–5.8)
SODIUM SERPL-SCNC: 138 MMOL/L (ref 136–145)
WBC OTHER # BLD: 6.4 K/UL (ref 4.5–11.5)

## 2025-07-25 PROCEDURE — 6370000000 HC RX 637 (ALT 250 FOR IP): Performed by: NURSE PRACTITIONER

## 2025-07-25 PROCEDURE — 6370000000 HC RX 637 (ALT 250 FOR IP): Performed by: INTERNAL MEDICINE

## 2025-07-25 PROCEDURE — 6360000002 HC RX W HCPCS: Performed by: NURSE PRACTITIONER

## 2025-07-25 PROCEDURE — 80048 BASIC METABOLIC PNL TOTAL CA: CPT

## 2025-07-25 PROCEDURE — 97535 SELF CARE MNGMENT TRAINING: CPT

## 2025-07-25 PROCEDURE — 6360000002 HC RX W HCPCS: Performed by: INTERNAL MEDICINE

## 2025-07-25 PROCEDURE — 6370000000 HC RX 637 (ALT 250 FOR IP): Performed by: PSYCHIATRY & NEUROLOGY

## 2025-07-25 PROCEDURE — 36415 COLL VENOUS BLD VENIPUNCTURE: CPT

## 2025-07-25 PROCEDURE — 85025 COMPLETE CBC W/AUTO DIFF WBC: CPT

## 2025-07-25 PROCEDURE — 83735 ASSAY OF MAGNESIUM: CPT

## 2025-07-25 PROCEDURE — 97530 THERAPEUTIC ACTIVITIES: CPT

## 2025-07-25 PROCEDURE — 93010 ELECTROCARDIOGRAM REPORT: CPT | Performed by: INTERNAL MEDICINE

## 2025-07-25 PROCEDURE — 99239 HOSP IP/OBS DSCHRG MGMT >30: CPT | Performed by: INTERNAL MEDICINE

## 2025-07-25 RX ORDER — DIVALPROEX SODIUM 125 MG/1
125 CAPSULE, COATED PELLETS ORAL EVERY 12 HOURS SCHEDULED
Qty: 28 CAPSULE | Refills: 0 | Status: SHIPPED | OUTPATIENT
Start: 2025-07-25 | End: 2025-08-08

## 2025-07-25 RX ORDER — LANOLIN ALCOHOL/MO/W.PET/CERES
400 CREAM (GRAM) TOPICAL ONCE
Status: COMPLETED | OUTPATIENT
Start: 2025-07-25 | End: 2025-07-25

## 2025-07-25 RX ORDER — DIVALPROEX SODIUM 125 MG/1
125 CAPSULE, COATED PELLETS ORAL EVERY 8 HOURS SCHEDULED
Status: DISCONTINUED | OUTPATIENT
Start: 2025-07-25 | End: 2025-07-25

## 2025-07-25 RX ORDER — DIVALPROEX SODIUM 125 MG/1
125 CAPSULE, COATED PELLETS ORAL EVERY 12 HOURS SCHEDULED
Status: DISCONTINUED | OUTPATIENT
Start: 2025-07-25 | End: 2025-07-25 | Stop reason: HOSPADM

## 2025-07-25 RX ORDER — LOPERAMIDE HYDROCHLORIDE 2 MG/1
2 CAPSULE ORAL 4 TIMES DAILY PRN
Qty: 40 CAPSULE | Refills: 0 | Status: SHIPPED | OUTPATIENT
Start: 2025-07-25 | End: 2025-08-04

## 2025-07-25 RX ORDER — HYDRALAZINE HYDROCHLORIDE 20 MG/ML
10 INJECTION INTRAMUSCULAR; INTRAVENOUS ONCE
Status: COMPLETED | OUTPATIENT
Start: 2025-07-25 | End: 2025-07-25

## 2025-07-25 RX ADMIN — POTASSIUM BICARBONATE 40 MEQ: 782 TABLET, EFFERVESCENT ORAL at 11:16

## 2025-07-25 RX ADMIN — PANTOPRAZOLE SODIUM 40 MG: 40 TABLET, DELAYED RELEASE ORAL at 08:26

## 2025-07-25 RX ADMIN — HYDRALAZINE HYDROCHLORIDE 10 MG: 20 INJECTION INTRAMUSCULAR; INTRAVENOUS at 11:27

## 2025-07-25 RX ADMIN — SODIUM CHLORIDE 1 G: 1 TABLET ORAL at 08:26

## 2025-07-25 RX ADMIN — METOCLOPRAMIDE 10 MG: 10 TABLET ORAL at 08:26

## 2025-07-25 RX ADMIN — METOPROLOL TARTRATE 12.5 MG: 25 TABLET, FILM COATED ORAL at 08:26

## 2025-07-25 RX ADMIN — ENOXAPARIN SODIUM 40 MG: 100 INJECTION SUBCUTANEOUS at 08:25

## 2025-07-25 RX ADMIN — METOCLOPRAMIDE 10 MG: 10 TABLET ORAL at 11:28

## 2025-07-25 RX ADMIN — Medication 400 MG: at 11:16

## 2025-07-25 RX ADMIN — TAMSULOSIN HYDROCHLORIDE 0.4 MG: 0.4 CAPSULE ORAL at 08:26

## 2025-07-25 RX ADMIN — MAGNESIUM SULFATE HEPTAHYDRATE 2000 MG: 40 INJECTION, SOLUTION INTRAVENOUS at 11:19

## 2025-07-25 RX ADMIN — DIVALPROEX SODIUM 125 MG: 125 CAPSULE, COATED PELLETS ORAL at 08:26

## 2025-07-25 RX ADMIN — EZETIMIBE 10 MG: 10 TABLET ORAL at 08:26
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
## 2025-07-25 NOTE — PROGRESS NOTES
Occupational Therapy  OT BEDSIDE TREATMENT NOTE      Date:2025  Patient Name: Mark Owusu  MRN: [id number]  : 1950  Room: [address] [place] [address]-A     Evaluating OT: Kasi Moser OTR/L #[id number]      Referring Provider: Rabia Benson APRN - CNP   Specific Provider Orders/Date: OT eval and treat 25     Diagnosis: AMS (altered mental status) [R41.82]   Pt admitted to hospital with AMS and agitation.       Pertinent Medical History:  has a past medical history of Arthritis, Constipation, Diarrhea, Dislocated shoulder, right, GERD (gastroesophageal reflux disease), Hearing loss, Hyperlipidemia, Hypoglycemia, Hyponatremia, Liver disease, Mixed hyperlipidemia, New onset seizure (HCC), Renal failure, Skin cancer, Sleep apnea, Syncope and collapse, and Urinary incontinence.         Precautions:  Fall Risk, , bed alarm, cognition      Assessment of current deficits    [x] Functional mobility          [x]ADLs           [x] Strength                  []Cognition    [x] Functional transfers        [x] IADLs         [x] Safety Awareness   [x]Endurance    [] Fine Coordination                        [x] Balance      [] Vision/perception   []Sensation      []Gross Motor Coordination            [] ROM           [] Delirium                   [] Motor Control      OT PLAN OF CARE   OT POC based on physician orders, patient diagnosis and results of clinical assessment     Frequency/Duration 1-5 days/wk for 2 weeks PRN   Specific OT Treatment Interventions to include:   * Instruction/training on adapted ADL techniques and AE recommendations to increase functional independence within precautions       * Training on energy conservation strategies, correct breathing pattern and techniques to improve independence/tolerance for self-care routine  * Functional transfer/mobility training/DME recommendations for increased independence, safety, and fall prevention  * Patient/Family education to increase follow through with

## 2025-07-25 NOTE — PLAN OF CARE
Problem: Safety - Adult  Goal: Free from fall injury  7/24/2025 2121 by Zuleyka Govea, RN  Outcome: Progressing     Problem: Discharge Planning  Goal: Discharge to home or other facility with appropriate resources  7/24/2025 2121 by Zuleyka Govea RN  Outcome: Progressing     Problem: Skin/Tissue Integrity  Goal: Skin integrity remains intact  Description: 1.  Monitor for areas of redness and/or skin breakdown  2.  Assess vascular access sites hourly  3.  Every 4-6 hours minimum:  Change oxygen saturation probe site  4.  Every 4-6 hours:  If on nasal continuous positive airway pressure, respiratory therapy assess nares and determine need for appliance change or resting period  7/24/2025 2121 by Zuleyka Govea, RN  Outcome: Progressing     Problem: ABCDS Injury Assessment  Goal: Absence of physical injury  7/24/2025 2121 by Zuleyka Govea, RN  Outcome: Progressing

## 2025-07-25 NOTE — DISCHARGE INSTR - COC
Continuity of Care Form    Patient Name: Mark Owusu   :  1950  MRN:  [id number]    Admit date:  2025  Discharge date:      Code Status Order: Full Code   Advance Directives:     Admitting Physician:  Moshe Serrato MD  PCP: Rukhsana Olivia APRN - CNP    Discharging Nurse: ADAM Perez  Discharging Hospital Unit/Room#: 6509/6509-A  Discharging Unit Phone Number: [phone]    Emergency Contact:   Extended Emergency Contact Information  Primary Emergency Contact: Keyon Owusu  Address: [address] [place] [address] [facility]  Home Phone: [phone]  Mobile Phone: [phone]  Relation: Spouse   needed? No  Secondary Emergency Contact: Bart Hansen   [facility]  Home Phone: [phone]  Mobile Phone: [phone]  Relation: Child   needed? No    Past Surgical History:  Past Surgical History:   Procedure Laterality Date    CHOLECYSTECTOMY      COLONOSCOPY      INCISIONAL HERNIA REPAIR N/A 10/13/2016    INGUINAL HERNIA REPAIR Left 2013    laparoscopic left inguinal hernia repair    LAMINECTOMY      [facility]    LARYNGOSCOPY Right 6/10/2025    **FROZEN SECTION** PANENDOSCOPY WITH BIOPSY FROZEN SECTION LARYNGOSCOPY performed by Estelita Jacobo DO at [facility] OR    NERVE BLOCK Left 2021    LEFT S1 TRANSFORAMINAL EPIDURAL STEROID INJECTION(REQUESTS LAST APPOINMENT) performed by Debra Guerrero DO at [facility] OR    NERVE BLOCK Left 2021    LEFT L5-S1 TRANSFORAMINAL EPIDURAL STEROID INJECTION(WANTS LATER) performed by Debra Guerrero DO at [facility] OR    OTHER SURGICAL HISTORY Right     Skin cancer removed from right chest 2024    PANCREAS SURGERY  2015    Whipple procedure    TN LAPS COLECTOMY ABDL W/PROCTECTOMY W/ILEOSTOMY N/A 2018    DIAGNOSTIC LAPAROSCOPY POSS LAPAROTOMY POSS BOWEL RESECTION performed by Rodney Newberry MD at [facility] OR    TN LAPS ENTERECT RESCJ 1 SMALL  reflux disease) K21.9    Mixed hyperlipidemia E78.2    Lumbago M54.50    Arthritis M19.90    Polyp of colon K63.5    Hyperlipidemia E78.5    Hernia, incisional K43.2    Hypoglycemia E16.2    Small bowel obstruction (HCC) K56.609    Humerus fracture S42.309A    Lumbar radiculitis M54.16    DDD (degenerative disc disease), lumbosacral M51.379    H/O Whipple procedure Z90.410, Z90.49    History of lumbosacral spine surgery Z98.890    ALFRED (acute kidney injury) N17.9    Rhabdomyolysis M62.82    Falls frequently R29.6    Tibial plateau fracture S82.142A    Weakness R53.1    At risk for falls Z91.81    Malignant neoplasm of tonsil (HCC) C09.9    Malignant neoplasm of tonsillar fossa (HCC) C09.0    AMS (altered mental status) R41.82    Delirium due to another medical condition F05    Goals of care, counseling/discussion Z71.89    Palliative care encounter Z51.5       Isolation/Infection:   Isolation            No Isolation          Patient Infection Status    None to display              Nurse Assessment:  Last Vital Signs: /84   Pulse 88   Temp 97.5 °F (36.4 °C) (Temporal)   Resp 21   Ht 1.575 m (5' 2\")   Wt 52 kg (114 lb 10.2 oz)   SpO2 95%   BMI 20.97 kg/m²     Last documented pain score (0-10 scale): Pain Level: 5  Last Weight:   Wt Readings from Last 1 Encounters:   07/22/25 52 kg (114 lb 10.2 oz)     Mental Status:  disoriented    IV Access:  - None    Nursing Mobility/ADLs:  Walking   Assisted  Transfer  Assisted  Bathing  Assisted  Dressing  Assisted  Toileting  Assisted  Feeding  Assisted  Med Admin  Assisted  Med Delivery   whole    Wound Care Documentation and Therapy:  Incision 06/10/25 Mouth (Active)   Number of days: 44       Incision 04/26/19 Shoulder Right (Active)   Number of days: 2281        Elimination:  Continence:   Bowel: No  Bladder: No  Urinary Catheter: None   Colostomy/Ileostomy/Ileal Conduit: No       Date of Last BM: 7/23    Intake/Output Summary (Last 24 hours) at 7/25/2025

## 2025-07-25 NOTE — CARE COORDINATION
7/25/25  Transition of Care update.  Patient accepted at [facility] for [facility] stay.  No pre-cert needed with update to attending to check if ok to discharge? ROMMEL, Destination, PASRR complete.  Transport started and on the soft chart but facility will provide transport to [facility]. Patients wife called and aware of discharge likely today.  Nursing also updated and will call the nurse to nurse once discharge order is placed. Discharge time to be obtained once order is placed.  SW/CM to follow.     11:15  Discharge order placed.  Transport arranged for 1pm.     11:20  Transport time changed to 2pm so patient can have mag replaced.     Electronically signed by VICKIE Tim on 7/25/2025 at 11:13 AM

## 2025-07-25 NOTE — PROGRESS NOTES
[facility] Quality Flow/Interdisciplinary Rounds Progress Note        Quality Flow Rounds held on July 25, 2025    Disciplines Attending:  Bedside Nurse, , , and Nursing Unit Leadership    Mark Owusu was admitted on 7/22/2025  2:15 PM    Anticipated Discharge Date:       Disposition:    Jeferson Score:  Jeferson Scale Score: 16    BS RISK OF UNPLANNED READMISSION 2.0             16.8 Total Score        Discussed patient goal for the day, patient clinical progression, and barriers to discharge.  The following Goal(s) of the Day/Commitment(s) have been identified:  Diagnostics - Report Results and Labs - Report Results      Aurora Newberry RN  July 25, 2025

## 2025-07-29 ENCOUNTER — TELEPHONE (OUTPATIENT)
Age: 75
End: 2025-07-29

## 2025-07-29 ENCOUNTER — TELEPHONE (OUTPATIENT)
Dept: ENT CLINIC | Age: 75
End: 2025-07-29

## 2025-07-29 NOTE — TELEPHONE ENCOUNTER
Patient to be scheduled this Thursday, 07/31/25, per clinical staff / Dr. Wolfe office.      Called [facility].      Spoke with Mesha, she needs to transfer scheduling information to her Clinical supervisor, Angela, who will call our office to confirm if able to set up transportation.      Wife, Keyon, is opposed to bringing patient to our office for treatment, stating patient is confused and not doing well.  Waiting to hear from Angela.

## 2025-07-29 NOTE — TELEPHONE ENCOUNTER
Patient in facility for rehab.  Patient had severe dehydration, diarrhea after fitting for radiation mask.  Patient was seen in ED and referred to rehab

## 2025-07-31 ENCOUNTER — HOSPITAL ENCOUNTER (OUTPATIENT)
Dept: INFUSION THERAPY | Age: 75
End: 2025-07-31

## 2025-07-31 ENCOUNTER — SCHEDULED TELEPHONE ENCOUNTER (OUTPATIENT)
Age: 75
End: 2025-07-31
Payer: MEDICARE

## 2025-07-31 ENCOUNTER — TELEPHONE (OUTPATIENT)
Age: 75
End: 2025-07-31

## 2025-07-31 DIAGNOSIS — C09.9 MALIGNANT NEOPLASM OF TONSIL (HCC): Primary | ICD-10-CM

## 2025-07-31 PROCEDURE — 1123F ACP DISCUSS/DSCN MKR DOCD: CPT | Performed by: STUDENT IN AN ORGANIZED HEALTH CARE EDUCATION/TRAINING PROGRAM

## 2025-07-31 PROCEDURE — 99214 OFFICE O/P EST MOD 30 MIN: CPT | Performed by: STUDENT IN AN ORGANIZED HEALTH CARE EDUCATION/TRAINING PROGRAM

## 2025-07-31 NOTE — TELEPHONE ENCOUNTER
Called and spoke Keyon regarding referral for Palliative Care. Patient has been scheduled to meet with [facility] but canceled due to weakness. Patient had received one round of chemo and ended up in hospital with AMS. Patient was followed by Palliative Care in pt. Keyon shares that she had meeting with [facility] yesterday and they were hopeful that he was able to complete PT. Keyon states he does not have any unmanaged symptoms. Explained our team was able to see her  at the facility. Keyon declines appointment at this time , has our contact information and will call if needed.

## 2025-07-31 NOTE — PROGRESS NOTES
[facility] PHYSICIANS [facility] MED ONCOLOGY  [address] [facility] [address]  Dept: [phone]  Loc: [phone]    Yamini Tracy MD   ·   MD Felicity Kennedy APRN  ·  LINDSEY Carey      [facility] in [address] [address]  P: [phone]  F: [phone] [facility] [place]  [address] [place] [address]  P: [phone]  F: [phone]  [facility] [place]  [address] [place] [address]  P: [phone]  F: [phone]                 Hematology/Oncology   Clinic Progress Note              Patient: Mark Owusu,  75 y.o. 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  Today's visit was a telephone visit.  They had requested in person visit to be converted to a telephone/virtual visit as he is currently feeling very weak and currently at a rehab center            ONCOLOGIC HISTORY:          Biopsy-5/14/2025   Due to increased size and pain from the right neck mass, biopsy was done, with pathology results as below:       -- Diagnosis --   Right neck mass, core biopsy: Invasive moderately differentiated   squamous cell carcinoma, involving fibrous tissue     Comment: The carcinoma is positive for p40 immunostain.  P16   immunostain (surrogate HPV marker) is negative in tumor cells.   Intradepartmental consultation is obtained.       Balta Cancino MD   **Electronically Signed Out**   zw/5/19/2025      Clinical Information   Procedure:  Core biopsy, right neck mass.     Pre-Operative Diagnosis   Neck mass.     Post Operative Diagnosis   Neck mass.     Source of Specimen   A: RIGHT

## 2025-08-01 NOTE — PROGRESS NOTES
Patient was Telephone virtual visit.  Called [facility] @ [facility].    Spoke with  and gave patient's next appointment date / times.  [facility] to set up transportation for patient.

## 2025-08-26 ENCOUNTER — TELEPHONE (OUTPATIENT)
Age: 75
End: 2025-08-26

## (undated) DEVICE — CHLORAPREP 26ML ORANGE

## (undated) DEVICE — INTENDED FOR TISSUE SEPARATION, AND OTHER PROCEDURES THAT REQUIRE A SHARP SURGICAL BLADE TO PUNCTURE OR CUT.: Brand: BARD-PARKER ® STAINLESS STEEL BLADES

## (undated) DEVICE — GENERATOR ELECSURG FORCETRAID

## (undated) DEVICE — 3 ML SYRINGE LUER-LOCK TIP: Brand: MONOJECT

## (undated) DEVICE — ELECTRODE PT RET AD L9FT HI MOIST COND ADH HYDRGEL CORDED

## (undated) DEVICE — TUBING, SUCTION, 9/32" X 10', STRAIGHT: Brand: MEDLINE

## (undated) DEVICE — TROCAR ENDOSCP L100MM DIA5MM BLDELSS STBL SL OBT RADLUC

## (undated) DEVICE — Device

## (undated) DEVICE — TOTAL TRAY, 16FR 10ML SIL FOLEY, URN: Brand: MEDLINE

## (undated) DEVICE — TUBE IRRIG HNDPC HI FLO TP INTRPULS W/SUCTION TUBE

## (undated) DEVICE — PACK,SHOULDER SPLIT: Brand: MEDLINE

## (undated) DEVICE — GAUZE,SPONGE,2"X2",8PLY,STERILE,LF,2'S: Brand: MEDLINE

## (undated) DEVICE — NEEDLE SPNL 22GA L3.5IN BLK HUB S STL REG WALL FIT STYL W/

## (undated) DEVICE — GARMENT,MEDLINE,DVT,INT,CALF,MED, GEN2: Brand: MEDLINE

## (undated) DEVICE — BLADE ES ELASTOMERIC COAT INSUL DURABLE BEND UPTO 90DEG

## (undated) DEVICE — GOWN,SIRUS,FABRNF,XL,20/CS: Brand: MEDLINE

## (undated) DEVICE — BIT DRL DIA2.7MM PERIPH SCR REUSE FOR COMPHSVE REV SHLDR

## (undated) DEVICE — ANTISEPTIC 16OZ H PEROX 1ST AID ORAL DEBRIDING AGNT

## (undated) DEVICE — DISSECTOR ENDOSCP L39CM JAW L14.5MM 360DEG SHFT ROT STR JAW

## (undated) DEVICE — IMPLANTABLE DEVICE
Type: IMPLANTABLE DEVICE | Site: SHOULDER | Status: NON-FUNCTIONAL
Removed: 2019-04-26

## (undated) DEVICE — SEALER/DIVIDER LAP SHFT L44CM DIA5MM STR BLNT TIP JAW HND

## (undated) DEVICE — APPLIER CLP M/L SHFT DIA5MM 15 LIG LIGAMAX 5

## (undated) DEVICE — GLOVE ORANGE PI 7 1/2   MSG9075

## (undated) DEVICE — SET ENDO INSTR LAPAROSCOPIC INCISIONAL

## (undated) DEVICE — STAPLER INT L28CM DIA29MM CLS STPL H10-2.5MM OPN LEG L5.5MM

## (undated) DEVICE — 40586 ADVANCED TRENDELENBURG POSITIONING KIT: Brand: 40586 ADVANCED TRENDELENBURG POSITIONING KIT

## (undated) DEVICE — PAPER FILTER 1 32CM

## (undated) DEVICE — APPLIER CLP L SHFT DIA12MM 20 ROT MULT LIGACLP

## (undated) DEVICE — MARKER,SKIN,WI/RULER AND LABELS: Brand: MEDLINE

## (undated) DEVICE — SUTURE V-LOC 180 SZ 0 L12IN ABSRB GRN L37MM GS-21 1/2 CIR VLOCL0316

## (undated) DEVICE — 6 X 9  1.75MIL 4-WALL LABGUARD: Brand: MINIGRIP COMMERCIAL LLC

## (undated) DEVICE — MASTISOL ADHESIVE LIQ 2/3ML

## (undated) DEVICE — 1000 S-DRAPE TOWEL DRAPE 10/BX: Brand: STERI-DRAPE™

## (undated) DEVICE — PLUMEPORT LAPAROSCOPIC SMOKE FILTRATION DEVICE: Brand: PLUMEPORT ACTIV

## (undated) DEVICE — NDL CNTR 40CT FM MAG: Brand: MEDLINE INDUSTRIES, INC.

## (undated) DEVICE — IMPLANTABLE DEVICE
Type: IMPLANTABLE DEVICE | Status: NON-FUNCTIONAL
Removed: 2019-04-26

## (undated) DEVICE — STAPLER SKIN L440MM 32MM LNG 12 FIRING B FRM PWR + GRIPPING

## (undated) DEVICE — TUBING, SUCTION, 1/4" X 10', STRAIGHT: Brand: MEDLINE

## (undated) DEVICE — GAUZE,SPONGE,4"X4",8PLY,STRL,LF,10/TRAY: Brand: MEDLINE

## (undated) DEVICE — HYDROPHILIC COATED RED RUBBER URETHRAL CATHETER, SMOOTH ROUNDED TIP, 20 FR (6.7 MM): Brand: DOVER

## (undated) DEVICE — GUARD TEETH AD PR NYL

## (undated) DEVICE — BANDAGE ADH W1XL3IN NAT FAB WVN FLX DURABLE N ADH PD SEAL

## (undated) DEVICE — INTENT TO BE USED WITH SUTURE MATERIAL FOR TISSUE CLOSURE: Brand: RICHARD-ALLAN® NEEDLE 1/2 CIRCLE TAPER

## (undated) DEVICE — GAUZE,SPONGE,4"X4",12PLY,STERILE,LF,2'S: Brand: MEDLINE

## (undated) DEVICE — NEEDLE SPNL 22GA L5IN BLK HUB S STL W/ QNCKE PNT W/OUT

## (undated) DEVICE — PACK PROCEDURE SURG GEN CUST

## (undated) DEVICE — BASIC DOUBLE BASIN 2-LF: Brand: MEDLINE INDUSTRIES, INC.

## (undated) DEVICE — GAUZE,SPONGE,4"X4",16PLY,XRAY,STRL,LF: Brand: MEDLINE

## (undated) DEVICE — TROCAR ENDOSCP L100MM DIA12MM BLDELSS OBT RADLUC STBL SL

## (undated) DEVICE — SET MAJOR INSTR HOUSE

## (undated) DEVICE — CAMERA STRYKER 1488 HD GEN

## (undated) DEVICE — SCISSORS ENDOSCP DIA5MM CRV MPLR CAUT W/ RATCH HNDL

## (undated) DEVICE — SET ENDO INSTR RED YEL LAPAROSCOPIC

## (undated) DEVICE — TOWEL,OR,DSP,ST,BLUE,STD,6/PK,12PK/CS: Brand: MEDLINE

## (undated) DEVICE — NON-DEHP CATHETER EXTENSION SET, MALE LUER LOCK ADAPTER

## (undated) DEVICE — KENDALL 450 SERIES MONITORING FOAM ELECTRODE - RECTANGULAR SHAPE ( 3/PK): Brand: KENDALL

## (undated) DEVICE — Z DISCONTINUED APPLICATOR SURG PREP 0.35OZ 2% CHG 70% ISO ALC W/ HI LT

## (undated) DEVICE — GLOVE SURG SZ 6 THK91MIL LTX FREE SYN POLYISOPRENE ANTI

## (undated) DEVICE — LAPAROSCOPIC SCISSORS: Brand: EPIX LAPAROSCOPIC SCISSORS

## (undated) DEVICE — TOWEL OR BLUEE 16X26IN ST 8 PACK ORB08 16X26ORTWL

## (undated) DEVICE — PUMP SUC IRR TBNG L10FT W/ HNDPC ASSEMB STRYKEFLOW 2

## (undated) DEVICE — NEEDLE HYPO 18GA L1.5IN PNK POLYPR HUB S STL THN WALL FILL

## (undated) DEVICE — SOLUTION IV 500ML 0.9% SOD CHL PH 5 INJ USP VIAFLX PLAS

## (undated) DEVICE — 3M™ RED DOT™ MONITORING ELECTRODE WITH FOAM TAPE AND STICKY GEL 2560, 50/BAG, 20/CASE, 72/PLT: Brand: RED DOT™

## (undated) DEVICE — COLOSTOMY/ILEOSTOMY KIT,FLEXWEAR: Brand: NEW IMAGE

## (undated) DEVICE — Z DISCONTINUED PER MEDLINE USE 2741943 DRESSING AQUACEL 10 IN ALG W9XL25CM SIL CVR WTRPRF VIR BACT BARR ANTIMIC

## (undated) DEVICE — MASK,FACE,MAXFLUIDPROTECT,SHIELD/ERLPS: Brand: MEDLINE

## (undated) DEVICE — YANKAUER,BULB TIP,W/O VENT,RIGID,STERILE: Brand: MEDLINE

## (undated) DEVICE — PMI PTFE COATED LAPAROSCOPIC WIRE L-HOOK 44 CM: Brand: PMI

## (undated) DEVICE — 6 ML SYRINGE LUER-LOCK TIP: Brand: MONOJECT

## (undated) DEVICE — GLOVE ORANGE PI 7   MSG9070

## (undated) DEVICE — SOLUTION IRRIG 1000ML 09% SOD CHL USP PIC PLAS CONTAINER

## (undated) DEVICE — DOUBLE BASIN SET: Brand: MEDLINE INDUSTRIES, INC.

## (undated) DEVICE — PACK,EENT,STERILE,PK I: Brand: MEDLINE

## (undated) DEVICE — STAPLER EXT 65MM S STL AUTO DISP PURSTRING

## (undated) DEVICE — SYRINGE IRRIG 60ML SFT PLIABLE BLB EZ TO GRP 1 HND USE W/

## (undated) DEVICE — STAPLER INT L75MM CUT LN L73MM STPL LN L77MM BLU B FRM 8

## (undated) DEVICE — NEEDLE HYPO 25GA L1.5IN BLU POLYPR HUB S STL REG BVL STR

## (undated) DEVICE — SPONGE,TONSIL,DBL STRNG,XRAY,MED,1",STRL: Brand: MEDLINE INDUSTRIES, INC.

## (undated) DEVICE — SYRINGE, LUER LOCK, 10ML: Brand: MEDLINE

## (undated) DEVICE — CONTAINER SPEC COLL 960ML POLYPR TRIANG GRAD INTAKE/OUTPUT

## (undated) DEVICE — BIT DRILL DIA3.5MM CANNULATED 5MM SCREW

## (undated) DEVICE — STANDARD HYPODERMIC NEEDLE,POLYPROPYLENE HUB: Brand: MONOJECT

## (undated) DEVICE — MEDI-VAC NON-CONDUCTIVE SUCTION TUBING: Brand: CARDINAL HEALTH

## (undated) DEVICE — TROCAR ENDOSCP L150MM DIA5MM BLDELSS STBL SL CAM PRT W/

## (undated) DEVICE — Z INACTIVE USE 2660664 SOLUTION IRRIG 3000ML 0.9% SOD CHL USP UROMATIC PLAS CONT

## (undated) DEVICE — GOWN,SIRUS,NONRNF,SETINSLV,XL,20/CS: Brand: MEDLINE

## (undated) DEVICE — KIT,ANTI FOG,W/SPONGE & FLUID,SOFT PACK: Brand: MEDLINE

## (undated) DEVICE — ANTI-FOG SOLUTION WITH FOAM PAD: Brand: DEVON

## (undated) DEVICE — IMMOBILIZER SHLDR L10.5-17IN D7IN SLNG W/ 15DEG ABD PLLW

## (undated) DEVICE — 3M™ IOBAN™ 2 ANTIMICROBIAL INCISE DRAPE 6640EZ: Brand: IOBAN™ 2

## (undated) DEVICE — PAD,NON-ADHERENT,2X3,STERILE,LF,1/PK: Brand: MEDLINE

## (undated) DEVICE — CATHETER,RED SUCT,POLY-CATH,10: Brand: MEDLINE INDUSTRIES, INC.

## (undated) DEVICE — MEDI-VAC YANKAUER SUCTION HANDLE W/BULBOUS TIP: Brand: CARDINAL HEALTH

## (undated) DEVICE — CADDY CLIN COMPACT ALMND ISODOORCADDY

## (undated) DEVICE — 3M™ STERI-DRAPE™ U-DRAPE 1015: Brand: STERI-DRAPE™

## (undated) DEVICE — DRAPE,U/ SHT,SPLIT,PLAS,STERIL: Brand: MEDLINE

## (undated) DEVICE — CONTROL SYRINGE LUER-LOCK TIP: Brand: MONOJECT

## (undated) DEVICE — KIT BEDSIDE REVITAL OX 500ML

## (undated) DEVICE — SUTURE VCRL SZ 1 L27IN ABSRB VLT L70MM XLH 1/2 CIR J583G

## (undated) DEVICE — SUTURE FIBERWIRE SZ 5 L38IN NONABSORBABLE BLU L48MM 1/2 AR7211

## (undated) DEVICE — FORCEPS BX L240CM JAW DIA2.8MM L CAP W/ NDL MIC MESH TOOTH

## (undated) DEVICE — GOWN ISOLATN REG YEL M WT MULTIPLY SIDETIE LEV 2

## (undated) DEVICE — COVER,TABLE,44X90,STERILE: Brand: MEDLINE

## (undated) DEVICE — CONVERTORS STOCKINETTE: Brand: CONVERTORS

## (undated) DEVICE — NEEDLE FLTR 18GA L1.5IN MEM THK5UM BLNT DISP

## (undated) DEVICE — NEEDLE INSUF L120MM DIA2MM DISP FOR PNEUMOPERI ENDOPATH

## (undated) DEVICE — SPONGE,NEURO,0.5"X3",XR,STRL,LF,10/PK: Brand: MEDLINE

## (undated) DEVICE — DRESSING,GAUZE,XEROFORM,CURAD,1"X8",ST: Brand: CURAD

## (undated) DEVICE — SPONGE GZ 4IN 4IN 4 PLY N WVN AVANT

## (undated) DEVICE — ENCORE® LATEX TEXTURED SIZE 6.5, STERILE LATEX POWDER-FREE SURGICAL GLOVE: Brand: ENCORE

## (undated) DEVICE — PACK SURG LAP CHOLE CUSTOM

## (undated) DEVICE — COVER HNDL LT DISP

## (undated) DEVICE — TRAY PROCED CUSTOM GASTROINTESTINAL

## (undated) DEVICE — DEVICE SUT FOR TRCR SITE INCIS ENDO CLOSE

## (undated) DEVICE — Device: Brand: DEFENDO VALVE AND CONNECTOR KIT

## (undated) DEVICE — PMI PTFE COATED LAPAROSCOPIC WIRE L-HOOK 33 CM: Brand: PMI

## (undated) DEVICE — 3M™ COBAN™ NL STERILE NON-LATEX SELF-ADHERENT WRAP, 2084S, 4 IN X 5 YD (10 CM X 4,5 M), 18 ROLLS/CASE: Brand: 3M™ COBAN™

## (undated) DEVICE — SUTURE ABSRB L6IN L37MM 0 GS-21 GRN 1/2 CIR TAPR PNT NDL VLOCL0306

## (undated) DEVICE — Z CONVERTED USE 2275207 CLOTH PREP W7.5XL7.5IN 2% CHG SKIN ALC AND RNS FREE

## (undated) DEVICE — SPONGE LAP W18XL18IN WHT COT 4 PLY FLD STRUNG RADPQ DISP ST

## (undated) DEVICE — EVAC 70 XTRA WAND: Brand: COBLATION

## (undated) DEVICE — DRAPE C ARM W41XL74IN UNIV MOB W RUBBERBAND CLP

## (undated) DEVICE — 4-PORT MANIFOLD: Brand: NEPTUNE 2

## (undated) DEVICE — LUBRICANT SURG JELLY ST BACTER TUBE 4.25OZ

## (undated) DEVICE — COVER,LIGHT HANDLE,FLX,1/PK: Brand: MEDLINE INDUSTRIES, INC.

## (undated) DEVICE — STRIP,CLOSURE,WOUND,MEDI-STRIP,1/2X4: Brand: MEDLINE

## (undated) DEVICE — [HIGH FLOW INSUFFLATOR,  DO NOT USE IF PACKAGE IS DAMAGED,  KEEP DRY,  KEEP AWAY FROM SUNLIGHT,  PROTECT FROM HEAT AND RADIOACTIVE SOURCES.]: Brand: PNEUMOSURE

## (undated) DEVICE — PAD,ABDOMINAL,5"X9",ST,LF,25/BX: Brand: MEDLINE INDUSTRIES, INC.

## (undated) DEVICE — BLOCK BITE 60FR CAREGUARD